# Patient Record
Sex: FEMALE | Race: WHITE | HISPANIC OR LATINO | Employment: FULL TIME | URBAN - METROPOLITAN AREA
[De-identification: names, ages, dates, MRNs, and addresses within clinical notes are randomized per-mention and may not be internally consistent; named-entity substitution may affect disease eponyms.]

---

## 2017-05-26 ENCOUNTER — GENERIC CONVERSION - ENCOUNTER (OUTPATIENT)
Dept: OTHER | Facility: OTHER | Age: 27
End: 2017-05-26

## 2017-06-01 ENCOUNTER — GENERIC CONVERSION - ENCOUNTER (OUTPATIENT)
Dept: OTHER | Facility: OTHER | Age: 27
End: 2017-06-01

## 2017-07-28 ENCOUNTER — ALLSCRIPTS OFFICE VISIT (OUTPATIENT)
Dept: OTHER | Facility: OTHER | Age: 27
End: 2017-07-28

## 2017-07-28 DIAGNOSIS — M54.41 LOW BACK PAIN WITH RIGHT-SIDED SCIATICA: ICD-10-CM

## 2017-07-28 DIAGNOSIS — E89.0 POSTPROCEDURAL HYPOTHYROIDISM: ICD-10-CM

## 2017-07-28 LAB
BILIRUB UR QL STRIP: NORMAL
CLARITY UR: NORMAL
COLOR UR: YELLOW
GLUCOSE (HISTORICAL): NORMAL
HGB UR QL STRIP.AUTO: NORMAL
KETONES UR STRIP-MCNC: NORMAL MG/DL
LEUKOCYTE ESTERASE UR QL STRIP: NORMAL
NITRITE UR QL STRIP: NORMAL
PH UR STRIP.AUTO: 6 [PH]
PROT UR STRIP-MCNC: NORMAL MG/DL
SP GR UR STRIP.AUTO: 1.01
UROBILINOGEN UR QL STRIP.AUTO: NORMAL

## 2017-07-31 ENCOUNTER — TRANSCRIBE ORDERS (OUTPATIENT)
Dept: ADMINISTRATIVE | Facility: HOSPITAL | Age: 27
End: 2017-07-31

## 2017-07-31 DIAGNOSIS — M54.41 ACUTE BACK PAIN WITH SCIATICA, RIGHT: Primary | ICD-10-CM

## 2017-08-10 ENCOUNTER — TRANSCRIBE ORDERS (OUTPATIENT)
Dept: ADMINISTRATIVE | Facility: HOSPITAL | Age: 27
End: 2017-08-10

## 2017-08-10 ENCOUNTER — APPOINTMENT (OUTPATIENT)
Dept: LAB | Facility: HOSPITAL | Age: 27
End: 2017-08-10
Payer: COMMERCIAL

## 2017-08-10 ENCOUNTER — HOSPITAL ENCOUNTER (OUTPATIENT)
Dept: RADIOLOGY | Facility: HOSPITAL | Age: 27
Discharge: HOME | End: 2017-08-10
Payer: COMMERCIAL

## 2017-08-10 DIAGNOSIS — M54.41 LOW BACK PAIN WITH RIGHT-SIDED SCIATICA: ICD-10-CM

## 2017-08-10 DIAGNOSIS — E89.0 POSTSURGICAL HYPOTHYROIDISM: ICD-10-CM

## 2017-08-10 DIAGNOSIS — E89.0 POSTPROCEDURAL HYPOTHYROIDISM: ICD-10-CM

## 2017-08-10 DIAGNOSIS — Z79.899 ENCOUNTER FOR LONG-TERM (CURRENT) USE OF OTHER MEDICATIONS: ICD-10-CM

## 2017-08-10 DIAGNOSIS — Z83.3 FAMILY HISTORY OF DIABETES MELLITUS: ICD-10-CM

## 2017-08-10 DIAGNOSIS — C73 MALIGNANT NEOPLASM OF THYROID GLAND (HCC): ICD-10-CM

## 2017-08-10 DIAGNOSIS — C73 MALIGNANT NEOPLASM OF THYROID GLAND (HCC): Primary | ICD-10-CM

## 2017-08-10 LAB
25(OH)D3 SERPL-MCNC: 7.1 NG/ML (ref 30–100)
ALBUMIN SERPL BCP-MCNC: 3.8 G/DL (ref 3.5–5)
ALP SERPL-CCNC: 45 U/L (ref 46–116)
ALT SERPL W P-5'-P-CCNC: 36 U/L (ref 12–78)
ANION GAP SERPL CALCULATED.3IONS-SCNC: 11 MMOL/L (ref 4–13)
AST SERPL W P-5'-P-CCNC: 35 U/L (ref 5–45)
BASOPHILS # BLD AUTO: 0.02 THOUSANDS/ΜL (ref 0–0.1)
BASOPHILS NFR BLD AUTO: 0 % (ref 0–1)
BILIRUB SERPL-MCNC: 0.74 MG/DL (ref 0.2–1)
BILIRUB UR QL STRIP: NEGATIVE
BUN SERPL-MCNC: 9 MG/DL (ref 5–25)
CALCIUM SERPL-MCNC: 7.9 MG/DL (ref 8.3–10.1)
CHLORIDE SERPL-SCNC: 106 MMOL/L (ref 100–108)
CHOLEST SERPL-MCNC: 283 MG/DL (ref 50–200)
CLARITY UR: NORMAL
CO2 SERPL-SCNC: 24 MMOL/L (ref 21–32)
COLOR UR: YELLOW
CREAT SERPL-MCNC: 1.1 MG/DL (ref 0.6–1.3)
EOSINOPHIL # BLD AUTO: 0.22 THOUSAND/ΜL (ref 0–0.61)
EOSINOPHIL NFR BLD AUTO: 2 % (ref 0–6)
ERYTHROCYTE [DISTWIDTH] IN BLOOD BY AUTOMATED COUNT: 15.9 % (ref 11.6–15.1)
GFR SERPL CREATININE-BSD FRML MDRD: 68 ML/MIN/1.73SQ M
GLUCOSE P FAST SERPL-MCNC: 87 MG/DL (ref 65–99)
GLUCOSE UR STRIP-MCNC: NEGATIVE MG/DL
HCT VFR BLD AUTO: 40.9 % (ref 34.8–46.1)
HDLC SERPL-MCNC: 48 MG/DL (ref 40–60)
HGB BLD-MCNC: 13.3 G/DL (ref 11.5–15.4)
HGB UR QL STRIP.AUTO: NEGATIVE
KETONES UR STRIP-MCNC: NEGATIVE MG/DL
LDLC SERPL CALC-MCNC: 214 MG/DL (ref 0–100)
LEUKOCYTE ESTERASE UR QL STRIP: NEGATIVE
LYMPHOCYTES # BLD AUTO: 2.78 THOUSANDS/ΜL (ref 0.6–4.47)
LYMPHOCYTES NFR BLD AUTO: 26 % (ref 14–44)
MCH RBC QN AUTO: 28.2 PG (ref 26.8–34.3)
MCHC RBC AUTO-ENTMCNC: 32.5 G/DL (ref 31.4–37.4)
MCV RBC AUTO: 87 FL (ref 82–98)
MONOCYTES # BLD AUTO: 0.53 THOUSAND/ΜL (ref 0.17–1.22)
MONOCYTES NFR BLD AUTO: 5 % (ref 4–12)
NEUTROPHILS # BLD AUTO: 7.03 THOUSANDS/ΜL (ref 1.85–7.62)
NEUTS SEG NFR BLD AUTO: 67 % (ref 43–75)
NITRITE UR QL STRIP: NEGATIVE
NRBC BLD AUTO-RTO: 0 /100 WBCS
PH UR STRIP.AUTO: 6 [PH] (ref 4.5–8)
PLATELET # BLD AUTO: 367 THOUSANDS/UL (ref 149–390)
PMV BLD AUTO: 9.7 FL (ref 8.9–12.7)
POTASSIUM SERPL-SCNC: 3.9 MMOL/L (ref 3.5–5.3)
PROT SERPL-MCNC: 8 G/DL (ref 6.4–8.2)
PROT UR STRIP-MCNC: NEGATIVE MG/DL
RBC # BLD AUTO: 4.71 MILLION/UL (ref 3.81–5.12)
SODIUM SERPL-SCNC: 141 MMOL/L (ref 136–145)
SP GR UR STRIP.AUTO: 1.02 (ref 1–1.03)
T4 FREE SERPL-MCNC: 0.3 NG/DL (ref 0.76–1.46)
T4 FREE SERPL-MCNC: 0.3 NG/DL (ref 0.76–1.46)
TRIGL SERPL-MCNC: 104 MG/DL
TSH SERPL DL<=0.05 MIU/L-ACNC: 73.43 UIU/ML (ref 0.36–3.74)
UROBILINOGEN UR QL STRIP.AUTO: 0.2 E.U./DL
WBC # BLD AUTO: 10.58 THOUSAND/UL (ref 4.31–10.16)

## 2017-08-10 PROCEDURE — 84443 ASSAY THYROID STIM HORMONE: CPT

## 2017-08-10 PROCEDURE — 80061 LIPID PANEL: CPT

## 2017-08-10 PROCEDURE — 84432 ASSAY OF THYROGLOBULIN: CPT

## 2017-08-10 PROCEDURE — 81003 URINALYSIS AUTO W/O SCOPE: CPT

## 2017-08-10 PROCEDURE — 85025 COMPLETE CBC W/AUTO DIFF WBC: CPT

## 2017-08-10 PROCEDURE — 80053 COMPREHEN METABOLIC PANEL: CPT

## 2017-08-10 PROCEDURE — 82306 VITAMIN D 25 HYDROXY: CPT

## 2017-08-10 PROCEDURE — 86800 THYROGLOBULIN ANTIBODY: CPT

## 2017-08-10 PROCEDURE — 36415 COLL VENOUS BLD VENIPUNCTURE: CPT

## 2017-08-10 PROCEDURE — 84439 ASSAY OF FREE THYROXINE: CPT

## 2017-08-10 PROCEDURE — 72110 X-RAY EXAM L-2 SPINE 4/>VWS: CPT

## 2017-08-11 ENCOUNTER — GENERIC CONVERSION - ENCOUNTER (OUTPATIENT)
Dept: OTHER | Facility: OTHER | Age: 27
End: 2017-08-11

## 2017-08-11 LAB
THYROGLOB AB SERPL-ACNC: <1 IU/ML (ref 0–0.9)
THYROGLOB AB SERPL-ACNC: <1 IU/ML (ref 0–0.9)
THYROGLOB SERPL-MCNC: 157.4 NG/ML (ref 1.5–38.5)

## 2017-08-15 ENCOUNTER — GENERIC CONVERSION - ENCOUNTER (OUTPATIENT)
Dept: OTHER | Facility: OTHER | Age: 27
End: 2017-08-15

## 2017-08-21 ENCOUNTER — HOSPITAL ENCOUNTER (OUTPATIENT)
Dept: NEUROLOGY | Facility: CLINIC | Age: 27
Discharge: HOME | End: 2017-08-21
Payer: COMMERCIAL

## 2017-08-21 DIAGNOSIS — M54.41 ACUTE BACK PAIN WITH SCIATICA, RIGHT: ICD-10-CM

## 2017-08-21 PROCEDURE — 95886 MUSC TEST DONE W/N TEST COMP: CPT

## 2017-08-21 PROCEDURE — 95910 NRV CNDJ TEST 7-8 STUDIES: CPT

## 2017-08-23 ENCOUNTER — TRANSCRIBE ORDERS (OUTPATIENT)
Dept: ADMINISTRATIVE | Age: 27
End: 2017-08-23

## 2017-08-23 ENCOUNTER — HOSPITAL ENCOUNTER (OUTPATIENT)
Dept: RADIOLOGY | Age: 27
Discharge: HOME | End: 2017-08-23
Payer: COMMERCIAL

## 2017-08-23 ENCOUNTER — APPOINTMENT (OUTPATIENT)
Dept: LAB | Age: 27
End: 2017-08-23
Payer: COMMERCIAL

## 2017-08-23 DIAGNOSIS — C73 MALIGNANT NEOPLASM OF THYROID GLAND (HCC): Primary | ICD-10-CM

## 2017-08-23 DIAGNOSIS — C73 MALIGNANT NEOPLASM OF THYROID GLAND (HCC): ICD-10-CM

## 2017-08-23 LAB — B-HCG SERPL-ACNC: <2 MIU/ML

## 2017-08-23 PROCEDURE — 84702 CHORIONIC GONADOTROPIN TEST: CPT

## 2017-08-23 PROCEDURE — 36415 COLL VENOUS BLD VENIPUNCTURE: CPT

## 2017-08-24 ENCOUNTER — HOSPITAL ENCOUNTER (OUTPATIENT)
Dept: RADIOLOGY | Age: 27
Discharge: HOME | End: 2017-08-24
Payer: COMMERCIAL

## 2017-08-24 DIAGNOSIS — C73 MALIGNANT NEOPLASM OF THYROID GLAND (HCC): ICD-10-CM

## 2017-08-25 ENCOUNTER — HOSPITAL ENCOUNTER (OUTPATIENT)
Dept: RADIOLOGY | Facility: HOSPITAL | Age: 27
Discharge: HOME | End: 2017-08-25
Payer: COMMERCIAL

## 2017-08-25 ENCOUNTER — HOSPITAL ENCOUNTER (OUTPATIENT)
Dept: RADIOLOGY | Age: 27
Discharge: HOME | End: 2017-08-25
Payer: COMMERCIAL

## 2017-08-25 DIAGNOSIS — C73 MALIGNANT NEOPLASM OF THYROID GLAND (HCC): ICD-10-CM

## 2017-08-25 PROCEDURE — A9517 I131 IODIDE CAP, RX: HCPCS

## 2017-08-25 PROCEDURE — 78018 THYROID MET IMAGING BODY: CPT

## 2017-08-25 PROCEDURE — 79005 NUCLEAR RX ORAL ADMIN: CPT

## 2017-08-25 PROCEDURE — A9509 IODINE I-123 SOD IODIDE MIL: HCPCS

## 2017-08-31 ENCOUNTER — HOSPITAL ENCOUNTER (OUTPATIENT)
Dept: RADIOLOGY | Age: 27
Discharge: HOME | End: 2017-08-31
Payer: COMMERCIAL

## 2017-08-31 DIAGNOSIS — C73 MALIGNANT NEOPLASM OF THYROID GLAND (HCC): ICD-10-CM

## 2017-08-31 PROCEDURE — 78018 THYROID MET IMAGING BODY: CPT

## 2017-09-07 ENCOUNTER — TRANSCRIBE ORDERS (OUTPATIENT)
Dept: ADMINISTRATIVE | Facility: HOSPITAL | Age: 27
End: 2017-09-07

## 2017-09-07 ENCOUNTER — APPOINTMENT (OUTPATIENT)
Dept: LAB | Facility: HOSPITAL | Age: 27
End: 2017-09-07
Payer: COMMERCIAL

## 2017-09-07 DIAGNOSIS — E55.9 UNSPECIFIED VITAMIN D DEFICIENCY: ICD-10-CM

## 2017-09-07 DIAGNOSIS — Z79.899 ENCOUNTER FOR LONG-TERM (CURRENT) USE OF OTHER MEDICATIONS: Primary | ICD-10-CM

## 2017-09-07 DIAGNOSIS — Z79.899 ENCOUNTER FOR LONG-TERM (CURRENT) USE OF OTHER MEDICATIONS: ICD-10-CM

## 2017-09-07 LAB
25(OH)D3 SERPL-MCNC: 9.6 NG/ML (ref 30–100)
ALBUMIN SERPL BCP-MCNC: 3.9 G/DL (ref 3.5–5)
ALP SERPL-CCNC: 61 U/L (ref 46–116)
ALT SERPL W P-5'-P-CCNC: 28 U/L (ref 12–78)
ANION GAP SERPL CALCULATED.3IONS-SCNC: 6 MMOL/L (ref 4–13)
AST SERPL W P-5'-P-CCNC: 39 U/L (ref 5–45)
BASOPHILS # BLD AUTO: 0.02 THOUSANDS/ΜL (ref 0–0.1)
BASOPHILS NFR BLD AUTO: 0 % (ref 0–1)
BILIRUB SERPL-MCNC: 1.18 MG/DL (ref 0.2–1)
BUN SERPL-MCNC: 8 MG/DL (ref 5–25)
CALCIUM SERPL-MCNC: 7.3 MG/DL (ref 8.3–10.1)
CHLORIDE SERPL-SCNC: 105 MMOL/L (ref 100–108)
CHOLEST SERPL-MCNC: 247 MG/DL (ref 50–200)
CO2 SERPL-SCNC: 26 MMOL/L (ref 21–32)
CREAT SERPL-MCNC: 1.21 MG/DL (ref 0.6–1.3)
EOSINOPHIL # BLD AUTO: 0.22 THOUSAND/ΜL (ref 0–0.61)
EOSINOPHIL NFR BLD AUTO: 3 % (ref 0–6)
ERYTHROCYTE [DISTWIDTH] IN BLOOD BY AUTOMATED COUNT: 16.3 % (ref 11.6–15.1)
EST. AVERAGE GLUCOSE BLD GHB EST-MCNC: 120 MG/DL
FOLATE SERPL-MCNC: 3.3 NG/ML (ref 3.1–17.5)
GFR SERPL CREATININE-BSD FRML MDRD: 61 ML/MIN/1.73SQ M
GLUCOSE P FAST SERPL-MCNC: 88 MG/DL (ref 65–99)
HBA1C MFR BLD: 5.8 % (ref 4.2–6.3)
HCT VFR BLD AUTO: 42.2 % (ref 34.8–46.1)
HDLC SERPL-MCNC: 45 MG/DL (ref 40–60)
HGB BLD-MCNC: 13.6 G/DL (ref 11.5–15.4)
LDLC SERPL CALC-MCNC: 185 MG/DL (ref 0–100)
LITHIUM SERPL-SCNC: 0.4 MMOL/L (ref 0.5–1)
LYMPHOCYTES # BLD AUTO: 1.39 THOUSANDS/ΜL (ref 0.6–4.47)
LYMPHOCYTES NFR BLD AUTO: 17 % (ref 14–44)
MCH RBC QN AUTO: 28.6 PG (ref 26.8–34.3)
MCHC RBC AUTO-ENTMCNC: 32.2 G/DL (ref 31.4–37.4)
MCV RBC AUTO: 89 FL (ref 82–98)
MONOCYTES # BLD AUTO: 0.49 THOUSAND/ΜL (ref 0.17–1.22)
MONOCYTES NFR BLD AUTO: 6 % (ref 4–12)
NEUTROPHILS # BLD AUTO: 6.2 THOUSANDS/ΜL (ref 1.85–7.62)
NEUTS SEG NFR BLD AUTO: 74 % (ref 43–75)
NRBC BLD AUTO-RTO: 0 /100 WBCS
PLATELET # BLD AUTO: 306 THOUSANDS/UL (ref 149–390)
PMV BLD AUTO: 10.3 FL (ref 8.9–12.7)
POTASSIUM SERPL-SCNC: 4.2 MMOL/L (ref 3.5–5.3)
PROT SERPL-MCNC: 8.5 G/DL (ref 6.4–8.2)
RBC # BLD AUTO: 4.75 MILLION/UL (ref 3.81–5.12)
SODIUM SERPL-SCNC: 137 MMOL/L (ref 136–145)
T4 FREE SERPL-MCNC: 0.65 NG/DL (ref 0.76–1.46)
TRIGL SERPL-MCNC: 84 MG/DL
TSH SERPL DL<=0.05 MIU/L-ACNC: 98.32 UIU/ML (ref 0.36–3.74)
VIT B12 SERPL-MCNC: 676 PG/ML (ref 100–900)
WBC # BLD AUTO: 8.32 THOUSAND/UL (ref 4.31–10.16)

## 2017-09-07 PROCEDURE — 84439 ASSAY OF FREE THYROXINE: CPT

## 2017-09-07 PROCEDURE — 82746 ASSAY OF FOLIC ACID SERUM: CPT

## 2017-09-07 PROCEDURE — 83036 HEMOGLOBIN GLYCOSYLATED A1C: CPT

## 2017-09-07 PROCEDURE — 36415 COLL VENOUS BLD VENIPUNCTURE: CPT

## 2017-09-07 PROCEDURE — 82306 VITAMIN D 25 HYDROXY: CPT

## 2017-09-07 PROCEDURE — 85025 COMPLETE CBC W/AUTO DIFF WBC: CPT

## 2017-09-07 PROCEDURE — 80178 ASSAY OF LITHIUM: CPT

## 2017-09-07 PROCEDURE — 84443 ASSAY THYROID STIM HORMONE: CPT

## 2017-09-07 PROCEDURE — 80061 LIPID PANEL: CPT

## 2017-09-07 PROCEDURE — 82607 VITAMIN B-12: CPT

## 2017-09-07 PROCEDURE — 80053 COMPREHEN METABOLIC PANEL: CPT

## 2017-09-15 ENCOUNTER — ALLSCRIPTS OFFICE VISIT (OUTPATIENT)
Dept: OTHER | Facility: OTHER | Age: 27
End: 2017-09-15

## 2017-09-15 DIAGNOSIS — C73 MALIGNANT NEOPLASM OF THYROID GLAND (HCC): ICD-10-CM

## 2017-09-15 DIAGNOSIS — E83.51 HYPOCALCEMIA: ICD-10-CM

## 2017-10-10 ENCOUNTER — GENERIC CONVERSION - ENCOUNTER (OUTPATIENT)
Dept: OTHER | Facility: OTHER | Age: 27
End: 2017-10-10

## 2017-10-13 ENCOUNTER — ALLSCRIPTS OFFICE VISIT (OUTPATIENT)
Dept: OTHER | Facility: OTHER | Age: 27
End: 2017-10-13

## 2017-10-14 NOTE — CONSULTS
Assessment  1  Thyroid cancer (193) (C73)    Plan  Thyroid cancer    · Follow Up After Tests Complete Evaluation and Treatment  Follow-up  Status: Hold For -  Scheduling  Requested for: 92MJY2004   Ordered; For: Thyroid cancer; Ordered By: Vernell Rodríguez Performed:  Due: 01PBE0640   · (1) T3 TOTAL; Status:Active; Requested NICOLAS:[id number];    Perform:[facility]; Due:13Oct2018; Ordered; For:Thyroid cancer; Ordered By:Ian Barakat;   · (1) T4, FREE; Status:Active; Requested UZF:00OMF0597;    Perform:[facility]; Due:13Oct2018; Ordered; For:Thyroid cancer; Ordered By:Ian Barakat;   · (1) THYROGLOBULIN/QUANT W/ANTIBODY PANEL; Status:Active; Requested  SIN:66UYF3580;    Perform:[facility]; Due:13Oct2018; Ordered; For:Thyroid cancer; Ordered By:Ian Barakat;   · (1) TSH; Status:Active; Requested ICZ:73BQK4874;    Perform:[facility]; Due:13Oct2018; Ordered; For:Thyroid cancer; Ordered By:Ian Barakat;   · US HEAD NECK [place]; Status:Active; Requested UTX:52VOS1236  02:15PM;    Perform:[facility]; Due:16Oct2018; Last Updated By:Moyer, Denise Buerger; 10/13/2017 12:38:14 PM;Ordered; For:Thyroid cancer; Ordered By:Michelle Barakat; Discussion/Summary  Discussion Summary:   History of thyroid cancer, status post radioactive iodine ablation  We will set up for repeat blood work and ultrasound for surveillance  Follow-up here once results are available for review  Further management to be determined based on results  Goals and Barriers: The patient has the current Goals: Diagnosis/surveillance  The patent has the current Barriers: None  Patient's Capacity to Self-Care: Patient is able to Self-Care  Patient agrees and allows to involve family/caregiver in development of care plan:   Medication SE Review and Pt Understands Tx: The treatment plan was reviewed with the patient/guardian   The patient/guardian understands and agrees with the treatment plan Chief Complaint  Chief Complaint Free Text Note Form: Patient here for surgical consult history of thyroid cancer  Here to review recent scan  History of Present Illness  HPI: Patient is a 28year old woman Status post philip then total thyroidectomy in spring of this year  She underwent a recent iodine scan in August of this year and was noted to have residual area of uptake in the neck  She subsequently underwent an ablative dose  She has been referred for evaluation and treatment  She has been taking 125 mcg of Synthroid daily  Previous Therapy:   Philip and completion/total thyroidectomy 2017  radioactive iodine August 2017      Review of Systems  Complete Female ROS SurgOnc:   Constitutional: feeling poorly,-- feeling tired-- and-- change in appetite, but-- The patient denies new or recent history of general fatigue, no recent weight loss, no change in appetite  Eyes: No complaints of visual problems, no scleral icterus  ENT: no complaints of ear pain, no hoarseness, no difficulty swallowing  Cardiovascular: No complaints of chest pain, no palpitations, no ankle edema  Respiratory: No complaints of shortness of breath, no cough  Gastrointestinal: No complaints of jaundice, no bloody stools, no pale stools  Genitourinary: No complaints of dysuria, no hematuria, no nocturia, no frequent urination, no urethral discharge  Musculoskeletal: No complaints of weakness, paralysis, joint stiffness or arthralgias,  Integumentary: No complaints of rash, no new lesions  Neurological: No complaints of convulsions, no seizures, no dizziness  Hematologic/Lymphatic: No complaints of easy bruising  ROS Reviewed:   ROS reviewed  Active Problems  1  Cervical cancer screening (V76 2) (Z12 4)   2  Chronic bilateral low back pain with right-sided sciatica (724 2,724 3,338 29)   (M54 41,G89 29)   3  Hypocalcemia (275 41) (E83 51)   4  Postoperative hypothyroidism (244 0) (E89 0)   5   Schizoaffective disorder, bipolar type (295 70) (F25 0)   6  Thyroid cancer (193) (C73)   7  Urinary frequency (788 41) (R35 0)   8  Vitamin D deficiency (268 9) (E55 9)    Past Medical History  1  History of bipolar disorder (V11 1) (Z86 59)  Active Problems And Past Medical History Reviewed: The active problems and past medical history were reviewed and updated today  Surgical History  1  History of Thyroid Surgery Total Thyroidectomy  Surgical History Reviewed: The surgical history was reviewed and updated today  Family History  Mother    1  Family history of Anxiety and depression   2  Family history of HTN (hypertension), benign  Father    3  Family history of hyperlipidemia (V18 19) (Z83 49)   4  Family history of HTN (hypertension), benign  Family History Reviewed: The family history was reviewed and updated today  Social History   · Former smoker (V79 51) (Z31 340)   · History of marijuana use (305 23) (Z87 898)  Social History Reviewed: The social history was reviewed and updated today  The social history was reviewed and is unchanged  Current Meds   1  Calcium Carbonate 600 MG Oral Tablet; Take 1 tablet twice daily; Therapy: 68Ysx8872 to (Evaluate:46Hgd2515)  Requested for: 50Gze4121; Last   Rx:80Vdz0087 Ordered   2  Levothyroxine Sodium 125 MCG Oral Tablet; TAKE 1 TABLET DAILY AS DIRECTED; Therapy: 85Qhm8673 to (Evaluate:04Wmb7364)  Requested for: 21Cpr4167; Last   Rx:73Gmh7663 Ordered   3  Methocarbamol 500 MG Oral Tablet; TAKE 1 TABLET 3 TIMES DAILY; Therapy: 74PDI0943 to (Evaluate:42Jnm1225)  Requested for: 67LDJ2718; Last   Rx:44Inl2695 Ordered   4  Naproxen 500 MG Oral Tablet; take 1 tablet every 12 hours with food as needed; Therapy: 85YGT8616 to (Evaluate:68Cgv7611)  Requested for: 35XYA0360; Last   Rx:71Isu2967 Ordered   5  Vitamin D (Ergocalciferol) 49617 UNIT Oral Capsule; TAKE 1 CAPSULE Weekly;    Therapy: 85Vsd2789 to (Last Rx:38Skv4137)  Requested for: 95Lpp1966 Ordered  Medication List Reviewed: The medication list was reviewed and updated today  Allergies  1  No Known Drug Allergies    Vitals  Vital Signs    Recorded: 86LXC1818 11:59AM   Temperature 98 F   Heart Rate 78   Respiration 16   Systolic 092   Diastolic 78   Height 5 ft 7 in   Weight 263 lb 4 oz   BMI Calculated 41 23   BSA Calculated 2 27   O2 Saturation 96     Physical Exam    Constitutional: General appearance: The Patient is well-developed, well-nourished female who appears her stated age in no acute distress  She is pleasant and talkative  HEENT: Head and face: Normal  -- Conjunctiva and lids: No swelling, erythema, or discharge  -- CHRISTIAN, EOMI and the sclerae are anicteric  -- External inspection of ears and nose: Normal  -- Neck: Abnormal  -- left neck cystic hygroma status post prior resection, with recurrence, posterior neck; keloid scar from thyroidectomy  Results/Data  Diagnostic Studies Reviewed Surg Onc:   Lab Review (1) THYROGLOBULIN/QUANT W/ANTIBODY PANEL Final  Documents Attached  ordered by: Pat Decker   Test Result Flag Reference Goal   THYROGLOB AB <1 0 IU/mL 0 0 - 0 9 New   Thyroglobulin Antibody measured by [facility] Methodologyat: [address], [place] [id number]: Dafne Lindsey MD, Phone: [phone]      THYROGLOBULIN-ISADORA 157 4 ng/mL H 1 5 - 38 5 New   According to the [facility] of Clinical Biochemistry, theinterval for Thyroglobulin (TG) should be related topatients and not for patients who underwent thyroidectomy  reference intervals for these patients depend on the residualof the thyroid tissue left after surgery  Establishing abaseline is recommended  assay limit of quantitation is 0 1 ng/mLmeasured by [facility] Immunometric [facility]at: [address] [place], [place] [id number]: Dafne Lindsey MD, Phone: [phone]      by: Shayy Green, Provider Collected/Examined: 19MAR7743 12:54PM Not Required Stage: Final Resulted: 13JMU8494 12:54PM Last Updated: 58Yvw2374 05:04AM Accession: [id number]           Signatures   Electronically signed by : Sully Locke MD; Oct 13 2017 12:39PM EST                       (Author)

## 2017-10-16 ENCOUNTER — HOSPITAL ENCOUNTER (OUTPATIENT)
Dept: ULTRASOUND IMAGING | Facility: HOSPITAL | Age: 27
Discharge: HOME | End: 2017-10-16
Attending: SURGERY
Payer: COMMERCIAL

## 2017-10-16 DIAGNOSIS — C73 MALIGNANT NEOPLASM OF THYROID GLAND (HCC): ICD-10-CM

## 2017-10-16 PROCEDURE — 76536 US EXAM OF HEAD AND NECK: CPT

## 2017-10-20 ENCOUNTER — ALLSCRIPTS OFFICE VISIT (OUTPATIENT)
Dept: OTHER | Facility: OTHER | Age: 27
End: 2017-10-20

## 2017-11-27 ENCOUNTER — APPOINTMENT (OUTPATIENT)
Dept: LAB | Facility: HOSPITAL | Age: 27
End: 2017-11-27
Attending: SURGERY
Payer: COMMERCIAL

## 2017-11-27 DIAGNOSIS — C73 MALIGNANT NEOPLASM OF THYROID GLAND (HCC): ICD-10-CM

## 2017-11-27 LAB
T3 SERPL-MCNC: 0.6 NG/ML (ref 0.6–1.8)
T4 FREE SERPL-MCNC: 0.71 NG/DL (ref 0.76–1.46)
TSH SERPL DL<=0.05 MIU/L-ACNC: 105.24 UIU/ML (ref 0.36–3.74)

## 2017-11-27 PROCEDURE — 84443 ASSAY THYROID STIM HORMONE: CPT

## 2017-11-27 PROCEDURE — 36415 COLL VENOUS BLD VENIPUNCTURE: CPT

## 2017-11-27 PROCEDURE — 84432 ASSAY OF THYROGLOBULIN: CPT

## 2017-11-27 PROCEDURE — 84439 ASSAY OF FREE THYROXINE: CPT

## 2017-11-27 PROCEDURE — 84480 ASSAY TRIIODOTHYRONINE (T3): CPT

## 2017-11-27 PROCEDURE — 86800 THYROGLOBULIN ANTIBODY: CPT

## 2017-11-28 LAB
THYROGLOB AB SERPL-ACNC: <1 IU/ML (ref 0–0.9)
THYROGLOB SERPL-MCNC: 43.4 NG/ML (ref 1.5–38.5)

## 2018-01-11 NOTE — RESULT NOTES
Verified Results  (1) TSH WITH FT4 REFLEX 10Aug2017 12:51PM Fay Kramer Order Number: [id number]     Test Name Result Flag Reference   TSH 73 430 uIU/mL H 0 358-3 740   A FT4 has been ordered      Patients undergoing fluorescein dye angiography may retain small amounts of fluorescein in the body for 48-72 hours post procedure  Samples containing fluorescein can produce falsely depressed TSH values  If the patient had this procedure,a specimen should be resubmitted post fluorescein clearance  The recommended reference ranges for TSH during pregnancy are as follows:  First trimester 0 1 to 2 5 uIU/mL  Second trimester  0 2 to 3 0 uIU/mL  Third trimester 0 3 to 3 0 uIU/m   T4,FREE 0 30 ng/dL L 0 76-1 46   Specimen collection should occur prior to Sulfasalazine administration due to the potential for falsely elevated results

## 2018-01-12 NOTE — RESULT NOTES
Verified Results  * XR SPINE LUMBAR MINIMUM 4 VIEWS NON INJURY 10Aug2017 12:26PM Myriam Grayson Order Number: [id number]     Test Name Result Flag Reference   XR SPINE LUMBAR MINIMUM 4 VIEWS (Report)     LUMBAR SPINE     INDICATION: Lower back pain for 2 years  COMPARISON: None     VIEWS: AP, lateral, bilateral oblique and coned down projections     IMAGES: 5     FINDINGS:     Alignment is unremarkable  There is no radiographic evidence of acute fracture or destructive osseous lesion  No significant lumbar degenerative change noted  The pedicles appear intact  No pars defects  Visualized soft tissues appear unremarkable  IMPRESSION:     Normal examination         Workstation performed: [id number]     Signed by:   Jc Garcia DO   8/15/17

## 2018-01-13 VITALS
HEART RATE: 78 BPM | OXYGEN SATURATION: 96 % | WEIGHT: 263.25 LBS | HEIGHT: 67 IN | SYSTOLIC BLOOD PRESSURE: 120 MMHG | BODY MASS INDEX: 41.32 KG/M2 | RESPIRATION RATE: 16 BRPM | DIASTOLIC BLOOD PRESSURE: 78 MMHG | TEMPERATURE: 98 F

## 2018-01-14 VITALS
SYSTOLIC BLOOD PRESSURE: 112 MMHG | DIASTOLIC BLOOD PRESSURE: 76 MMHG | HEART RATE: 88 BPM | HEIGHT: 67 IN | BODY MASS INDEX: 41.48 KG/M2 | OXYGEN SATURATION: 98 % | TEMPERATURE: 96.9 F | WEIGHT: 264.31 LBS | RESPIRATION RATE: 20 BRPM

## 2018-01-14 VITALS
HEIGHT: 67 IN | WEIGHT: 259 LBS | SYSTOLIC BLOOD PRESSURE: 110 MMHG | HEART RATE: 92 BPM | TEMPERATURE: 97.4 F | DIASTOLIC BLOOD PRESSURE: 78 MMHG | RESPIRATION RATE: 18 BRPM | OXYGEN SATURATION: 99 % | BODY MASS INDEX: 40.65 KG/M2

## 2018-01-18 NOTE — RESULT NOTES
Verified Results  (1) CBC/PLT/DIFF 10Aug2017 12:51PM Carlene Sahu Order Number: [id number]     Test Name Result Flag Reference   WBC COUNT 10 58 Thousand/uL H 4 31-10 16   RBC COUNT 4 71 Million/uL  3 81-5 12   HEMOGLOBIN 13 3 g/dL  11 5-15 4   HEMATOCRIT 40 9 %  34 8-46  1   MCV 87 fL  82-98   MCH 28 2 pg  26 8-34 3   MCHC 32 5 g/dL  31 4-37 4   RDW 15 9 % H 11 6-15 1   MPV 9 7 fL  8 9-12 7   PLATELET COUNT 876 Thousands/uL  149-390   nRBC AUTOMATED 0 /100 WBCs     NEUTROPHILS RELATIVE PERCENT 67 %  43-75   LYMPHOCYTES RELATIVE PERCENT 26 %  14-44   MONOCYTES RELATIVE PERCENT 5 %  4-12   EOSINOPHILS RELATIVE PERCENT 2 %  0-6   BASOPHILS RELATIVE PERCENT 0 %  0-1   NEUTROPHILS ABSOLUTE COUNT 7 03 Thousands/? ??L  1 85-7 62   LYMPHOCYTES ABSOLUTE COUNT 2 78 Thousands/? ??L  0 60-4 47   MONOCYTES ABSOLUTE COUNT 0 53 Thousand/? ??L  0 17-1 22   EOSINOPHILS ABSOLUTE COUNT 0 22 Thousand/? ??L  0 00-0 61   BASOPHILS ABSOLUTE COUNT 0 02 Thousands/? ??L  0 00-0 10     (1) COMPREHENSIVE METABOLIC PANEL 72OCM4793 94:12PY Carlene Sahu Order Number: [id number]     Test Name Result Flag Reference   SODIUM 141 mmol/L  136-145   POTASSIUM 3 9 mmol/L  3 5-5 3   CHLORIDE 106 mmol/L  100-108   CARBON DIOXIDE 24 mmol/L  21-32   ANION GAP (CALC) 11 mmol/L  4-13   BLOOD UREA NITROGEN 9 mg/dL  5-25   CREATININE 1 10 mg/dL  0 60-1 30   Standardized to IDMS reference method   CALCIUM 7 9 mg/dL L 8 3-10 1   BILI, TOTAL 0 74 mg/dL  0 20-1 00   ALK PHOSPHATAS 45 U/L L    ALT (SGPT) 36 U/L  12-78   Specimen collection should occur prior to Sulfasalazine administration due to the potential for falsely depressed results  AST(SGOT) 35 U/L  5-45   Specimen collection should occur prior to Sulfasalazine administration due to the potential for falsely depressed results     ALBUMIN 3 8 g/dL  3 5-5 0   TOTAL PROTEIN 8 0 g/dL  6 4-8 2   eGFR 68 ml/min/1 73sq vanessa eDl [employer] Education Program recommendations are as follows:  GFR calculation is accurate only with a steady state creatinine  Chronic Kidney disease less than 60 ml/min/1 73 sq  meters  Kidney failure less than 15 ml/min/1 73 sq  meters  GLUCOSE FASTING 87 mg/dL  65-99   Specimen collection should occur prior to Sulfasalazine administration due to the potential for falsely depressed results  Specimen collection should occur prior to Sulfapyridine administration due to the potential for falsely elevated results  (1) LIPID PANEL, FASTING 10Aug2017 12:51PM Valeria Tan Order Number: [id number]     Test Name Result Flag Reference   CHOLESTEROL 283 mg/dL H    HDL,DIRECT 48 mg/dL  40-60   Specimen collection should occur prior to Metamizole administration due to the potential for falsley depressed results  LDL CHOLESTEROL CALCULATED 214 mg/dL H 0-100   Triglyceride:        Normal ??? ??? ??? ??? ??? ??? ??? <150 mg/dl   ??? ??? ???Borderline High ??? ??? 150-199 mg/dl   ??? ??? ? ?? High ??? ??? ??? ??? ??? ??? ??? 200-499 mg/dl   ??? ??? ? ??Very High ??? ??? ??? ??? ??? >499 mg/dl      Cholesterol:       Desirable ??? ??? ??? ??? <200 mg/dl   ??? ??? Borderline High ??? 200-239 mg/dl   ??? ??? High ??? ??? ??? ??? ??? ??? >239 mg/dl      HDL Cholesterol:       High ??? ???>59 mg/dL   ??? ??? Low ??? ??? <41 mg/dL      This screening LDL is a calculated result  It does not have the accuracy of the Direct Measured LDL in the monitoring of patients with hyperlipidemia and/or statin therapy  Direct Measure LDL (HCB596) must be ordered separately in these patients  TRIGLYCERIDES 104 mg/dL  <=150   Specimen collection should occur prior to N-Acetylcysteine or Metamizole administration due to the potential for falsely depressed results       (1) TSH WITH FT4 REFLEX 10Aug2017 12:51PM Valeria Tan Order Number: [id number]     Test Name Result Flag Reference   TSH 73 430 uIU/mL H 0 358-3 740   A FT4 has been ordered      Patients undergoing fluorescein dye angiography may retain small amounts of fluorescein in the body for 48-72 hours post procedure  Samples containing fluorescein can produce falsely depressed TSH values  If the patient had this procedure,a specimen should be resubmitted post fluorescein clearance            The recommended reference ranges for TSH during pregnancy are as follows:  First trimester 0 1 to 2 5 uIU/mL  Second trimester  0 2 to 3 0 uIU/mL  Third trimester 0 3 to 3 0 uIU/m     (1) URINALYSIS w URINE C/S REFLEX (will reflex a microscopy if leukocytes, occult blood, or nitrites are not within normal limits) 70PYK7148 12:51PM [place] Order Number: [id number]     Test Name Result Flag Reference   COLOR Yellow     CLARITY Slightly Cloudy     PH UA 6 0  4 5-8 0   LEUKOCYTE ESTERASE UA Negative  Negative   NITRITE UA Negative  Negative   PROTEIN UA Negative mg/dl  Negative   GLUCOSE UA Negative mg/dl  Negative   KETONES UA Negative mg/dl  Negative   UROBILINOGEN UA 0 2 E U /dl  0 2, 1 0 E U /dl   BILIRUBIN UA Negative  Negative   BLOOD UA Negative  Negative, Trace-Intact   SPECIFIC GRAVITY UA 1 025  1 003-1 030

## 2019-01-31 ENCOUNTER — TELEPHONE (OUTPATIENT)
Dept: FAMILY MEDICINE CLINIC | Facility: CLINIC | Age: 29
End: 2019-01-31

## 2019-02-01 NOTE — TELEPHONE ENCOUNTER
Please call phone number on file, and trying get a hold of patient  If she is still living in this area, can you get an updated address

## 2019-02-04 NOTE — TELEPHONE ENCOUNTER
PT CALLED BACK WITH ADDRESS:    [address] [facility], [address] [place] [address]

## 2019-04-25 ENCOUNTER — APPOINTMENT (OUTPATIENT)
Dept: LAB | Facility: HOSPITAL | Age: 29
End: 2019-04-25
Attending: FAMILY MEDICINE
Payer: COMMERCIAL

## 2019-04-25 ENCOUNTER — TRANSCRIBE ORDERS (OUTPATIENT)
Dept: ADMINISTRATIVE | Facility: HOSPITAL | Age: 29
End: 2019-04-25

## 2019-04-25 DIAGNOSIS — E03.9 MYXEDEMA HEART DISEASE: Primary | ICD-10-CM

## 2019-04-25 DIAGNOSIS — E66.9 OBESITY, UNSPECIFIED CLASSIFICATION, UNSPECIFIED OBESITY TYPE, UNSPECIFIED WHETHER SERIOUS COMORBIDITY PRESENT: ICD-10-CM

## 2019-04-25 DIAGNOSIS — E83.51 HYPOCALCEMIA: ICD-10-CM

## 2019-04-25 DIAGNOSIS — E03.9 MYXEDEMA HEART DISEASE: ICD-10-CM

## 2019-04-25 DIAGNOSIS — I51.9 MYXEDEMA HEART DISEASE: Primary | ICD-10-CM

## 2019-04-25 DIAGNOSIS — I51.9 MYXEDEMA HEART DISEASE: ICD-10-CM

## 2019-04-25 LAB
T4 FREE SERPL-MCNC: 1.25 NG/DL (ref 0.76–1.46)
TSH SERPL DL<=0.05 MIU/L-ACNC: 2.73 UIU/ML (ref 0.36–3.74)

## 2019-04-25 PROCEDURE — 84439 ASSAY OF FREE THYROXINE: CPT

## 2019-04-25 PROCEDURE — 36415 COLL VENOUS BLD VENIPUNCTURE: CPT

## 2019-04-25 PROCEDURE — 84443 ASSAY THYROID STIM HORMONE: CPT

## 2020-02-05 ENCOUNTER — TELEPHONE (OUTPATIENT)
Dept: PSYCHIATRY | Facility: CLINIC | Age: 30
End: 2020-02-05

## 2020-02-05 NOTE — TELEPHONE ENCOUNTER
Patient called and is looking for a psychiatrist and a therapist  She has [facility] [employer] insurance    Pleas call her at [phone]  Anytime after 3pm or from 11:50  to 12:20    She is experiencing severe anxiety, depression and she has been diagnosed with bipolar  She wanted to see Dr London

## 2020-02-07 NOTE — TELEPHONE ENCOUNTER
Called pt 2xs  First time, pt hung up on me  2nd time, pt stated I had the wrong # and hung up   [phone] pt/left msg to contact intake to schedule appt  [phone]

## 2020-02-12 ENCOUNTER — TELEPHONE (OUTPATIENT)
Dept: PSYCHIATRY | Facility: CLINIC | Age: 30
End: 2020-02-12

## 2020-02-12 NOTE — TELEPHONE ENCOUNTER
Pt is now scheduled w/ Geronimo Bowers and Fartun Peres - deleted the cell phone number as it was incorrect

## 2020-02-12 NOTE — TELEPHONE ENCOUNTER
Behavorial Health Outpatient Intake Questions    Referred by: PCP    Please advised interviewee that they need to answer all questions truthfully to allow for best care and any misrepresentations of information may affect their ability to be seen at this clinic   => Was this discussed? Yes     Behavorial Health Outpatient Intake History -     Presenting Problem (in patient's words): Looking for both therapy and psychiatry for anxiety and depression  Patient describes feeling depressed and anxious for quiet a long time  She has had suicidal thoughts in the past but not recently, instead, the patient describes recent self destcructive thoughts (quitting job, "go somewhere and not deal with life") patient reports these thoughts hinder her in many aspects of life  Patient reports mood swings, "feeling really really happy" and then crashing down  Patient was put on Lexapro about a month ago and reports not really seeing much of a change  Has the patient ever seen or is currently seeing a psychiatrist? Yes   If yes who/when? So long she doesn't remember    If seen as outpatient, have they been seen here (and by whom)? If not seen here, which provider(s) did the patient see and for how long? Has the patient ever seen or currently see a therapist? No If yes who/when? Has a member of the patient's family been in therapy here? No  If yes, with whom? Has the patient been hospitalized for mental health? Yes   If yes, how long ago was last hospitalization and where was it? Last year [facility] for about 3 weeks - patient describes it wasn't super helpful and she did not want to seek help afterwards  Substance Abuse:No concerns of substance abuse are reported  Patient does not want Benzos!!!      Does the patient have ICM or CTT? No    Is the patient taking injectable psychiatric medications? No    => If yes, patient cannot be seen here  Communications  Are there any developmental disabilities?  No    Does the patient have hearing impairment? No       History-    Has the patient served in the [place] [address]? No    If yes, have you had combat services? No    Was the patient activated into federal active duty as a member of the [employer] or reserve? No    Legal History-     Does the patient have any history of arrests, retirement/snf time, or DUIs? No  If Yes-  1) What types of charges? 2) When were they last incarcerated? 3) Are they currently on parole or probation? Minor Child-    Who has custody of the child? Is there a custody agreement? If there is a custody agreement remind parent that they must bring a copy to the first appt or they will not be seen  Intake Team, please check with provider before scheduling if flags come up such as:  - complex case  - legal history (other than DUI)  - communication barrier concerns are present  - if, in your judgment, this needs further review    ACCEPTED as a patient Yes  => Appointment Date: Tuesday 2/25 @ 10am with Tiara Koehler, Wednesday 3/18 @ 5pm w/Tee Ramos    Referred Elsewhere? No    Name of Insurance Co: [address] [facility] ID# [id number]  Insurance Phone # [phone]  If ins is primary or secondary  If patient is a minor, parents information such as Name, D  O B of guarantor

## 2020-03-17 ENCOUNTER — TELEPHONE (OUTPATIENT)
Dept: PSYCHIATRY | Facility: CLINIC | Age: 30
End: 2020-03-17

## 2020-03-17 NOTE — TELEPHONE ENCOUNTER
Called Iveliss,   Hello, we are calling to ask a few questions as we prepare for your appointment tomorrow  Are you able to attend your appointment in person tomorrow? Yes  (if yes, proceed with the next questions) If NO, reschedule or offer the appointment to be a Brief telephone visit with their provider (15 minute check in that is a billable service)  1  Have you traveled to a community with widespread COVID-19 in the past 14 days? No (IF YES, provide quarantine recommendations)  2  Have you had close contact with someone that has traveled  to an affected area or tested positive to COVID-19? No (IF YES, go to next question)  (IF THEY ANSWER YESYOU ASK   Do you currently have a fever, trouble breathing or a cough?   No (If YES, refer them to PCP AND reschedule this patient or offer the appointment to be a Brief telephone visit with their provider (15 minute check in and that is a billable service)

## 2020-03-18 ENCOUNTER — SOCIAL WORK (OUTPATIENT)
Dept: BEHAVIORAL/MENTAL HEALTH CLINIC | Facility: CLINIC | Age: 30
End: 2020-03-18
Payer: COMMERCIAL

## 2020-03-18 DIAGNOSIS — F43.10 POST TRAUMATIC STRESS DISORDER (PTSD): Primary | ICD-10-CM

## 2020-03-18 PROCEDURE — 90791 PSYCH DIAGNOSTIC EVALUATION: CPT | Performed by: SOCIAL WORKER
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
## 2020-03-19 PROBLEM — F43.10 POST TRAUMATIC STRESS DISORDER (PTSD): Status: ACTIVE | Noted: 2020-03-19

## 2020-03-24 ENCOUNTER — TELEPHONE (OUTPATIENT)
Dept: PSYCHIATRY | Facility: CLINIC | Age: 30
End: 2020-03-24

## 2020-03-30 ENCOUNTER — TELEMEDICINE (OUTPATIENT)
Dept: PSYCHIATRY | Facility: CLINIC | Age: 30
End: 2020-03-30
Payer: COMMERCIAL

## 2020-03-30 DIAGNOSIS — F43.10 PTSD (POST-TRAUMATIC STRESS DISORDER): ICD-10-CM

## 2020-03-30 DIAGNOSIS — F41.1 GAD (GENERALIZED ANXIETY DISORDER): Primary | ICD-10-CM

## 2020-03-30 DIAGNOSIS — F25.0 SCHIZOAFFECTIVE DISORDER, BIPOLAR TYPE (HCC): ICD-10-CM

## 2020-03-30 PROCEDURE — 99214 OFFICE O/P EST MOD 30 MIN: CPT | Performed by: NURSE PRACTITIONER

## 2020-03-30 RX ORDER — PRAZOSIN HYDROCHLORIDE 1 MG/1
1 CAPSULE ORAL
Qty: 30 CAPSULE | Refills: 0 | Status: SHIPPED | OUTPATIENT
Start: 2020-03-30 | End: 2020-04-21

## 2020-03-30 RX ORDER — ESCITALOPRAM OXALATE 10 MG/1
10 TABLET ORAL DAILY
Qty: 30 TABLET | Refills: 2 | Status: SHIPPED | OUTPATIENT
Start: 2020-03-30 | End: 2020-04-30

## 2020-03-30 RX ORDER — LEVOTHYROXINE SODIUM 175 UG/1
200 TABLET ORAL DAILY
COMMUNITY

## 2020-03-30 RX ORDER — OLANZAPINE 10 MG/1
10 TABLET ORAL
COMMUNITY
End: 2020-06-17 | Stop reason: SDUPTHER

## 2020-03-30 RX ORDER — TOPIRAMATE 25 MG/1
25 TABLET ORAL DAILY
COMMUNITY
End: 2020-10-15

## 2020-03-30 RX ORDER — ESCITALOPRAM OXALATE 10 MG/1
10 TABLET ORAL DAILY
COMMUNITY
End: 2020-03-30 | Stop reason: SDUPTHER
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
## 2020-03-30 NOTE — BH TREATMENT PLAN
TREATMENT PLAN (Medication Management Only)        [facility]    Name and Date of Birth:  Mason Jeffries 26 y o  1990  Date of Treatment Plan: March 30, 2020  Diagnosis/Diagnoses:    1  GOOD (generalized anxiety disorder)    2  PTSD (post-traumatic stress disorder)    3  Schizoaffective disorder, bipolar type [facility])      Strengths/Personal Resources for Self-Care: supportive family, supportive friends, taking medications as prescribed, motivation for treatment, willingness to work on problems  Area/Areas of need (in own words): anxiety symptoms  1  Long Term Goal: improve control of anxiety  Target Date: 2 months - 5/30/2020  Person/Persons responsible for completion of goal: Iveliss  2  Short Term Objective (s) - How will we reach this goal?:   A  Provider new recommended medication/dosage changes and/or continue medication(s): continue current medications as prescribed Lexapro, Zyprexa, Prazosin  Terrye Porsha all scheduled appointments  C  Attend psychotherapy regularly  Target Date: 1 month - 4/30/2020  Person/Persons Responsible for Completion of Goal: Iveliss,   Progress Towards Goals: continuing treatment  Treatment Modality: medication management every 1 month  Review due 90 to 120 days from date of this plan: 4 months - 7/30/2020  Expected length of service: ongoing treatment  My Physician/PA/NP and I have developed this plan together and I agree to work on the goals and objectives  I understand the treatment goals that were developed for my treatment

## 2020-04-21 DIAGNOSIS — F43.10 PTSD (POST-TRAUMATIC STRESS DISORDER): ICD-10-CM

## 2020-04-21 RX ORDER — PRAZOSIN HYDROCHLORIDE 1 MG/1
1 CAPSULE ORAL
Qty: 30 CAPSULE | Refills: 0 | Status: SHIPPED | OUTPATIENT
Start: 2020-04-21 | End: 2020-06-17

## 2020-04-22 ENCOUNTER — TELEMEDICINE (OUTPATIENT)
Dept: BEHAVIORAL/MENTAL HEALTH CLINIC | Facility: CLINIC | Age: 30
End: 2020-04-22
Payer: COMMERCIAL

## 2020-04-22 DIAGNOSIS — F43.10 POST TRAUMATIC STRESS DISORDER (PTSD): ICD-10-CM

## 2020-04-22 DIAGNOSIS — F41.1 GAD (GENERALIZED ANXIETY DISORDER): Primary | ICD-10-CM

## 2020-04-22 PROCEDURE — 90834 PSYTX W PT 45 MINUTES: CPT | Performed by: SOCIAL WORKER

## 2020-04-29 ENCOUNTER — TELEPHONE (OUTPATIENT)
Dept: BEHAVIORAL/MENTAL HEALTH CLINIC | Facility: CLINIC | Age: 30
End: 2020-04-29

## 2020-04-29 ENCOUNTER — TELEMEDICINE (OUTPATIENT)
Dept: BEHAVIORAL/MENTAL HEALTH CLINIC | Facility: CLINIC | Age: 30
End: 2020-04-29
Payer: COMMERCIAL

## 2020-04-29 DIAGNOSIS — F41.1 GAD (GENERALIZED ANXIETY DISORDER): ICD-10-CM

## 2020-04-29 DIAGNOSIS — F25.0 SCHIZOAFFECTIVE DISORDER, BIPOLAR TYPE (HCC): ICD-10-CM

## 2020-04-29 DIAGNOSIS — F43.10 POST TRAUMATIC STRESS DISORDER (PTSD): Primary | ICD-10-CM

## 2020-04-29 PROCEDURE — 90834 PSYTX W PT 45 MINUTES: CPT | Performed by: SOCIAL WORKER

## 2020-04-30 ENCOUNTER — TELEMEDICINE (OUTPATIENT)
Dept: PSYCHIATRY | Facility: CLINIC | Age: 30
End: 2020-04-30
Payer: COMMERCIAL

## 2020-04-30 DIAGNOSIS — F25.0 SCHIZOAFFECTIVE DISORDER, BIPOLAR TYPE (HCC): Primary | ICD-10-CM

## 2020-04-30 DIAGNOSIS — F41.1 GAD (GENERALIZED ANXIETY DISORDER): ICD-10-CM

## 2020-04-30 DIAGNOSIS — F43.10 POST TRAUMATIC STRESS DISORDER (PTSD): ICD-10-CM

## 2020-04-30 PROCEDURE — 90833 PSYTX W PT W E/M 30 MIN: CPT | Performed by: NURSE PRACTITIONER

## 2020-04-30 PROCEDURE — NC001 PR NO CHARGE: Performed by: NURSE PRACTITIONER

## 2020-04-30 PROCEDURE — 99214 OFFICE O/P EST MOD 30 MIN: CPT | Performed by: NURSE PRACTITIONER

## 2020-04-30 RX ORDER — ESCITALOPRAM OXALATE 20 MG/1
20 TABLET ORAL DAILY
Qty: 30 TABLET | Refills: 1 | Status: SHIPPED | OUTPATIENT
Start: 2020-04-30 | End: 2020-08-19

## 2020-05-06 ENCOUNTER — TELEMEDICINE (OUTPATIENT)
Dept: BEHAVIORAL/MENTAL HEALTH CLINIC | Facility: CLINIC | Age: 30
End: 2020-05-06
Payer: COMMERCIAL

## 2020-05-06 ENCOUNTER — TELEPHONE (OUTPATIENT)
Dept: BEHAVIORAL/MENTAL HEALTH CLINIC | Facility: CLINIC | Age: 30
End: 2020-05-06

## 2020-05-06 DIAGNOSIS — F43.10 POST TRAUMATIC STRESS DISORDER (PTSD): Primary | ICD-10-CM

## 2020-05-06 DIAGNOSIS — F25.0 SCHIZOAFFECTIVE DISORDER, BIPOLAR TYPE (HCC): ICD-10-CM

## 2020-05-06 DIAGNOSIS — F41.1 GAD (GENERALIZED ANXIETY DISORDER): ICD-10-CM

## 2020-05-06 PROCEDURE — 90834 PSYTX W PT 45 MINUTES: CPT | Performed by: SOCIAL WORKER

## 2020-05-13 ENCOUNTER — TELEMEDICINE (OUTPATIENT)
Dept: BEHAVIORAL/MENTAL HEALTH CLINIC | Facility: CLINIC | Age: 30
End: 2020-05-13
Payer: COMMERCIAL

## 2020-05-13 DIAGNOSIS — F43.10 POST TRAUMATIC STRESS DISORDER (PTSD): ICD-10-CM

## 2020-05-13 DIAGNOSIS — F41.1 GAD (GENERALIZED ANXIETY DISORDER): ICD-10-CM

## 2020-05-13 DIAGNOSIS — F25.0 SCHIZOAFFECTIVE DISORDER, BIPOLAR TYPE (HCC): Primary | ICD-10-CM

## 2020-05-13 PROCEDURE — 90834 PSYTX W PT 45 MINUTES: CPT | Performed by: SOCIAL WORKER

## 2020-05-20 ENCOUNTER — TELEPHONE (OUTPATIENT)
Dept: BEHAVIORAL/MENTAL HEALTH CLINIC | Facility: CLINIC | Age: 30
End: 2020-05-20

## 2020-05-20 ENCOUNTER — TELEMEDICINE (OUTPATIENT)
Dept: BEHAVIORAL/MENTAL HEALTH CLINIC | Facility: CLINIC | Age: 30
End: 2020-05-20
Payer: COMMERCIAL

## 2020-05-20 DIAGNOSIS — F41.1 GAD (GENERALIZED ANXIETY DISORDER): ICD-10-CM

## 2020-05-20 DIAGNOSIS — F43.10 POST TRAUMATIC STRESS DISORDER (PTSD): Primary | ICD-10-CM

## 2020-05-20 PROCEDURE — 90834 PSYTX W PT 45 MINUTES: CPT | Performed by: SOCIAL WORKER

## 2020-05-27 ENCOUNTER — TELEMEDICINE (OUTPATIENT)
Dept: BEHAVIORAL/MENTAL HEALTH CLINIC | Facility: CLINIC | Age: 30
End: 2020-05-27
Payer: COMMERCIAL

## 2020-05-27 DIAGNOSIS — F43.10 POST TRAUMATIC STRESS DISORDER (PTSD): Primary | ICD-10-CM

## 2020-05-27 DIAGNOSIS — F41.1 GAD (GENERALIZED ANXIETY DISORDER): ICD-10-CM

## 2020-05-27 PROCEDURE — 90834 PSYTX W PT 45 MINUTES: CPT | Performed by: SOCIAL WORKER

## 2020-06-03 ENCOUNTER — TELEMEDICINE (OUTPATIENT)
Dept: BEHAVIORAL/MENTAL HEALTH CLINIC | Facility: CLINIC | Age: 30
End: 2020-06-03
Payer: COMMERCIAL

## 2020-06-03 ENCOUNTER — TELEPHONE (OUTPATIENT)
Dept: BEHAVIORAL/MENTAL HEALTH CLINIC | Facility: CLINIC | Age: 30
End: 2020-06-03

## 2020-06-03 DIAGNOSIS — F41.1 GAD (GENERALIZED ANXIETY DISORDER): ICD-10-CM

## 2020-06-03 DIAGNOSIS — F43.10 POST TRAUMATIC STRESS DISORDER (PTSD): Primary | ICD-10-CM

## 2020-06-03 PROCEDURE — 90834 PSYTX W PT 45 MINUTES: CPT | Performed by: SOCIAL WORKER

## 2020-06-04 ENCOUNTER — TELEPHONE (OUTPATIENT)
Dept: FAMILY MEDICINE CLINIC | Facility: CLINIC | Age: 30
End: 2020-06-04

## 2020-06-17 ENCOUNTER — TELEMEDICINE (OUTPATIENT)
Dept: PSYCHIATRY | Facility: CLINIC | Age: 30
End: 2020-06-17
Payer: COMMERCIAL

## 2020-06-17 DIAGNOSIS — F25.0 SCHIZOAFFECTIVE DISORDER, BIPOLAR TYPE (HCC): Primary | ICD-10-CM

## 2020-06-17 DIAGNOSIS — F43.10 PTSD (POST-TRAUMATIC STRESS DISORDER): ICD-10-CM

## 2020-06-17 DIAGNOSIS — F41.1 GAD (GENERALIZED ANXIETY DISORDER): ICD-10-CM

## 2020-06-17 DIAGNOSIS — F43.10 POST TRAUMATIC STRESS DISORDER (PTSD): ICD-10-CM

## 2020-06-17 PROCEDURE — 99214 OFFICE O/P EST MOD 30 MIN: CPT | Performed by: NURSE PRACTITIONER

## 2020-06-17 RX ORDER — PRAZOSIN HYDROCHLORIDE 1 MG/1
1 CAPSULE ORAL
Qty: 30 CAPSULE | Refills: 2 | Status: SHIPPED | OUTPATIENT
Start: 2020-06-17 | End: 2020-10-15

## 2020-06-17 RX ORDER — OLANZAPINE 5 MG/1
5 TABLET ORAL
Qty: 30 TABLET | Refills: 2 | Status: SHIPPED | OUTPATIENT
Start: 2020-06-17 | End: 2020-10-15

## 2020-06-23 ENCOUNTER — TELEMEDICINE (OUTPATIENT)
Dept: BEHAVIORAL/MENTAL HEALTH CLINIC | Facility: CLINIC | Age: 30
End: 2020-06-23
Payer: COMMERCIAL

## 2020-06-23 ENCOUNTER — TELEPHONE (OUTPATIENT)
Dept: BEHAVIORAL/MENTAL HEALTH CLINIC | Facility: CLINIC | Age: 30
End: 2020-06-23

## 2020-06-23 DIAGNOSIS — F41.1 GAD (GENERALIZED ANXIETY DISORDER): ICD-10-CM

## 2020-06-23 DIAGNOSIS — F43.10 POST TRAUMATIC STRESS DISORDER (PTSD): Primary | ICD-10-CM

## 2020-06-23 PROCEDURE — 90834 PSYTX W PT 45 MINUTES: CPT | Performed by: SOCIAL WORKER

## 2020-06-29 ENCOUNTER — TELEPHONE (OUTPATIENT)
Dept: BEHAVIORAL/MENTAL HEALTH CLINIC | Facility: CLINIC | Age: 30
End: 2020-06-29

## 2020-06-29 NOTE — TELEPHONE ENCOUNTER
Treatment Plan not completed within required time limits due to: Olga Kidd  no show appointment on 6/29/2020

## 2020-07-13 ENCOUNTER — TELEPHONE (OUTPATIENT)
Dept: BEHAVIORAL/MENTAL HEALTH CLINIC | Facility: CLINIC | Age: 30
End: 2020-07-13

## 2020-07-13 NOTE — TELEPHONE ENCOUNTER
Treatment Plan not completed within required time limits due to: Angeles Warner  no show appointment  on 7/13/2020

## 2020-07-21 ENCOUNTER — TELEPHONE (OUTPATIENT)
Dept: BEHAVIORAL/MENTAL HEALTH CLINIC | Facility: CLINIC | Age: 30
End: 2020-07-21

## 2020-07-21 NOTE — TELEPHONE ENCOUNTER
Pt not signed into [address] [place] [address] not completed within required time limits due to: Adamaris Jones  no show appointment  on 7/21/2020

## 2020-08-12 ENCOUNTER — DOCUMENTATION (OUTPATIENT)
Dept: BEHAVIORAL/MENTAL HEALTH CLINIC | Facility: CLINIC | Age: 30
End: 2020-08-12

## 2020-08-12 DIAGNOSIS — F41.1 GAD (GENERALIZED ANXIETY DISORDER): ICD-10-CM

## 2020-08-12 DIAGNOSIS — F43.10 POST TRAUMATIC STRESS DISORDER (PTSD): Primary | ICD-10-CM

## 2020-08-12 NOTE — PROGRESS NOTES
Assessment/Plan:      Diagnoses and all orders for this visit:    Post traumatic stress disorder (PTSD)    GOOD (generalized anxiety disorder)          Subjective:     Patient ID: Olga Kidd is a 31 y o  female  Outpatient Discharge Summary:   Admission Date: 03/18/2020  Quentin Kimbrough was referred by herself  Discharge Date: 07/20/2020    Discharge Diagnosis:    1  Post traumatic stress disorder (PTSD)     2   GOOD (generalized anxiety disorder)         Treating Physician: Dr Alfa Nance  Treatment Complications: none noted  Presenting Problem: anxiety, unresolved trauma  Course of treatment includes:    psychoeducation, psychiatric evaluation and individual therapy   Treatment Progress: good  Criteria for Discharge: two or more unexcused absences for services  Aftercare recommendations include outpatient counseling in the future if needed  Discharge Medications include:  Current Outpatient Medications:     escitalopram (LEXAPRO) 20 mg tablet, TAKE 1 TABLET BY MOUTH EVERY DAY, Disp: 30 tablet, Rfl: 5    levothyroxine 175 mcg tablet, Take 175 mcg by mouth daily, Disp: , Rfl:     OLANZapine (ZyPREXA) 5 mg tablet, Take 1 tablet (5 mg total) by mouth daily at bedtime, Disp: 30 tablet, Rfl: 2    prazosin (MINIPRESS) 1 mg capsule, Take 1 capsule (1 mg total) by mouth daily at bedtime, Disp: 30 capsule, Rfl: 2    topiramate (TOPAMAX) 25 mg tablet, Take 25 mg by mouth daily, Disp: , Rfl:     Prognosis: good

## 2020-08-19 DIAGNOSIS — F41.1 GAD (GENERALIZED ANXIETY DISORDER): ICD-10-CM

## 2020-08-19 RX ORDER — ESCITALOPRAM OXALATE 20 MG/1
TABLET ORAL
Qty: 30 TABLET | Refills: 5 | Status: SHIPPED | OUTPATIENT
Start: 2020-08-19 | End: 2020-10-15

## 2020-09-16 ENCOUNTER — TELEPHONE (OUTPATIENT)
Dept: BEHAVIORAL/MENTAL HEALTH CLINIC | Facility: CLINIC | Age: 30
End: 2020-09-16

## 2020-09-28 ENCOUNTER — TELEPHONE (OUTPATIENT)
Dept: PSYCHIATRY | Facility: CLINIC | Age: 30
End: 2020-09-28

## 2020-10-12 ENCOUNTER — TELEPHONE (OUTPATIENT)
Dept: PSYCHIATRY | Facility: CLINIC | Age: 30
End: 2020-10-12

## 2020-10-13 DIAGNOSIS — F43.10 PTSD (POST-TRAUMATIC STRESS DISORDER): ICD-10-CM

## 2020-10-13 RX ORDER — PRAZOSIN HYDROCHLORIDE 1 MG/1
1 CAPSULE ORAL
Qty: 30 CAPSULE | Refills: 2 | OUTPATIENT
Start: 2020-10-13

## 2020-10-14 ENCOUNTER — SOCIAL WORK (OUTPATIENT)
Dept: BEHAVIORAL/MENTAL HEALTH CLINIC | Facility: CLINIC | Age: 30
End: 2020-10-14
Payer: COMMERCIAL

## 2020-10-14 DIAGNOSIS — F43.10 POST TRAUMATIC STRESS DISORDER (PTSD): Primary | ICD-10-CM

## 2020-10-14 DIAGNOSIS — F41.1 GAD (GENERALIZED ANXIETY DISORDER): ICD-10-CM

## 2020-10-14 PROCEDURE — 90791 PSYCH DIAGNOSTIC EVALUATION: CPT | Performed by: SOCIAL WORKER

## 2020-10-15 ENCOUNTER — OFFICE VISIT (OUTPATIENT)
Dept: PSYCHIATRY | Facility: CLINIC | Age: 30
End: 2020-10-15
Payer: COMMERCIAL

## 2020-10-15 DIAGNOSIS — F25.0 SCHIZOAFFECTIVE DISORDER, BIPOLAR TYPE (HCC): Primary | ICD-10-CM

## 2020-10-15 DIAGNOSIS — F43.10 PTSD (POST-TRAUMATIC STRESS DISORDER): ICD-10-CM

## 2020-10-15 DIAGNOSIS — F99 INSOMNIA DUE TO OTHER MENTAL DISORDER: ICD-10-CM

## 2020-10-15 DIAGNOSIS — F51.05 INSOMNIA DUE TO OTHER MENTAL DISORDER: ICD-10-CM

## 2020-10-15 DIAGNOSIS — F41.1 GAD (GENERALIZED ANXIETY DISORDER): ICD-10-CM

## 2020-10-15 PROCEDURE — 99214 OFFICE O/P EST MOD 30 MIN: CPT | Performed by: NURSE PRACTITIONER

## 2020-10-15 RX ORDER — LAMOTRIGINE 25 MG/1
25 TABLET ORAL DAILY
Start: 2020-10-15 | End: 2020-10-23

## 2020-10-15 RX ORDER — PHENOL 1.4 %
10 AEROSOL, SPRAY (ML) MUCOUS MEMBRANE
Start: 2020-10-15

## 2020-10-15 RX ORDER — PRAZOSIN HYDROCHLORIDE 2 MG/1
2 CAPSULE ORAL
Qty: 30 CAPSULE | Refills: 1
Start: 2020-10-15 | End: 2020-10-23 | Stop reason: SDUPTHER

## 2020-10-15 RX ORDER — GABAPENTIN 300 MG/1
300 CAPSULE ORAL 3 TIMES DAILY
Qty: 90 CAPSULE | Refills: 1
Start: 2020-10-15 | End: 2020-10-23

## 2020-10-15 RX ORDER — DIPHENHYDRAMINE HCL 50 MG
50 CAPSULE ORAL
Qty: 30 CAPSULE | Refills: 0
Start: 2020-10-15 | End: 2020-10-23 | Stop reason: SDUPTHER

## 2020-10-15 RX ORDER — OLANZAPINE 20 MG/1
20 TABLET ORAL
Qty: 30 TABLET | Refills: 1
Start: 2020-10-15 | End: 2020-12-02

## 2020-10-23 ENCOUNTER — OFFICE VISIT (OUTPATIENT)
Dept: PSYCHIATRY | Facility: CLINIC | Age: 30
End: 2020-10-23
Payer: COMMERCIAL

## 2020-10-23 DIAGNOSIS — F43.10 PTSD (POST-TRAUMATIC STRESS DISORDER): ICD-10-CM

## 2020-10-23 DIAGNOSIS — F25.0 SCHIZOAFFECTIVE DISORDER, BIPOLAR TYPE (HCC): ICD-10-CM

## 2020-10-23 PROCEDURE — 99214 OFFICE O/P EST MOD 30 MIN: CPT | Performed by: NURSE PRACTITIONER

## 2020-10-23 RX ORDER — GABAPENTIN 300 MG/1
300 CAPSULE ORAL 3 TIMES DAILY
Qty: 90 CAPSULE | Refills: 1 | Status: SHIPPED | OUTPATIENT
Start: 2020-10-23 | End: 2020-12-02

## 2020-10-23 RX ORDER — LAMOTRIGINE 25 MG/1
50 TABLET ORAL DAILY
Qty: 60 TABLET | Refills: 1 | Status: SHIPPED | OUTPATIENT
Start: 2020-10-23 | End: 2020-12-02

## 2020-10-23 RX ORDER — DIPHENHYDRAMINE HCL 50 MG
50 CAPSULE ORAL
Qty: 30 CAPSULE | Refills: 0 | Status: SHIPPED | OUTPATIENT
Start: 2020-10-23 | End: 2020-12-02 | Stop reason: SDUPTHER

## 2020-10-23 RX ORDER — PRAZOSIN HYDROCHLORIDE 2 MG/1
2 CAPSULE ORAL
Qty: 30 CAPSULE | Refills: 1 | Status: SHIPPED | OUTPATIENT
Start: 2020-10-23 | End: 2020-12-02

## 2020-11-09 ENCOUNTER — SOCIAL WORK (OUTPATIENT)
Dept: BEHAVIORAL/MENTAL HEALTH CLINIC | Facility: CLINIC | Age: 30
End: 2020-11-09
Payer: COMMERCIAL

## 2020-11-09 DIAGNOSIS — F25.0 SCHIZOAFFECTIVE DISORDER, BIPOLAR TYPE (HCC): ICD-10-CM

## 2020-11-09 DIAGNOSIS — F43.10 POST TRAUMATIC STRESS DISORDER (PTSD): Primary | ICD-10-CM

## 2020-11-09 DIAGNOSIS — F41.1 GAD (GENERALIZED ANXIETY DISORDER): ICD-10-CM

## 2020-11-09 PROCEDURE — 90834 PSYTX W PT 45 MINUTES: CPT | Performed by: SOCIAL WORKER

## 2020-12-02 ENCOUNTER — OFFICE VISIT (OUTPATIENT)
Dept: PSYCHIATRY | Facility: CLINIC | Age: 30
End: 2020-12-02
Payer: COMMERCIAL

## 2020-12-02 DIAGNOSIS — F25.0 SCHIZOAFFECTIVE DISORDER, BIPOLAR TYPE (HCC): ICD-10-CM

## 2020-12-02 DIAGNOSIS — F43.10 PTSD (POST-TRAUMATIC STRESS DISORDER): ICD-10-CM

## 2020-12-02 PROCEDURE — 99213 OFFICE O/P EST LOW 20 MIN: CPT | Performed by: NURSE PRACTITIONER

## 2020-12-02 RX ORDER — OLANZAPINE 20 MG/1
20 TABLET ORAL
Qty: 30 TABLET | Refills: 2 | Status: SHIPPED | OUTPATIENT
Start: 2020-12-02 | End: 2021-03-11 | Stop reason: SDUPTHER

## 2020-12-02 RX ORDER — GABAPENTIN 300 MG/1
300 CAPSULE ORAL 3 TIMES DAILY
Qty: 90 CAPSULE | Refills: 2 | Status: SHIPPED | OUTPATIENT
Start: 2020-12-02 | End: 2021-03-11 | Stop reason: SDUPTHER

## 2020-12-02 RX ORDER — PRAZOSIN HYDROCHLORIDE 2 MG/1
2 CAPSULE ORAL
Qty: 30 CAPSULE | Refills: 2 | Status: SHIPPED | OUTPATIENT
Start: 2020-12-02 | End: 2021-03-11 | Stop reason: SDUPTHER

## 2020-12-02 RX ORDER — LAMOTRIGINE 25 MG/1
50 TABLET ORAL DAILY
Qty: 60 TABLET | Refills: 2 | Status: SHIPPED | OUTPATIENT
Start: 2020-12-02 | End: 2021-01-05

## 2020-12-02 RX ORDER — DIPHENHYDRAMINE HCL 50 MG
50 CAPSULE ORAL
Qty: 30 CAPSULE | Refills: 2 | Status: SHIPPED | OUTPATIENT
Start: 2020-12-02 | End: 2021-03-11 | Stop reason: SDUPTHER

## 2020-12-18 ENCOUNTER — TELEPHONE (OUTPATIENT)
Dept: BEHAVIORAL/MENTAL HEALTH CLINIC | Facility: CLINIC | Age: 30
End: 2020-12-18

## 2020-12-18 ENCOUNTER — TELEMEDICINE (OUTPATIENT)
Dept: BEHAVIORAL/MENTAL HEALTH CLINIC | Facility: CLINIC | Age: 30
End: 2020-12-18
Payer: COMMERCIAL

## 2020-12-18 DIAGNOSIS — F41.1 GAD (GENERALIZED ANXIETY DISORDER): ICD-10-CM

## 2020-12-18 DIAGNOSIS — F25.0 SCHIZOAFFECTIVE DISORDER, BIPOLAR TYPE (HCC): Primary | ICD-10-CM

## 2020-12-18 DIAGNOSIS — F43.10 POST TRAUMATIC STRESS DISORDER (PTSD): ICD-10-CM

## 2020-12-18 PROCEDURE — 90834 PSYTX W PT 45 MINUTES: CPT | Performed by: SOCIAL WORKER

## 2021-01-05 ENCOUNTER — OFFICE VISIT (OUTPATIENT)
Dept: PSYCHIATRY | Facility: CLINIC | Age: 31
End: 2021-01-05
Payer: COMMERCIAL

## 2021-01-05 DIAGNOSIS — F25.0 SCHIZOAFFECTIVE DISORDER, BIPOLAR TYPE (HCC): ICD-10-CM

## 2021-01-05 PROCEDURE — 90833 PSYTX W PT W E/M 30 MIN: CPT | Performed by: NURSE PRACTITIONER

## 2021-01-05 PROCEDURE — 99214 OFFICE O/P EST MOD 30 MIN: CPT | Performed by: NURSE PRACTITIONER

## 2021-01-05 RX ORDER — LAMOTRIGINE 100 MG/1
100 TABLET ORAL DAILY
Qty: 30 TABLET | Refills: 1 | Status: SHIPPED | OUTPATIENT
Start: 2021-01-05 | End: 2021-03-11

## 2021-01-05 NOTE — PSYCH
Virtual Regular Visit    Problem List Items Addressed This Visit        Other    Schizoaffective disorder, bipolar type ([facility] [address] )    Relevant Medications    lamoTRIgine (LaMICtal) 100 mg tablet             Encounter provider GENESIS Talamantes    Provider located at   [address]  [facility]    [address] [facility] [address]  [facility] [address] [phone]    Recent Visits  No visits were found meeting these conditions  Showing recent visits within past 7 days and meeting all other requirements     Future Appointments  No visits were found meeting these conditions  Showing future appointments within next 150 days and meeting all other requirements      The patient was identified by name and date of birth  Jerene Meigs was informed that this is a telemedicine visit and that the visit is being conducted through [employer]  My office door was closed  No one else was in the room  She acknowledged consent and understanding of privacy and security of the video platform  The patient has agreed to participate and understands they can discontinue the visit at any time  Patient is aware this is a billable service       HPI     Current Outpatient Medications   Medication Sig Dispense Refill    diphenhydrAMINE (BENADRYL) 50 mg capsule Take 1 capsule (50 mg total) by mouth daily at bedtime 30 capsule 2    gabapentin (NEURONTIN) 300 mg capsule Take 1 capsule (300 mg total) by mouth 3 (three) times a day 90 capsule 2    lamoTRIgine (LaMICtal) 100 mg tablet Take 1 tablet (100 mg total) by mouth daily 30 tablet 1    levothyroxine 175 mcg tablet Take 200 mcg by mouth daily      Melatonin 10 MG TABS Take 1 tablet (10 mg total) by mouth daily at bedtime      OLANZapine (ZyPREXA) 20 MG tablet Take 1 tablet (20 mg total) by mouth daily at bedtime 30 tablet 2    prazosin (MINIPRESS) 2 mg capsule Take 1 capsule (2 mg total) by mouth daily at bedtime 30 capsule 2     No current facility-administered medications for this visit  Review of Systems  Video Exam    There were no vitals filed for this visit  Physical Exam   As a result of this visit, I have referred the patient for further respiratory evaluation  No    I spent 25 minutes directly with the patient during this visit  Sylvain acknowledges that she has consented to an online visit or consultation  She understands that the online visit is based solely on information provided by her, and that, in the absence of a face-to-face physical evaluation by the physician, the diagnosis she receives is both limited and provisional in terms of accuracy and completeness  This is not intended to replace a full medical face-to-face evaluation by the physician  Sandy Castle understands and accepts these terms  MEDICATION MANAGEMENT NOTE        [address]    Name and Date of Birth:  Sandy Castle 31 y o  1990 MRN: [id number]    Date of Visit: January 5, 2021    No Known Allergies  SUBJECTIVE:    Vidal Cruz is seen today for a follow up for Schizoaffective Disorder  She reports that she has improved slightly since the last visit  Patient states he has experienced some increase fluctuations in mood  States she will have a day where she feels angry then a day when she feels sad followed by return to normal mood for several days  States she often feels bored and then has low motivation to engage in coping activities she enjoys  Patient engaged with provider and identifying healthy coping activities  Patient is also worried about weight gain with Zyprexa  Overall patient remains stable  She denies any side effects from medications  PLAN:  Increase Lamictal to 100 mg p o   Daily  Continue all other psychiatric medications as ordered  Consider additional medication for depression  Continue with individual therapist  Aware of 24 hour and weekend coverage for urgent situations accessed by calling [facility] main practice number    Diagnoses and all orders for this visit:    Schizoaffective disorder, bipolar type ([address] )  -     lamoTRIgine (LaMICtal) 100 mg tablet; Take 1 tablet (100 mg total) by mouth daily        Current Outpatient Medications on File Prior to Visit   Medication Sig Dispense Refill    diphenhydrAMINE (BENADRYL) 50 mg capsule Take 1 capsule (50 mg total) by mouth daily at bedtime 30 capsule 2    gabapentin (NEURONTIN) 300 mg capsule Take 1 capsule (300 mg total) by mouth 3 (three) times a day 90 capsule 2    levothyroxine 175 mcg tablet Take 200 mcg by mouth daily      Melatonin 10 MG TABS Take 1 tablet (10 mg total) by mouth daily at bedtime      OLANZapine (ZyPREXA) 20 MG tablet Take 1 tablet (20 mg total) by mouth daily at bedtime 30 tablet 2    prazosin (MINIPRESS) 2 mg capsule Take 1 capsule (2 mg total) by mouth daily at bedtime 30 capsule 2    [DISCONTINUED] lamoTRIgine (LaMICtal) 25 mg tablet Take 2 tablets (50 mg total) by mouth daily 60 tablet 2     No current facility-administered medications on file prior to visit  Psychotherapy Provided:     Individual psychotherapy provided: Yes  Counseling was provided during the session today for 16 minutes  Supportive counseling provided  Identify 3 healthy coping mechanisms when patient feels bored  Patient can draw, knit or do light exercise  Roommate can help with accountability  HPI ROS Appetite Changes and Sleep:     She reports normal sleep, normal appetite, normal energy level   Patient denies suicidal or homicidal ideation    Review Of Systems:      General decreased functioning   Personality no change in personality   Constitutional negative   ENT negative   Cardiovascular negative   Respiratory negative   Gastrointestinal negative   Genitourinary negative   Musculoskeletal negative   Integumentary negative   Neurological negative   Endocrine negative   Other Symptoms none, all other systems are negative     Mental Status Evaluation:    Appearance Adequate hygiene and grooming   Behavior calm and cooperative   Mood depressed  Depression Scale -  of 10 (0 = No depression)  Anxiety Scale -  of 10 (0 = No anxiety)   Speech Normal rate and volume   Affect mood-congruent and constricted   Thought Processes Goal directed and coherent   Thought Content Does not verbalize delusional material   Associations Tightly connected   Perceptual Disturbances Denies hallucinations and does not appear to be responding to internal stimuli   Risk Potential Suicidal/Homicidal Ideation - No evidence of suicidal or homicidal ideation and Patient does not verbalize suicidal or homicidal ideation  Risk of Violence - No evidence of risk for violence found on assessment  Risk of Self Mutilation - No evidence of risk for self mutilation found on assessment   Orientation oriented to person, place, time/date and situation   Memory recent and remote memory grossly intact   Consciousness alert and awake   Attention/Concentration attention span and concentration are age appropriate   Insight fair   Judgement fair   Muscle Strength and Gait normal muscle strength and normal muscle tone, normal gait/station and normal balance   Motor Activity no abnormal movements   Language no difficulty naming common objects, no difficulty repeating a phrase, no difficulty writing a sentence   Fund of Knowledge adequate knowledge of current events  adequate fund of knowledge regarding past history  adequate fund of knowledge regarding vocabulary      Past Psychiatric History Update:     Inpatient Psychiatric Admission Since Last Encounter:   no  Changes to Outpatient Psychiatric Treatment Team:    no  Suicide Attempt Or Self Mutilation Since Last Encounter:   no  Incidence of Violent Behavior Since Last Encounter:   no    Traumatic History Update:     New Onset of Abuse Since Last Encounter:   no  Traumatic Events Since Last Encounter:   no    Past Medical History:    Past Medical History:   Diagnosis Date    Bipolar 1 disorder ([facility] )     Depression     Schizoaffective disorder, bipolar type ([facility] )     Suicidal ideations     Thyroid disease      No past medical history pertinent negatives    Past Surgical History:   Procedure Laterality Date    NECK SURGERY      US GUIDED THYROID BIOPSY      normal  Onset: 2012     No Known Allergies  Substance Abuse History:    Social History     Substance and Sexual Activity   Alcohol Use Yes    Comment: occ     Social History     Substance and Sexual Activity   Drug Use No     Social History:    Social History     Socioeconomic History    Marital status: Single     Spouse name: Not on file    Number of children: Not on file    Years of education: Not on file    Highest education level: Not on file   Occupational History    Not on file   Social Needs    Financial resource strain: Not on file    Food insecurity     Worry: Not on file     Inability: Not on file    Transportation needs     Medical: Not on file     Non-medical: Not on file   Tobacco Use    Smoking status: Former Smoker     Packs/day: 1 00     Types: Cigarettes    Smokeless tobacco: Never Used   Substance and Sexual Activity    Alcohol use: Yes     Comment: occ    Drug use: No    Sexual activity: Not on file   Lifestyle    Physical activity     Days per week: Not on file     Minutes per session: Not on file    Stress: Not on file   Relationships    Social connections     Talks on phone: Not on file     Gets together: Not on file     Attends Jainism service: Not on file     Active member of club or organization: Not on file     Attends meetings of clubs or organizations: Not on file     Relationship status: Not on file    Intimate partner violence     Fear of current or ex partner: Not on file     Emotionally abused: Not on file     Physically abused: Not on file     Forced sexual activity: Not on file   Other Topics Concern    Not on file   Social History Narrative    Not on file     Family Psychiatric History:     No family history on file  History Review: The following portions of the patient's history were reviewed and updated as appropriate: allergies, current medications, past family history, past medical history, past social history, past surgical history and problem list     OBJECTIVE:     Vital signs in last 24 hours: There were no vitals filed for this visit  Laboratory Results: I have personally reviewed all pertinent laboratory/tests results  Suicide/Homicide Risk Assessment:    Risk of Harm to Self:   The following ratings are based on assessment at the time of the interview   Recent Specific Risk Factors include: current depressive symptoms    Risk of Harm to Others:   The following ratings are based on assessment at the time of the interview   Recent Specific Risk Factors include: none  The following interventions are recommended: no intervention changes needed    Medications Risks/Benefits:      Risks, Benefits And Possible Side Effects Of Medications:    Discussed risks and benefits of treatment with patient including risk of parkinsonian symptoms, metabolic syndrome, tardive dyskinesia and neuroleptic malignant syndrome related to treatment with antipsychotic medications and risk of rash related to treatment with Lamictal     Controlled Medication Discussion:     Not applicable    Treatment Plan:    Due for update/Updated:   no    GENESIS Blunt 01/05/21    This note was shared with patient

## 2021-01-18 ENCOUNTER — TRANSCRIBE ORDERS (OUTPATIENT)
Dept: ADMINISTRATIVE | Facility: HOSPITAL | Age: 31
End: 2021-01-18

## 2021-01-18 ENCOUNTER — LAB (OUTPATIENT)
Dept: LAB | Facility: HOSPITAL | Age: 31
End: 2021-01-18
Payer: COMMERCIAL

## 2021-01-18 DIAGNOSIS — R53.83 FATIGUE, UNSPECIFIED TYPE: ICD-10-CM

## 2021-01-18 DIAGNOSIS — E89.0 POSTSURGICAL HYPOTHYROIDISM: ICD-10-CM

## 2021-01-18 DIAGNOSIS — E03.9 ACQUIRED HYPOTHYROIDISM: Primary | ICD-10-CM

## 2021-01-18 DIAGNOSIS — Z85.850 PERSONAL HISTORY OF MALIGNANT NEOPLASM OF THYROID: ICD-10-CM

## 2021-01-18 DIAGNOSIS — Z00.01 ENCOUNTER FOR GENERAL ADULT MEDICAL EXAMINATION WITH ABNORMAL FINDINGS: ICD-10-CM

## 2021-01-18 DIAGNOSIS — E89.0 POSTSURGICAL HYPOTHYROIDISM: Primary | ICD-10-CM

## 2021-01-18 DIAGNOSIS — E03.9 ACQUIRED HYPOTHYROIDISM: ICD-10-CM

## 2021-01-18 LAB
25(OH)D3 SERPL-MCNC: 25.4 NG/ML (ref 30–100)
ALBUMIN SERPL BCP-MCNC: 3.6 G/DL (ref 3.5–5)
ALP SERPL-CCNC: 52 U/L (ref 46–116)
ALT SERPL W P-5'-P-CCNC: 14 U/L (ref 12–78)
ANION GAP SERPL CALCULATED.3IONS-SCNC: 12 MMOL/L (ref 4–13)
AST SERPL W P-5'-P-CCNC: 28 U/L (ref 5–45)
BILIRUB SERPL-MCNC: 0.63 MG/DL (ref 0.2–1)
BUN SERPL-MCNC: 11 MG/DL (ref 5–25)
CALCIUM SERPL-MCNC: 7.9 MG/DL (ref 8.3–10.1)
CHLORIDE SERPL-SCNC: 106 MMOL/L (ref 100–108)
CHOLEST SERPL-MCNC: 235 MG/DL (ref 50–200)
CO2 SERPL-SCNC: 21 MMOL/L (ref 21–32)
CREAT SERPL-MCNC: 1.1 MG/DL (ref 0.6–1.3)
ERYTHROCYTE [DISTWIDTH] IN BLOOD BY AUTOMATED COUNT: 14.3 % (ref 11.6–15.1)
GFR SERPL CREATININE-BSD FRML MDRD: 67 ML/MIN/1.73SQ M
GLUCOSE P FAST SERPL-MCNC: 93 MG/DL (ref 65–99)
HCT VFR BLD AUTO: 43.6 % (ref 34.8–46.1)
HDLC SERPL-MCNC: 59 MG/DL
HGB BLD-MCNC: 13.3 G/DL (ref 11.5–15.4)
LDLC SERPL CALC-MCNC: 162 MG/DL (ref 0–100)
MCH RBC QN AUTO: 27.5 PG (ref 26.8–34.3)
MCHC RBC AUTO-ENTMCNC: 30.5 G/DL (ref 31.4–37.4)
MCV RBC AUTO: 90 FL (ref 82–98)
NONHDLC SERPL-MCNC: 176 MG/DL
PLATELET # BLD AUTO: 329 THOUSANDS/UL (ref 149–390)
PMV BLD AUTO: 9.3 FL (ref 8.9–12.7)
POTASSIUM SERPL-SCNC: 3.9 MMOL/L (ref 3.5–5.3)
PROT SERPL-MCNC: 8.6 G/DL (ref 6.4–8.2)
RBC # BLD AUTO: 4.84 MILLION/UL (ref 3.81–5.12)
SODIUM SERPL-SCNC: 139 MMOL/L (ref 136–145)
T4 FREE SERPL-MCNC: 1.15 NG/DL (ref 0.76–1.46)
T4 SERPL-MCNC: 14.3 UG/DL (ref 4.7–13.3)
TRIGL SERPL-MCNC: 70 MG/DL
TSH SERPL DL<=0.05 MIU/L-ACNC: 13.17 UIU/ML (ref 0.36–3.74)
WBC # BLD AUTO: 7.17 THOUSAND/UL (ref 4.31–10.16)

## 2021-01-18 PROCEDURE — 84432 ASSAY OF THYROGLOBULIN: CPT

## 2021-01-18 PROCEDURE — 80061 LIPID PANEL: CPT

## 2021-01-18 PROCEDURE — 84439 ASSAY OF FREE THYROXINE: CPT

## 2021-01-18 PROCEDURE — 85027 COMPLETE CBC AUTOMATED: CPT

## 2021-01-18 PROCEDURE — 84443 ASSAY THYROID STIM HORMONE: CPT

## 2021-01-18 PROCEDURE — 36415 COLL VENOUS BLD VENIPUNCTURE: CPT

## 2021-01-18 PROCEDURE — 82306 VITAMIN D 25 HYDROXY: CPT

## 2021-01-18 PROCEDURE — 80053 COMPREHEN METABOLIC PANEL: CPT

## 2021-01-18 PROCEDURE — 86800 THYROGLOBULIN ANTIBODY: CPT

## 2021-01-19 LAB
THYROGLOB AB SERPL-ACNC: <1 IU/ML (ref 0–0.9)
THYROGLOB SERPL-MCNC: 2.8 NG/ML (ref 1.5–38.5)

## 2021-02-04 DIAGNOSIS — F43.10 PTSD (POST-TRAUMATIC STRESS DISORDER): ICD-10-CM

## 2021-02-05 RX ORDER — PRAZOSIN HYDROCHLORIDE 1 MG/1
1 CAPSULE ORAL
Qty: 30 CAPSULE | Refills: 2 | OUTPATIENT
Start: 2021-02-05

## 2021-02-18 ENCOUNTER — TELEMEDICINE (OUTPATIENT)
Dept: BEHAVIORAL/MENTAL HEALTH CLINIC | Facility: CLINIC | Age: 31
End: 2021-02-18
Payer: COMMERCIAL

## 2021-02-18 DIAGNOSIS — F41.1 GAD (GENERALIZED ANXIETY DISORDER): ICD-10-CM

## 2021-02-18 DIAGNOSIS — F25.0 SCHIZOAFFECTIVE DISORDER, BIPOLAR TYPE (HCC): Primary | ICD-10-CM

## 2021-02-18 DIAGNOSIS — F43.10 POST TRAUMATIC STRESS DISORDER (PTSD): ICD-10-CM

## 2021-02-18 PROCEDURE — 90834 PSYTX W PT 45 MINUTES: CPT | Performed by: SOCIAL WORKER

## 2021-02-18 NOTE — PSYCH
Psychotherapy Provided: Individual Psychotherapy 45 minutes     Length of time in session: 45 minutes, follow up in 2 week    Goals addressed in session: 2    Pain:      none    0    Current suicide risk : Low     D:  Met with Janes for session  "She is stable  She is bord  She used to have her thoughts to entertain her but, she doesn't anymore  She has tried reading but, that only lasted for two weeks, the stock market but, that only lasted for 1 week "  She has been "unstable since 24 yrs old "  Described an analogy of a person who uses drugs  They did not have a life due to their drug use  Her's is the same thing except hers was she was mentally unstable and is now stable  Discussed coping skills of her not choosing one thing and burning herself out on it then trying something else  A:  Mod progress on goal 2  P:  To continue addressing  [place] plan goals  Behavioral Health Treatment Plan [facility]: Diagnosis and Treatment Plan explained to Kirasilvianoearl Mariejorge relates understanding diagnosis and is agreeable to Treatment Plan  Yes   Virtual Regular Visit      Assessment/Plan:    Problem List Items Addressed This Visit     None               Reason for visit is No chief complaint on file  Encounter provider Ward Desi    Provider located at [address] [place] [id number]      Recent Visits  No visits were found meeting these conditions  Showing recent visits within past 7 days and meeting all other requirements     Future Appointments  No visits were found meeting these conditions  Showing future appointments within next 150 days and meeting all other requirements        The patient was identified by name and date of birth  Leonides Yates was informed that this is a telemedicine visit and that the visit is being conducted through [employer] and patient was informed that this is a secure, HIPAA-compliant platform  She agrees to proceed     My office door was closed  No one else was in the room  She acknowledged consent and understanding of privacy and security of the video platform  The patient has agreed to participate and understands they can discontinue the visit at any time  Patient is aware this is a billable service  Subjective  Fransisca Segundo is a 31 y o  female    HPI     Past Medical History:   Diagnosis Date    Bipolar 1 disorder ([facility] [address] )     Depression     [facility]     Suicidal ideations     Thyroid disease        Past Surgical History:   Procedure Laterality Date    NECK SURGERY      US GUIDED THYROID BIOPSY      normal  Onset: 2012       Current Outpatient Medications   Medication Sig Dispense Refill    diphenhydrAMINE (BENADRYL) 50 mg capsule Take 1 capsule (50 mg total) by mouth daily at bedtime 30 capsule 2    gabapentin (NEURONTIN) 300 mg capsule Take 1 capsule (300 mg total) by mouth 3 (three) times a day 90 capsule 2    lamoTRIgine (LaMICtal) 100 mg tablet Take 1 tablet (100 mg total) by mouth daily 30 tablet 1    levothyroxine 175 mcg tablet Take 200 mcg by mouth daily      Melatonin 10 MG TABS Take 1 tablet (10 mg total) by mouth daily at bedtime      OLANZapine (ZyPREXA) 20 MG tablet Take 1 tablet (20 mg total) by mouth daily at bedtime 30 tablet 2    prazosin (MINIPRESS) 2 mg capsule Take 1 capsule (2 mg total) by mouth daily at bedtime 30 capsule 2     No current facility-administered medications for this visit  No Known Allergies    Review of Systems    Video Exam    There were no vitals filed for this visit  Physical Exam     I spent 45 minutes directly with the patient during this visit      VIRTUAL VISIT Cecilywilfred acknowledges that she has consented to an online visit or consultation   She understands that the online visit is based solely on information provided by her, and that, in the absence of a face-to-face physical evaluation by the physician, the diagnosis she receives is both limited and provisional in terms of accuracy and completeness  This is not intended to replace a full medical face-to-face evaluation by the physician  Tej Glover understands and accepts these terms

## 2021-03-11 ENCOUNTER — TELEPHONE (OUTPATIENT)
Dept: PSYCHIATRY | Facility: CLINIC | Age: 31
End: 2021-03-11

## 2021-03-11 ENCOUNTER — TELEMEDICINE (OUTPATIENT)
Dept: PSYCHIATRY | Facility: CLINIC | Age: 31
End: 2021-03-11
Payer: COMMERCIAL

## 2021-03-11 DIAGNOSIS — F25.0 SCHIZOAFFECTIVE DISORDER, BIPOLAR TYPE (HCC): ICD-10-CM

## 2021-03-11 DIAGNOSIS — F43.10 PTSD (POST-TRAUMATIC STRESS DISORDER): ICD-10-CM

## 2021-03-11 PROCEDURE — 99214 OFFICE O/P EST MOD 30 MIN: CPT | Performed by: NURSE PRACTITIONER

## 2021-03-11 RX ORDER — OLANZAPINE 20 MG/1
20 TABLET ORAL
Qty: 30 TABLET | Refills: 2 | Status: SHIPPED | OUTPATIENT
Start: 2021-03-11 | End: 2021-04-23

## 2021-03-11 RX ORDER — DIPHENHYDRAMINE HCL 50 MG
50 CAPSULE ORAL
Qty: 30 CAPSULE | Refills: 2 | Status: SHIPPED | OUTPATIENT
Start: 2021-03-11 | End: 2021-04-23

## 2021-03-11 RX ORDER — PRAZOSIN HYDROCHLORIDE 2 MG/1
2 CAPSULE ORAL
Qty: 30 CAPSULE | Refills: 2 | Status: SHIPPED | OUTPATIENT
Start: 2021-03-11 | End: 2021-04-23

## 2021-03-11 RX ORDER — LAMOTRIGINE 100 MG/1
TABLET ORAL
Qty: 113 TABLET | Refills: 0 | Status: SHIPPED | OUTPATIENT
Start: 2021-03-11 | End: 2021-04-23

## 2021-03-11 RX ORDER — GABAPENTIN 300 MG/1
300 CAPSULE ORAL 3 TIMES DAILY
Qty: 90 CAPSULE | Refills: 2 | Status: SHIPPED | OUTPATIENT
Start: 2021-03-11 | End: 2021-04-23

## 2021-03-11 NOTE — PSYCH
Virtual Regular Visit    Problem List Items Addressed This Visit        Other    Schizoaffective disorder, bipolar type ([facility] [address] )    Relevant Medications    lamoTRIgine (LaMICtal) 100 mg tablet    OLANZapine (ZyPREXA) 20 MG tablet    diphenhydrAMINE (BENADRYL) 50 mg capsule      Other Visit Diagnoses     PTSD (post-traumatic stress disorder)        Relevant Medications    prazosin (MINIPRESS) 2 mg capsule    OLANZapine (ZyPREXA) 20 MG tablet    gabapentin (NEURONTIN) 300 mg capsule             Encounter provider GENESIS Mejía    Provider located at   [facility]  [facility]   [address] [address] [address]  [facility] [address] [phone]    Recent Visits  No visits were found meeting these conditions  Showing recent visits within past 7 days and meeting all other requirements     Today's Visits  Date Type Provider Dept   03/11/21 Telephone Maame Perales, [address] [place] [address]   03/11/21 Telemedicine GENESIS [facility] [address]   Showing today's visits and meeting all other requirements     Future Appointments  No visits were found meeting these conditions  Showing future appointments within next 150 days and meeting all other requirements      The patient was identified by name and date of birth  Benjamin Jazmin was informed that this is a telemedicine visit and that the visit is being conducted through [employer]  My office door was closed  No one else was in the room  She acknowledged consent and understanding of privacy and security of the video platform  The patient has agreed to participate and understands they can discontinue the visit at any time  Patient is aware this is a billable service       HPI     Current Outpatient Medications   Medication Sig Dispense Refill    diphenhydrAMINE (BENADRYL) 50 mg capsule Take 1 capsule (50 mg total) by mouth daily at bedtime 30 capsule 2    gabapentin (NEURONTIN) 300 mg capsule Take 1 capsule (300 mg total) by mouth 3 (three) times a day 90 capsule 2    lamoTRIgine (LaMICtal) 100 mg tablet Take 1 5 tablets (150 mg total) by mouth daily for 14 days, THEN 2 tablets (200 mg total) daily  113 tablet 0    levothyroxine 175 mcg tablet Take 200 mcg by mouth daily      Melatonin 10 MG TABS Take 1 tablet (10 mg total) by mouth daily at bedtime      OLANZapine (ZyPREXA) 20 MG tablet Take 1 tablet (20 mg total) by mouth daily at bedtime 30 tablet 2    prazosin (MINIPRESS) 2 mg capsule Take 1 capsule (2 mg total) by mouth daily at bedtime 30 capsule 2     No current facility-administered medications for this visit  Review of Systems  Video Exam    There were no vitals filed for this visit  Physical Exam   As a result of this visit, I have referred the patient for further respiratory evaluation  No    I spent 25 minutes directly with the patient during this visit  Emmieide acknowledges that she has consented to an online visit or consultation  She understands that the online visit is based solely on information provided by her, and that, in the absence of a face-to-face physical evaluation by the physician, the diagnosis she receives is both limited and provisional in terms of accuracy and completeness  This is not intended to replace a full medical face-to-face evaluation by the physician  Forgan Trudy understands and accepts these terms  MEDICATION MANAGEMENT NOTE        [facility]    Name and Date of Birth:  Leonides Yates 31 y o  1990 MRN: [id number]    Date of Visit: March 11, 2021    No Known Allergies  SUBJECTIVE:    Mana Sheryl is seen today for a follow up for PTSD and Schizoaffective Disorder  She reports that she has decompensated slightly since the last visit  Patient states that she has been feeling very bored lately  Does not know how to spend her time  Typically will engage in activity to an extreme and then become poor within stop doing it  Recommended patient pick 2 or 3 activities and schedule times to pick from the list avoiding doing the same activity every day  In addition patient has been distressed about roommate switching from day shift night shift  Patient feels that for the past week since roommate change shifts she has been "paranoid "and fearful at night  Patient reports she has continues to have difficulty with sleep  Falls asleep after taking Zyprexa but then wakes throughout the night  Encouraged patient to improve sleep hygiene practices including not taking naps and not laying in bed or looking at her phone while in bed  Patient appears to be overly dependent on key relationships and become stressed and overwhelmed when changes occur  Patient dislikes interacting with others and developing new relationships  She denies any side effects from medications  PLAN:    -Continue lamictal titration to 200 mg as ordered  - Continue all other current psychiatric medications as ordered  -recommended increasing frequency of individual therapy sessions to discuss techniques for overcoming dependence on roommate and reducing fear of interacting with others  Also to work on developing ways to spend her time when she is bored  -followup in four weeks  Aware of 24 hour and weekend coverage for urgent situations accessed by calling [facility] main practice number    Diagnoses and all orders for this visit:    Schizoaffective disorder, bipolar type ([facility] [address] )  -     lamoTRIgine (LaMICtal) 100 mg tablet; Take 1 5 tablets (150 mg total) by mouth daily for 14 days, THEN 2 tablets (200 mg total) daily   -     OLANZapine (ZyPREXA) 20 MG tablet; Take 1 tablet (20 mg total) by mouth daily at bedtime  -     diphenhydrAMINE (BENADRYL) 50 mg capsule;  Take 1 capsule (50 mg total) by mouth daily at bedtime    PTSD (post-traumatic stress disorder)  -     prazosin (MINIPRESS) 2 mg capsule; Take 1 capsule (2 mg total) by mouth daily at bedtime  -     gabapentin (NEURONTIN) 300 mg capsule; Take 1 capsule (300 mg total) by mouth 3 (three) times a day        Current Outpatient Medications on File Prior to Visit   Medication Sig Dispense Refill    levothyroxine 175 mcg tablet Take 200 mcg by mouth daily      Melatonin 10 MG TABS Take 1 tablet (10 mg total) by mouth daily at bedtime      [DISCONTINUED] diphenhydrAMINE (BENADRYL) 50 mg capsule Take 1 capsule (50 mg total) by mouth daily at bedtime 30 capsule 2    [DISCONTINUED] gabapentin (NEURONTIN) 300 mg capsule Take 1 capsule (300 mg total) by mouth 3 (three) times a day 90 capsule 2    [DISCONTINUED] lamoTRIgine (LaMICtal) 100 mg tablet Take 1 tablet (100 mg total) by mouth daily 30 tablet 1    [DISCONTINUED] OLANZapine (ZyPREXA) 20 MG tablet Take 1 tablet (20 mg total) by mouth daily at bedtime 30 tablet 2    [DISCONTINUED] prazosin (MINIPRESS) 2 mg capsule Take 1 capsule (2 mg total) by mouth daily at bedtime 30 capsule 2     No current facility-administered medications on file prior to visit  Psychotherapy Provided:     Individual psychotherapy provided: Yes  Counseling was provided during the session today for 16 minutes  Supportive counseling provided  HPI ROS Appetite Changes and Sleep:     She reports frequent awakenings, adequate appetite, low energy   Patient denies suicidal or homicidal ideation    Review Of Systems:      General relationship problems, emotional problems, sleep disturbances and decreased functioning   Personality no change in personality   Constitutional negative   ENT negative   Cardiovascular negative   Respiratory negative   Gastrointestinal negative   Genitourinary negative   Musculoskeletal negative   Integumentary negative   Neurological negative   Endocrine negative   Other Symptoms none, all other systems are negative     Mental Status Evaluation:    Appearance Adequate hygiene and grooming   Behavior calm and cooperative   Mood anxious and depressed  Depression Scale -  of 10 (0 = No depression)  Anxiety Scale -  of 10 (0 = No anxiety)   Speech Normal rate and volume   Affect appropriate and mood-congruent   Thought Processes Goal directed and coherent   Thought Content Does not verbalize delusional material   Associations Tightly connected   Perceptual Disturbances Denies hallucinations and does not appear to be responding to internal stimuli   Risk Potential Suicidal/Homicidal Ideation - No evidence of suicidal or homicidal ideation and Patient does not verbalize suicidal or homicidal ideation  Risk of Violence - No evidence of risk for violence found on assessment  Risk of Self Mutilation - No evidence of risk for self mutilation found on assessment   Orientation oriented to person, place, time/date and situation   Memory recent and remote memory grossly intact   Consciousness alert and awake   Attention/Concentration attention span and concentration are age appropriate   Insight limited   Judgement limited   Muscle Strength and Gait normal muscle strength and normal muscle tone, normal gait/station and normal balance   Motor Activity no abnormal movements   Language no difficulty naming common objects, no difficulty repeating a phrase, no difficulty writing a sentence   Fund of Knowledge adequate knowledge of current events  adequate fund of knowledge regarding past history  adequate fund of knowledge regarding vocabulary      Past Psychiatric History Update:     Inpatient Psychiatric Admission Since Last Encounter:   no  Changes to Outpatient Psychiatric Treatment Team:    no  Suicide Attempt Or Self Mutilation Since Last Encounter:   no  Incidence of Violent Behavior Since Last Encounter:   no    Traumatic History Update:     New Onset of Abuse Since Last Encounter:   no  Traumatic Events Since Last Encounter:   no    Past Medical History:    Past Medical History:   Diagnosis Date    Bipolar [facility] disorder ([facility] )     Depression     Schizoaffective disorder, bipolar type ([facility] )     Suicidal ideations     Thyroid disease      No past medical history pertinent negatives    Past Surgical History:   Procedure Laterality Date    NECK SURGERY      US GUIDED THYROID BIOPSY      normal  Onset: 2012     No Known Allergies  Substance Abuse History:    Social History     Substance and Sexual Activity   Alcohol Use Yes    Comment: occ     Social History     Substance and Sexual Activity   Drug Use No     Social History:    Social History     Socioeconomic History    Marital status: Single     Spouse name: Not on file    Number of children: Not on file    Years of education: Not on file    Highest education level: Not on file   Occupational History    Not on file   Social Needs    Financial resource strain: Not on file    Food insecurity     Worry: Not on file     Inability: Not on file    Transportation needs     Medical: Not on file     Non-medical: Not on file   Tobacco Use    Smoking status: Former Smoker     Packs/day: 1 00     Types: Cigarettes    Smokeless tobacco: Never Used   Substance and Sexual Activity    Alcohol use: Yes     Comment: occ    Drug use: No    Sexual activity: Not on file   Lifestyle    Physical activity     Days per week: Not on file     Minutes per session: Not on file    Stress: Not on file   Relationships    Social connections     Talks on phone: Not on file     Gets together: Not on file     Attends [employer] service: Not on file     Active member of club or organization: Not on file     Attends meetings of clubs or organizations: Not on file     Relationship status: Not on file    Intimate partner violence     Fear of current or ex partner: Not on file     Emotionally abused: Not on file     Physically abused: Not on file     Forced sexual activity: Not on file   Other Topics Concern    Not on file   Social History Narrative    Not on file     Family Psychiatric History:     No family history on file  History Review: The following portions of the patient's history were reviewed and updated as appropriate: allergies, current medications, past family history, past medical history, past social history, past surgical history and problem list     OBJECTIVE:     Vital signs in last 24 hours: There were no vitals filed for this visit  Laboratory Results: I have personally reviewed all pertinent laboratory/tests results  Suicide/Homicide Risk Assessment:    Risk of Harm to Self:   The following ratings are based on assessment at the time of the interview   Recent Specific Risk Factors include: diagnosis of mood disorder    Risk of Harm to Others:   The following ratings are based on assessment at the time of the interview   Recent Specific Risk Factors include: none  The following interventions are recommended: no intervention changes needed    Medications Risks/Benefits:      Risks, Benefits And Possible Side Effects Of Medications:    Discussed risks and benefits of treatment with patient including risk of parkinsonian symptoms, metabolic syndrome, tardive dyskinesia and neuroleptic malignant syndrome related to treatment with antipsychotic medications and Risk of orthostatic hypotension with prazosin     Controlled Medication Discussion:     Not applicable    Treatment Plan:    Due for update/Updated:   GENESIS Yeh 03/11/21    This note was shared with patient

## 2021-03-11 NOTE — TELEPHONE ENCOUNTER
Pt called wanted to see if you refilled her medication  I gave her the names of the refills     she also wanted to know if she could get a refill of the Benadryl

## 2021-04-02 ENCOUNTER — TELEMEDICINE (OUTPATIENT)
Dept: BEHAVIORAL/MENTAL HEALTH CLINIC | Facility: CLINIC | Age: 31
End: 2021-04-02
Payer: COMMERCIAL

## 2021-04-02 DIAGNOSIS — F25.0 SCHIZOAFFECTIVE DISORDER, BIPOLAR TYPE (HCC): Primary | ICD-10-CM

## 2021-04-02 DIAGNOSIS — F41.1 GAD (GENERALIZED ANXIETY DISORDER): ICD-10-CM

## 2021-04-02 DIAGNOSIS — F43.10 POST TRAUMATIC STRESS DISORDER (PTSD): ICD-10-CM

## 2021-04-02 PROCEDURE — 90834 PSYTX W PT 45 MINUTES: CPT | Performed by: SOCIAL WORKER

## 2021-04-02 NOTE — PSYCH
Psychotherapy Provided: Individual Psychotherapy 45 minutes     Length of time in session: 45 minutes, follow up in 2 week    Goals addressed in session: 2    Pain:      none    0    Current suicide risk : Low     D:  Met with Janes for session  The Zyprexa is causing her weight gain and tiredness but, she knows she needs to take it and that it works  She went to her PCP who prescribed her diet pills that help will help with the weight and with the tiredness  MSW with Janes's permission sent her PA who is prescribing her psych meds a message regarding the above  Discussed work  A co-worker asked her if she was pregnant and her boss told her she has been late on two occassions which she denied  Her brother who she felt was her protector at work transferred to another building  Her roommate used to work day shift is not working night shift  Her brother is to transfer back to her building and her roommate is to go back to day shift  Her parents are visiting from the [facility] for her mom to have surgery  Discussed coping skills for dealing with co-workers  She wants to go to the gym 3 times per week but, her friend and her mother won't go  A:  Mod progress on goal 1,2   P:  To continue addressing  [place] plan goals  Apply coping skills for all the above that was discussed in session  Behavioral Health Treatment Plan [facility]: Diagnosis and Treatment Plan explained to Ev Miranda relates understanding diagnosis and is agreeable to Treatment Plan  Yes   Virtual Regular Visit      Assessment/Plan:    Problem List Items Addressed This Visit     None               Reason for visit is No chief complaint on file  Encounter provider Tera See    Provider located at [address] [place] [address] [address]  [facility] [id number]      Recent Visits  No visits were found meeting these conditions     Showing recent visits within past 7 days and meeting all other requirements     Future Appointments  No visits were found meeting these conditions  Showing future appointments within next 150 days and meeting all other requirements        The patient was identified by name and date of birth  Kandice Jhaveri was informed that this is a telemedicine visit and that the visit is being conducted through [employer] and patient was informed that this is a secure, HIPAA-compliant platform  She agrees to proceed     My office door was closed  No one else was in the room  She acknowledged consent and understanding of privacy and security of the video platform  The patient has agreed to participate and understands they can discontinue the visit at any time  Patient is aware this is a billable service  Subjective  Kandice Jhaveri is a 31 y o  female    HPI     Past Medical History:   Diagnosis Date    Bipolar 1 disorder ([facility] [address] )     Depression     Schizoaffective disorder, bipolar type ([facility] [address] )     Suicidal ideations     Thyroid disease        Past Surgical History:   Procedure Laterality Date    NECK SURGERY      US GUIDED THYROID BIOPSY      normal  Onset: 2012       Current Outpatient Medications   Medication Sig Dispense Refill    diphenhydrAMINE (BENADRYL) 50 mg capsule Take 1 capsule (50 mg total) by mouth daily at bedtime 30 capsule 2    gabapentin (NEURONTIN) 300 mg capsule Take 1 capsule (300 mg total) by mouth 3 (three) times a day 90 capsule 2    lamoTRIgine (LaMICtal) 100 mg tablet Take 1 5 tablets (150 mg total) by mouth daily for 14 days, THEN 2 tablets (200 mg total) daily   113 tablet 0    levothyroxine 175 mcg tablet Take 200 mcg by mouth daily      Melatonin 10 MG TABS Take 1 tablet (10 mg total) by mouth daily at bedtime      OLANZapine (ZyPREXA) 20 MG tablet Take 1 tablet (20 mg total) by mouth daily at bedtime 30 tablet 2    prazosin (MINIPRESS) 2 mg capsule Take 1 capsule (2 mg total) by mouth daily at bedtime 30 capsule 2 No current facility-administered medications for this visit  No Known Allergies    Review of Systems    Video Exam    There were no vitals filed for this visit  Physical Exam     I spent 45 minutes directly with the patient during this visit      VIRTUAL VISIT Miguel acknowledges that she has consented to an online visit or consultation  She understands that the online visit is based solely on information provided by her, and that, in the absence of a face-to-face physical evaluation by the physician, the diagnosis she receives is both limited and provisional in terms of accuracy and completeness  This is not intended to replace a full medical face-to-face evaluation by the physician  Sandi Blair understands and accepts these terms

## 2021-04-06 ENCOUNTER — TELEPHONE (OUTPATIENT)
Dept: PSYCHIATRY | Facility: CLINIC | Age: 31
End: 2021-04-06

## 2021-04-14 ENCOUNTER — TELEMEDICINE (OUTPATIENT)
Dept: BEHAVIORAL/MENTAL HEALTH CLINIC | Facility: CLINIC | Age: 31
End: 2021-04-14
Payer: COMMERCIAL

## 2021-04-14 DIAGNOSIS — F25.0 SCHIZOAFFECTIVE DISORDER, BIPOLAR TYPE (HCC): ICD-10-CM

## 2021-04-14 DIAGNOSIS — F43.10 POST TRAUMATIC STRESS DISORDER (PTSD): Primary | ICD-10-CM

## 2021-04-14 DIAGNOSIS — F41.1 GAD (GENERALIZED ANXIETY DISORDER): ICD-10-CM

## 2021-04-14 PROCEDURE — 90834 PSYTX W PT 45 MINUTES: CPT | Performed by: SOCIAL WORKER

## 2021-04-14 NOTE — PSYCH
Psychotherapy Provided: Individual Psychotherapy 45 minutes     Length of time in session: 45 minutes, follow up in 2 week    Goals addressed in session: 2    Pain:      none    0    Current suicide risk : Low     D:  Met with Janes for session  She went to the [facility] and is still gaining weight  He wanted to increase her diet pills but, she does not want that  "They are a narcotic and scare her "  She is "afraid to see Yuan Hernandez" regarding her psych meds  She is afraid he is going to change her Zyprexia and she doesn't want him to  "She has been doing good" as evidenced by no psychosis and decreased mood swings  Her brother might  Be moving in with her  She lives in her parents house with her roommate and when her parents come to the  from [address] [place] [address] they stay there  At work "she is learning to get a thicker skin " She gave examples of this to MSW  A:  Mod progress on goal 1,2   P:  To continue addressing  [place] plan goals  To see PA on 4/23  Behavioral Health Treatment Plan [facility]: Diagnosis and Treatment Plan explained to Linda Renner relates understanding diagnosis and is agreeable to Treatment Plan  Yes   Virtual Regular Visit      Assessment/Plan:    Problem List Items Addressed This Visit     None               Reason for visit is No chief complaint on file  Encounter provider Sandor Smith    Provider located at [address]  [facility] [id number]      Recent Visits  No visits were found meeting these conditions  Showing recent visits within past 7 days and meeting all other requirements     Future Appointments  No visits were found meeting these conditions  Showing future appointments within next 150 days and meeting all other requirements        The patient was identified by name and date of birth   Kandcie Jhaveri was informed that this is a telemedicine visit and that the visit is being conducted through [email or web address] Teams and patient was informed that this is a secure, HIPAA-compliant platform  She agrees to proceed     My office door was closed  No one else was in the room  She acknowledged consent and understanding of privacy and security of the video platform  The patient has agreed to participate and understands they can discontinue the visit at any time  Patient is aware this is a billable service  Subjective  Sheela David is a 31 y o  female    HPI     Past Medical History:   Diagnosis Date    [facility] [address] )     Depression     [facility]     Suicidal ideations     Thyroid disease        Past Surgical History:   Procedure Laterality Date    NECK SURGERY      US GUIDED THYROID BIOPSY      normal  Onset: 2012       Current Outpatient Medications   Medication Sig Dispense Refill    diphenhydrAMINE (BENADRYL) 50 mg capsule Take 1 capsule (50 mg total) by mouth daily at bedtime 30 capsule 2    gabapentin (NEURONTIN) 300 mg capsule Take 1 capsule (300 mg total) by mouth 3 (three) times a day 90 capsule 2    lamoTRIgine (LaMICtal) 100 mg tablet Take 1 5 tablets (150 mg total) by mouth daily for 14 days, THEN 2 tablets (200 mg total) daily  113 tablet 0    levothyroxine 175 mcg tablet Take 200 mcg by mouth daily      Melatonin 10 MG TABS Take 1 tablet (10 mg total) by mouth daily at bedtime      OLANZapine (ZyPREXA) 20 MG tablet Take 1 tablet (20 mg total) by mouth daily at bedtime 30 tablet 2    prazosin (MINIPRESS) 2 mg capsule Take 1 capsule (2 mg total) by mouth daily at bedtime 30 capsule 2     No current facility-administered medications for this visit  No Known Allergies    Review of Systems    Video Exam    There were no vitals filed for this visit      Physical Exam     I spent 45 minutes directly with the patient during this visit      VIRTUAL VISIT Miguel acknowledges that she has consented to an online visit or consultation  She understands that the online visit is based solely on information provided by her, and that, in the absence of a face-to-face physical evaluation by the physician, the diagnosis she receives is both limited and provisional in terms of accuracy and completeness  This is not intended to replace a full medical face-to-face evaluation by the physician  Bryan Gao understands and accepts these terms

## 2021-04-15 ENCOUNTER — TELEPHONE (OUTPATIENT)
Dept: PSYCHIATRY | Facility: CLINIC | Age: 31
End: 2021-04-15

## 2021-04-15 NOTE — TELEPHONE ENCOUNTER
R/s 4/29  Next f/u is 5/13 
no
Otolaryngologist Procedure Text (D): After obtaining clear surgical margins the patient was sent to otolaryngology for surgical repair.  The patient understands they will receive post-surgical care and follow-up from the referring physician's office.

## 2021-04-23 ENCOUNTER — TELEMEDICINE (OUTPATIENT)
Dept: PSYCHIATRY | Facility: CLINIC | Age: 31
End: 2021-04-23
Payer: COMMERCIAL

## 2021-04-23 VITALS — WEIGHT: 280 LBS | BODY MASS INDEX: 42.44 KG/M2 | HEIGHT: 68 IN

## 2021-04-23 DIAGNOSIS — F43.10 PTSD (POST-TRAUMATIC STRESS DISORDER): ICD-10-CM

## 2021-04-23 DIAGNOSIS — F41.1 GAD (GENERALIZED ANXIETY DISORDER): ICD-10-CM

## 2021-04-23 DIAGNOSIS — F25.0 SCHIZOAFFECTIVE DISORDER, BIPOLAR TYPE (HCC): Primary | ICD-10-CM

## 2021-04-23 PROCEDURE — 99214 OFFICE O/P EST MOD 30 MIN: CPT | Performed by: NURSE PRACTITIONER

## 2021-04-23 RX ORDER — DIPHENHYDRAMINE HCL 50 MG
50 CAPSULE ORAL
Qty: 30 CAPSULE | Refills: 5 | Status: SHIPPED | OUTPATIENT
Start: 2021-04-23 | End: 2022-01-24

## 2021-04-23 RX ORDER — GABAPENTIN 300 MG/1
300 CAPSULE ORAL 3 TIMES DAILY
Qty: 90 CAPSULE | Refills: 5 | Status: SHIPPED | OUTPATIENT
Start: 2021-04-23 | End: 2021-05-27

## 2021-04-23 RX ORDER — PRAZOSIN HYDROCHLORIDE 2 MG/1
2 CAPSULE ORAL
Qty: 30 CAPSULE | Refills: 5 | Status: SHIPPED | OUTPATIENT
Start: 2021-04-23 | End: 2021-09-09 | Stop reason: SDUPTHER

## 2021-04-23 RX ORDER — OLANZAPINE 20 MG/1
20 TABLET ORAL
Qty: 30 TABLET | Refills: 5 | Status: SHIPPED | OUTPATIENT
Start: 2021-04-23 | End: 2021-09-09 | Stop reason: SDUPTHER

## 2021-04-23 RX ORDER — LAMOTRIGINE 200 MG/1
200 TABLET ORAL DAILY
Qty: 30 TABLET | Refills: 5 | Status: SHIPPED | OUTPATIENT
Start: 2021-04-23 | End: 2021-07-02 | Stop reason: SDUPTHER

## 2021-04-23 NOTE — PSYCH
Virtual Regular Visit    Problem List Items Addressed This Visit        Other    Schizoaffective disorder, bipolar type ([address] ) - Primary    GOOD (generalized anxiety disorder)             Encounter provider GENESIS Pappas    Provider located at   [address]  [facility]    [address] [facility]  [address]  [facility]  [address] [id number] [phone]    Recent Visits  No visits were found meeting these conditions  Showing recent visits within past 7 days and meeting all other requirements     Today's Visits  Date Type Provider Dept   04/23/21 Telemedicine GENESIS [facility]   Showing today's visits and meeting all other requirements     Future Appointments  No visits were found meeting these conditions  Showing future appointments within next 150 days and meeting all other requirements      The patient was identified by name and date of birth  Manny Beard was informed that this is a telemedicine visit and that the visit is being conducted through [employer]  My office door was closed  No one else was in the room  She acknowledged consent and understanding of privacy and security of the video platform  The patient has agreed to participate and understands they can discontinue the visit at any time  Patient is aware this is a billable service  HPI     Current Outpatient Medications   Medication Sig Dispense Refill    diphenhydrAMINE (BENADRYL) 50 mg capsule Take 1 capsule (50 mg total) by mouth daily at bedtime 30 capsule 2    gabapentin (NEURONTIN) 300 mg capsule Take 1 capsule (300 mg total) by mouth 3 (three) times a day 90 capsule 2    lamoTRIgine (LaMICtal) 100 mg tablet Take 1 5 tablets (150 mg total) by mouth daily for 14 days, THEN 2 tablets (200 mg total) daily   113 tablet 0    levothyroxine 175 mcg tablet Take 200 mcg by mouth daily      Melatonin 10 MG TABS Take 1 tablet (10 mg total) by mouth daily at bedtime      OLANZapine (ZyPREXA) 20 MG tablet Take 1 tablet (20 mg total) by mouth daily at bedtime 30 tablet 2    prazosin (MINIPRESS) 2 mg capsule Take 1 capsule (2 mg total) by mouth daily at bedtime 30 capsule 2     No current facility-administered medications for this visit  Review of Systems  Video Exam    Vitals:    04/23/21 1546   Weight: 127 kg (280 lb)   Height: 5' 8" (1 727 m)       Physical Exam   As a result of this visit, I have referred the patient for further respiratory evaluation  No    I spent 21 minutes directly with the patient during this visit  Sylvain acknowledges that she has consented to an online visit or consultation  She understands that the online visit is based solely on information provided by her, and that, in the absence of a face-to-face physical evaluation by the physician, the diagnosis she receives is both limited and provisional in terms of accuracy and completeness  This is not intended to replace a full medical face-to-face evaluation by the physician  Jason Abdirahman understands and accepts these terms  MEDICATION MANAGEMENT NOTE        [facility]    Name and Date of Birth:  Jason Gary 31 y o  1990 MRN: [id number]    Date of Visit: April 23, 2021    No Known Allergies  SUBJECTIVE:    Vee Amaya is seen today for a follow up for PTSD, Generalized Anxiety Disorder and Schizoaffective Disorder  She reports that she has done well since the last visit  Patient states that overall "I have been on happy spectrum "  States she has been relatively productive and feels work is going well  She has been somewhat more social and is feeling that she is build friendships at work  Previously patient was very suspicious of others  Patient continues to sleep well and appears to be overall experiencing a balanced mood    Is considering having her brother move in with her and her roommate  Is considering buying a treadmill as she does feel very self-conscious when going to the gym to work out  Patient is worried about antipsychotic weight gain  Was prescribed phentermine by PCP for weight loss  Discussed risks of stimulant medications with the patient  Recommend patient discontinue this medicine  Will initiate metformin 500 mg p o  b i d  for antipsychotic induced weight gain  Patient is now taking lamotrigine 200 mg daily denies any medication issues other then struggling with weight  PLAN:    Initiate metformin 500 mg p o  b i d  with meals   continue olanzapine 20 mg daily   continue prazosin 2 mg at bedtime   continue lamotrigine 200 mg p o  daily   continue Benadryl 25mg po hs   continue gabapentin 300 mg p o  t i d  Aware of 24 hour and weekend coverage for urgent situations accessed by calling [facility] main practice number    Diagnoses and all orders for this visit:    Schizoaffective disorder, bipolar type ([facility] [address] )    GOOD (generalized anxiety disorder)        Current Outpatient Medications on File Prior to Visit   Medication Sig Dispense Refill    diphenhydrAMINE (BENADRYL) 50 mg capsule Take 1 capsule (50 mg total) by mouth daily at bedtime 30 capsule 2    gabapentin (NEURONTIN) 300 mg capsule Take 1 capsule (300 mg total) by mouth 3 (three) times a day 90 capsule 2    lamoTRIgine (LaMICtal) 100 mg tablet Take 1 5 tablets (150 mg total) by mouth daily for 14 days, THEN 2 tablets (200 mg total) daily  113 tablet 0    levothyroxine 175 mcg tablet Take 200 mcg by mouth daily      Melatonin 10 MG TABS Take 1 tablet (10 mg total) by mouth daily at bedtime      OLANZapine (ZyPREXA) 20 MG tablet Take 1 tablet (20 mg total) by mouth daily at bedtime 30 tablet 2    prazosin (MINIPRESS) 2 mg capsule Take 1 capsule (2 mg total) by mouth daily at bedtime 30 capsule 2     No current facility-administered medications on file prior to visit  Psychotherapy Provided:     Individual psychotherapy provided: Yes  Counseling was provided during the session today for 16 minutes  Supportive counseling provided  HPI ROS Appetite Changes and Sleep:     She reports normal sleep, normal appetite, normal energy level   Patient denies suicidal or homicidal ideation    Review Of Systems:      General emotional problems and appetite disturbances   Personality no change in personality   Constitutional negative   ENT negative   Cardiovascular negative   Respiratory negative   Gastrointestinal negative   Genitourinary negative   Musculoskeletal negative   Integumentary negative   Neurological negative   Endocrine negative   Other Symptoms none, all other systems are negative     Mental Status Evaluation:    Appearance Adequate hygiene and grooming   Behavior calm and cooperative   Mood euthymic  Depression Scale -  of 10 (0 = No depression)  Anxiety Scale -  of 10 (0 = No anxiety)   Speech Normal rate and volume   Affect appropriate and mood-congruent   Thought Processes Goal directed and coherent   Thought Content Does not verbalize delusional material   Associations Tightly connected   Perceptual Disturbances Denies hallucinations and does not appear to be responding to internal stimuli   Risk Potential Suicidal/Homicidal Ideation - No evidence of suicidal or homicidal ideation and Patient does not verbalize suicidal or homicidal ideation  Risk of Violence - No evidence of risk for violence found on assessment  Risk of Self Mutilation - No evidence of risk for self mutilation found on assessment   Orientation oriented to person, place, time/date and situation   Memory recent and remote memory grossly intact   Consciousness alert and awake   Attention/Concentration attention span and concentration are age appropriate   Insight fair   Judgement fair   Muscle Strength and Gait normal muscle strength and normal muscle tone, normal gait/station and normal balance Motor Activity no abnormal movements   Language no difficulty naming common objects, no difficulty repeating a phrase, no difficulty writing a sentence   Fund of Knowledge adequate knowledge of current events  adequate fund of knowledge regarding past history  adequate fund of knowledge regarding vocabulary      Past Psychiatric History Update:     Inpatient Psychiatric Admission Since Last Encounter:   no  Changes to Outpatient Psychiatric Treatment Team:    no  Suicide Attempt Or Self Mutilation Since Last Encounter:   no  Incidence of Violent Behavior Since Last Encounter:   no    Traumatic History Update:     New Onset of Abuse Since Last Encounter:   no  Traumatic Events Since Last Encounter:   no    Past Medical History:    Past Medical History:   Diagnosis Date    Bipolar 1 disorder ([facility] )     Depression     Schizoaffective disorder, bipolar type ([facility] )     Suicidal ideations     Thyroid disease      No past medical history pertinent negatives    Past Surgical History:   Procedure Laterality Date    NECK SURGERY      US GUIDED THYROID BIOPSY      normal  Onset: 2012     No Known Allergies  Substance Abuse History:    Social History     Substance and Sexual Activity   Alcohol Use Yes    Comment: occ     Social History     Substance and Sexual Activity   Drug Use No     Social History:    Social History     Socioeconomic History    Marital status: Single     Spouse name: Not on file    Number of children: Not on file    Years of education: Not on file    Highest education level: Not on file   Occupational History    Not on file   Social Needs    Financial resource strain: Not on file    Food insecurity     Worry: Not on file     Inability: Not on file   [employer] needs     Medical: Not on file     Non-medical: Not on file   Tobacco Use    Smoking status: Former Smoker     Packs/day: 1 00     Types: Cigarettes    Smokeless tobacco: Never Used   Substance and Sexual Activity    Alcohol use: Yes     Comment: occ    Drug use: No    Sexual activity: Not on file   Lifestyle    Physical activity     Days per week: Not on file     Minutes per session: Not on file    Stress: Not on file   Relationships    Social connections     Talks on phone: Not on file     Gets together: Not on file     Attends Rastafarian service: Not on file     Active member of club or organization: Not on file     Attends meetings of clubs or organizations: Not on file     Relationship status: Not on file    Intimate partner violence     Fear of current or ex partner: Not on file     Emotionally abused: Not on file     Physically abused: Not on file     Forced sexual activity: Not on file   Other Topics Concern    Not on file   Social History Narrative    Not on file     Family Psychiatric History:     No family history on file  History Review: The following portions of the patient's history were reviewed and updated as appropriate: allergies, current medications, past family history, past medical history, past social history, past surgical history and problem list     OBJECTIVE:     Vital signs in last 24 hours:    Vitals:    04/23/21 1546   Weight: 127 kg (280 lb)   Height: 5' 8" (1 727 m)     Laboratory Results: I have personally reviewed all pertinent laboratory/tests results  Suicide/Homicide Risk Assessment:    Risk of Harm to Self:   The following ratings are based on assessment at the time of the interview   Recent Specific Risk Factors include: diagnosis of mood disorder    Risk of Harm to Others:   The following ratings are based on assessment at the time of the interview   Recent Specific Risk Factors include: none      The following interventions are recommended: no intervention changes needed    Medications Risks/Benefits:      Risks, Benefits And Possible Side Effects Of Medications:    Discussed risks and benefits of treatment with patient including risk of parkinsonian symptoms, metabolic syndrome, tardive dyskinesia and neuroleptic malignant syndrome related to treatment with antipsychotic medications, risk of rash related to treatment with Lamictal and Risk of orthostatic hypotension with prazosin     Controlled Medication Discussion:     Not applicable    Treatment Plan:    Due for update/Updated:   GENESIS Adkins 04/23/21    This note was shared with patient

## 2021-05-06 ENCOUNTER — TELEPHONE (OUTPATIENT)
Dept: PSYCHIATRY | Facility: CLINIC | Age: 31
End: 2021-05-06

## 2021-05-06 NOTE — TELEPHONE ENCOUNTER
Janes called and left a message  She is wondering when her next appointment is  She made the f/u with Ruben Washburn but it looks like it was never scheduled  Do you remember the date or the time frame?

## 2021-05-07 ENCOUNTER — TELEPHONE (OUTPATIENT)
Dept: PSYCHIATRY | Facility: CLINIC | Age: 31
End: 2021-05-07

## 2021-05-07 NOTE — TELEPHONE ENCOUNTER
Called Janes to discuss side effects  Janes states she feels like "trash"  I asked her to describe the symptoms that are making her feel that way and she said her body is retaining water, gaining weight and she is "peeing a lot"  Asked what is making her feel as though she is retaining water and she said she couldn't get her shoes on  States that her legs dont look swollen but they feel swollen  When she lays on her adjustable bed wit her feel elevated, she feels as though the water moves down to her knees and then she can't bend her legs  Asked if she has pain to assess for blood clots  Instructed her to go to the ER for evaluation if swelling gets worse and she has pain  States she is still taking the Metformin and took her morning dose already  No other complaints other than the swelling  Will refer to French Guzman for review

## 2021-05-07 NOTE — TELEPHONE ENCOUNTER
Tracey Agent can you follow up for me details  Is she nauseous, taking with food? Any leg swelling, why does she feel she is "retaining water"?

## 2021-05-07 NOTE — TELEPHONE ENCOUNTER
Lets stop metformin and see if symptoms improve  If they do not patient should schedule an appointment with PCP for follow-up

## 2021-05-07 NOTE — TELEPHONE ENCOUNTER
Called Janes and instructed her to stop the Metformin per Khris  Requested that she monitor water retention and swelling  If it does not improve after stopping the Metformin, instructed her that she will need to call her PCP  Will refer to Jose Ureña for is information

## 2021-05-07 NOTE — TELEPHONE ENCOUNTER
Patient called into the office and would like to speak with Khris regarding some side effects she is having with the  metFORMIN - she has been retaining water and not feeling well

## 2021-05-11 ENCOUNTER — TELEPHONE (OUTPATIENT)
Dept: PSYCHIATRY | Facility: CLINIC | Age: 31
End: 2021-05-11

## 2021-05-12 NOTE — TELEPHONE ENCOUNTER
Spoke with patient over the phone  Patient was referred to nutritionist by PCP for weight management  Patient had gained an additional 5 lb since last visit  Will continue to monitor weight  Patient was retaining fluid possibly related to metformin  Patient reports swelling in legs and feet has subsided  Recommend patient work on lifestyle modification and look for opportunities for improvements in diet and exercise during individual therapy sessions

## 2021-05-13 ENCOUNTER — TELEMEDICINE (OUTPATIENT)
Dept: BEHAVIORAL/MENTAL HEALTH CLINIC | Facility: CLINIC | Age: 31
End: 2021-05-13
Payer: COMMERCIAL

## 2021-05-13 DIAGNOSIS — F43.10 POST TRAUMATIC STRESS DISORDER (PTSD): Primary | ICD-10-CM

## 2021-05-13 DIAGNOSIS — F41.1 GAD (GENERALIZED ANXIETY DISORDER): ICD-10-CM

## 2021-05-13 DIAGNOSIS — F25.0 SCHIZOAFFECTIVE DISORDER, BIPOLAR TYPE (HCC): ICD-10-CM

## 2021-05-13 PROCEDURE — 90834 PSYTX W PT 45 MINUTES: CPT | Performed by: SOCIAL WORKER

## 2021-05-13 NOTE — BH TREATMENT PLAN
Fanny Cannon  1990       Date of Initial Treatment Plan: 11/9/20   Date of Current Treatment Plan: 05/13/21    Treatment Plan Number 1     Strengths/Personal Resources for Self Care: hard working and caring    Diagnosis:   1  Post traumatic stress disorder (PTSD)     2  Schizoaffective disorder, bipolar type ([facility] [address] )     3  GOOD (generalized anxiety disorder)         Area of Needs: I am 285 lbs and am gaining weight  CONNIE Piendo told me not to take the diet pills and to talk to MSW  Long Term Goal 1:  I want to loose weight  Target Date:  11/21  Completion Date: N/A         Short Term Objectives for Goal 1:   I will follow through with the weight management program at   I will call insurance company to see it covers nutitional counseling  I will not drink energy drinks or soda due to weight management but, also due to hx of grey  I will continue to follow a healthy meal plan  I will decrease my carb intake  I will watch the "healthy" foods that have a lot of sugar in them  I will eat the plain oatmeal, yogart, etc and put my own berries, etc in it  I will not eat foods that have high frutose corn syrup in it  I will go to the gym Mon, Wed, Fri         I will try all the above first for 3-6 months and if still no weight loss then, go back and talk to Prince Lindquist  Long Term Goal 2: NA    Target Date:  Completion Date: N/A    Short Term Objectives for Goal 2:                   Long Term Goal # 3: N/A        Target Date: N/A  Completion Date: N/A    Short Term Objectives for Goal 3: N/A    GOAL 1: Modality: Individual 2x per month   Completion Date Iveliss  and The person(s) responsible for carrying out the plan is  Iveliss  GOAL 2: Modality:  Individual 2x per month   Completion Date Iveliss  and The person(s) responsible for carrying out the plan is  Iveliss      GOAL 3: Modality: NA      [address]: Diagnosis and Treatment Plan explained to Hugo Clear relates understanding diagnosis and is agreeable to Treatment Plan         Client Comments : Please share your thoughts, feelings, need and/or experiences regarding your treatment plan:

## 2021-05-13 NOTE — PSYCH
Psychotherapy Provided: Individual Psychotherapy 50 minutes     Length of time in session: 50 minutes, follow up in 2 week    No diagnosis found  Goals addressed in session: Goal 1     Pain:      none    0    Current suicide risk : Low     D:  Met with Janes for session  She talked to her PA who recommended she not take the diet pills  She has stopped them  Her PCP had put her on another med that made her retain water so she is not on any meds currently to help with weight loss  Her Dr referred her to weight management  Her PA discussed her Zyprexia as a weight dario but, she is afraid to come off of it due to "how her brain feels when not on it "    She got the COVID shot yesterday and does not feel well today  She left work by ambulance due to her heart racing and now is at home and feels tired and like her body was hit by a truck  Reviewed previous tx plan and developed new one  A:  Mod progress on goal 1,2   P:  To  Address new  [place] plan goals  Behavioral Health Treatment Plan [facility]: Diagnosis and Treatment Plan explained to Samantha Sy relates understanding diagnosis and is agreeable to Treatment Plan  Yes     Virtual Regular Visit      Assessment/Plan:    Problem List Items Addressed This Visit     None          Goals addressed in session: Goal 1 and Goal 2          Reason for visit is No chief complaint on file  Encounter provider Indra Wesley    Provider located at [address] [place] [id number] [phone]      Recent Visits  No visits were found meeting these conditions  Showing recent visits within past 7 days and meeting all other requirements     Future Appointments  No visits were found meeting these conditions  Showing future appointments within next 150 days and meeting all other requirements        The patient was identified by name and date of birth   Karen Aparicio Amaury Suarez was informed that this is a telemedicine visit and that the visit is being conducted through [employer] and patient was informed that this is a secure, HIPAA-compliant platform  She agrees to proceed     My office door was closed  No one else was in the room  She acknowledged consent and understanding of privacy and security of the video platform  The patient has agreed to participate and understands they can discontinue the visit at any time  Patient is aware this is a billable service  Subjective  Kristina Miner is a 31 y o  female    HPI     Past Medical History:   Diagnosis Date    Bipolar 1 disorder ([facility] [address] )     Depression     Schizoaffective disorder, bipolar type ([facility] [address] )     Suicidal ideations     Thyroid disease        Past Surgical History:   Procedure Laterality Date    NECK SURGERY      US GUIDED THYROID BIOPSY      normal  Onset: 2012       Current Outpatient Medications   Medication Sig Dispense Refill    diphenhydrAMINE (BENADRYL) 50 mg capsule Take 1 capsule (50 mg total) by mouth daily at bedtime 30 capsule 5    gabapentin (NEURONTIN) 300 mg capsule Take 1 capsule (300 mg total) by mouth 3 (three) times a day 90 capsule 5    lamoTRIgine (LaMICtal) 200 MG tablet Take 1 tablet (200 mg total) by mouth daily for 14 days 30 tablet 5    levothyroxine 175 mcg tablet Take 200 mcg by mouth daily      Melatonin 10 MG TABS Take 1 tablet (10 mg total) by mouth daily at bedtime      OLANZapine (ZyPREXA) 20 MG tablet Take 1 tablet (20 mg total) by mouth daily at bedtime 30 tablet 5    prazosin (MINIPRESS) 2 mg capsule Take 1 capsule (2 mg total) by mouth daily at bedtime 30 capsule 5     No current facility-administered medications for this visit  No Known Allergies    Review of Systems    Video Exam    There were no vitals filed for this visit      Physical Exam     I spent 50 minutes directly with the patient during this visit      VIRTUAL VISIT DISCLAIMER    Monty Ireland acknowledges that she has consented to an online visit or consultation  She understands that the online visit is based solely on information provided by her, and that, in the absence of a face-to-face physical evaluation by the physician, the diagnosis she receives is both limited and provisional in terms of accuracy and completeness  This is not intended to replace a full medical face-to-face evaluation by the physician  Monty Ireland understands and accepts these terms

## 2021-05-18 ENCOUNTER — TELEPHONE (OUTPATIENT)
Dept: PSYCHIATRY | Facility: CLINIC | Age: 31
End: 2021-05-18

## 2021-05-18 NOTE — TELEPHONE ENCOUNTER
Val called and would like Khris to call her back - she would like to know if she is able to switch to abilify

## 2021-05-20 ENCOUNTER — TELEPHONE (OUTPATIENT)
Dept: PSYCHIATRY | Facility: CLINIC | Age: 31
End: 2021-05-20

## 2021-05-25 ENCOUNTER — TELEPHONE (OUTPATIENT)
Dept: PSYCHIATRY | Facility: CLINIC | Age: 31
End: 2021-05-25

## 2021-05-25 NOTE — TELEPHONE ENCOUNTER
I called Janes and scheduled her off of the Wait List for an appointment with Aminta Limon  Patient stated that she had received a call from billing, they did not say their name, and had asked for her  and address  Is there a way to confirm this was someone from billing contacting patient or if it was a scam call?

## 2021-05-25 NOTE — TELEPHONE ENCOUNTER
I checked on what I have access to, I don't see anything documented  I would suggest she contact [employer] to see if someone called   [phone]

## 2021-05-26 ENCOUNTER — TELEMEDICINE (OUTPATIENT)
Dept: BEHAVIORAL/MENTAL HEALTH CLINIC | Facility: CLINIC | Age: 31
End: 2021-05-26
Payer: COMMERCIAL

## 2021-05-26 DIAGNOSIS — F43.10 POST TRAUMATIC STRESS DISORDER (PTSD): Primary | ICD-10-CM

## 2021-05-26 DIAGNOSIS — F25.0 SCHIZOAFFECTIVE DISORDER, BIPOLAR TYPE (HCC): ICD-10-CM

## 2021-05-26 DIAGNOSIS — F41.1 GAD (GENERALIZED ANXIETY DISORDER): ICD-10-CM

## 2021-05-26 PROCEDURE — 90834 PSYTX W PT 45 MINUTES: CPT | Performed by: SOCIAL WORKER

## 2021-05-26 NOTE — PSYCH
Psychotherapy Provided: Individual Psychotherapy 50 minutes     Length of time in session: 50 minutes, follow up in 2 week    Encounter Diagnosis     ICD-10-CM    1  Post traumatic stress disorder (PTSD)  F43 10    2  Schizoaffective disorder, bipolar type ([facility] )  F25 0    3  GOOD (generalized anxiety disorder)  F41 1        Goals addressed in session: Goal 1     Pain:      none    0    Current suicide risk : Low     D:  Met with Janes for session  She has an appt next month at the weight loss clinic  Work is slow at her job due to no parts  Discussed coworkers/incidents at work and "wasn't sure if it is real or if she is paranoid " Throughout session she listed off coping skills she has been applying  She is afraid of returning to the hospital   Aug will be a year post hospital   She is afraid to come off of the Zyprexia but, recognizes her weight is an issue and is unsure what to do  She has started eating better  A:  Mod progress on goal 1,2  Appears to have some paranoia but, has some insight into it  P:  To continue addressing  [place] plan goals  To see if the weight management clinic can help her loose weight  If it cannot then, talk to  about alternative meds  Behavioral Health Treatment Plan [facility]: Diagnosis and Treatment Plan explained to Love See relates understanding diagnosis and is agreeable to Treatment Plan  Yes     Virtual Regular Visit      Assessment/Plan:    Problem List Items Addressed This Visit     None          Goals addressed in session: Goal 1 and Goal 2          Reason for visit is No chief complaint on file  Encounter provider Yen Herrera    Provider located at [address] [place] [id number] [phone]      Recent Visits  No visits were found meeting these conditions     Showing recent visits within past 7 days and meeting all other requirements     Future Appointments  No visits were found meeting these conditions  Showing future appointments within next 150 days and meeting all other requirements        The patient was identified by name and date of birth  Monty Ireland was informed that this is a telemedicine visit and that the visit is being conducted through [employer] and patient was informed that this is a secure, HIPAA-compliant platform  She agrees to proceed     My office door was closed  No one else was in the room  She acknowledged consent and understanding of privacy and security of the video platform  The patient has agreed to participate and understands they can discontinue the visit at any time  Patient is aware this is a billable service  Subjective  Monty Ireland is a 31 y o  female    HPI     Past Medical History:   Diagnosis Date    Bipolar 1 disorder ([facility] [address] )     Depression     Schizoaffective disorder, bipolar type ([facility] )     Suicidal ideations     Thyroid disease        Past Surgical History:   Procedure Laterality Date    NECK SURGERY      US GUIDED THYROID BIOPSY      normal  Onset: 2012       Current Outpatient Medications   Medication Sig Dispense Refill    diphenhydrAMINE (BENADRYL) 50 mg capsule Take 1 capsule (50 mg total) by mouth daily at bedtime 30 capsule 5    gabapentin (NEURONTIN) 300 mg capsule Take 1 capsule (300 mg total) by mouth 3 (three) times a day 90 capsule 5    lamoTRIgine (LaMICtal) 200 MG tablet Take 1 tablet (200 mg total) by mouth daily for 14 days 30 tablet 5    levothyroxine 175 mcg tablet Take 200 mcg by mouth daily      Melatonin 10 MG TABS Take 1 tablet (10 mg total) by mouth daily at bedtime      OLANZapine (ZyPREXA) 20 MG tablet Take 1 tablet (20 mg total) by mouth daily at bedtime 30 tablet 5    prazosin (MINIPRESS) 2 mg capsule Take 1 capsule (2 mg total) by mouth daily at bedtime 30 capsule 5     No current facility-administered medications for this visit           No Known Allergies    Review of Systems    Video Exam    There were no vitals filed for this visit  Physical Exam     I spent 50 minutes directly with the patient during this visit      VIRTUAL VISIT Miguel acknowledges that she has consented to an online visit or consultation  She understands that the online visit is based solely on information provided by her, and that, in the absence of a face-to-face physical evaluation by the physician, the diagnosis she receives is both limited and provisional in terms of accuracy and completeness  This is not intended to replace a full medical face-to-face evaluation by the physician  Julia Hester understands and accepts these terms

## 2021-05-27 ENCOUNTER — TELEMEDICINE (OUTPATIENT)
Dept: PSYCHIATRY | Facility: CLINIC | Age: 31
End: 2021-05-27
Payer: COMMERCIAL

## 2021-05-27 DIAGNOSIS — F41.1 GAD (GENERALIZED ANXIETY DISORDER): ICD-10-CM

## 2021-05-27 DIAGNOSIS — F25.0 SCHIZOAFFECTIVE DISORDER, BIPOLAR TYPE (HCC): ICD-10-CM

## 2021-05-27 DIAGNOSIS — F43.10 POST TRAUMATIC STRESS DISORDER (PTSD): ICD-10-CM

## 2021-05-27 DIAGNOSIS — F43.10 PTSD (POST-TRAUMATIC STRESS DISORDER): ICD-10-CM

## 2021-05-27 PROCEDURE — 99213 OFFICE O/P EST LOW 20 MIN: CPT | Performed by: NURSE PRACTITIONER

## 2021-05-27 PROCEDURE — 90833 PSYTX W PT W E/M 30 MIN: CPT | Performed by: NURSE PRACTITIONER

## 2021-05-27 RX ORDER — GABAPENTIN 400 MG/1
400 CAPSULE ORAL 3 TIMES DAILY
Qty: 90 CAPSULE | Refills: 5 | Status: SHIPPED | OUTPATIENT
Start: 2021-05-27 | End: 2021-07-02

## 2021-05-27 NOTE — PSYCH
Virtual Regular Visit    Problem List Items Addressed This Visit        Other    Post traumatic stress disorder (PTSD)    Schizoaffective disorder, bipolar type ([facility] [address] )    GOOD (generalized anxiety disorder) - Primary           Encounter provider GENESIS Osorio    Provider located at   [address]   [address] [address] [address]  [facility] [address] [phone]    Recent Visits  Date Type Provider Dept   05/25/21 Telephone [facility]   05/20/21 Telephone [address] recent visits within past 7 days and meeting all other requirements     Future Appointments  No visits were found meeting these conditions  Showing future appointments within next 150 days and meeting all other requirements      The patient was identified by name and date of birth  [place] Sender was informed that this is a telemedicine visit and that the visit is being conducted through [employer]  My office door was closed  No one else was in the room  She acknowledged consent and understanding of privacy and security of the video platform  The patient has agreed to participate and understands they can discontinue the visit at any time  Patient is aware this is a billable service       HPI     Current Outpatient Medications   Medication Sig Dispense Refill    diphenhydrAMINE (BENADRYL) 50 mg capsule Take 1 capsule (50 mg total) by mouth daily at bedtime 30 capsule 5    gabapentin (NEURONTIN) 300 mg capsule Take 1 capsule (300 mg total) by mouth 3 (three) times a day 90 capsule 5    lamoTRIgine (LaMICtal) 200 MG tablet Take 1 tablet (200 mg total) by mouth daily for 14 days 30 tablet 5    levothyroxine 175 mcg tablet Take 200 mcg by mouth daily      Melatonin 10 MG TABS Take 1 tablet (10 mg total) by mouth daily at bedtime      OLANZapine (ZyPREXA) 20 MG tablet Take 1 tablet (20 mg total) by mouth daily at bedtime 30 tablet 5    prazosin (MINIPRESS) 2 mg capsule Take 1 capsule (2 mg total) by mouth daily at bedtime 30 capsule 5     No current facility-administered medications for this visit  Review of Systems  Video Exam    There were no vitals filed for this visit  Physical Exam   As a result of this visit, I have referred the patient for further respiratory evaluation  No    I spent 25 minutes directly with the patient during this visit  Sylvain acknowledges that she has consented to an online visit or consultation  She understands that the online visit is based solely on information provided by her, and that, in the absence of a face-to-face physical evaluation by the physician, the diagnosis she receives is both limited and provisional in terms of accuracy and completeness  This is not intended to replace a full medical face-to-face evaluation by the physician  Vesna Hancock understands and accepts these terms  MEDICATION MANAGEMENT NOTE        [address] [place] [address]    Name and Date of Birth:  Vesna Hancock 31 y o  1990 MRN: [id number]    Date of Visit: May 27, 2021    No Known Allergies  SUBJECTIVE:    Jl Park is seen today for a follow up for PTSD, Generalized Anxiety Disorder and Schizoaffective Disorder  She reports that she has experienced on and off symptoms since the last visit  Patient states she feels she has been struggling more recently  She has been stressed at work and feels unsure if people do not like her or not  Leading up to prior hospitalization she had a lot of paranoia associated with work environment  Patient retain some insight  Patient has also been stressed about losing weight due to family commenting on weight and telling her she needs to lose weight  Weight has been stable at 280  Patient has done very well on Zyprexa  And weight is currently stable    Patient encouraged to focus on what she can control will increase gabapentin to help with anxiety  Patient encouraged to work on coping techniques associated with stressors in everyday life  Patient sees individual therapist who is given similar advice  Patient will continue to you to see individual therapist    She denies any side effects from medications  PLAN:   increase gabapentin to 400 mg p o  t i d    continue all psychiatric medications as ordered   follow-up in one month   continue with individual therapy  Aware of 24 hour and weekend coverage for urgent situations accessed by calling [facility] main practice number    Diagnoses and all orders for this visit:    GOOD (generalized anxiety disorder)    Schizoaffective disorder, bipolar type ([facility] [address] )    Post traumatic stress disorder (PTSD)        Current Outpatient Medications on File Prior to Visit   Medication Sig Dispense Refill    diphenhydrAMINE (BENADRYL) 50 mg capsule Take 1 capsule (50 mg total) by mouth daily at bedtime 30 capsule 5    gabapentin (NEURONTIN) 300 mg capsule Take 1 capsule (300 mg total) by mouth 3 (three) times a day 90 capsule 5    lamoTRIgine (LaMICtal) 200 MG tablet Take 1 tablet (200 mg total) by mouth daily for 14 days 30 tablet 5    levothyroxine 175 mcg tablet Take 200 mcg by mouth daily      Melatonin 10 MG TABS Take 1 tablet (10 mg total) by mouth daily at bedtime      OLANZapine (ZyPREXA) 20 MG tablet Take 1 tablet (20 mg total) by mouth daily at bedtime 30 tablet 5    prazosin (MINIPRESS) 2 mg capsule Take 1 capsule (2 mg total) by mouth daily at bedtime 30 capsule 5     No current facility-administered medications on file prior to visit  Psychotherapy Provided:     Individual psychotherapy provided: Yes  Counseling was provided during the session today for 16 minutes  Supportive counseling provided       HPI ROS Appetite Changes and Sleep:     She reports frequent awakenings, nightmares, adequate appetite, adequate energy level   Patient denies suicidal or homicidal ideation    Review Of Systems:      General relationship problems, emotional problems, sleep disturbances and decreased functioning   Personality no change in personality   Constitutional negative   ENT negative   Cardiovascular negative   Respiratory negative   Gastrointestinal negative   Genitourinary negative   Musculoskeletal negative   Integumentary negative   Neurological negative   Endocrine negative   Other Symptoms none, all other systems are negative     Mental Status Evaluation:    Appearance Adequate hygiene and grooming   Behavior cooperative   Mood anxious and depressed  Depression Scale -  of 10 (0 = No depression)  Anxiety Scale -  of 10 (0 = No anxiety)   Speech Normal rate and volume   Affect appropriate and mood-congruent   Thought Processes Goal directed and coherent   Thought Content Does not verbalize delusional material   Associations Tightly connected   Perceptual Disturbances Denies hallucinations and does not appear to be responding to internal stimuli   Risk Potential Suicidal/Homicidal Ideation - No evidence of suicidal or homicidal ideation and Patient does not verbalize suicidal or homicidal ideation  Risk of Violence - No evidence of risk for violence found on assessment  Risk of Self Mutilation - No evidence of risk for self mutilation found on assessment   Orientation oriented to person, place, time/date and situation   Memory recent and remote memory grossly intact   Consciousness alert and awake   Attention/Concentration attention span and concentration are age appropriate   Insight improving   Judgement fair   Muscle Strength and Gait normal muscle strength and normal muscle tone, normal gait/station and normal balance   Motor Activity no abnormal movements   Language no difficulty naming common objects, no difficulty repeating a phrase, no difficulty writing a sentence   Fund of Knowledge adequate knowledge of current events  adequate fund of knowledge regarding past history  adequate fund of knowledge regarding vocabulary      Past Psychiatric History Update:     Inpatient Psychiatric Admission Since Last Encounter:   no  Changes to Outpatient Psychiatric Treatment Team:    no  Suicide Attempt Or Self Mutilation Since Last Encounter:   no  Incidence of Violent Behavior Since Last Encounter:   no    Traumatic History Update:     New Onset of Abuse Since Last Encounter:   no  Traumatic Events Since Last Encounter:   no    Past Medical History:    Past Medical History:   Diagnosis Date    Bipolar 1 disorder ([place] [address] )     Depression     [facility]oaffective disorder, [facility] type ([facility] )     Suicidal ideations     Thyroid disease      No past medical history pertinent negatives    Past Surgical History:   Procedure Laterality Date    NECK SURGERY      US GUIDED THYROID BIOPSY      normal  Onset: 2012     No Known Allergies  Substance Abuse History:    Social History     Substance and Sexual Activity   Alcohol Use Yes    Comment: occ     Social History     Substance and Sexual Activity   Drug Use No     Social History:    Social History     Socioeconomic History    Marital status: Single     Spouse name: Not on file    Number of children: Not on file    Years of education: Not on file    Highest education level: Not on file   Occupational History    Not on file   Social Needs    Financial resource strain: Not on file    Food insecurity     Worry: Not on file     Inability: Not on file   [employer] needs     Medical: Not on file     Non-medical: Not on file   Tobacco Use    Smoking status: Former Smoker     Packs/day: 1 00     Types: Cigarettes    Smokeless tobacco: Never Used   Substance and Sexual Activity    Alcohol use: Yes     Comment: occ    Drug use: No    Sexual activity: Not on file   Lifestyle    Physical activity     Days per week: Not on file     Minutes per session: Not on file    Stress: Not on file   Relationships    Social connections     Talks on phone: Not on file     Gets together: Not on file     Attends [employer] service: Not on file     Active member of club or organization: Not on file     Attends meetings of clubs or organizations: Not on file     Relationship status: Not on file    Intimate partner violence     Fear of current or ex partner: Not on file     Emotionally abused: Not on file     Physically abused: Not on file     Forced sexual activity: Not on file   Other Topics Concern    Not on file   Social History Narrative    Not on file     Family Psychiatric History:     No family history on file  History Review: The following portions of the patient's history were reviewed and updated as appropriate: allergies, current medications, past family history, past medical history, past social history, past surgical history and problem list     OBJECTIVE:     Vital signs in last 24 hours: There were no vitals filed for this visit  Laboratory Results: I have personally reviewed all pertinent laboratory/tests results  Suicide/Homicide Risk Assessment:    Risk of Harm to Self:   The following ratings are based on assessment at the time of the interview   Recent Specific Risk Factors include: diagnosis of mood disorder   Demographic risk factors include: none    Risk of Harm to Others:   The following ratings are based on assessment at the time of the interview   Recent Specific Risk Factors include: none      The following interventions are recommended: no intervention changes needed    Medications Risks/Benefits:      Risks, Benefits And Possible Side Effects Of Medications:    Discussed risks and benefits of treatment with patient including risk of parkinsonian symptoms, metabolic syndrome, tardive dyskinesia and neuroleptic malignant syndrome related to treatment with antipsychotic medications, risk of rash related to treatment with Lamictal and Risk of orthostatic hypotension with prazosin     Controlled Medication Discussion:     Not applicable    Treatment Plan:    Due for update/Updated:   GENESIS Berkowitz 05/27/21    This note was shared with patient

## 2021-06-03 ENCOUNTER — TELEMEDICINE (OUTPATIENT)
Dept: BEHAVIORAL/MENTAL HEALTH CLINIC | Facility: CLINIC | Age: 31
End: 2021-06-03
Payer: COMMERCIAL

## 2021-06-03 DIAGNOSIS — F25.0 SCHIZOAFFECTIVE DISORDER, BIPOLAR TYPE (HCC): Primary | ICD-10-CM

## 2021-06-03 DIAGNOSIS — F43.10 POST TRAUMATIC STRESS DISORDER (PTSD): ICD-10-CM

## 2021-06-03 DIAGNOSIS — F41.1 GAD (GENERALIZED ANXIETY DISORDER): ICD-10-CM

## 2021-06-03 PROCEDURE — 90834 PSYTX W PT 45 MINUTES: CPT | Performed by: SOCIAL WORKER

## 2021-06-03 NOTE — PSYCH
Psychotherapy Provided: Individual Psychotherapy 50 minutes     Length of time in session: 50 minutes, follow up in 2 week    Encounter Diagnosis     ICD-10-CM    1  Post traumatic stress disorder (PTSD)  F43 10    2  Schizoaffective disorder, bipolar type ([facility] )  F25 0    3  GOOD (generalized anxiety disorder)  F41 1        Goals addressed in session: Goal 1     Pain:      none    0    Current suicide risk : Low     D:  Met with Janes for session  She meet with the PA who upped her one med  She "has been doing better but, people at work are afraid of her  She has been speaking her mind   she is tired of the bullshit  Her brother told her it is about time "  In society she has only done this with her mother    Her mother complains about everything  She had a broken foot for 5 years   , Her dad was chuckling under his breath "   This is not like her  Usually she would keep everything in and then, explode  She thought saying things, "as not good "   She has a pending appt soon with the weight management DR  She has been going to the gym by herself  A:  Mod progress on goal 1  It does not appear people are "afraid of her" and it does not appear that her "speaking her mind"  Is extreme as she may think just maybe use a little more tact  P:  To continue addressing  [place] plan goals  Behavioral Health [facility]: Diagnosis and Treatment Plan explained to Ana Laura Hester relates understanding diagnosis and is agreeable to Treatment Plan  Yes         Virtual Regular Visit      Assessment/Plan:    Problem List Items Addressed This Visit        Other    Post traumatic stress disorder (PTSD)    Schizoaffective disorder, bipolar type ([place] [address] ) - Primary    GOOD (generalized anxiety disorder)          Goals addressed in session: Goal 1          Reason for visit is No chief complaint on file         Encounter provider Tristian Alba    Provider located at [place] [address] [facility] [address]  [place] [address] [phone]      Recent Visits  No visits were found meeting these conditions  Showing recent visits within past 7 days and meeting all other requirements     Future Appointments  No visits were found meeting these conditions  Showing future appointments within next 150 days and meeting all other requirements        The patient was identified by name and date of birth  Olga Kidd was informed that this is a telemedicine visit and that the visit is being conducted through [address] [facility] and patient was informed that this is a secure, HIPAA-compliant platform  She agrees to proceed     My office door was closed  No one else was in the room  She acknowledged consent and understanding of privacy and security of the video platform  The patient has agreed to participate and understands they can discontinue the visit at any time  Patient is aware this is a billable service  Subjective  Olga Kidd is a 31 y o  female          HPI     Past Medical History:   Diagnosis Date    B[facility] ([facility] )     Depression     [facility]     Suicidal ideations     Thyroid disease        Past Surgical History:   Procedure Laterality Date    NECK SURGERY      US GUIDED THYROID BIOPSY      normal  Onset:        Current Outpatient Medications   Medication Sig Dispense Refill    diphenhydrAMINE (BENADRYL) 50 mg capsule Take 1 capsule (50 mg total) by mouth daily at bedtime 30 capsule 5    gabapentin (NEURONTIN) 400 mg capsule Take 1 capsule (400 mg total) by mouth 3 (three) times a day 90 capsule 5    lamoTRIgine (LaMICtal) 200 MG tablet Take 1 tablet (200 mg total) by mouth daily for 14 days 30 tablet 5    levothyroxine 175 mcg tablet Take 200 mcg by mouth daily      Melatonin 10 MG TABS Take 1 tablet (10 mg total) by mouth daily at bedtime      OLANZapine (ZyPREXA) 20 MG tablet Take 1 tablet (20 mg total) by mouth daily at bedtime 30 tablet 5    prazosin (MINIPRESS) 2 mg capsule Take 1 capsule (2 mg total) by mouth daily at bedtime 30 capsule 5     No current facility-administered medications for this visit  No Known Allergies    Review of Systems    Video Exam    There were no vitals filed for this visit  Physical Exam     I spent 50 minutes directly with the patient during this visit      VIRTUAL VISIT Miguel acknowledges that she has consented to an online visit or consultation  She understands that the online visit is based solely on information provided by her, and that, in the absence of a face-to-face physical evaluation by the physician, the diagnosis she receives is both limited and provisional in terms of accuracy and completeness  This is not intended to replace a full medical face-to-face evaluation by the physician  Bryan Gao understands and accepts these terms  Psychotherapy Provided: Individual Psychotherapy 50 minutes     Length of time in session: 50 minutes, follow up in 2 week    Encounter Diagnosis     ICD-10-CM    1  Post traumatic stress disorder (PTSD)  F43 10    2  [facility]ive disorder, [facility] ([facility] )  F25 0    3  GOOD (generalized anxiety disorder)  F41 1        Goals addressed in session: Goal 1     Pain:      none    0    Current suicide risk : Low     D:  Met with Janes for session  She has an appt next month at the weight loss clinic  Work is slow at her job due to no parts  Discussed coworkers/incidents at work and "wasn't sure if it is real or if she is paranoid " Throughout session she listed off coping skills she has been applying  She is afraid of returning to the hospital   Aug will be a year post hospital   She is afraid to come off of the Zyprexia but, recognizes her weight is an issue and is unsure what to do  She has started eating better  A:  Mod progress on goal 1,2  Appears to have some paranoia but, has some insight into it  P:  To continue addressing  [place] plan goals  To see if the weight management clinic can help her loose weight  If it cannot then, talk to  about alternative meds  Behavioral Health Treatment Plan [facility]: Diagnosis and Treatment Plan explained to Luis Abernathy relates understanding diagnosis and is agreeable to Treatment Plan   Yes

## 2021-06-05 ENCOUNTER — HOSPITAL ENCOUNTER (EMERGENCY)
Facility: HOSPITAL | Age: 31
Discharge: HOME | End: 2021-06-05
Attending: EMERGENCY MEDICINE | Admitting: EMERGENCY MEDICINE
Payer: COMMERCIAL

## 2021-06-05 VITALS
TEMPERATURE: 98.2 F | SYSTOLIC BLOOD PRESSURE: 143 MMHG | HEART RATE: 87 BPM | DIASTOLIC BLOOD PRESSURE: 69 MMHG | BODY MASS INDEX: 43.31 KG/M2 | OXYGEN SATURATION: 97 % | RESPIRATION RATE: 18 BRPM | WEIGHT: 284.83 LBS

## 2021-06-05 DIAGNOSIS — R60.9 PERIPHERAL EDEMA: Primary | ICD-10-CM

## 2021-06-05 LAB
ALBUMIN SERPL BCP-MCNC: 3.3 G/DL (ref 3.5–5)
ALP SERPL-CCNC: 50 U/L (ref 46–116)
ALT SERPL W P-5'-P-CCNC: 23 U/L (ref 12–78)
ANION GAP SERPL CALCULATED.3IONS-SCNC: 12 MMOL/L (ref 4–13)
AST SERPL W P-5'-P-CCNC: 15 U/L (ref 5–45)
BASOPHILS # BLD AUTO: 0.04 THOUSANDS/ΜL (ref 0–0.1)
BASOPHILS NFR BLD AUTO: 0 % (ref 0–1)
BILIRUB SERPL-MCNC: 0.33 MG/DL (ref 0.2–1)
BUN SERPL-MCNC: 11 MG/DL (ref 5–25)
CALCIUM ALBUM COR SERPL-MCNC: 8.6 MG/DL (ref 8.3–10.1)
CALCIUM SERPL-MCNC: 8 MG/DL (ref 8.3–10.1)
CHLORIDE SERPL-SCNC: 104 MMOL/L (ref 100–108)
CO2 SERPL-SCNC: 24 MMOL/L (ref 21–32)
CREAT SERPL-MCNC: 1.07 MG/DL (ref 0.6–1.3)
D DIMER PPP FEU-MCNC: 0.48 UG/ML FEU
EOSINOPHIL # BLD AUTO: 0.28 THOUSAND/ΜL (ref 0–0.61)
EOSINOPHIL NFR BLD AUTO: 3 % (ref 0–6)
ERYTHROCYTE [DISTWIDTH] IN BLOOD BY AUTOMATED COUNT: 15.5 % (ref 11.6–15.1)
GFR SERPL CREATININE-BSD FRML MDRD: 69 ML/MIN/1.73SQ M
GLUCOSE SERPL-MCNC: 105 MG/DL (ref 65–140)
HCT VFR BLD AUTO: 40.7 % (ref 34.8–46.1)
HGB BLD-MCNC: 12.4 G/DL (ref 11.5–15.4)
IMM GRANULOCYTES # BLD AUTO: 0.03 THOUSAND/UL (ref 0–0.2)
IMM GRANULOCYTES NFR BLD AUTO: 0 % (ref 0–2)
LYMPHOCYTES # BLD AUTO: 2.57 THOUSANDS/ΜL (ref 0.6–4.47)
LYMPHOCYTES NFR BLD AUTO: 28 % (ref 14–44)
MCH RBC QN AUTO: 27.3 PG (ref 26.8–34.3)
MCHC RBC AUTO-ENTMCNC: 30.5 G/DL (ref 31.4–37.4)
MCV RBC AUTO: 90 FL (ref 82–98)
MONOCYTES # BLD AUTO: 0.71 THOUSAND/ΜL (ref 0.17–1.22)
MONOCYTES NFR BLD AUTO: 8 % (ref 4–12)
NEUTROPHILS # BLD AUTO: 5.49 THOUSANDS/ΜL (ref 1.85–7.62)
NEUTS SEG NFR BLD AUTO: 61 % (ref 43–75)
NRBC BLD AUTO-RTO: 0 /100 WBCS
NT-PROBNP SERPL-MCNC: 57 PG/ML
PLATELET # BLD AUTO: 316 THOUSANDS/UL (ref 149–390)
PMV BLD AUTO: 9.2 FL (ref 8.9–12.7)
POTASSIUM SERPL-SCNC: 3.3 MMOL/L (ref 3.5–5.3)
PROT SERPL-MCNC: 7.8 G/DL (ref 6.4–8.2)
RBC # BLD AUTO: 4.55 MILLION/UL (ref 3.81–5.12)
SODIUM SERPL-SCNC: 140 MMOL/L (ref 136–145)
WBC # BLD AUTO: 9.12 THOUSAND/UL (ref 4.31–10.16)

## 2021-06-05 PROCEDURE — 83880 ASSAY OF NATRIURETIC PEPTIDE: CPT | Performed by: EMERGENCY MEDICINE

## 2021-06-05 PROCEDURE — 85379 FIBRIN DEGRADATION QUANT: CPT | Performed by: EMERGENCY MEDICINE

## 2021-06-05 PROCEDURE — 36415 COLL VENOUS BLD VENIPUNCTURE: CPT | Performed by: EMERGENCY MEDICINE

## 2021-06-05 PROCEDURE — 99284 EMERGENCY DEPT VISIT MOD MDM: CPT

## 2021-06-05 PROCEDURE — 85025 COMPLETE CBC W/AUTO DIFF WBC: CPT | Performed by: EMERGENCY MEDICINE

## 2021-06-05 PROCEDURE — 80053 COMPREHEN METABOLIC PANEL: CPT | Performed by: EMERGENCY MEDICINE

## 2021-06-05 PROCEDURE — 99282 EMERGENCY DEPT VISIT SF MDM: CPT | Performed by: EMERGENCY MEDICINE

## 2021-06-06 NOTE — ED PROVIDER NOTES
History  Chief Complaint   Patient presents with    Edema     b/l foot swelling and sob x1 week getting worse also with left calf pain     78-year-old female with history of bipolar depression, schizoaffective disorder, thyroid cancer with thyroidectomy presents to the emergency department with bilateral lower extremity swelling for 1 week  Patient also complains of feeling short of breath  She does states she feels like her right leg is larger than the other  No pain in the legs  No prior history of similar episodes  No new medications  History provided by:  Patient   used: No    Leg Pain  Location:  Leg  Time since incident:  1 week  Injury: no    Leg pain location: Bilateral leg swelling  No pain  Pain details:     Severity:  No pain    Timing:  Constant    Progression:  Unchanged  Chronicity:  New  Prior injury to area:  No  Relieved by:  None tried  Worsened by:  Nothing  Ineffective treatments:  None tried  Associated symptoms: swelling    Associated symptoms: no back pain, no decreased ROM, no fatigue, no fever, no itching, no muscle weakness, no neck pain, no numbness, no stiffness and no tingling    Swelling:     Location:  Leg    Onset quality:  Gradual    Duration:  7 days    Timing:  Constant    Progression:  Unchanged    Chronicity:  New  Risk factors: obesity        Prior to Admission Medications   Prescriptions Last Dose Informant Patient Reported? Taking?    Melatonin 10 MG TABS   No No   Sig: Take 1 tablet (10 mg total) by mouth daily at bedtime   OLANZapine (ZyPREXA) 20 MG tablet   No No   Sig: Take 1 tablet (20 mg total) by mouth daily at bedtime   diphenhydrAMINE (BENADRYL) 50 mg capsule   No No   Sig: Take 1 capsule (50 mg total) by mouth daily at bedtime   gabapentin (NEURONTIN) 400 mg capsule   No No   Sig: Take 1 capsule (400 mg total) by mouth 3 (three) times a day   lamoTRIgine (LaMICtal) 200 MG tablet   No No   Sig: Take 1 tablet (200 mg total) by mouth daily for 14 days   levothyroxine 175 mcg tablet   Yes No   Sig: Take 200 mcg by mouth daily   prazosin (MINIPRESS) 2 mg capsule   No No   Sig: Take 1 capsule (2 mg total) by mouth daily at bedtime      Facility-Administered Medications: None       Past Medical History:   Diagnosis Date    Bipolar 1 disorder ([place] [address] )     Cancer ([place] [address] )     thyroid    Depression     Schizoaffective disorder, bipolar type ([place] [address] )     Suicidal ideations     Thyroid disease        Past Surgical History:   Procedure Laterality Date    NECK SURGERY      US GUIDED THYROID BIOPSY      normal  Onset: 2012       History reviewed  No pertinent family history  I have reviewed and agree with the history as documented  E-Cigarette/Vaping     E-Cigarette/Vaping Substances     Social History     Tobacco Use    Smoking status: Former Smoker     Packs/day: 1 00     Types: Cigarettes    Smokeless tobacco: Never Used   Substance Use Topics    Alcohol use: Yes     Comment: occ    Drug use: No       Review of Systems   Constitutional: Negative  Negative for chills, diaphoresis, fatigue and fever  HENT: Negative  Negative for congestion, rhinorrhea and sore throat  Eyes: Negative  Negative for discharge, redness and itching  Respiratory: Negative  Negative for apnea, cough, chest tightness, shortness of breath and wheezing  Cardiovascular: Negative for chest pain, palpitations and leg swelling  Gastrointestinal: Negative  Negative for abdominal pain  Endocrine: Negative  Genitourinary: Negative  Negative for flank pain, frequency and urgency  Musculoskeletal: Negative  Negative for back pain, neck pain and stiffness  Skin: Negative  Negative for itching  Allergic/Immunologic: Negative  Neurological: Negative  Negative for dizziness, syncope, weakness, light-headedness, numbness and headaches  Hematological: Negative  All other systems reviewed and are negative        Physical Exam  Physical Exam  Vitals signs and nursing note reviewed  Constitutional:       General: She is awake  She is not in acute distress  Appearance: Normal appearance  She is well-developed and overweight  She is not ill-appearing, toxic-appearing or diaphoretic  HENT:      Head: Normocephalic and atraumatic  Right Ear: External ear normal       Left Ear: External ear normal       Nose: Nose normal       Mouth/Throat:      Pharynx: No oropharyngeal exudate  Eyes:      General: No scleral icterus  Right eye: No discharge  Left eye: No discharge  Conjunctiva/sclera: Conjunctivae normal    Neck:      Thyroid: No thyromegaly  Vascular: No JVD  Trachea: No tracheal deviation  Cardiovascular:      Rate and Rhythm: Normal rate and regular rhythm  Heart sounds: Normal heart sounds  No murmur  No friction rub  No gallop  Pulmonary:      Effort: Pulmonary effort is normal  No respiratory distress  Breath sounds: Normal breath sounds  No stridor  No wheezing, rhonchi or rales  Chest:      Chest wall: No tenderness  Abdominal:      General: Bowel sounds are normal  There is no distension  Palpations: Abdomen is soft  There is no mass  Tenderness: There is no abdominal tenderness  Hernia: No hernia is present  Musculoskeletal: Normal range of motion  General: Swelling present  No tenderness, deformity or signs of injury  Right lower leg: Edema (Nonpitting edema) present  Left lower leg: Edema (Nonpitting edema) present  Skin:     General: Skin is warm and dry  Coloration: Skin is not jaundiced or pale  Findings: No bruising, erythema, lesion or rash  Neurological:      General: No focal deficit present  Mental Status: She is alert and oriented to person, place, and time  Motor: No weakness or abnormal muscle tone  Deep Tendon Reflexes: Reflexes are normal and symmetric     Psychiatric:         Mood and Affect: Mood normal  Behavior: Behavior is cooperative  Vital Signs  ED Triage Vitals [06/05/21 2116]   Temperature Pulse Respirations Blood Pressure SpO2   98 2 °F (36 8 °C) 87 18 143/69 97 %      Temp Source Heart Rate Source Patient Position - Orthostatic VS BP Location FiO2 (%)   Oral Monitor Sitting Right arm --      Pain Score       No Pain           Vitals:    06/05/21 2116   BP: 143/69   Pulse: 87   Patient Position - Orthostatic VS: Sitting         Visual Acuity      ED Medications  Medications - No data to display    Diagnostic Studies  Results Reviewed     Procedure Component Value Units Date/Time    D-Dimer [[id number]]     Lab Status: No result Specimen: Blood     CBC and differential [[id number]]     Lab Status: No result Specimen: Blood     Comprehensive metabolic panel [[id number]]     Lab Status: No result Specimen: Blood                  No orders to display              Procedures  Procedures         ED Course                                           MDM  Number of Diagnoses or Management Options  Diagnosis management comments: 34-year-old female presents with a one-week history of bilateral lower extremity swelling and feeling short of breath  No chest pain  No new medications  On exam she is alert no acute distress  Her lungs are clear  She does have bilateral non pitting edema of the lower extremities  She states that the right leg is larger than the left, but I do not appreciate a difference  There is no erythema or calf tenderness  She is on several medications that potentially have a side effect of edema  She looks well so I do not really suspect congestive heart failure but will check CBC, CMP to rule out any electrolyte abnormalities    Will check D-dimer and BNP       Amount and/or Complexity of Data Reviewed  Clinical lab tests: ordered and reviewed        Disposition  Final diagnoses:   None     ED Disposition     None      Follow-up Information    None         Patient's Medications Discharge Prescriptions    No medications on file     No discharge procedures on file      PDMP Review     None          ED Provider  Electronically Signed by           Estevan Koehler DO  06/05/21 8768

## 2021-06-11 ENCOUNTER — TRANSCRIBE ORDERS (OUTPATIENT)
Dept: ADMINISTRATIVE | Facility: HOSPITAL | Age: 31
End: 2021-06-11

## 2021-06-11 ENCOUNTER — APPOINTMENT (OUTPATIENT)
Dept: LAB | Facility: HOSPITAL | Age: 31
End: 2021-06-11
Attending: FAMILY MEDICINE
Payer: COMMERCIAL

## 2021-06-11 DIAGNOSIS — I51.9 MYXEDEMA HEART DISEASE: ICD-10-CM

## 2021-06-11 DIAGNOSIS — E55.9 VITAMIN D DEFICIENCY, UNSPECIFIED: Primary | ICD-10-CM

## 2021-06-11 DIAGNOSIS — E83.51 HYPOCALCEMIA: ICD-10-CM

## 2021-06-11 DIAGNOSIS — E03.9 MYXEDEMA HEART DISEASE: ICD-10-CM

## 2021-06-11 DIAGNOSIS — E55.9 VITAMIN D DEFICIENCY, UNSPECIFIED: ICD-10-CM

## 2021-06-11 DIAGNOSIS — E87.6 HYPOPOTASSEMIA: ICD-10-CM

## 2021-06-11 LAB
ALBUMIN SERPL BCP-MCNC: 3.6 G/DL (ref 3.5–5)
ALP SERPL-CCNC: 62 U/L (ref 46–116)
ALT SERPL W P-5'-P-CCNC: 30 U/L (ref 12–78)
ANION GAP SERPL CALCULATED.3IONS-SCNC: 12 MMOL/L (ref 4–13)
AST SERPL W P-5'-P-CCNC: 26 U/L (ref 5–45)
BILIRUB SERPL-MCNC: 0.4 MG/DL (ref 0.2–1)
BUN SERPL-MCNC: 11 MG/DL (ref 5–25)
CALCIUM SERPL-MCNC: 7.4 MG/DL (ref 8.3–10.1)
CHLORIDE SERPL-SCNC: 104 MMOL/L (ref 100–108)
CO2 SERPL-SCNC: 22 MMOL/L (ref 21–32)
CREAT SERPL-MCNC: 1.22 MG/DL (ref 0.6–1.3)
GFR SERPL CREATININE-BSD FRML MDRD: 59 ML/MIN/1.73SQ M
GLUCOSE SERPL-MCNC: 77 MG/DL (ref 65–140)
POTASSIUM SERPL-SCNC: 3.8 MMOL/L (ref 3.5–5.3)
PROT SERPL-MCNC: 8 G/DL (ref 6.4–8.2)
PTH-INTACT SERPL-MCNC: 39.3 PG/ML (ref 18.4–80.1)
SODIUM SERPL-SCNC: 138 MMOL/L (ref 136–145)
T4 FREE SERPL-MCNC: 1.14 NG/DL (ref 0.76–1.46)
TSH SERPL DL<=0.05 MIU/L-ACNC: 10.31 UIU/ML (ref 0.36–3.74)

## 2021-06-11 PROCEDURE — 82088 ASSAY OF ALDOSTERONE: CPT

## 2021-06-11 PROCEDURE — 36415 COLL VENOUS BLD VENIPUNCTURE: CPT

## 2021-06-11 PROCEDURE — 84439 ASSAY OF FREE THYROXINE: CPT

## 2021-06-11 PROCEDURE — 83970 ASSAY OF PARATHORMONE: CPT

## 2021-06-11 PROCEDURE — 80053 COMPREHEN METABOLIC PANEL: CPT

## 2021-06-11 PROCEDURE — 84244 ASSAY OF RENIN: CPT

## 2021-06-11 PROCEDURE — 84443 ASSAY THYROID STIM HORMONE: CPT

## 2021-06-17 LAB
ALDOST SERPL-MCNC: 2.6 NG/DL (ref 0–30)
ALDOST/RENIN PLAS-RTO: 1.3 {RATIO} (ref 0–30)
RENIN PLAS-CCNC: 1.95 NG/ML/HR (ref 0.17–5.38)

## 2021-06-25 ENCOUNTER — TELEMEDICINE (OUTPATIENT)
Dept: BEHAVIORAL/MENTAL HEALTH CLINIC | Facility: CLINIC | Age: 31
End: 2021-06-25
Payer: COMMERCIAL

## 2021-06-25 DIAGNOSIS — F41.1 GAD (GENERALIZED ANXIETY DISORDER): ICD-10-CM

## 2021-06-25 DIAGNOSIS — F25.0 SCHIZOAFFECTIVE DISORDER, BIPOLAR TYPE (HCC): Primary | ICD-10-CM

## 2021-06-25 DIAGNOSIS — F43.10 POST TRAUMATIC STRESS DISORDER (PTSD): ICD-10-CM

## 2021-06-25 PROCEDURE — 90834 PSYTX W PT 45 MINUTES: CPT | Performed by: SOCIAL WORKER

## 2021-06-25 NOTE — PSYCH
Psychotherapy Provided: Individual Psychotherapy 50 minutes     Length of time in session: 50 minutes, follow up in 2 week    Encounter Diagnosis     ICD-10-CM    1  Post traumatic stress disorder (PTSD)  F43 10    2  Schizoaffective disorder, bipolar type ([facility] [address] )  F25 0    3  GOOD (generalized anxiety disorder)  F41 1        Goals addressed in session: Goal 1     Pain:      none    0    Current suicide risk : Low     D:  Met with Janes for session  "Fed up with work "  Asked her Dr to fill out [employer] paperwork so she can take mental health days  He didn't fill the paperwork out correctly so she is going to ask the psych PA  She did list all the things she accomplished in the past few months such as things she bought, trips she has planned, etc   Discussed what did she need to reach all those goals, money  Discussed focusing on the money as a coping skill to get through work along with other coping skills  A:  Mod progress on goal 1  Appears she has burn out at her job  P:  To continue addressing  [place] plan goals  Behavioral Health Treatment Plan [facility]: Diagnosis and Treatment Plan explained to Ev Miranda relates understanding diagnosis and is agreeable to Treatment Plan  Yes         Virtual Regular Visit      Assessment/Plan:    Problem List Items Addressed This Visit     None          Goals addressed in session: Goal 1          Reason for visit is No chief complaint on file  Encounter provider Tera See    Provider located at [address] [place] [id number] [phone]      Recent Visits  No visits were found meeting these conditions  Showing recent visits within past 7 days and meeting all other requirements  Future Appointments  No visits were found meeting these conditions    Showing future appointments within next 150 days and meeting all other requirements       The patient was identified by name and date of birth  Brynn Muir was informed that this is a telemedicine visit and that the visit is being conducted through [address] [place] [address] Now and patient was informed that this is a secure, HIPAA-compliant platform  She agrees to proceed     My office door was closed  No one else was in the room  She acknowledged consent and understanding of privacy and security of the video platform  The patient has agreed to participate and understands they can discontinue the visit at any time  Patient is aware this is a billable service  Subjective  Brynn Muir is a 27 y o  female    HPI     Past Medical History:   Diagnosis Date    Bipolar 1 disorder ([facility] [address] )     Cancer ([facility] )     thyroid    Depression     Schizoaffective disorder, bipolar type ([facility] )     Suicidal ideations     Thyroid disease        Past Surgical History:   Procedure Laterality Date    NECK SURGERY      US GUIDED THYROID BIOPSY      normal  Onset: 2012       Current Outpatient Medications   Medication Sig Dispense Refill    diphenhydrAMINE (BENADRYL) 50 mg capsule Take 1 capsule (50 mg total) by mouth daily at bedtime 30 capsule 5    gabapentin (NEURONTIN) 400 mg capsule Take 1 capsule (400 mg total) by mouth 3 (three) times a day 90 capsule 5    lamoTRIgine (LaMICtal) 200 MG tablet Take 1 tablet (200 mg total) by mouth daily for 14 days 30 tablet 5    levothyroxine 175 mcg tablet Take 200 mcg by mouth daily      Melatonin 10 MG TABS Take 1 tablet (10 mg total) by mouth daily at bedtime      OLANZapine (ZyPREXA) 20 MG tablet Take 1 tablet (20 mg total) by mouth daily at bedtime 30 tablet 5    prazosin (MINIPRESS) 2 mg capsule Take 1 capsule (2 mg total) by mouth daily at bedtime 30 capsule 5     No current facility-administered medications for this visit  No Known Allergies    Review of Systems    Video Exam    There were no vitals filed for this visit      Physical Exam     I spent 50 minutes directly with the patient during this visit      VIRTUAL VISIT Miguel acknowledges that she has consented to an online visit or consultation  She understands that the online visit is based solely on information provided by her, and that, in the absence of a face-to-face physical evaluation by the physician, the diagnosis she receives is both limited and provisional in terms of accuracy and completeness  This is not intended to replace a full medical face-to-face evaluation by the physician  Bernardino Holcomb understands and accepts these terms  Psychotherapy Provided: Individual Psychotherapy 50 minutes     Length of time in session: 50 minutes, follow up in 2 week    Encounter Diagnosis     ICD-10-CM    1  Post traumatic stress disorder (PTSD)  F43 10    2  Schizoaffective disorder, bipolar type ([facility] [address] )  F25 0    3  GOOD (generalized anxiety disorder)  F41 1        Goals addressed in session: Goal 1     Pain:      none    0    Current suicide risk : Low     D:  Met with Janes for session  She has an appt next month at the weight loss clinic  Work is slow at her job due to no parts  Discussed coworkers/incidents at work and "wasn't sure if it is real or if she is paranoid " Throughout session she listed off coping skills she has been applying  She is afraid of returning to the hospital   Aug will be a year post hospital   She is afraid to come off of the Zyprexia but, recognizes her weight is an issue and is unsure what to do  She has started eating better  A:  Mod progress on goal 1,2  Appears to have some paranoia but, has some insight into it  P:  To continue addressing  [place] plan goals  To see if the weight management clinic can help her loose weight  If it cannot then, talk to  about alternative meds  Behavioral Health Treatment Plan [facility]: Diagnosis and Treatment Plan explained to Sushila Chou relates understanding diagnosis and is agreeable to Treatment Plan   Yes

## 2021-07-02 ENCOUNTER — OFFICE VISIT (OUTPATIENT)
Dept: PSYCHIATRY | Facility: CLINIC | Age: 31
End: 2021-07-02
Payer: COMMERCIAL

## 2021-07-02 DIAGNOSIS — F25.0 SCHIZOAFFECTIVE DISORDER, BIPOLAR TYPE (HCC): Primary | ICD-10-CM

## 2021-07-02 DIAGNOSIS — F41.1 GAD (GENERALIZED ANXIETY DISORDER): ICD-10-CM

## 2021-07-02 DIAGNOSIS — F99 INSOMNIA DUE TO OTHER MENTAL DISORDER: ICD-10-CM

## 2021-07-02 DIAGNOSIS — F51.05 INSOMNIA DUE TO OTHER MENTAL DISORDER: ICD-10-CM

## 2021-07-02 DIAGNOSIS — F43.10 PTSD (POST-TRAUMATIC STRESS DISORDER): ICD-10-CM

## 2021-07-02 PROCEDURE — 99214 OFFICE O/P EST MOD 30 MIN: CPT | Performed by: NURSE PRACTITIONER

## 2021-07-02 RX ORDER — LAMOTRIGINE 200 MG/1
200 TABLET ORAL DAILY
Qty: 30 TABLET | Refills: 5 | Status: SHIPPED | OUTPATIENT
Start: 2021-07-02 | End: 2021-11-26 | Stop reason: SDUPTHER

## 2021-07-02 RX ORDER — GABAPENTIN 100 MG/1
200 CAPSULE ORAL 3 TIMES DAILY
Qty: 180 CAPSULE | Refills: 1 | Status: SHIPPED | OUTPATIENT
Start: 2021-07-02 | End: 2021-07-06

## 2021-07-02 RX ORDER — GABAPENTIN 300 MG/1
300 CAPSULE ORAL 3 TIMES DAILY
Qty: 7 CAPSULE | Refills: 0 | Status: SHIPPED | OUTPATIENT
Start: 2021-07-02 | End: 2021-08-18

## 2021-07-02 NOTE — BH TREATMENT PLAN
TREATMENT PLAN (Medication Management Only)        [facility]    Name and Date of Birth:  Martina Maldonado 27 y o  1990  Date of Treatment Plan: July 2, 2021  Diagnosis/Diagnoses:    1  Schizoaffective disorder, bipolar type ([facility] [address] )    2  PTSD (post-traumatic stress disorder)    3  GOOD (generalized anxiety disorder)    4  Insomnia due to other mental disorder      Strengths/Personal Resources for Self-Care: motivation for treatment, taking medications as prescribed, ability to communicate well, ability to listen, ability to reason  Area/Areas of need (in own words): depressive symptoms, anxiety symptoms, mood instability, sleep problems, psychotic symptoms  1  Long Term Goal: maintain improvement in anxiety, maintain improvement in depression, maintain improvement in mood stability, maintain improvement in psychotic symptoms and maintain improvement insleep  Target Date: 6 months - 1/2/2022  Person/Persons responsible for completion of goal: Martina Erica  2  Short Term Objective (s) - How will we reach this goal?:   A  Provider new recommended medication/dosage changes and/or continue medication(s): continue current medications as prescribed  B   Attend medication management appointments regularly  C   Attend psychotherapy regularly  Target Date: 6 months - 1/2/2022  Person/Persons Responsible for Completion of Goal: Kgmariah Maldonado  Progress Towards Goals: continuing treatment  Treatment Modality: medication management with psychotherapy every 1 months  Review due 90 to 120 days from date of this plan: 6 months - 1/2/2022  Expected length of service: maintenance unless revised  My Physician/PA/NP and I have developed this plan together and I agree to work on the goals and objectives  I understand the treatment goals that were developed for my treatment    Treatment Plan Verbal Consent - Due to COVID

## 2021-07-02 NOTE — PSYCH
MEDICATION MANAGEMENT NOTE        [address]    Name and Date of Birth:  Alexandra Vega 27 y o  1990 MRN: [id number]    Date of Visit: July 2, 2021    No Known Allergies  SUBJECTIVE:    Bruce Terrazas is seen today for a follow up for PTSD, Generalized Anxiety Disorder, Schizoaffective Disorder and insomnia  She reports that she has done fairly well since the last visit  Patient has remained stable since last visit  No symptoms of psychosis, mood instability, depression, anxiety PTSD  Overall she feels she is doing well  However, the patient has been experiencing intermittent lower extremity edema  Patient finds that the edema happens more frequently when she sits for extended periods  Finds it is helped by exercise such as walking  Reviewed emergency department note from June 5th  No medical cause found for lower extremity edema  Suspect side effects from 1 of the patient's medications  Review potential side effects olanzapine, gabapentin and prazosin seem to carry some risk of lower extremity edema  Will reduce medications 1 at a time and assess for improvement in symptoms  Denies any other side effects from current medication regimen  PLAN:   -Reduce gabapentin to 200 mg p o  t i d ;  Call office if  edema recurs or worsens   -continue all other psychiatric medications as ordered   -follow-up 1 month   -continue with individual therapy  Aware of 24 hour and weekend coverage for urgent situations accessed by calling [facility] main practice number    Diagnoses and all orders for this visit:    GOOD (generalized anxiety disorder)  -     gabapentin (NEURONTIN) 100 mg capsule; Take 2 capsules (200 mg total) by mouth 3 (three) times a day    PTSD (post-traumatic stress disorder)  -     gabapentin (NEURONTIN) 300 mg capsule;  Take 1 capsule (300 mg total) by mouth 3 (three) times a day for 7 days    Schizoaffective disorder, bipolar type ([facility] [address] )  -     Lipid Panel with Direct LDL reflex; Future  -     lamoTRIgine (LaMICtal) 200 MG tablet; Take 1 tablet (200 mg total) by mouth daily    Insomnia due to other mental disorder        Current Outpatient Medications on File Prior to Visit   Medication Sig Dispense Refill    diphenhydrAMINE (BENADRYL) 50 mg capsule Take 1 capsule (50 mg total) by mouth daily at bedtime 30 capsule 5    levothyroxine 175 mcg tablet Take 200 mcg by mouth daily      Melatonin 10 MG TABS Take 1 tablet (10 mg total) by mouth daily at bedtime      OLANZapine (ZyPREXA) 20 MG tablet Take 1 tablet (20 mg total) by mouth daily at bedtime 30 tablet 5    prazosin (MINIPRESS) 2 mg capsule Take 1 capsule (2 mg total) by mouth daily at bedtime 30 capsule 5    [DISCONTINUED] gabapentin (NEURONTIN) 400 mg capsule Take 1 capsule (400 mg total) by mouth 3 (three) times a day 90 capsule 5    [DISCONTINUED] lamoTRIgine (LaMICtal) 200 MG tablet Take 1 tablet (200 mg total) by mouth daily for 14 days 30 tablet 5     No current facility-administered medications on file prior to visit  Psychotherapy Provided:     Individual psychotherapy provided: Yes  Counseling was provided during the session today for 16 minutes  Supportive counseling provided  HPI ROS Appetite Changes and Sleep:     She reports normal sleep, normal appetite, normal energy level   Patient denies suicidal or homicidal ideation    Review Of Systems:      General normal    Personality no change in personality   Constitutional negative   ENT negative   Cardiovascular negative   Respiratory negative   Gastrointestinal negative   Genitourinary negative   Musculoskeletal negative   Integumentary negative   Neurological negative   Endocrine negative   Other Symptoms none, all other systems are negative     Mental Status Evaluation:    Appearance Adequate hygiene and grooming   Behavior calm and cooperative   Mood euthymic  Depression Scale -  of 10 (0 = No depression)  Anxiety Scale -  of 10 (0 = No anxiety)   Speech Normal rate and volume   Affect appropriate and mood-congruent   Thought Processes Goal directed and coherent   Thought Content Does not verbalize delusional material   Associations Tightly connected   Perceptual Disturbances Denies hallucinations and does not appear to be responding to internal stimuli   Risk Potential Suicidal/Homicidal Ideation - No evidence of suicidal or homicidal ideation and patient does not verbalize suicidal or homicidal ideation  Risk of Violence - No evidence of risk for violence found on assessment  Risk of Self Mutilation - No evidence of risk for self mutilation found on assessment   Orientation oriented to person, place, time/date and situation   Memory recent and remote memory grossly intact   Consciousness alert and awake   Attention/Concentration attention span and concentration are age appropriate   Insight intact   Judgement intact   Muscle Strength and Gait normal muscle strength and normal muscle tone, normal gait/station and normal balance   Motor Activity no abnormal movements   Language no difficulty naming common objects, no difficulty repeating a phrase, no difficulty writing a sentence   Fund of Knowledge adequate knowledge of current events  adequate fund of knowledge regarding past history  adequate fund of knowledge regarding vocabulary      Past Psychiatric History Update:     Inpatient Psychiatric Admission Since Last Encounter:   no  Changes to Outpatient Psychiatric Treatment Team:    no  Suicide Attempt Or Self Mutilation Since Last Encounter:   no  Incidence of Violent Behavior Since Last Encounter:   no    Traumatic History Update:     New Onset of Abuse Since Last Encounter:   no  Traumatic Events Since Last Encounter:   no    Past Medical History:    Past Medical History:   Diagnosis Date    [facility]     Cancer ([facility] )     thyroid    Depression     Schizoaffective disorder, bipolar type ([facility] [facility] )     Suicidal ideations     Thyroid disease      No past medical history pertinent negatives  Past Surgical History:   Procedure Laterality Date    NECK SURGERY      US GUIDED THYROID BIOPSY      normal  Onset: 2012     No Known Allergies  Substance Abuse History:    Social History     Substance and Sexual Activity   Alcohol Use Yes    Comment: occ     Social History     Substance and Sexual Activity   Drug Use No     Social History:    Social History     Socioeconomic History    Marital status: Single     Spouse name: Not on file    Number of children: Not on file    Years of education: Not on file    Highest education level: Not on file   Occupational History    Not on file   Tobacco Use    Smoking status: Former Smoker     Packs/day: 1 00     Types: Cigarettes    Smokeless tobacco: Never Used   Substance and Sexual Activity    Alcohol use: Yes     Comment: occ    Drug use: No    Sexual activity: Not on file   Other Topics Concern    Not on file   Social History Narrative    Not on file     Social Determinants of Health     Financial Resource Strain:     Difficulty of Paying Living Expenses:    Food Insecurity:     Worried About Running Out of Food in the Last Year:     [address] in the Last Year:    Transportation Needs:     Lack of Transportation (Medical):      Lack of Transportation (Non-Medical):    Physical Activity:     Days of Exercise per Week:     Minutes of Exercise per Session:    Stress:     Feeling of Stress :    Social Connections:     Frequency of Communication with Friends and Family:     Frequency of Social Gatherings with Friends and Family:     Attends L[employer] day Services:     Active Member of Clubs or Organizations:     Attends Club or Organization Meetings:     Marital Status:    Intimate Partner Violence:     Fear of Current or Ex-Partner:     Emotionally Abused:     Physically Abused:     Sexually Abused:      Family Psychiatric History:     No family history on file  History Review: The following portions of the patient's history were reviewed and updated as appropriate: allergies, current medications, past family history, past medical history, past social history, past surgical history and problem list     OBJECTIVE:     Vital signs in last 24 hours: There were no vitals filed for this visit  Laboratory Results: I have personally reviewed all pertinent laboratory/tests results  Suicide/Homicide Risk Assessment:    Risk of Harm to Self:   The following ratings are based on assessment at the time of the interview   Recent Specific Risk Factors include: none    Risk of Harm to Others:   The following ratings are based on assessment at the time of the interview   Recent Specific Risk Factors include: none  The following interventions are recommended: no intervention changes needed    Medications Risks/Benefits:      Risks, Benefits And Possible Side Effects Of Medications:    Discussed risks and benefits of treatment with patient including risk of parkinsonian symptoms, metabolic syndrome, tardive dyskinesia and neuroleptic malignant syndrome related to treatment with antipsychotic medications, Risk of orthostatic hypotension with Prazosin and risk of rash related to treatment with Lamictal     Controlled Medication Discussion:     Not applicable    Treatment Plan:    Due for update/Updated:   yes    Romayne Saxon, CRNP 07/02/21    This note was shared with patient

## 2021-07-07 ENCOUNTER — TELEPHONE (OUTPATIENT)
Dept: PSYCHIATRY | Facility: CLINIC | Age: 31
End: 2021-07-07

## 2021-07-07 NOTE — TELEPHONE ENCOUNTER
Patient called about her FMLA Paperwork  Patient stated that her insurance company would not accept it without the Start and End Date in Section 3a  Paperwork was placed in your mail box with her start and end date  Please initial the change, date, and time it  (needs to match the previous dates and times)    Patient was notified this will not be complete until Monday   Patient requests a call back once faxed to the [address] [place] [address]

## 2021-07-15 ENCOUNTER — TELEMEDICINE (OUTPATIENT)
Dept: BEHAVIORAL/MENTAL HEALTH CLINIC | Facility: CLINIC | Age: 31
End: 2021-07-15
Payer: COMMERCIAL

## 2021-07-15 DIAGNOSIS — F41.1 GAD (GENERALIZED ANXIETY DISORDER): ICD-10-CM

## 2021-07-15 DIAGNOSIS — F43.10 POST TRAUMATIC STRESS DISORDER (PTSD): Primary | ICD-10-CM

## 2021-07-15 DIAGNOSIS — F25.0 SCHIZOAFFECTIVE DISORDER, BIPOLAR TYPE (HCC): ICD-10-CM

## 2021-07-15 PROCEDURE — 90832 PSYTX W PT 30 MINUTES: CPT | Performed by: SOCIAL WORKER

## 2021-07-15 NOTE — PSYCH
Psychotherapy Provided: Individual Psychotherapy 25 minutes     Length of time in session: 25 minutes, follow up in 2 week    Encounter Diagnosis     ICD-10-CM    1  Post traumatic stress disorder (PTSD)  F43 10    2  Schizoaffective disorder, bipolar type ([facility] )  F25 0    3  GOOD (generalized anxiety disorder)  F41 1        Goals addressed in session: Goal 1     Pain:      none    0    Current suicide risk : Low     D:  Met with Janes for session  The PA lowered her Gabapentent due to her foot swelling which has helped with her foot  Since then, she has been more anxious  With her permission MSW sent the PA a message while we were in session  She continues to struggle with work in "not liking her job "  Her increased anxiety has not helped with this  Her father went back to  her mom will go back on the 24th of this month and they will return to here in Oct   She "likes it when they are here because the house feels more like a home "  Her parents consistently travel back and forth from  to the [address] [facility] [facility]  A:  Mod progress on goal 1  P:  To continue addressing  [place] plan goals  Behavioral Health Treatment Plan [facility]: Diagnosis and Treatment Plan explained to Devi Schaeffer relates understanding diagnosis and is agreeable to Treatment Plan   Yes             Virtual Regular Visit    Verification of patient location:    Patient is currently located in the state [facility]  Patient is currently located in a state in which I am licensed      Assessment/Plan:    Problem List Items Addressed This Visit        Other    Post traumatic stress disorder (PTSD) - Primary    Schizoaffective disorder, bipolar type ([facility] )    GOOD (generalized anxiety disorder)          Goals addressed in session: Goal 1          Reason for visit is   Chief Complaint   Patient presents with    Virtual Regular Visit        Encounter provider Cayla Robins    Provider located at [facility] [place] [facility] [address]  [phone]      Recent Visits  No visits were found meeting these conditions  Showing recent visits within past 7 days and meeting all other requirements  Today's Visits  Date Type Provider Dept   07/15/21 [address] cloud's visits and meeting all other requirements  Future Appointments  No visits were found meeting these conditions  Showing future appointments within next 150 days and meeting all other requirements       The patient was identified by name and date of birth  Dorita Ulloa was informed that this is a telemedicine visit and that the visit is being conducted through[employer] and patient was informed that this is a secure, HIPAA-compliant platform  She agrees to proceed     My office door was closed  No one else was in the room  She acknowledged consent and understanding of privacy and security of the video platform  The patient has agreed to participate and understands they can discontinue the visit at any time  Patient is aware this is a billable service  Subjective  Dorita Ulloa is a 27 y o  female          HPI     Past Medical History:   Diagnosis Date    B[facility] disorder ([facility]     [facility]     thyroid    Depression     Schizoaffective disorder, bipolar type ([facility] )     Suicidal ideations     Thyroid disease        Past Surgical History:   Procedure Laterality Date    NECK SURGERY      US GUIDED THYROID BIOPSY      normal  Onset: 2012       Current Outpatient Medications   Medication Sig Dispense Refill    diphenhydrAMINE (BENADRYL) 50 mg capsule Take 1 capsule (50 mg total) by mouth daily at bedtime 30 capsule 5    gabapentin (NEURONTIN) 100 mg capsule Take 1 capsule (100 mg total) by mouth 3 (three) times a day 180 capsule 1    gabapentin (NEURONTIN) 300 mg capsule Take 1 capsule (300 mg total) by mouth 3 (three) times a day for 7 days 7 capsule 0    lamoTRIgine (LaMICtal) 200 MG tablet Take 1 tablet (200 mg total) by mouth daily 30 tablet 5    levothyroxine 175 mcg tablet Take 200 mcg by mouth daily      Melatonin 10 MG TABS Take 1 tablet (10 mg total) by mouth daily at bedtime      OLANZapine (ZyPREXA) 20 MG tablet Take 1 tablet (20 mg total) by mouth daily at bedtime 30 tablet 5    prazosin (MINIPRESS) 2 mg capsule Take 1 capsule (2 mg total) by mouth daily at bedtime 30 capsule 5     No current facility-administered medications for this visit  No Known Allergies    Review of Systems    Video Exam    There were no vitals filed for this visit  Physical Exam     I spent 25 minutes directly with the patient during this visit    Sylvain verbally agrees to participate in [employer]  Pt is aware that [employer] could be limited without vital signs or the ability to perform a full hands-on physical [address] understands she or the provider may request at any time to terminate the video visit and request the patient to seek care or treatment in person

## 2021-08-17 ENCOUNTER — TELEMEDICINE (OUTPATIENT)
Dept: BEHAVIORAL/MENTAL HEALTH CLINIC | Facility: CLINIC | Age: 31
End: 2021-08-17
Payer: COMMERCIAL

## 2021-08-17 DIAGNOSIS — F41.1 GAD (GENERALIZED ANXIETY DISORDER): ICD-10-CM

## 2021-08-17 DIAGNOSIS — F25.0 SCHIZOAFFECTIVE DISORDER, BIPOLAR TYPE (HCC): ICD-10-CM

## 2021-08-17 DIAGNOSIS — F43.10 POST TRAUMATIC STRESS DISORDER (PTSD): Primary | ICD-10-CM

## 2021-08-17 PROCEDURE — 90834 PSYTX W PT 45 MINUTES: CPT | Performed by: SOCIAL WORKER

## 2021-08-17 NOTE — PSYCH
Psychotherapy Provided: Individual Psychotherapy 45 minutes     Length of time in session: 45 minutes, follow up in 2 week    Encounter Diagnosis     ICD-10-CM    1  Post traumatic stress disorder (PTSD)  F43 10    2  Schizoaffective disorder, bipolar type ([facility] [address] )  F25 0    3  GOOD (generalized anxiety disorder)  F41 1        Goals addressed in session: Goal 1     Pain:      none    0    Current suicide risk : Low     D:  Met with Janes for session  The PA discontinued her Gabapenten due to side effects  Since then her anxiety increased and she has begun picking again  She has has been smoking marijuana to help with her anxiety so she can sleep at night  MSW recommended she call the PA  "She doesn't like to bother him "  Discussed it is not bothering him  Also discussed other copping skills to help with her picking  A:  Mod progress on goal 1  P:  To continue addressing  [place] plan goals  Behavioral Health Treatment Plan [facility]: Diagnosis and Treatment Plan explained to Kami Colindres relates understanding diagnosis and is agreeable to Treatment Plan  Yes             Virtual Regular Visit    Verification of patient location:    Patient is currently located in the state [facility]  Patient is currently located in a state in which I am licensed      Assessment/Plan:    Problem List Items Addressed This Visit     None          Goals addressed in session: Goal 1          Reason for visit is   No chief complaint on file  Encounter provider Muriel Sarmiento    Provider located at [address] [place] [id number] [phone]      Recent Visits  No visits were found meeting these conditions  Showing recent visits within past 7 days and meeting all other requirements  Future Appointments  No visits were found meeting these conditions    Showing future appointments within next 150 days and meeting all other requirements       The patient was identified by name and date of birth  Martina Maldonado was informed that this is a telemedicine visit and that the [facility] and patient was informed that this is a secure, HIPAA-compliant platform  She agrees to proceed     My office door was closed  No one else was in the room  She acknowledged consent and understanding of privacy and security of the video platform  The patient has agreed to participate and understands they can discontinue the visit at any time  Patient is aware this is a billable service  Subjective  Martina Maldonado is a 27 y o  female    HPI     Past Medical History:   Diagnosis Date    Bipolar 1 disorder ([facility] )     Cancer ([facility] )     thyroid    Depression     Schizoaffective disorder, bipolar type ([facility] )     Suicidal ideations     Thyroid disease        Past Surgical History:   Procedure Laterality Date    NECK SURGERY      US GUIDED THYROID BIOPSY      normal  Onset: 2012       Current Outpatient Medications   Medication Sig Dispense Refill    diphenhydrAMINE (BENADRYL) 50 mg capsule Take 1 capsule (50 mg total) by mouth daily at bedtime 30 capsule 5    gabapentin (NEURONTIN) 100 mg capsule Take 1 capsule (100 mg total) by mouth 3 (three) times a day 180 capsule 1    gabapentin (NEURONTIN) 300 mg capsule Take 1 capsule (300 mg total) by mouth 3 (three) times a day for 7 days 7 capsule 0    lamoTRIgine (LaMICtal) 200 MG tablet Take 1 tablet (200 mg total) by mouth daily 30 tablet 5    levothyroxine 175 mcg tablet Take 200 mcg by mouth daily      Melatonin 10 MG TABS Take 1 tablet (10 mg total) by mouth daily at bedtime      OLANZapine (ZyPREXA) 20 MG tablet Take 1 tablet (20 mg total) by mouth daily at bedtime 30 tablet 5    prazosin (MINIPRESS) 2 mg capsule Take 1 capsule (2 mg total) by mouth daily at bedtime 30 capsule 5     No current facility-administered medications for this visit          No Known Allergies    Review of Systems    Video Exam    There were no vitals filed for this visit  Physical Exam     I spent 45 minutes directly with the patient during this [address] [facility] [address] verbally agrees to participate in [employer]  Pt is aware that [employer] could be limited without vital signs or the ability to perform a full hands-on physical [address] understands she or the provider may request at any time to terminate the video visit and request the patient to seek care or treatment in person

## 2021-08-18 ENCOUNTER — TELEPHONE (OUTPATIENT)
Dept: PSYCHIATRY | Facility: CLINIC | Age: 31
End: 2021-08-18

## 2021-08-18 DIAGNOSIS — F41.1 GAD (GENERALIZED ANXIETY DISORDER): Primary | ICD-10-CM

## 2021-08-18 DIAGNOSIS — F42.4 SKIN PICKING HABIT: ICD-10-CM

## 2021-08-18 RX ORDER — CLONAZEPAM 0.5 MG/1
0.25 TABLET ORAL 2 TIMES DAILY
Qty: 30 TABLET | Refills: 2 | Status: SHIPPED | OUTPATIENT
Start: 2021-08-18 | End: 2021-09-09

## 2021-08-18 NOTE — TELEPHONE ENCOUNTER
Patient called to let Calero Vaughn know that she is struggling with her anxiety, and it Is getting worse - she said she is staring to pick her skin again    She is currently scheduled to see Khris on 9/9, and is also on the wait list

## 2021-08-18 NOTE — TELEPHONE ENCOUNTER
Patient continues to have ongoing anxiety and history soon and skin picking habit when feeling anxious or distressed  Patient's gabapentin was lowered to 100 3 times a day due to suspicion of edema from 1 of her psychiatric medications  Edema has since resolved however she has been unable to manage anxiety on lower dose  Will initiate clonazepam 0 25 mg mg p o  b i d   Patient to call with questions or concerns

## 2021-08-31 ENCOUNTER — TELEMEDICINE (OUTPATIENT)
Dept: BEHAVIORAL/MENTAL HEALTH CLINIC | Facility: CLINIC | Age: 31
End: 2021-08-31
Payer: COMMERCIAL

## 2021-08-31 DIAGNOSIS — F43.10 POST TRAUMATIC STRESS DISORDER (PTSD): ICD-10-CM

## 2021-08-31 DIAGNOSIS — F25.0 SCHIZOAFFECTIVE DISORDER, BIPOLAR TYPE (HCC): ICD-10-CM

## 2021-08-31 DIAGNOSIS — F41.1 GAD (GENERALIZED ANXIETY DISORDER): Primary | ICD-10-CM

## 2021-08-31 PROCEDURE — 90834 PSYTX W PT 45 MINUTES: CPT | Performed by: SOCIAL WORKER

## 2021-08-31 NOTE — PSYCH
Psychotherapy Provided: Individual Psychotherapy 45 minutes     Length of time in session: 45 minutes, follow up in 2 week    Encounter Diagnosis     ICD-10-CM    1  Post traumatic stress disorder (PTSD)  F43 10    2  Schizoaffective disorder, bipolar type ([facility] )  F25 0    3  GOOD (generalized anxiety disorder)  F41 1        Goals addressed in session: Goal 1     Pain:      none    0    Current suicide risk : Low     D:  Met with Janes for session  Her roommate and her are doing the Keto diet 4 days ago  She has had trouble sleeping the past 2 nights with only getting 5 hrs of sleep each night  She has no thyroid and takes meds for it  Her dr is unaware that she is on a diet  Discussed talking to her DR  She met with her psych PA  He prescribed her klonopin but, she is afraid to take them  Discussed the risks and the benefits  She is willing to try them now  She missed work yesterday and felt good that when she went into work today she felt appreciated  Her year anniversary of staying out of the hospital is approaching  A:  Mod progress on goal 1  P:  To continue addressing  [place] plan goals  Behavioral Health Treatment Plan [facility]: Diagnosis and Treatment Plan explained to Mckenzie Garnica relates understanding diagnosis and is agreeable to Treatment Plan  Yes             Virtual Regular Visit    Verification of patient location:    Patient is currently located in the state [facility]  Patient is currently located in a state in which I am licensed      Assessment/Plan:    Problem List Items Addressed This Visit        Other    Post traumatic stress disorder (PTSD)    Schizoaffective disorder, bipolar type ([place] [address] )    GOOD (generalized anxiety disorder) - Primary          Goals addressed in session: Goal 1          Reason for visit is   No chief complaint on file         Encounter provider Bhavani Barton    Provider located at [address] [facility] [address]  [place] [id number] [phone]      Recent Visits  No visits were found meeting these conditions  Showing recent visits within past 7 days and meeting all other requirements  Future Appointments  No visits were found meeting these conditions  Showing future appointments within next 150 days and meeting all other requirements       The patient was identified by name and date of birth  Martina Maldonado was informed that this is a telemedicine visit and that the [facility] and patient was informed that this is a secure, HIPAA-compliant platform  She agrees to proceed     My office door was closed  No one else was in the room  She acknowledged consent and understanding of privacy and security of the video platform  The patient has agreed to participate and understands they can discontinue the visit at any time  Patient is aware this is a billable service  Subjective  Martina Maldonado is a 27 y o  female          HPI     Past Medical History:   Diagnosis Date    Bipolar 1 disorder ([facility] )     Cancer ([facility] )     thyroid    Depression     Schizoaffective disorder, bipolar type ([facility] )     Suicidal ideations     Thyroid disease        Past Surgical History:   Procedure Laterality Date    NECK SURGERY      US GUIDED THYROID BIOPSY      normal  Onset: 2012       Current Outpatient Medications   Medication Sig Dispense Refill    clonazePAM (KlonoPIN) 0 5 mg tablet Take 0 5 tablets (0 25 mg total) by mouth 2 (two) times a day 30 tablet 2    diphenhydrAMINE (BENADRYL) 50 mg capsule Take 1 capsule (50 mg total) by mouth daily at bedtime 30 capsule 5    gabapentin (NEURONTIN) 100 mg capsule Take 1 capsule (100 mg total) by mouth 3 (three) times a day 180 capsule 1    lamoTRIgine (LaMICtal) 200 MG tablet Take 1 tablet (200 mg total) by mouth daily 30 tablet 5    levothyroxine 175 mcg tablet Take 200 mcg by mouth daily      Melatonin 10 MG TABS Take 1 tablet (10 mg total) by mouth daily at bedtime      OLANZapine (ZyPREXA) 20 MG tablet Take 1 tablet (20 mg total) by mouth daily at bedtime 30 tablet 5    prazosin (MINIPRESS) 2 mg capsule Take 1 capsule (2 mg total) by mouth daily at bedtime 30 capsule 5     No current facility-administered medications for this visit  No Known Allergies    Review of Systems    Video Exam    There were no vitals filed for this visit  Physical Exam     I spent 45 minutes directly with the patient during this [address] [facility] verbally agrees to participate in [employer]  Pt is aware that [employer] could be limited without vital signs or the ability to perform a full hands-on physical [address] [facility] understands she or the provider may request at any time to terminate the video visit and request the patient to seek care or treatment in person

## 2021-09-09 ENCOUNTER — TELEMEDICINE (OUTPATIENT)
Dept: PSYCHIATRY | Facility: CLINIC | Age: 31
End: 2021-09-09
Payer: COMMERCIAL

## 2021-09-09 DIAGNOSIS — F41.1 GAD (GENERALIZED ANXIETY DISORDER): ICD-10-CM

## 2021-09-09 DIAGNOSIS — F43.10 PTSD (POST-TRAUMATIC STRESS DISORDER): ICD-10-CM

## 2021-09-09 DIAGNOSIS — F25.0 SCHIZOAFFECTIVE DISORDER, BIPOLAR TYPE (HCC): Primary | ICD-10-CM

## 2021-09-09 DIAGNOSIS — F42.4 SKIN PICKING HABIT: ICD-10-CM

## 2021-09-09 PROCEDURE — 99214 OFFICE O/P EST MOD 30 MIN: CPT | Performed by: NURSE PRACTITIONER

## 2021-09-09 RX ORDER — PRAZOSIN HYDROCHLORIDE 2 MG/1
2 CAPSULE ORAL
Qty: 30 CAPSULE | Refills: 5 | Status: SHIPPED | OUTPATIENT
Start: 2021-09-09 | End: 2021-11-26 | Stop reason: SDUPTHER

## 2021-09-09 RX ORDER — OLANZAPINE 20 MG/1
20 TABLET ORAL
Qty: 30 TABLET | Refills: 5 | Status: SHIPPED | OUTPATIENT
Start: 2021-09-09 | End: 2021-11-26 | Stop reason: SDUPTHER

## 2021-09-09 RX ORDER — CLONAZEPAM 0.5 MG/1
0.5 TABLET ORAL 2 TIMES DAILY
Qty: 60 TABLET | Refills: 2 | Status: SHIPPED | OUTPATIENT
Start: 2021-09-09 | End: 2021-11-26 | Stop reason: SDUPTHER

## 2021-09-09 NOTE — PSYCH
Virtual Regular Visit    Problem List Items Addressed This Visit        Other    Schizoaffective disorder, bipolar type ([facility] ) - Primary    GOOD (generalized anxiety disorder)    Skin picking habit             Encounter provider GENESIS Apple    Provider located at   [address]  [facility]    [address] [facility] [address]  [facility] [address] [phone]    Patient is located in the following state in which I hold an active license [place]    Recent Visits  No visits were found meeting these conditions  Showing recent visits within past 7 days and meeting all other requirements  Future Appointments  No visits were found meeting these conditions  Showing future appointments within next 150 days and meeting all other requirements     The patient was identified by name and date of birth  Denis Alvarado was informed that this is a telemedicine visit and that the visit is being conducted through [address] [place] [address] Now and patient was informed that this is a secure, HIPAA-compliant platform  Denis Alvarado agrees to proceed  My office door was closed  No one else was in the room  She acknowledged consent and understanding of privacy and security of the video platform  The patient has agreed to participate and understands they can discontinue the visit at any time  Patient is aware this is a billable service       HPI     Current Outpatient Medications   Medication Sig Dispense Refill    clonazePAM (KlonoPIN) 0 5 mg tablet Take 0 5 tablets (0 25 mg total) by mouth 2 (two) times a day 30 tablet 2    diphenhydrAMINE (BENADRYL) 50 mg capsule Take 1 capsule (50 mg total) by mouth daily at bedtime 30 capsule 5    lamoTRIgine (LaMICtal) 200 MG tablet Take 1 tablet (200 mg total) by mouth daily 30 tablet 5    levothyroxine 175 mcg tablet Take 200 mcg by mouth daily      Melatonin 10 MG TABS Take 1 tablet (10 mg total) by mouth daily at bedtime      OLANZapine (ZyPREXA) 20 MG tablet Take 1 tablet (20 mg total) by mouth daily at bedtime 30 tablet 5    prazosin (MINIPRESS) 2 mg capsule Take 1 capsule (2 mg total) by mouth daily at bedtime 30 capsule 5     No current facility-administered medications for this visit  Review of Systems  Video Exam    There were no vitals filed for this visit  Physical Exam   As a result of this visit, I have referred the patient for further respiratory evaluation  No    I spent 20 minutes directly with the patient during this visit  Sylvain acknowledges that she has consented to an online visit or consultation  She understands that the online visit is based solely on information provided by her, and that, in the absence of a face-to-face physical evaluation by the physician, the diagnosis she receives is both limited and provisional in terms of accuracy and completeness  This is not intended to replace a full medical face-to-face evaluation by the physician  Reny Wang understands and accepts these terms  MEDICATION MANAGEMENT NOTE        [facility]    Name and Date of Birth:  Reny Wang 27 y o  1990 MRN: [id number]    Date of Visit: September 9, 2021    No Known Allergies  SUBJECTIVE:    Joe Rowell is seen today for a follow up for PTSD, Generalized Anxiety Disorder, Schizoaffective Disorder and insomnia  She reports that she has decompensated since the last visit  Patient experienced a 3 day span where she had decreased need for sleep approximately 5 hours per night, began to feel paranoid, had increased anxiety ,felt  "Manic ", restless and irritable  symptoms resolved after starting clonazepam 0 25 mg at bedtime and patient began to get 8 hours of sleep at night  Edema has not returned    States that she began a ketogenic diet 1 month ago and feels that has been giving her some additional energy and may be contributing to sleeplessness  States she has lost 20 lb in the last several weeks, patient believes some of his weight loss is reduction in "water weight"  Continues to experience difficulty with anxiety control, visible small 0 5-1 cm lesions on face from skin picking in various stages of healing  Will increase clonazepam to 0 5 mg p o  b i d  to help treat insomnia, anxiety and agitation  She denies any side effects from medications      PLAN:    Discontinue gabapentin   increase clonazepam to 0 5 mg p o  b i d    continue diphenhydramine 50 mg at bedtime   continue lamotrigine 200 mg p o  daily   continue olanzapine 20 mg at bedtime   continue prazosin 2 mg at bedtime   continue with individual therapy   call with questions or concerns or if symptoms of  Ai or psychosis return   follow-up in 6-8 weeks  Aware of 24 hour and weekend coverage for urgent situations accessed by calling [facility] main practice number    Diagnoses and all orders for this visit:    Schizoaffective disorder, bipolar type ([facility] )    GOOD (generalized anxiety disorder)    Skin picking habit        Current Outpatient Medications on File Prior to Visit   Medication Sig Dispense Refill    clonazePAM (KlonoPIN) 0 5 mg tablet Take 0 5 tablets (0 25 mg total) by mouth 2 (two) times a day 30 tablet 2    diphenhydrAMINE (BENADRYL) 50 mg capsule Take 1 capsule (50 mg total) by mouth daily at bedtime 30 capsule 5    lamoTRIgine (LaMICtal) 200 MG tablet Take 1 tablet (200 mg total) by mouth daily 30 tablet 5    levothyroxine 175 mcg tablet Take 200 mcg by mouth daily      Melatonin 10 MG TABS Take 1 tablet (10 mg total) by mouth daily at bedtime      OLANZapine (ZyPREXA) 20 MG tablet Take 1 tablet (20 mg total) by mouth daily at bedtime 30 tablet 5    prazosin (MINIPRESS) 2 mg capsule Take 1 capsule (2 mg total) by mouth daily at bedtime 30 capsule 5    [DISCONTINUED] gabapentin (NEURONTIN) 100 mg capsule Take 1 capsule (100 mg total) by mouth 3 (three) times a day 180 capsule 1     No current facility-administered medications on file prior to visit  Psychotherapy Provided:     Individual psychotherapy provided: Yes  Counseling was provided during the session today for 16 minutes  Supportive counseling provided  HPI ROS Appetite Changes and Sleep:     She reports decrease in number of sleep hours (5 hours), adequate appetite, adequate energy level   Patient denies suicidal or homicidal ideation    Review Of Systems:      General emotional problems, decreased functioning and sleep disturbances   Personality no change in personality   Constitutional negative   ENT negative   Cardiovascular negative   Respiratory negative   Gastrointestinal negative   Genitourinary negative   Musculoskeletal negative   Integumentary negative   Neurological negative   Endocrine negative   Other Symptoms none, all other systems are negative     Mental Status Evaluation:    Appearance Adequate hygiene and grooming   Behavior calm and cooperative   Mood anxious  Depression Scale -  of 10 (0 = No depression)  Anxiety Scale -  of 10 (0 = No anxiety)   Speech Normal rate and volume   Affect appropriate and mood-congruent   Thought Processes Goal directed and coherent   Thought Content Does not verbalize delusional material   Associations Tightly connected   Perceptual Disturbances Denies hallucinations and does not appear to be responding to internal stimuli   Risk Potential Suicidal/Homicidal Ideation - No evidence of suicidal or homicidal ideation and patient does not verbalize suicidal or homicidal ideation  Risk of Violence - No evidence of risk for violence found on assessment  Risk of Self Mutilation - No evidence of risk for self mutilation found on assessment   Orientation oriented to person, place, time/date and situation   Memory recent and remote memory grossly intact   Consciousness alert and awake   Attention/Concentration attention span and concentration are age appropriate   Insight intact   Judgement intact   Muscle Strength and Gait normal muscle strength and normal muscle tone, normal gait/station and normal balance   Motor Activity no abnormal movements   Language no difficulty naming common objects, no difficulty repeating a phrase, no difficulty writing a sentence   Fund of Knowledge adequate knowledge of current events  adequate fund of knowledge regarding past history  adequate fund of knowledge regarding vocabulary      Past Psychiatric History Update:     Inpatient Psychiatric Admission Since Last Encounter:   no  Suicide Attempt Or Self Mutilation Since Last Encounter:   no  Incidence of Violent Behavior Since Last Encounter:   no    Traumatic History Update:     New Onset of Abuse Since Last Encounter:   no  Traumatic Events Since Last Encounter:   no    Past Medical History:    Past Medical History:   Diagnosis Date    Bipolar 1 disorder ([facility] )     Cancer ([facility] )     thyroid    Depression     Schizoaffective disorder, bipolar type ([facility] )     Suicidal ideations     Thyroid disease      No past medical history pertinent negatives    Past Surgical History:   Procedure Laterality Date    NECK SURGERY      US GUIDED THYROID BIOPSY      normal  Onset: 2012     No Known Allergies  Substance Abuse History:    Social History     Substance and Sexual Activity   Alcohol Use Yes    Comment: occ     Social History     Substance and Sexual Activity   Drug Use No     Social History:    Social History     Socioeconomic History    Marital status: Single     Spouse name: Not on file    Number of children: Not on file    Years of education: Not on file    Highest education level: Not on file   Occupational History    Not on file   Tobacco Use    Smoking status: Former Smoker     Packs/day: 1 00     Types: Cigarettes    Smokeless tobacco: Never Used   Substance and Sexual Activity    Alcohol use: Yes     Comment: occ    Drug use: No    Sexual activity: Not on file   Other Topics Concern    Not on file   Social History Narrative    Not on file     Social Determinants of Health     Financial Resource Strain:     Difficulty of Paying Living Expenses:    Food Insecurity:     Worried About Running Out of Food in the Last Year:     [address] in the Last Year:    Transportation Needs:     Lack of Transportation (Medical):  Lack of Transportation (Non-Medical):    Physical Activity:     Days of Exercise per Week:     Minutes of Exercise per Session:    Stress:     Feeling of Stress :    Social Connections:     Frequency of Communication with Friends and Family:     Frequency of Social Gatherings with Friends and Family:     Attends Mu-ism Services:     Active Member of Clubs or Organizations:     Attends Club or Organization Meetings:     Marital Status:    Intimate Partner Violence:     Fear of Current or Ex-Partner:     Emotionally Abused:     Physically Abused:     Sexually Abused:      Family Psychiatric History:     No family history on file  History Review: The following portions of the patient's history were reviewed and updated as appropriate: allergies, current medications, past family history, past medical history, past social history, past surgical history and problem list     OBJECTIVE:     Vital signs in last 24 hours: There were no vitals filed for this visit  Laboratory Results: I have personally reviewed all pertinent laboratory/tests results  Suicide/Homicide Risk Assessment:    Risk of Harm to Self:   The following ratings are based on assessment at the time of the interview   Recent Specific Risk Factors include: current depressive symptoms, current anxiety symptoms    Risk of Harm to Others:   The following ratings are based on assessment at the time of the interview   Recent Specific Risk Factors include: none      The following interventions are recommended: no intervention changes needed    Medications Risks/Benefits:      Risks, Benefits And Possible Side Effects Of Medications:    Discussed risks and benefits of treatment with patient including risk of parkinsonian symptoms, metabolic syndrome, tardive dyskinesia and neuroleptic malignant syndrome related to treatment with antipsychotic medications, risks of misuse, abuse or dependence, sedation and respiratory depression related to treatment with benzodiazepine medications, Risk of orthostatic hypotension with Prazosin and risk of rash related to treatment with Lamictal     Controlled Medication Discussion:     Nainamargarettedrew Newsome has been filling controlled prescriptions on time as prescribed according to [facility]    Treatment Plan:    Due for update/Updated:   GENESIS Madrigal 09/09/21    This note was shared with patient

## 2021-09-09 NOTE — BH TREATMENT PLAN
TREATMENT PLAN (Medication Management Only)        [facility]    Name and Date of Birth:  Isaac Thompson 27 y o  1990  Date of Treatment Plan: September 9, 2021  Diagnosis/Diagnoses:  No diagnosis found  Strengths/Personal Resources for Self-Care: motivation for treatment, taking medications as prescribed, ability to communicate well, ability to listen, ability to reason  Area/Areas of need (in own words): depressive symptoms, anxiety symptoms, mood instability  1  Long Term Goal: maintain improvement in anxiety, maintain improvement in depression and maintain improvement in mood stability  Target Date: 6 months - 3/9/2022  Person/Persons responsible for completion of goal: Isaac Thompson  2  Short Term Objective (s) - How will we reach this goal?:   A  Provider new recommended medication/dosage changes and/or continue medication(s): continue current medications as prescribed  B   Attend medication management appointments regularly  C   Attend psychotherapy regularly  Target Date: 6 months - 3/9/2022  Person/Persons Responsible for Completion of Goal: Isaac Thompson  Progress Towards Goals: continuing treatment  Treatment Modality: medication management with psychotherapy every 3 months  Review due 90 to 120 days from date of this plan: 6 months - 3/9/2022  Expected length of service: maintenance unless revised  My Physician/PA/NP and I have developed this plan together and I agree to work on the goals and objectives  I understand the treatment goals that were developed for my treatment    Treatment Plan Verbal Consent - [facility]

## 2021-09-21 ENCOUNTER — TELEMEDICINE (OUTPATIENT)
Dept: BEHAVIORAL/MENTAL HEALTH CLINIC | Facility: CLINIC | Age: 31
End: 2021-09-21
Payer: COMMERCIAL

## 2021-09-21 DIAGNOSIS — F41.1 GAD (GENERALIZED ANXIETY DISORDER): ICD-10-CM

## 2021-09-21 DIAGNOSIS — F43.10 POST TRAUMATIC STRESS DISORDER (PTSD): Primary | ICD-10-CM

## 2021-09-21 DIAGNOSIS — F25.0 SCHIZOAFFECTIVE DISORDER, BIPOLAR TYPE (HCC): ICD-10-CM

## 2021-09-21 PROCEDURE — 90834 PSYTX W PT 45 MINUTES: CPT | Performed by: SOCIAL WORKER

## 2021-09-21 NOTE — PSYCH
Psychotherapy Provided: Individual Psychotherapy 45 minutes     Length of time in session: 45 minutes, follow up in 2 week    Encounter Diagnosis     ICD-10-CM    1  Post traumatic stress disorder (PTSD)  F43 10    2  Schizoaffective disorder, bipolar type ([facility] )  F25 0    3  GOOD (generalized anxiety disorder)  F41 1        Goals addressed in session: Goal 1     Pain:      none    0    Current suicide risk : Low     D:  Met with Janes for session  She does not want to keep working   " 72 yrs old is far away, she doesn't like her co workers, she is board, etc   She wants to find a sugar daddy so she doesn't have to work but, has money "  Discussed what does she do outside of work  During the week it is "eat, shower, take her meds and go to bed by 6:00 pm   She is up at 4:00 am   Her Zyprexa makes her tired so she goes to bed early  She does not do much on the weekends either except play video games on line with friends  Discussed her "getting a life"  The PA increased her Klonopin which has helped with her anxiety and decreased her picking to not picking at all  A:  Mod progress on goal 1  P:  To continue addressing  [place] plan goals  Behavioral Health Treatment Plan [facility]: Diagnosis and Treatment Plan explained to Mckenzie Garnica relates understanding diagnosis and is agreeable to Treatment Plan  Yes             Virtual Regular Visit    Verification of patient location:    Patient is currently located in the [facility]  Patient is currently located in a state in which I am licensed      Assessment/Plan:    Problem List Items Addressed This Visit     None          Goals addressed in session: Goal 1          Reason for visit is   No chief complaint on file         Encounter provider Bhavani Barton    Provider located at [address] [place] [address] [phone]      Recent Visits  No visits were found meeting these conditions  Showing recent visits within past 7 days and meeting all other requirements  Future Appointments  No visits were found meeting these conditions  Showing future appointments within next 150 days and meeting all other requirements       The patient was identified by name and date of birth  Dorita Ulloa was informed that this is a telemedicine visit and that the [facility] and patient was informed that this is a secure, HIPAA-compliant platform  She agrees to proceed     My office door was closed  No one else was in the room  She acknowledged consent and understanding of privacy and security of the video platform  The patient has agreed to participate and understands they can discontinue the visit at any time  Patient is aware this is a billable service  Subjective  Dorita Ulloa is a 27 y o  female          HPI     Past Medical History:   Diagnosis Date    Bipolar 1 disorder ([facility] )     Cancer ([facility] )     thyroid    Depression     Schizoaffective disorder, bipolar type ([facility] )     Suicidal ideations     Thyroid disease        Past Surgical History:   Procedure Laterality Date    NECK SURGERY      US GUIDED THYROID BIOPSY      normal  Onset: 2012       Current Outpatient Medications   Medication Sig Dispense Refill    clonazePAM (KlonoPIN) 0 5 mg tablet Take 1 tablet (0 5 mg total) by mouth 2 (two) times a day 60 tablet 2    diphenhydrAMINE (BENADRYL) 50 mg capsule Take 1 capsule (50 mg total) by mouth daily at bedtime 30 capsule 5    lamoTRIgine (LaMICtal) 200 MG tablet Take 1 tablet (200 mg total) by mouth daily 30 tablet 5    levothyroxine 175 mcg tablet Take 200 mcg by mouth daily      Melatonin 10 MG TABS Take 1 tablet (10 mg total) by mouth daily at bedtime      OLANZapine (ZyPREXA) 20 MG tablet Take 1 tablet (20 mg total) by mouth daily at bedtime 30 tablet 5    prazosin (MINIPRESS) 2 mg capsule Take 1 capsule (2 mg total) by mouth daily at bedtime 30 capsule 5     No current facility-administered medications for this visit  No Known Allergies    Review of Systems    Video Exam    There were no vitals filed for this visit  Physical Exam     I spent 45 minutes directly with the patient during this [address] [facility] [address] verbally agrees to participate in [employer]  Pt is aware that [employer] could be limited without vital signs or the ability to perform a full hands-on physical [address] understands she or the provider may request at any time to terminate the video visit and request the patient to seek care or treatment in person

## 2021-10-29 ENCOUNTER — TELEMEDICINE (OUTPATIENT)
Dept: BEHAVIORAL/MENTAL HEALTH CLINIC | Facility: CLINIC | Age: 31
End: 2021-10-29
Payer: COMMERCIAL

## 2021-10-29 DIAGNOSIS — F43.10 POST TRAUMATIC STRESS DISORDER (PTSD): Primary | ICD-10-CM

## 2021-10-29 DIAGNOSIS — F41.1 GAD (GENERALIZED ANXIETY DISORDER): ICD-10-CM

## 2021-10-29 DIAGNOSIS — F25.0 SCHIZOAFFECTIVE DISORDER, BIPOLAR TYPE (HCC): ICD-10-CM

## 2021-10-29 PROCEDURE — 90834 PSYTX W PT 45 MINUTES: CPT | Performed by: SOCIAL WORKER

## 2021-11-10 ENCOUNTER — TELEPHONE (OUTPATIENT)
Dept: BEHAVIORAL/MENTAL HEALTH CLINIC | Facility: CLINIC | Age: 31
End: 2021-11-10

## 2021-11-11 ENCOUNTER — TELEPHONE (OUTPATIENT)
Dept: BEHAVIORAL/MENTAL HEALTH CLINIC | Facility: CLINIC | Age: 31
End: 2021-11-11

## 2021-11-23 ENCOUNTER — TELEMEDICINE (OUTPATIENT)
Dept: BEHAVIORAL/MENTAL HEALTH CLINIC | Facility: CLINIC | Age: 31
End: 2021-11-23
Payer: COMMERCIAL

## 2021-11-23 DIAGNOSIS — F41.1 GAD (GENERALIZED ANXIETY DISORDER): ICD-10-CM

## 2021-11-23 DIAGNOSIS — F25.0 SCHIZOAFFECTIVE DISORDER, BIPOLAR TYPE (HCC): Primary | ICD-10-CM

## 2021-11-23 DIAGNOSIS — F43.10 POST TRAUMATIC STRESS DISORDER (PTSD): ICD-10-CM

## 2021-11-23 PROCEDURE — 90834 PSYTX W PT 45 MINUTES: CPT | Performed by: SOCIAL WORKER

## 2021-11-26 ENCOUNTER — TELEMEDICINE (OUTPATIENT)
Dept: PSYCHIATRY | Facility: CLINIC | Age: 31
End: 2021-11-26
Payer: COMMERCIAL

## 2021-11-26 DIAGNOSIS — F41.1 GAD (GENERALIZED ANXIETY DISORDER): ICD-10-CM

## 2021-11-26 DIAGNOSIS — F43.10 POST TRAUMATIC STRESS DISORDER (PTSD): ICD-10-CM

## 2021-11-26 DIAGNOSIS — F42.4 SKIN PICKING HABIT: ICD-10-CM

## 2021-11-26 DIAGNOSIS — F43.10 PTSD (POST-TRAUMATIC STRESS DISORDER): ICD-10-CM

## 2021-11-26 DIAGNOSIS — F25.0 SCHIZOAFFECTIVE DISORDER, BIPOLAR TYPE (HCC): Primary | ICD-10-CM

## 2021-11-26 PROCEDURE — 99214 OFFICE O/P EST MOD 30 MIN: CPT | Performed by: NURSE PRACTITIONER

## 2021-11-26 RX ORDER — OLANZAPINE 20 MG/1
20 TABLET ORAL
Qty: 30 TABLET | Refills: 5 | Status: SHIPPED | OUTPATIENT
Start: 2021-11-26 | End: 2022-06-10

## 2021-11-26 RX ORDER — PRAZOSIN HYDROCHLORIDE 2 MG/1
2 CAPSULE ORAL
Qty: 30 CAPSULE | Refills: 5 | Status: SHIPPED | OUTPATIENT
Start: 2021-11-26 | End: 2022-08-04 | Stop reason: SDUPTHER

## 2021-11-26 RX ORDER — LAMOTRIGINE 200 MG/1
200 TABLET ORAL DAILY
Qty: 30 TABLET | Refills: 5 | Status: SHIPPED | OUTPATIENT
Start: 2021-11-26 | End: 2022-03-24

## 2021-11-26 RX ORDER — CLONAZEPAM 0.5 MG/1
0.5 TABLET ORAL 2 TIMES DAILY
Qty: 60 TABLET | Refills: 2 | Status: SHIPPED | OUTPATIENT
Start: 2021-11-26 | End: 2022-02-01 | Stop reason: SDUPTHER

## 2021-12-10 ENCOUNTER — TELEMEDICINE (OUTPATIENT)
Dept: BEHAVIORAL/MENTAL HEALTH CLINIC | Facility: CLINIC | Age: 31
End: 2021-12-10
Payer: COMMERCIAL

## 2021-12-10 DIAGNOSIS — F43.10 POST TRAUMATIC STRESS DISORDER (PTSD): ICD-10-CM

## 2021-12-10 DIAGNOSIS — F25.0 SCHIZOAFFECTIVE DISORDER, BIPOLAR TYPE (HCC): Primary | ICD-10-CM

## 2021-12-10 DIAGNOSIS — F41.1 GAD (GENERALIZED ANXIETY DISORDER): ICD-10-CM

## 2021-12-10 PROCEDURE — 90834 PSYTX W PT 45 MINUTES: CPT | Performed by: SOCIAL WORKER

## 2021-12-30 ENCOUNTER — TELEMEDICINE (OUTPATIENT)
Dept: BEHAVIORAL/MENTAL HEALTH CLINIC | Facility: CLINIC | Age: 31
End: 2021-12-30
Payer: COMMERCIAL

## 2021-12-30 DIAGNOSIS — F43.10 POST TRAUMATIC STRESS DISORDER (PTSD): ICD-10-CM

## 2021-12-30 DIAGNOSIS — F25.0 SCHIZOAFFECTIVE DISORDER, BIPOLAR TYPE (HCC): Primary | ICD-10-CM

## 2021-12-30 DIAGNOSIS — F41.1 GAD (GENERALIZED ANXIETY DISORDER): ICD-10-CM

## 2021-12-30 PROCEDURE — 90834 PSYTX W PT 45 MINUTES: CPT | Performed by: SOCIAL WORKER

## 2022-01-09 DIAGNOSIS — F25.0 SCHIZOAFFECTIVE DISORDER, BIPOLAR TYPE (HCC): ICD-10-CM

## 2022-01-12 ENCOUNTER — TELEMEDICINE (OUTPATIENT)
Dept: BEHAVIORAL/MENTAL HEALTH CLINIC | Facility: CLINIC | Age: 32
End: 2022-01-12
Payer: COMMERCIAL

## 2022-01-12 DIAGNOSIS — F43.10 POST TRAUMATIC STRESS DISORDER (PTSD): ICD-10-CM

## 2022-01-12 DIAGNOSIS — F25.0 SCHIZOAFFECTIVE DISORDER, BIPOLAR TYPE (HCC): Primary | ICD-10-CM

## 2022-01-12 DIAGNOSIS — F41.1 GAD (GENERALIZED ANXIETY DISORDER): ICD-10-CM

## 2022-01-12 PROCEDURE — 90834 PSYTX W PT 45 MINUTES: CPT | Performed by: SOCIAL WORKER

## 2022-01-12 NOTE — PSYCH
Psychotherapy Provided: Individual Psychotherapy 45 minutes     Length of time in session: 45 minutes, follow up in 2 week    Encounter Diagnosis     ICD-10-CM    1  Post traumatic stress disorder (PTSD)  F43 10    2  Schizoaffective disorder, bipolar type ([facility] )  F25 0    3  GOOD (generalized anxiety disorder)  F41 1        Goals addressed in session: Goal 2     Pain:      none    0    Current suicide risk : Low     D:  Met with Janes for session  She has been having "mini meltdowns "  Discussed what happened today that led to her "meltdown "  She shared with a coworker who told her first about his wife being dx as schizoaffective, whom she has known for 2 years at work and they are friends that she is dx with schizoaffective disorder and her hx with it  She then, "became paranoid" and  began questioning should she of done that, etc    She has many co workers who tell her private things, who joke with her, etc   Discussed her "paranoia" with that  Discussed her childhood  She has a hx since elementary school of being bullied, not trusting people and not having friends  Discussed "normal paranoia" and clinical paranoia  A:  Mod progress on goal 2  It appears she has reasons to be paranoid  Having friends appears to feel uncomfortable due to this is all new to her  P:  To continue addressing [place] plan goals  Behavioral Health Treatment Plan [facility]: Diagnosis and Treatment Plan explained to Landry Moran relates understanding diagnosis and is agreeable to Treatment Plan  Yes             Virtual Regular Visit    Verification of patient location:    Patient is currently located in the state [facility]  Patient is currently located in a state in which I am licensed      Assessment/Plan:    Problem List Items Addressed This Visit     None          Goals addressed in session: 2         Reason for visit is   No chief complaint on file         Encounter provider Janet Chen    Provider located at [facility] THERAPIST [facility] [address]  [place] [facility] [id number] [phone]      Recent Visits  No visits were found meeting these conditions  Showing recent visits within past 7 days and meeting all other requirements  Future Appointments  No visits were found meeting these conditions  Showing future appointments within next 150 days and meeting all other requirements       The patient was identified by name and date of birth  Vesna Hancock was informed that this is a telemedicine visit and that the visit is being conducted through[employer] and patient was informed that this is a secure, HIPAA-compliant platform  She agrees to proceed     My office door was closed  No one else was in the room  She acknowledged consent and understanding of privacy and security of the video platform  The patient has agreed to participate and understands they can discontinue the visit at any time  Patient is aware this is a billable service  Subjective  Vesna Hancock is a 27 y o  female          HPI     Past Medical History:   Diagnosis Date    Bipolar 1 disorder ([facility] )     Cancer ([facility] )     thyroid    Depression     Schizoaffective disorder, bipolar type ([facility] )     Suicidal ideations     Thyroid disease        Past Surgical History:   Procedure Laterality Date    NECK SURGERY      US GUIDED THYROID BIOPSY      normal  Onset: 2012       Current Outpatient Medications   Medication Sig Dispense Refill    clonazePAM (KlonoPIN) 0 5 mg tablet Take 1 tablet (0 5 mg total) by mouth 2 (two) times a day 60 tablet 2    diphenhydrAMINE (BENADRYL) 50 mg capsule Take 1 capsule (50 mg total) by mouth daily at bedtime 30 capsule 5    lamoTRIgine (LaMICtal) 200 MG tablet Take 1 tablet (200 mg total) by mouth daily 30 tablet 5    levothyroxine 175 mcg tablet Take 200 mcg by mouth daily      Melatonin 10 MG TABS Take 1 tablet (10 mg total) by mouth daily at bedtime  OLANZapine (ZyPREXA) 20 MG tablet Take 1 tablet (20 mg total) by mouth daily at bedtime 30 tablet 5    prazosin (MINIPRESS) 2 mg capsule Take 1 capsule (2 mg total) by mouth daily at bedtime 30 capsule 5     No current facility-administered medications for this visit  No Known Allergies    Review of Systems    Video Exam    There were no vitals filed for this visit  Physical Exam     I spent 45 minutes directly with the patient during this [facility] verbally agrees to participate in [employer]  Pt is aware that [employer] could be limited without vital signs or the ability to perform a full hands-on physical [address] understands she or the provider may request at any time to terminate the video visit and request the patient to seek care or treatment in person

## 2022-01-13 ENCOUNTER — TELEPHONE (OUTPATIENT)
Dept: PSYCHIATRY | Facility: CLINIC | Age: 32
End: 2022-01-13

## 2022-01-13 NOTE — TELEPHONE ENCOUNTER
Patient's FMLA  in December and she needs to resubmit it to her work by 22  Janes was informed that Yelena Vega will not return to the office until   Patient will be dropping off FMLA paperwork

## 2022-01-24 RX ORDER — DIPHENHYDRAMINE HCL 50 MG/1
CAPSULE ORAL
Qty: 30 CAPSULE | Refills: 5 | Status: SHIPPED | OUTPATIENT
Start: 2022-01-24 | End: 2022-08-04 | Stop reason: SDUPTHER

## 2022-02-01 ENCOUNTER — TELEMEDICINE (OUTPATIENT)
Dept: PSYCHIATRY | Facility: CLINIC | Age: 32
End: 2022-02-01
Payer: COMMERCIAL

## 2022-02-01 DIAGNOSIS — F42.4 SKIN PICKING HABIT: ICD-10-CM

## 2022-02-01 DIAGNOSIS — F25.0 SCHIZOAFFECTIVE DISORDER, BIPOLAR TYPE (HCC): Primary | ICD-10-CM

## 2022-02-01 DIAGNOSIS — F41.1 GAD (GENERALIZED ANXIETY DISORDER): ICD-10-CM

## 2022-02-01 DIAGNOSIS — F43.10 POST TRAUMATIC STRESS DISORDER (PTSD): ICD-10-CM

## 2022-02-01 PROCEDURE — 99214 OFFICE O/P EST MOD 30 MIN: CPT | Performed by: NURSE PRACTITIONER

## 2022-02-01 RX ORDER — CLONAZEPAM 0.5 MG/1
0.5 TABLET ORAL 2 TIMES DAILY
Qty: 60 TABLET | Refills: 2 | Status: SHIPPED | OUTPATIENT
Start: 2022-02-01 | End: 2022-06-06 | Stop reason: SDUPTHER

## 2022-02-01 RX ORDER — OLANZAPINE 5 MG/1
5 TABLET ORAL
Qty: 30 TABLET | Refills: 5 | Status: SHIPPED | OUTPATIENT
Start: 2022-02-01 | End: 2022-06-10
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
## 2022-02-02 ENCOUNTER — TELEMEDICINE (OUTPATIENT)
Dept: BEHAVIORAL/MENTAL HEALTH CLINIC | Facility: CLINIC | Age: 32
End: 2022-02-02
Payer: COMMERCIAL

## 2022-02-02 DIAGNOSIS — F43.10 POST TRAUMATIC STRESS DISORDER (PTSD): Primary | ICD-10-CM

## 2022-02-02 DIAGNOSIS — F41.1 GAD (GENERALIZED ANXIETY DISORDER): ICD-10-CM

## 2022-02-02 DIAGNOSIS — F25.0 SCHIZOAFFECTIVE DISORDER, BIPOLAR TYPE (HCC): ICD-10-CM

## 2022-02-02 PROCEDURE — 90834 PSYTX W PT 45 MINUTES: CPT | Performed by: SOCIAL WORKER
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
## 2022-02-05 ENCOUNTER — APPOINTMENT (OUTPATIENT)
Dept: LAB | Facility: HOSPITAL | Age: 32
End: 2022-02-05
Payer: COMMERCIAL

## 2022-02-05 DIAGNOSIS — K21.9 GASTROESOPHAGEAL REFLUX DISEASE, UNSPECIFIED WHETHER ESOPHAGITIS PRESENT: ICD-10-CM

## 2022-02-05 DIAGNOSIS — E03.9 ACQUIRED HYPOTHYROIDISM: ICD-10-CM

## 2022-02-05 DIAGNOSIS — Z00.01 ENCOUNTER FOR GENERAL ADULT MEDICAL EXAMINATION WITH ABNORMAL FINDINGS: ICD-10-CM

## 2022-02-05 DIAGNOSIS — E55.9 VITAMIN D DEFICIENCY DISEASE: ICD-10-CM

## 2022-02-05 DIAGNOSIS — F25.0 SCHIZOAFFECTIVE DISORDER, BIPOLAR TYPE (HCC): ICD-10-CM

## 2022-02-05 LAB
25(OH)D3 SERPL-MCNC: 66.9 NG/ML (ref 30–100)
ALBUMIN SERPL BCP-MCNC: 3.2 G/DL (ref 3.5–5)
ALP SERPL-CCNC: 53 U/L (ref 46–116)
ALT SERPL W P-5'-P-CCNC: 23 U/L (ref 12–78)
ANION GAP SERPL CALCULATED.3IONS-SCNC: 11 MMOL/L (ref 4–13)
AST SERPL W P-5'-P-CCNC: 20 U/L (ref 5–45)
BASOPHILS # BLD AUTO: 0.03 THOUSANDS/ΜL (ref 0–0.1)
BASOPHILS NFR BLD AUTO: 0 % (ref 0–1)
BILIRUB SERPL-MCNC: 0.39 MG/DL (ref 0.2–1)
BUN SERPL-MCNC: 9 MG/DL (ref 5–25)
CALCIUM ALBUM COR SERPL-MCNC: 8 MG/DL (ref 8.3–10.1)
CALCIUM SERPL-MCNC: 7.4 MG/DL (ref 8.3–10.1)
CHLORIDE SERPL-SCNC: 107 MMOL/L (ref 100–108)
CHOLEST SERPL-MCNC: 209 MG/DL
CO2 SERPL-SCNC: 22 MMOL/L (ref 21–32)
CREAT SERPL-MCNC: 1.04 MG/DL (ref 0.6–1.3)
EOSINOPHIL # BLD AUTO: 0.2 THOUSAND/ΜL (ref 0–0.61)
EOSINOPHIL NFR BLD AUTO: 3 % (ref 0–6)
ERYTHROCYTE [DISTWIDTH] IN BLOOD BY AUTOMATED COUNT: 15.2 % (ref 11.6–15.1)
EST. AVERAGE GLUCOSE BLD GHB EST-MCNC: 114 MG/DL
GFR SERPL CREATININE-BSD FRML MDRD: 71 ML/MIN/1.73SQ M
GLUCOSE P FAST SERPL-MCNC: 102 MG/DL (ref 65–99)
HBA1C MFR BLD: 5.6 %
HCT VFR BLD AUTO: 40.4 % (ref 34.8–46.1)
HDLC SERPL-MCNC: 70 MG/DL
HGB BLD-MCNC: 12.4 G/DL (ref 11.5–15.4)
IMM GRANULOCYTES # BLD AUTO: 0.03 THOUSAND/UL (ref 0–0.2)
IMM GRANULOCYTES NFR BLD AUTO: 0 % (ref 0–2)
LDLC SERPL CALC-MCNC: 122 MG/DL (ref 0–100)
LYMPHOCYTES # BLD AUTO: 1.67 THOUSANDS/ΜL (ref 0.6–4.47)
LYMPHOCYTES NFR BLD AUTO: 22 % (ref 14–44)
MCH RBC QN AUTO: 27 PG (ref 26.8–34.3)
MCHC RBC AUTO-ENTMCNC: 30.7 G/DL (ref 31.4–37.4)
MCV RBC AUTO: 88 FL (ref 82–98)
MONOCYTES # BLD AUTO: 0.44 THOUSAND/ΜL (ref 0.17–1.22)
MONOCYTES NFR BLD AUTO: 6 % (ref 4–12)
NEUTROPHILS # BLD AUTO: 5.29 THOUSANDS/ΜL (ref 1.85–7.62)
NEUTS SEG NFR BLD AUTO: 69 % (ref 43–75)
NRBC BLD AUTO-RTO: 0 /100 WBCS
PLATELET # BLD AUTO: 280 THOUSANDS/UL (ref 149–390)
PMV BLD AUTO: 9.9 FL (ref 8.9–12.7)
POTASSIUM SERPL-SCNC: 4 MMOL/L (ref 3.5–5.3)
PROT SERPL-MCNC: 7.7 G/DL (ref 6.4–8.2)
RBC # BLD AUTO: 4.59 MILLION/UL (ref 3.81–5.12)
SODIUM SERPL-SCNC: 140 MMOL/L (ref 136–145)
T4 SERPL-MCNC: 10.4 UG/DL (ref 4.7–13.3)
TRIGL SERPL-MCNC: 84 MG/DL
TSH SERPL DL<=0.05 MIU/L-ACNC: 16.71 UIU/ML (ref 0.36–3.74)
WBC # BLD AUTO: 7.66 THOUSAND/UL (ref 4.31–10.16)

## 2022-02-05 PROCEDURE — 85025 COMPLETE CBC W/AUTO DIFF WBC: CPT

## 2022-02-05 PROCEDURE — 84436 ASSAY OF TOTAL THYROXINE: CPT

## 2022-02-05 PROCEDURE — 36415 COLL VENOUS BLD VENIPUNCTURE: CPT

## 2022-02-05 PROCEDURE — 80053 COMPREHEN METABOLIC PANEL: CPT

## 2022-02-05 PROCEDURE — 82306 VITAMIN D 25 HYDROXY: CPT

## 2022-02-05 PROCEDURE — 80061 LIPID PANEL: CPT

## 2022-02-05 PROCEDURE — 84443 ASSAY THYROID STIM HORMONE: CPT

## 2022-02-05 PROCEDURE — 83036 HEMOGLOBIN GLYCOSYLATED A1C: CPT

## 2022-02-22 ENCOUNTER — TELEMEDICINE (OUTPATIENT)
Dept: BEHAVIORAL/MENTAL HEALTH CLINIC | Facility: CLINIC | Age: 32
End: 2022-02-22
Payer: COMMERCIAL

## 2022-02-22 DIAGNOSIS — F25.0 SCHIZOAFFECTIVE DISORDER, BIPOLAR TYPE (HCC): Primary | ICD-10-CM

## 2022-02-22 DIAGNOSIS — F43.10 POST TRAUMATIC STRESS DISORDER (PTSD): ICD-10-CM

## 2022-02-22 DIAGNOSIS — F41.1 GAD (GENERALIZED ANXIETY DISORDER): ICD-10-CM

## 2022-02-22 PROCEDURE — 90834 PSYTX W PT 45 MINUTES: CPT | Performed by: SOCIAL WORKER

## 2022-02-22 NOTE — PSYCH
Psychotherapy Provided: Individual Psychotherapy 45 minutes     Length of time in session: 45 minutes, follow up in 2 week    Encounter Diagnosis     ICD-10-CM    1  Post traumatic stress disorder (PTSD)  F43 10    2  Schizoaffective disorder, bipolar type ([facility] )  F25 0    3  GOOD (generalized anxiety disorder)  F41 1        Goals addressed in session: Goal 2,1     Pain:      none    0    Current suicide risk : Low     D:  Met with Janes for session  Over the weekend "her brain would not stop "  She spent the weekend in bed worrying about something that happened at work  When she went into work she found out that what she was worrying about was nothing to worry about  Discussed copying skills to prevent her from "going down the rabbit whole "  Discussed other situations like passing her re-certification test where she wasn't worried but, should of been  MSW asked why wasn't she worried then informed her that what she did was applied coping skills and that is why she wasn't worried  A:  Mild progress on goal 2  It appears she has reasons to be paranoid  P:  To continue addressing [place] plan goals  Behavioral Health Treatment Plan [facility] includes the [facility]: Diagnosis and Treatment Plan explained to Author Jackelyn relates understanding diagnosis and is agreeable to Treatment Plan  Yes             Virtual Regular Visit    Verification of patient location:    Patient is currently located in the state [facility]  Patient is currently located in a state in which I am licensed      Assessment/Plan:    Problem List Items Addressed This Visit     None          Goals addressed in session: 2         Reason for visit is   No chief complaint on file  Encounter provider Candido Salter    Provider located at [address] [place] [address] [phone]      Recent Visits  No visits were found meeting these conditions    Showing recent visits within past 7 days and meeting all other requirements  Future Appointments  No visits were found meeting these conditions  Showing future appointments within next 150 days and meeting all other requirements       The patient was identified by name and date of birth  Derrick Rodriguez was informed that this is a telemedicine visit and [facility] the [facility] and patient was informed that this is a secure, HIPAA-compliant platform  She agrees to proceed     My office door was closed  No one else was in the room  She acknowledged consent and understanding of privacy and security of the video platform  The patient has agreed to participate and understands they can discontinue the visit at any time  Patient is aware this is a billable service  Subjective  Derrick Rodriguez is a 27 y o  female          HPI     Past Medical History:   Diagnosis Date    B[facility] )     Cancer ([facility] )     thyroid    Depression     Schizoaffective disorder, bipolar type ([facility] )     Suicidal ideations     Thyroid disease        Past Surgical History:   Procedure Laterality Date    NECK SURGERY      US GUIDED THYROID BIOPSY      normal  Onset: 2012       Current Outpatient Medications   Medication Sig Dispense Refill    Banophen 50 MG capsule TAKE 1 CAPSULE (50 MG TOTAL) BY MOUTH DAILY AT BEDTIME 30 capsule 5    clonazePAM (KlonoPIN) 0 5 mg tablet Take 1 tablet (0 5 mg total) by mouth 2 (two) times a day 60 tablet 2    lamoTRIgine (LaMICtal) 200 MG tablet Take 1 tablet (200 mg total) by mouth daily 30 tablet 5    levothyroxine 175 mcg tablet Take 200 mcg by mouth daily      Melatonin 10 MG TABS Take 1 tablet (10 mg total) by mouth daily at bedtime      OLANZapine (ZyPREXA) 20 MG tablet Take 1 tablet (20 mg total) by mouth daily at bedtime 30 tablet 5    OLANZapine (ZyPREXA) 5 mg tablet Take 1 tablet (5 mg total) by mouth daily at bedtime 30 tablet 5    prazosin (MINIPRESS) 2 mg capsule Take 1 capsule (2 mg total) by mouth daily at bedtime 30 capsule 5     No current facility-administered medications for this visit  No Known Allergies    Review of Systems    Video Exam    There were no vitals filed for this visit  Physical Exam     I spent 45 minutes directly with the patient during this [address] verbally agrees to participate in [employer]  Pt is aware that [employer] could be limited without vital signs or the ability to perform a full hands-on physical [address] understands she or the provider may request at any time to terminate the video visit and request the patient to seek care or treatment in person

## 2022-03-08 ENCOUNTER — TELEMEDICINE (OUTPATIENT)
Dept: BEHAVIORAL/MENTAL HEALTH CLINIC | Facility: CLINIC | Age: 32
End: 2022-03-08
Payer: COMMERCIAL

## 2022-03-08 DIAGNOSIS — F43.10 POST TRAUMATIC STRESS DISORDER (PTSD): Primary | ICD-10-CM

## 2022-03-08 DIAGNOSIS — F25.0 SCHIZOAFFECTIVE DISORDER, BIPOLAR TYPE (HCC): ICD-10-CM

## 2022-03-08 DIAGNOSIS — F41.1 GAD (GENERALIZED ANXIETY DISORDER): ICD-10-CM

## 2022-03-08 PROCEDURE — 90834 PSYTX W PT 45 MINUTES: CPT | Performed by: SOCIAL WORKER

## 2022-03-09 NOTE — PSYCH
Psychotherapy Provided: Individual Psychotherapy 45 minutes     Length of time in session: 45 minutes, follow up in 2 week    Encounter Diagnosis     ICD-10-CM    1  Post traumatic stress disorder (PTSD)  F43 10    2  Schizoaffective disorder, bipolar type ([facility] )  F25 0    3  GOOD (generalized anxiety disorder)  F41 1        Goals addressed in session: Goal 2,1     Pain:      none    0    Current suicide risk : Low     D:  Met with Janes for session  On Wed her 38 yo brother had open heart surgery  She could not visit him in the hospital due to it would of depressed her  "Her brother said, it was minor surgery but, when she googled it she found out it was major surgery  This scares her regarding her own health also  She is obese, vapes, does'nt eat healthy and doesn't exercise "  She wants to and listed off ways she wants to begin taking care of herself  A:  Mild progress on goal 2  P:  To continue addressing [place] plan goals  To start taking care of herself  Behavioral Health Treatment Plan [facility]: Diagnosis and Treatment Plan explained to Emelina Bradshaw relates understanding diagnosis and is agreeable to Treatment Plan  Yes             Virtual Regular Visit    Verification of patient location:    Patient is currently located in the [facility]  Patient is currently located in a state in which I am licensed      Assessment/Plan:    Problem List Items Addressed This Visit     None          Goals addressed in session: 2         Reason for visit is   No chief complaint on file  Encounter provider Mala Pichardo    Provider located at [address] [place] [id number] [phone]      Recent Visits  No visits were found meeting these conditions  Showing recent visits within past 7 days and meeting all other requirements  Future Appointments  No visits were found meeting these conditions    Showing future appointments within next 150 days and meeting all other requirements       The patient was identified by name and date of birth  Bernardino Holcomb was informed that this is a telemedicine visit and that the [facility] and patient was informed that this is a secure, HIPAA-compliant platform  She agrees to proceed     My office door was closed  No one else was in the room  She acknowledged consent and understanding of privacy and security of the video platform  The patient has agreed to participate and understands they can discontinue the visit at any time  Patient is aware this is a billable service  Subjective  Bernardino Holcomb is a 27 y o  female          HPI     Past Medical History:   Diagnosis Date    B[facility] 1 disorder ([facility] )     Cancer ([facility] )     thyroid    Depression     Schizoaffective disorder, bipolar type ([facility] )     Suicidal ideations     Thyroid disease        Past Surgical History:   Procedure Laterality Date    NECK SURGERY      US GUIDED THYROID BIOPSY      normal  Onset: 2012       Current Outpatient Medications   Medication Sig Dispense Refill    Banophen 50 MG capsule TAKE 1 CAPSULE (50 MG TOTAL) BY MOUTH DAILY AT BEDTIME 30 capsule 5    clonazePAM (KlonoPIN) 0 5 mg tablet Take 1 tablet (0 5 mg total) by mouth 2 (two) times a day 60 tablet 2    lamoTRIgine (LaMICtal) 200 MG tablet Take 1 tablet (200 mg total) by mouth daily 30 tablet 5    levothyroxine 175 mcg tablet Take 200 mcg by mouth daily      Melatonin 10 MG TABS Take 1 tablet (10 mg total) by mouth daily at bedtime      OLANZapine (ZyPREXA) 20 MG tablet Take 1 tablet (20 mg total) by mouth daily at bedtime 30 tablet 5    OLANZapine (ZyPREXA) 5 mg tablet Take 1 tablet (5 mg total) by mouth daily at bedtime 30 tablet 5    prazosin (MINIPRESS) 2 mg capsule Take 1 capsule (2 mg total) by mouth daily at bedtime 30 capsule 5     No current facility-administered medications for this visit  No Known Allergies    Review of Systems    Video Exam    There were no vitals filed for this visit  Physical Exam     I spent 45 minutes directly with the patient during this [address] verbally agrees to participate in [employer]  Pt is aware that [employer] could be limited without vital signs or the ability to perform a full hands-on physical [address] understands she or the provider may request at any time to terminate the video visit and request the patient to seek care or treatment in person

## 2022-03-24 ENCOUNTER — TELEMEDICINE (OUTPATIENT)
Dept: PSYCHIATRY | Facility: CLINIC | Age: 32
End: 2022-03-24
Payer: COMMERCIAL

## 2022-03-24 DIAGNOSIS — Z30.41 ENCOUNTER FOR SURVEILLANCE OF CONTRACEPTIVE PILLS: ICD-10-CM

## 2022-03-24 DIAGNOSIS — F43.10 POST TRAUMATIC STRESS DISORDER (PTSD): ICD-10-CM

## 2022-03-24 DIAGNOSIS — F25.0 SCHIZOAFFECTIVE DISORDER, BIPOLAR TYPE (HCC): Primary | ICD-10-CM

## 2022-03-24 DIAGNOSIS — F41.1 GAD (GENERALIZED ANXIETY DISORDER): ICD-10-CM

## 2022-03-24 PROCEDURE — 99214 OFFICE O/P EST MOD 30 MIN: CPT | Performed by: NURSE PRACTITIONER

## 2022-03-24 PROCEDURE — 90833 PSYTX W PT W E/M 30 MIN: CPT | Performed by: NURSE PRACTITIONER

## 2022-03-24 RX ORDER — FLUOXETINE 10 MG/1
10 CAPSULE ORAL DAILY
Qty: 30 CAPSULE | Refills: 1 | Status: SHIPPED | OUTPATIENT
Start: 2022-03-24 | End: 2022-05-11

## 2022-03-24 NOTE — PSYCH
Virtual Regular Visit    Problem List Items Addressed This Visit        Other    Post traumatic stress disorder (PTSD)    Relevant Medications    FLUoxetine (PROzac) 10 mg capsule    Schizoaffective disorder, bipolar type ([facility] ) - Primary    Relevant Medications    FLUoxetine (PROzac) 10 mg capsule    Other Relevant Orders    Ambulatory Referral to Obstetrics / Gynecology    GOOD (generalized anxiety disorder)    Relevant Medications    FLUoxetine (PROzac) 10 mg capsule      Other Visit Diagnoses     Encounter for surveillance of contraceptive pills        Relevant Orders    Ambulatory Referral to Obstetrics / Gynecology             Encounter provider GENESIS Pappas    Provider located at   [address]  [facility] [address] [phone]    Patient is located in the following state in which I hold an active license [place]    Recent Visits  No visits were found meeting these conditions  Showing recent visits within past 7 days and meeting all other requirements  Today's Visits  Date Type Provider Dept   03/24/22 Telemedicine [facility]   Showing today's visits and meeting all other requirements  Future Appointments  No visits were found meeting these conditions  Showing future appointments within next 150 days and meeting all other requirements     The patient was identified by name and date of birth  Manny Beard was informed that this is a telemedicine visit and that the visit is being conducted through [address] and patient was informed that this is a secure, HIPAA-compliant platform  Manny Beard agrees to proceed  My office door was closed  No one else was in the room  She acknowledged consent and understanding of privacy and security of the video platform  The patient has agreed to participate and understands they can discontinue the visit at any time      Patient is aware this is a billable service  HPI     Current Outpatient Medications   Medication Sig Dispense Refill    Banophen 50 MG capsule TAKE 1 CAPSULE (50 MG TOTAL) BY MOUTH DAILY AT BEDTIME 30 capsule 5    clonazePAM (KlonoPIN) 0 5 mg tablet Take 1 tablet (0 5 mg total) by mouth 2 (two) times a day 60 tablet 2    FLUoxetine (PROzac) 10 mg capsule Take 1 capsule (10 mg total) by mouth daily 30 capsule 1    levothyroxine 175 mcg tablet Take 200 mcg by mouth daily      Melatonin 10 MG TABS Take 1 tablet (10 mg total) by mouth daily at bedtime      OLANZapine (ZyPREXA) 20 MG tablet Take 1 tablet (20 mg total) by mouth daily at bedtime 30 tablet 5    OLANZapine (ZyPREXA) 5 mg tablet Take 1 tablet (5 mg total) by mouth daily at bedtime 30 tablet 5    prazosin (MINIPRESS) 2 mg capsule Take 1 capsule (2 mg total) by mouth daily at bedtime 30 capsule 5     No current facility-administered medications for this visit  Review of Systems  Video Exam    There were no vitals filed for this visit  Physical Exam   As a result of this visit, I have referred the patient for further respiratory evaluation  No    I spent 20 minutes directly with the patient during this visit  Sylvain acknowledges that she has consented to an online visit or consultation  She understands that the online visit is based solely on information provided by her, and that, in the absence of a face-to-face physical evaluation by the physician, the diagnosis she receives is both limited and provisional in terms of accuracy and completeness  This is not intended to replace a full medical face-to-face evaluation by the physician  Trae Puckett understands and accepts these terms      MEDICATION MANAGEMENT NOTE        [facility]    Name and Date of Birth:  Mo Franco  1990 MRN: [id number]    Date of Visit: March 24, 2022    No Known Allergies  SUBJECTIVE:    Vamshi Mcclelland is seen today for a follow up for PTSD, Generalized Anxiety Disorder and Schizoaffective Disorder  She reports that she has decompensated since the last visit  Patient reports that increase in olanzapine effectively eliminated feelings of paranoia  However, patient was experiencing prolonged heavy menstruation despite being on oral birth control the past 2 months  Patient believed that this was due to interaction between Lamictal and oral birth control(or worse control losing effectiveness) and stop Lamictal   Since then patient has had a significant increase in depressive symptoms including increased depressed mood, low motivation, increased irritability as well as an increase in auditory hallucinations of a voice whispering her name  During today's interview patient is reality based thoughts well organized  No evidence of delusion  Patient is open to starting Prozac the patient was for referred to OBGYN to discuss strategy for restarting lamotrigine while taking oral birth control for hormone regulation  Consider transition to risperidone/Invega Abilify Latuda for better control of psychotic symptoms and depression  She denies any side effects from medications  PLAN:  Initiate Prozac 10 mg p o  Daily; consider titration towards Prozac 40 mg daily  Continue olanzapine 25 mg p o  HS  Continue prazosin 2 mg p o  HS  Referral for OBGYN to discuss recommendations for patient's taking both oral contraceptive and a moderate dose of lamotrigine (200mg daily)  Continue individual therapy  Follow-up in 3-4 weeks  Aware of 24 hour and weekend coverage for urgent situations accessed by calling [facility] main practice number    Diagnoses and all orders for this visit:    Schizoaffective disorder, bipolar type ([facility] )  -     FLUoxetine (PROzac) 10 mg capsule;  Take 1 capsule (10 mg total) by mouth daily  -     Cancel: Ambulatory Referral to Obstetrics / Gynecology; Future  -     Ambulatory Referral to Obstetrics / Gynecology; Future    GOOD (generalized anxiety disorder)    Post traumatic stress disorder (PTSD)    Encounter for surveillance of contraceptive pills  -     Cancel: Ambulatory Referral to Obstetrics / Gynecology; Future  -     Ambulatory Referral to Obstetrics / Gynecology; Future        Current Outpatient Medications on File Prior to Visit   Medication Sig Dispense Refill    Banophen 50 MG capsule TAKE 1 CAPSULE (50 MG TOTAL) BY MOUTH DAILY AT BEDTIME 30 capsule 5    clonazePAM (KlonoPIN) 0 5 mg tablet Take 1 tablet (0 5 mg total) by mouth 2 (two) times a day 60 tablet 2    levothyroxine 175 mcg tablet Take 200 mcg by mouth daily      Melatonin 10 MG TABS Take 1 tablet (10 mg total) by mouth daily at bedtime      OLANZapine (ZyPREXA) 20 MG tablet Take 1 tablet (20 mg total) by mouth daily at bedtime 30 tablet 5    OLANZapine (ZyPREXA) 5 mg tablet Take 1 tablet (5 mg total) by mouth daily at bedtime 30 tablet 5    prazosin (MINIPRESS) 2 mg capsule Take 1 capsule (2 mg total) by mouth daily at bedtime 30 capsule 5    [DISCONTINUED] lamoTRIgine (LaMICtal) 200 MG tablet Take 1 tablet (200 mg total) by mouth daily 30 tablet 5     No current facility-administered medications on file prior to visit  Psychotherapy Provided:     Individual psychotherapy provided: Yes  Counseling was provided during the session today for 16 minutes  Supportive counseling provided  HPI ROS Appetite Changes and Sleep:     She reports normal sleep, normal appetite, low energy   Denies homicidal ideation, denies suicidal ideation    Review Of Systems:      General emotional problems and decreased functioning   Personality no change in personality   Constitutional negative   ENT negative   Cardiovascular negative   Respiratory negative   Gastrointestinal negative   Genitourinary negative   Musculoskeletal negative   Integumentary negative   Neurological negative Endocrine negative   Other Symptoms none, all other systems are negative     Mental Status Evaluation:    Appearance Adequate hygiene and grooming   Behavior calm and cooperative   Mood depressed, irritable and dysphoric  Depression Scale -  of 10 (0 = No depression)  Anxiety Scale -  of 10 (0 = No anxiety)   Speech Normal rate and volume   Affect mood-congruent   Thought Processes Goal directed and coherent   Thought Content Does not verbalize delusional material   Associations Tightly connected   Perceptual Disturbances Auditory hallucinations without commands   Risk Potential Suicidal/Homicidal Ideation - No evidence of suicidal or homicidal ideation and patient does not verbalize suicidal or homicidal ideation  Risk of Violence - No evidence of risk for violence found on assessment  Risk of Self Mutilation - No evidence of risk for self mutilation found on assessment   Orientation oriented to person, place, time/date and situation   Memory recent and remote memory grossly intact   Consciousness alert and awake   Attention/Concentration attention span and concentration are age appropriate   Insight intact   Judgement intact   Muscle Strength and Gait normal muscle strength and normal muscle tone, normal gait/station and normal balance   Motor Activity no abnormal movements   Language no difficulty naming common objects, no difficulty repeating a phrase, no difficulty writing a sentence   Fund of Knowledge adequate knowledge of current events  adequate fund of knowledge regarding past history  adequate fund of knowledge regarding vocabulary      Past Psychiatric History Update:     Inpatient Psychiatric Admission Since Last Encounter:   no  Suicide Attempt Or Self Mutilation Since Last Encounter:   no  Incidence of Violent Behavior Since Last Encounter:   no    Traumatic History Update:     New Onset of Abuse Since Last Encounter:   no  Traumatic Events Since Last Encounter:   no    Past Medical History:    Past Medical History:   Diagnosis Date    Bipolar [facility] disorder [facility] )     Cancer ([facility] )     thyroid    Depression     Schizoaffective disorder, bipolar type ([facility] )     Suicidal ideations     Thyroid disease      No past medical history pertinent negatives  Past Surgical History:   Procedure Laterality Date    NECK SURGERY      US GUIDED THYROID BIOPSY      normal  Onset: 2012     No Known Allergies  Substance Abuse History:    Social History     Substance and Sexual Activity   Alcohol Use Yes    Comment: occ     Social History     Substance and Sexual Activity   Drug Use No     Social History:    Social History     Socioeconomic History    Marital status: Single     Spouse name: Not on file    Number of children: Not on file    Years of education: Not on file    Highest education level: Not on file   Occupational History    Not on file   Tobacco Use    Smoking status: Former Smoker     Packs/day: 1 00     Types: Cigarettes    Smokeless tobacco: Never Used   Substance and Sexual Activity    Alcohol use: Yes     Comment: occ    Drug use: No    Sexual activity: Not on file   Other Topics Concern    Not on file   Social History Narrative    Not on file     Social Determinants of Health     Financial Resource Strain: Not on file   Food Insecurity: Not on file   Transportation Needs: Not on file   Physical Activity: Not on file   Stress: Not on file   Social Connections: Not on file   Intimate Partner Violence: Not on file   Housing Stability: Not on file     Family Psychiatric History:     No family history on file  History Review: The following portions of the patient's history were reviewed and updated as appropriate: allergies, current medications, past family history, past medical history, past social history, past surgical history and problem list     OBJECTIVE:     Vital signs in last 24 hours: There were no vitals filed for this visit    Laboratory Results: I have personally reviewed all pertinent laboratory/tests results  Suicide/Homicide Risk Assessment:    Risk of Harm to Self:   The following ratings are based on assessment at the time of the interview   Recent Specific Risk Factors include: current depressive symptoms, current anxiety symptoms, presence of hallucinations    Risk of Harm to Others:   The following ratings are based on assessment at the time of the interview   Recent Specific Risk Factors include: none  The following interventions are recommended: no intervention changes needed    Medications Risks/Benefits:      Risks, Benefits And Possible Side Effects Of Medications:    Discussed risks and benefits of treatment with patient including risk of suicidality, serotonin syndrome, increased QTc interval and SIADH related to treatment with antidepressants; Risk of induction of manic symptoms in certain patient populations, risk of parkinsonian symptoms, metabolic syndrome, tardive dyskinesia and neuroleptic malignant syndrome related to treatment with antipsychotic medications, risks of misuse, abuse or dependence, sedation and respiratory depression related to treatment with benzodiazepine medications and Risk of orthostatic hypotension with Prazosin     Controlled Medication Discussion:     Jessica Villalta has been filling controlled prescriptions on time as prescribed according to [place] Prescription Drug Monitoring Program    Treatment Plan:    Due for update/Updated:   GENESIS Arevalo 03/24/22    This note was shared with patient

## 2022-03-25 ENCOUNTER — TELEMEDICINE (OUTPATIENT)
Dept: BEHAVIORAL/MENTAL HEALTH CLINIC | Facility: CLINIC | Age: 32
End: 2022-03-25
Payer: COMMERCIAL

## 2022-03-25 DIAGNOSIS — F25.0 SCHIZOAFFECTIVE DISORDER, BIPOLAR TYPE (HCC): Primary | ICD-10-CM

## 2022-03-25 DIAGNOSIS — F41.1 GAD (GENERALIZED ANXIETY DISORDER): ICD-10-CM

## 2022-03-25 DIAGNOSIS — F43.10 POST TRAUMATIC STRESS DISORDER (PTSD): ICD-10-CM

## 2022-03-25 PROCEDURE — 90853 GROUP PSYCHOTHERAPY: CPT | Performed by: SOCIAL WORKER

## 2022-03-25 NOTE — PSYCH
Psychotherapy Provided: Individual Psychotherapy 45 minutes     Length of time in session: 45 minutes, follow up in 2 week    Encounter Diagnosis     ICD-10-CM    1  Post traumatic stress disorder (PTSD)  F43 10    2  Schizoaffective disorder, bipolar type ([facility] [address] )  F25 0    3  GOOD (generalized anxiety disorder)  F41 1        Goals addressed in session: Goal 2,1     Pain:      none    0    Current suicide risk : Low     D:  Met with Janes for session  She stopped taking her Lamical due to she had her period for 2 months  She did not say anything to anybody including her roommate  She had an appt with her PA yesterday  She "realizes she made a mistake "  We talked about what could of happened with her doing that  The PA added Prozac  She "feels shitty "  It has been about 2 weeks now that she stopped taking the med  She left work early today due to not feeling well and being tired  She slept from 12:15 until MSW called her due to she didn't arrive online for her appt  Discussed how she feels  A:  Mild progress on goal 2  P:  To continue addressing [place] plan goals  Behavioral Health Treatment Plan [facility]: Diagnosis and Treatment Plan explained to Sushila Chou relates understanding diagnosis and is agreeable to Treatment Plan  Yes             Virtual Regular Visit    Verification of patient location:    Patient is currently located in the state [facility]  Patient is currently located in a state in which I am licensed      Assessment/Plan:    Problem List Items Addressed This Visit     None          Goals addressed in session: 2         Reason for visit is   No chief complaint on file  Encounter provider Daily Mancilla    Provider located at [address] [place] [id number] [phone]      Recent Visits  No visits were found meeting these conditions    Showing recent visits within past 7 days and meeting all other requirements  Future Appointments  No visits were found meeting these conditions  Showing future appointments within next 150 days and meeting all other requirements       The patient was identified by name and date of birth  Fanny Cannon was informed that this is a telemedicine visit and that the visit is being conducted [facility] and patient was informed that this is a secure, HIPAA-compliant platform  She agrees to proceed     My office door was closed  No one else was in the room  She acknowledged consent and understanding of privacy and security of the video platform  The patient has agreed to participate and understands they can discontinue the visit at any time  Patient is aware this is a billable service  Subjective  Fanny Cannon is a 27 y o  female          HPI     Past Medical History:   Diagnosis Date    B[facility] [address] )     [facility]     thyroid    Depression     Schizoaffective disorder, bipolar type ([facility] )     Suicidal ideations     Thyroid disease        Past Surgical History:   Procedure Laterality Date    NECK SURGERY      US GUIDED THYROID BIOPSY      normal  Onset: 2012       Current Outpatient Medications   Medication Sig Dispense Refill    Banophen 50 MG capsule TAKE 1 CAPSULE (50 MG TOTAL) BY MOUTH DAILY AT BEDTIME 30 capsule 5    clonazePAM (KlonoPIN) 0 5 mg tablet Take 1 tablet (0 5 mg total) by mouth 2 (two) times a day 60 tablet 2    FLUoxetine (PROzac) 10 mg capsule Take 1 capsule (10 mg total) by mouth daily 30 capsule 1    levothyroxine 175 mcg tablet Take 200 mcg by mouth daily      Melatonin 10 MG TABS Take 1 tablet (10 mg total) by mouth daily at bedtime      OLANZapine (ZyPREXA) 20 MG tablet Take 1 tablet (20 mg total) by mouth daily at bedtime 30 tablet 5    OLANZapine (ZyPREXA) 5 mg tablet Take 1 tablet (5 mg total) by mouth daily at bedtime 30 tablet 5    prazosin (MINIPRESS) 2 mg capsule Take 1 capsule (2 mg total) by mouth daily at bedtime 30 capsule 5     No current facility-administered medications for this visit  No Known Allergies    Review of Systems    Video Exam    There were no vitals filed for this visit  Physical Exam     I spent 45 minutes directly with the patient during this [address] verbally agrees to participate in [employer]  Pt is aware that [employer] could be limited without vital signs or the ability to perform a full hands-on physical [address] understands she or the provider may request at any time to terminate the video visit and request the patient to seek care or treatment in person

## 2022-03-28 NOTE — MISCELLANEOUS
Provider Comments  Provider Comments:   Called patient, no response, left a voice message for patient to reschedule appointment        Signatures   Electronically signed by : Marianna Rosa, ; Oct 20 2017  9:44AM EST                       (Author) Health Care Proxy (HCP)

## 2022-03-30 ENCOUNTER — TELEPHONE (OUTPATIENT)
Dept: PSYCHIATRY | Facility: CLINIC | Age: 32
End: 2022-03-30

## 2022-04-01 ENCOUNTER — TELEPHONE (OUTPATIENT)
Dept: PSYCHIATRY | Facility: CLINIC | Age: 32
End: 2022-04-01

## 2022-04-01 NOTE — TELEPHONE ENCOUNTER
Called Janes back and informed her that Rebecca Wu would like her to stay on the medication a little longer to see if her moods level out  Also reminded her to make an appointment with OBGYN  She agrees with plan  Will refer to Tomer Elizondo for his information 
Can we get her in to see me next week? Would like to continue for a few more days and see if thing level off   Please remind patient to call obgyn and schedule an appointment or schedule with PCP for referral 
LM on VM that I was returning her call 
Received call from [facility] stating that she is having mood swings  Started Prozac last Friday and has been feeling very happy for several days, then feels as though she crashed  She wants to know if that is normal     Will refer to Geronimo Bowers for review 
Scheduled 4/4 at 3:30pm as patient can only make it to 3:30pm appointments
yes

## 2022-04-04 ENCOUNTER — TELEMEDICINE (OUTPATIENT)
Dept: PSYCHIATRY | Facility: CLINIC | Age: 32
End: 2022-04-04
Payer: COMMERCIAL

## 2022-04-04 DIAGNOSIS — F25.0 SCHIZOAFFECTIVE DISORDER, BIPOLAR TYPE (HCC): Primary | ICD-10-CM

## 2022-04-04 PROCEDURE — 99212 OFFICE O/P EST SF 10 MIN: CPT | Performed by: NURSE PRACTITIONER

## 2022-04-04 RX ORDER — OXCARBAZEPINE 150 MG/1
150 TABLET, FILM COATED ORAL 2 TIMES DAILY
Qty: 60 TABLET | Refills: 2 | Status: SHIPPED | OUTPATIENT
Start: 2022-04-04 | End: 2022-04-04

## 2022-04-05 NOTE — PSYCH
Virtual Brief Visit    Patient is located in the following state in which I hold an active license [place]      Assessment/Plan:    Brief check-in video visit with patient  Discussed patient's recent changes in mood since starting Prozac  Patient experienced several days of intermittent severe feelings of depression lasting 3-4 hours  She then experienced 1 day feeling elevated bordered on elated  Denies symptoms of grey currently  States that over the past several days the symptoms have receded and mood has been stable  Will continue Prozac at this time and follow-up in 2-3 weeks  If symptoms continue then will DC Prozac and look for alternative treatment for depressive symptoms  Problem List Items Addressed This Visit        Other    Schizoaffective disorder, bipolar type [facility]) - Primary          Recent Visits  Date Type Provider Dept   04/04/22 Telemedicine [facility] Courtney Hubbard   04/01/22 Telephone Susanne Collier, Faraz Torres   03/30/22 Telephone [facility] [address]   Showing recent visits within past 7 days and meeting all other requirements  Future Appointments  No visits were found meeting these conditions    Showing future appointments within next 150 days and meeting all other requirements         I spent 10 minutes directly with the patient during this visit

## 2022-04-06 ENCOUNTER — TELEPHONE (OUTPATIENT)
Dept: BEHAVIORAL/MENTAL HEALTH CLINIC | Facility: CLINIC | Age: 32
End: 2022-04-06

## 2022-04-18 ENCOUNTER — TELEPHONE (OUTPATIENT)
Dept: PSYCHIATRY | Facility: CLINIC | Age: 32
End: 2022-04-18

## 2022-04-18 NOTE — TELEPHONE ENCOUNTER
Writer called to reschedule an appointment on 4/28 with Cathy Kelly as Xander Arroyo will not be in the office

## 2022-04-19 ENCOUNTER — TELEPHONE (OUTPATIENT)
Dept: PSYCHIATRY | Facility: CLINIC | Age: 32
End: 2022-04-19

## 2022-04-19 ENCOUNTER — TELEMEDICINE (OUTPATIENT)
Dept: BEHAVIORAL/MENTAL HEALTH CLINIC | Facility: CLINIC | Age: 32
End: 2022-04-19
Payer: COMMERCIAL

## 2022-04-19 DIAGNOSIS — F25.0 SCHIZOAFFECTIVE DISORDER, BIPOLAR TYPE (HCC): Primary | ICD-10-CM

## 2022-04-19 DIAGNOSIS — F43.10 POST TRAUMATIC STRESS DISORDER (PTSD): ICD-10-CM

## 2022-04-19 DIAGNOSIS — F41.1 GAD (GENERALIZED ANXIETY DISORDER): ICD-10-CM

## 2022-04-19 PROCEDURE — 90834 PSYTX W PT 45 MINUTES: CPT | Performed by: SOCIAL WORKER

## 2022-04-19 NOTE — PSYCH
Psychotherapy Provided: Individual Psychotherapy 45 minutes     Length of time in session: 45 minutes, follow up in 2 week    Encounter Diagnosis     ICD-10-CM    1  Post traumatic stress disorder (PTSD)  F43 10    2  Schizoaffective disorder, bipolar type ([facility] [address] )  F25 0    3  GOOD (generalized anxiety disorder)  F41 1        Goals addressed in session: Goal 2,1     Pain:      none    0    Current suicide risk : Low     D:  Met with Janes for session  She described how she has been feeling since starting prozac  She did not know it was grey and depression which it appears to be but, she did know "that this isn't good "  Discussed everything that she has been feeling  MSW left a message for her PA     A:  Mild progress on goal 2  Progressing in treatment to where she is gaining insight into her symptoms  P:  To continue addressing [place] plan goals  Follow recommendation of PA  Behavioral Health Treatment Plan [facility]: Diagnosis and Treatment Plan explained to Valeria Francis relates understanding diagnosis and is agreeable to Treatment Plan  Yes             Virtual Regular Visit    Verification of patient location:    Patient is currently located in the state [facility]  Patient is currently located in a state in which I am licensed      Assessment/Plan:    Problem List Items Addressed This Visit     None          Goals addressed in session: 2         Reason for visit is   No chief complaint on file  Encounter provider Carolynn Montanez    Provider located at [address] [place] [id number] [phone]      Recent Visits  No visits were found meeting these conditions  Showing recent visits within past 7 days and meeting all other requirements  Future Appointments  No visits were found meeting these conditions    Showing future appointments within next 150 days and meeting all other requirements       The patient was identified by name and date of birth  Denice Giles was informed that this is a telemedicine visit and that the [facility] and patient was informed that this is a secure, HIPAA-compliant platform  She agrees to proceed     My office door was closed  No one else was in the room  She acknowledged consent and understanding of privacy and security of the video platform  The patient has agreed to participate and understands they can discontinue the visit at any time  Patient is aware this is a billable service  Subjective  Denice Giles is a 27 y o  female    HPI     Past Medical History:   Diagnosis Date    B[facility] disorder ([facility] [address] )     [facility]     thyroid    Depression     [facility] ([facility] )     Suicidal ideations     Thyroid disease        Past Surgical History:   Procedure Laterality Date    NECK SURGERY      US GUIDED THYROID BIOPSY      normal  Onset: 2012       Current Outpatient Medications   Medication Sig Dispense Refill    Banophen 50 MG capsule TAKE 1 CAPSULE (50 MG TOTAL) BY MOUTH DAILY AT BEDTIME 30 capsule 5    clonazePAM (KlonoPIN) 0 5 mg tablet Take 1 tablet (0 5 mg total) by mouth 2 (two) times a day 60 tablet 2    FLUoxetine (PROzac) 10 mg capsule Take 1 capsule (10 mg total) by mouth daily 30 capsule 1    levothyroxine 175 mcg tablet Take 200 mcg by mouth daily      Melatonin 10 MG TABS Take 1 tablet (10 mg total) by mouth daily at bedtime      OLANZapine (ZyPREXA) 20 MG tablet Take 1 tablet (20 mg total) by mouth daily at bedtime 30 tablet 5    OLANZapine (ZyPREXA) 5 mg tablet Take 1 tablet (5 mg total) by mouth daily at bedtime 30 tablet 5    prazosin (MINIPRESS) 2 mg capsule Take 1 capsule (2 mg total) by mouth daily at bedtime 30 capsule 5     No current facility-administered medications for this visit          No Known Allergies    Review of Systems    Video Exam    There were no vitals filed for this visit  Physical Exam     I spent 45 minutes directly with the patient during this [address] verbally agrees to participate in [employer]  Pt is aware that [employer] could be limited without vital signs or the ability to perform a full hands-on physical [address] understands she or the provider may request at any time to terminate the video visit and request the patient to seek care or treatment in person

## 2022-04-19 NOTE — TELEPHONE ENCOUNTER
Call from [facility] asking for refill on her Prozac  Informed her she has 1 refill remaining  Called [facility] and confirmed availability

## 2022-05-10 ENCOUNTER — TELEMEDICINE (OUTPATIENT)
Dept: BEHAVIORAL/MENTAL HEALTH CLINIC | Facility: CLINIC | Age: 32
End: 2022-05-10
Payer: COMMERCIAL

## 2022-05-10 DIAGNOSIS — F25.0 SCHIZOAFFECTIVE DISORDER, BIPOLAR TYPE (HCC): ICD-10-CM

## 2022-05-10 DIAGNOSIS — F41.1 GAD (GENERALIZED ANXIETY DISORDER): ICD-10-CM

## 2022-05-10 DIAGNOSIS — F43.10 POST TRAUMATIC STRESS DISORDER (PTSD): ICD-10-CM

## 2022-05-10 PROCEDURE — 90834 PSYTX W PT 45 MINUTES: CPT | Performed by: SOCIAL WORKER

## 2022-05-11 ENCOUNTER — TELEPHONE (OUTPATIENT)
Dept: PSYCHIATRY | Facility: CLINIC | Age: 32
End: 2022-05-11

## 2022-05-11 ENCOUNTER — TELEMEDICINE (OUTPATIENT)
Dept: PSYCHIATRY | Facility: CLINIC | Age: 32
End: 2022-05-11
Payer: COMMERCIAL

## 2022-05-11 DIAGNOSIS — F43.10 POST TRAUMATIC STRESS DISORDER (PTSD): ICD-10-CM

## 2022-05-11 DIAGNOSIS — F42.4 SKIN PICKING HABIT: ICD-10-CM

## 2022-05-11 DIAGNOSIS — F25.0 SCHIZOAFFECTIVE DISORDER, BIPOLAR TYPE (HCC): ICD-10-CM

## 2022-05-11 DIAGNOSIS — F99 INSOMNIA DUE TO OTHER MENTAL DISORDER: ICD-10-CM

## 2022-05-11 DIAGNOSIS — R63.2 INCREASED APPETITE: ICD-10-CM

## 2022-05-11 DIAGNOSIS — F51.05 INSOMNIA DUE TO OTHER MENTAL DISORDER: ICD-10-CM

## 2022-05-11 DIAGNOSIS — F41.1 GAD (GENERALIZED ANXIETY DISORDER): ICD-10-CM

## 2022-05-11 DIAGNOSIS — F25.0 SCHIZOAFFECTIVE DISORDER, BIPOLAR TYPE (HCC): Primary | ICD-10-CM

## 2022-05-11 PROCEDURE — 99214 OFFICE O/P EST MOD 30 MIN: CPT | Performed by: NURSE PRACTITIONER

## 2022-05-11 RX ORDER — TOPIRAMATE 25 MG/1
25 TABLET ORAL 2 TIMES DAILY
Qty: 60 TABLET | Refills: 0 | Status: SHIPPED | OUTPATIENT
Start: 2022-05-11 | End: 2022-06-16

## 2022-05-11 RX ORDER — FLUOXETINE 10 MG/1
CAPSULE ORAL
Qty: 30 CAPSULE | Refills: 1 | Status: SHIPPED | OUTPATIENT
Start: 2022-05-11 | End: 2022-06-10

## 2022-05-11 NOTE — PSYCH
Virtual Regular Visit    Problem List Items Addressed This Visit        Other    Post traumatic stress disorder (PTSD)    Schizoaffective disorder, bipolar type ([facility] ) - Primary    GOOD (generalized anxiety disorder)    Insomnia due to other mental disorder    Skin picking habit    Relevant Medications    topiramate (Topamax) 25 mg tablet      Other Visit Diagnoses     Increased appetite        Relevant Medications    topiramate (Topamax) 25 mg tablet             Encounter provider GENESIS Hernandez    Provider located at   [address]  [facility]   [address] [address] [address]  [facility] [address] [phone]    Patient is located in the following state in which I hold an active license [place]    Recent Visits  No visits were found meeting these conditions  Showing recent visits within past 7 days and meeting all other requirements  Today's Visits  Date Type Provider Dept   05/11/22 Telemedicine [facility]   Showing today's visits and meeting all other requirements  Future Appointments  No visits were found meeting these conditions  Showing future appointments within next 150 days and meeting all other requirements     The patient was identified by name and date of birth  Sandy Castle was informed that this is a telemedicine visit and that the visit is being conducted through [address] and patient was informed that this is a secure, HIPAA-compliant platform  Sandy Castle agrees to proceed  My office door was closed  No one else was in the room  She acknowledged consent and understanding of privacy and security of the video platform  The patient has agreed to participate and understands they can discontinue the visit at any time  Patient is aware this is a billable service       HPI     Current Outpatient Medications   Medication Sig Dispense Refill    topiramate (Topamax) 25 mg tablet Take 1 tablet (25 mg total) by mouth in the morning and 1 tablet (25 mg total) in the evening  60 tablet 0    Banophen 50 MG capsule TAKE 1 CAPSULE (50 MG TOTAL) BY MOUTH DAILY AT BEDTIME 30 capsule 5    clonazePAM (KlonoPIN) 0 5 mg tablet Take 1 tablet (0 5 mg total) by mouth 2 (two) times a day 60 tablet 2    FLUoxetine (PROzac) 10 mg capsule TAKE 1 CAPSULE BY MOUTH EVERY DAY 30 capsule 1    levothyroxine 175 mcg tablet Take 200 mcg by mouth daily      Melatonin 10 MG TABS Take 1 tablet (10 mg total) by mouth daily at bedtime      OLANZapine (ZyPREXA) 20 MG tablet Take 1 tablet (20 mg total) by mouth daily at bedtime 30 tablet 5    OLANZapine (ZyPREXA) 5 mg tablet Take 1 tablet (5 mg total) by mouth daily at bedtime 30 tablet 5    prazosin (MINIPRESS) 2 mg capsule Take 1 capsule (2 mg total) by mouth daily at bedtime 30 capsule 5     No current facility-administered medications for this visit  Review of Systems  Video Exam    There were no vitals filed for this visit  Physical Exam   As a result of this visit, I have referred the patient for further respiratory evaluation  No    I spent 15 minutes directly with the patient during this visit  Sylvain acknowledges that she has consented to an online visit or consultation  She understands that the online visit is based solely on information provided by her, and that, in the absence of a face-to-face physical evaluation by the physician, the diagnosis she receives is both limited and provisional in terms of accuracy and completeness  This is not intended to replace a full medical face-to-face evaluation by the physician  Mason Jeffries understands and accepts these terms      MEDICATION MANAGEMENT NOTE        [facility]    Name and Date of Birth:  Rama Cruz  1990 MRN: [id number]    Date of Visit: May 11, 2022    No Known Allergies  SUBJECTIVE:    Nancy Perez is seen today for a follow up for PTSD, Generalized Anxiety Disorder, Schizoaffective Disorder and insomnia  She reports that she has improved slightly since the last visit  States that she is no longer experiencing mood fluctuations  States that she does not feel depressed or anxious  Motivation has also improved  Denies symptoms of grey including increased impulsivity, racing thoughts, talkativeness or sleeplessness  Has had increase in appetite recently, craves sweets  States weight has been stable  Would like to remain on Prozac at current dose  Will initiate a trial of topiramate 25 mg p o  B i d  to help with appetite control  Discussed lowering barrier to healthy food choices removing sweets from the home keeping healthy food choices in the house  Continues to struggle with skin picking behavior  Has not created more lesions but does not allow existing lesions to heal   Recommended wearing gloves to help break the habit  PLAN:  Initiate topiramate 25 mg p o  B i d ; may stop medication if causing excessive sedation or mental dulling  Continue Prozac 10 mg p o  Daily; consider discontinue Prozac if mood destabilization suspected  Continue olanzapine 25 mg p o  Daily; consider EKG at next visit  Continue prazosin 2 mg p o  Hs  Continue clonazepam 0 5 mg p o  B i d   Continue individual therapy  Follow-up in 3-5 weeks  Aware of 24 hour and weekend coverage for urgent situations accessed by calling [facility] main practice number    Diagnoses and all orders for this visit:    Schizoaffective disorder, bipolar type ([facility] [address] )    GOOD (generalized anxiety disorder)    Insomnia due to other mental disorder    Skin picking habit  -     topiramate (Topamax) 25 mg tablet; Take 1 tablet (25 mg total) by mouth in the morning and 1 tablet (25 mg total) in the evening  Post traumatic stress disorder (PTSD)    Increased appetite  -     topiramate (Topamax) 25 mg tablet;  Take 1 tablet (25 mg total) by mouth in the morning and 1 tablet (25 mg total) in the evening  Current Outpatient Medications on File Prior to Visit   Medication Sig Dispense Refill    Banophen 50 MG capsule TAKE 1 CAPSULE (50 MG TOTAL) BY MOUTH DAILY AT BEDTIME 30 capsule 5    clonazePAM (KlonoPIN) 0 5 mg tablet Take 1 tablet (0 5 mg total) by mouth 2 (two) times a day 60 tablet 2    levothyroxine 175 mcg tablet Take 200 mcg by mouth daily      Melatonin 10 MG TABS Take 1 tablet (10 mg total) by mouth daily at bedtime      OLANZapine (ZyPREXA) 20 MG tablet Take 1 tablet (20 mg total) by mouth daily at bedtime 30 tablet 5    OLANZapine (ZyPREXA) 5 mg tablet Take 1 tablet (5 mg total) by mouth daily at bedtime 30 tablet 5    prazosin (MINIPRESS) 2 mg capsule Take 1 capsule (2 mg total) by mouth daily at bedtime 30 capsule 5    [DISCONTINUED] FLUoxetine (PROzac) 10 mg capsule Take 1 capsule (10 mg total) by mouth daily 30 capsule 1     No current facility-administered medications on file prior to visit  Psychotherapy Provided:     Individual psychotherapy provided: Yes  Counseling was provided during the session today for 16 minutes  Supportive counseling provided  HPI ROS Appetite Changes and Sleep:     She reports normal sleep, increased appetite, adequate energy level   Denies homicidal ideation, denies suicidal ideation    Review Of Systems:      General emotional problems and decreased functioning   Personality no change in personality   Constitutional negative   ENT negative   Cardiovascular negative   Respiratory negative   Gastrointestinal negative   Genitourinary negative   Musculoskeletal negative   Integumentary negative   Neurological negative   Endocrine negative   Other Symptoms none, all other systems are negative     Mental Status Evaluation:    Appearance Adequate hygiene and grooming   Behavior calm and cooperative   Mood anxious and depressed  Depression Scale -  of 10 (0 = No depression)  Anxiety Scale -  of 10 (0 = No anxiety)   Speech Normal rate and volume   Affect appropriate and mood-congruent   Thought Processes Goal directed and coherent   Thought Content Does not verbalize delusional material   Associations Tightly connected   Perceptual Disturbances Denies hallucinations and does not appear to be responding to internal stimuli   Risk Potential Suicidal/Homicidal Ideation - No evidence of suicidal or homicidal ideation and patient does not verbalize suicidal or homicidal ideation  Risk of Violence - No evidence of risk for violence found on assessment  Risk of Self Mutilation - No evidence of risk for self mutilation found on assessment   Orientation oriented to person, place, time/date and situation   Memory recent and remote memory grossly intact   Consciousness alert and awake   Attention/Concentration attention span and concentration are age appropriate   Insight partial   Judgement partial   Muscle Strength and Gait normal muscle strength and normal muscle tone, normal gait/station and normal balance   Motor Activity no abnormal movements   Language no difficulty naming common objects, no difficulty repeating a phrase, no difficulty writing a sentence   Fund of Knowledge adequate knowledge of current events  adequate fund of knowledge regarding past history  adequate fund of knowledge regarding vocabulary      Past Psychiatric History Update:     Inpatient Psychiatric Admission Since Last Encounter:   no  Suicide Attempt Or Self Mutilation Since Last Encounter:   no  Incidence of Violent Behavior Since Last Encounter:   no    Traumatic History Update:     New Onset of Abuse Since Last Encounter:   no  Traumatic Events Since Last Encounter:   no    Past Medical History:    Past Medical History:   Diagnosis Date    [facility]     Cancer ([facility] )     thyroid    Depression     Schizoaffective disorder, bipolar type ([facility] )     Suicidal ideations     Thyroid disease      No past medical history pertinent negatives  Past Surgical History:   Procedure Laterality Date    NECK SURGERY      US GUIDED THYROID BIOPSY      normal  Onset: 2012     No Known Allergies  Substance Abuse History:    Social History     Substance and Sexual Activity   Alcohol Use Yes    Comment: occ     Social History     Substance and Sexual Activity   Drug Use No     Social History:    Social History     Socioeconomic History    Marital status: Single     Spouse name: Not on file    Number of children: Not on file    Years of education: Not on file    Highest education level: Not on file   Occupational History    Not on file   Tobacco Use    Smoking status: Former Smoker     Packs/day: 1 00     Types: Cigarettes    Smokeless tobacco: Never Used   Substance and Sexual Activity    Alcohol use: Yes     Comment: occ    Drug use: No    Sexual activity: Not on file   Other Topics Concern    Not on file   Social History Narrative    Not on file     Social Determinants of Health     Financial Resource Strain: Not on file   Food Insecurity: Not on file   Transportation Needs: Not on file   Physical Activity: Not on file   Stress: Not on file   Social Connections: Not on file   Intimate Partner Violence: Not on file   Housing Stability: Not on file     Family Psychiatric History:     No family history on file  History Review: The following portions of the patient's history were reviewed and updated as appropriate: allergies, current medications, past family history, past medical history, past social history, past surgical history and problem list     OBJECTIVE:     Vital signs in last 24 hours: There were no vitals filed for this visit  Laboratory Results: I have personally reviewed all pertinent laboratory/tests results      Suicide/Homicide Risk Assessment:    Risk of Harm to Self:   The following ratings are based on assessment at the time of the interview   Recent Specific Risk Factors include: none    Risk of Harm to Others:   The following ratings are based on assessment at the time of the interview   Recent Specific Risk Factors include: none  The following interventions are recommended: no intervention changes needed    Medications Risks/Benefits:      Risks, Benefits And Possible Side Effects Of Medications:    Discussed risks and benefits of treatment with patient including risk of suicidality, serotonin syndrome, increased QTc interval and SIADH related to treatment with antidepressants; Risk of induction of manic symptoms in certain patient populations, risk of parkinsonian symptoms, metabolic syndrome, tardive dyskinesia and neuroleptic malignant syndrome related to treatment with antipsychotic medications, risks of misuse, abuse or dependence, sedation and respiratory depression related to treatment with benzodiazepine medications and Risk of orthostatic hypotension with Prazosin     Controlled Medication Discussion:     Neida Evans has been filling controlled prescriptions on time as prescribed according to [place] Prescription Drug Monitoring Program    Treatment Plan:    Due for update/Updated:   GENESIS Lui 05/11/22    This note was shared with patient

## 2022-05-11 NOTE — PSYCH
Psychotherapy Provided: Individual Psychotherapy 45 minutes     Length of time in session: 45 minutes, follow up in 2 week    Encounter Diagnosis     ICD-10-CM    1  Post traumatic stress disorder (PTSD)  F43 10    2  Schizoaffective disorder, bipolar type ([facility] )  F25 0    3  GOOD (generalized anxiety disorder)  F41 1        Goals addressed in session: Goal 2,1     Pain:      none    0    Current suicide risk : Low     D:  Met with Ivaneesh for session  She has not followed up with the gyno as per her PA's recommendation  She had many excuses why she hasn't  "Her bleeding as stopped now that she has been off of the Lamictal   She now likes the Prozac and wants to stay on it "  MSW reinforced the PA's recommendation  She reports, "She is not manic now and feels ok which is why she wants to stay on the Prozac "  Work is going ok  Shared, "she is having a Monster and two [place] bars for breakfast every morning to help her with energy "  MSW informed her of the dangers of drinking energy drinks with MH dx  Also, she wants to loose weight and Monsters have a great deal of sugar in them along with the two candy bars  A:  Mild progress on goal 2  P:  To continue addressing [place] plan goals  Follow recommendation of PA  MSW also followed up with [place]  Behavioral Health Treatment Plan [facility]: Diagnosis and Treatment Plan explained to Samson Antunez relates understanding diagnosis and is agreeable to Treatment Plan  Yes             Virtual Regular Visit    Verification of patient location:    Patient is currently located in the state of [place]  Patient is currently located in a state in which I am licensed      Assessment/Plan:    Problem List Items Addressed This Visit    None         Goals addressed in session: 2         Reason for visit is   No chief complaint on file         Encounter provider Sarah Gonzalez    Provider located at [address] [facility] [address] [facility] [address]  [phone]      Recent Visits  No visits were found meeting these conditions  Showing recent visits within past 7 days and meeting all other requirements  Future Appointments  No visits were found meeting these conditions  Showing future appointments within next 150 days and meeting all other requirements       The patient was identified by name and date of birth  Dalia Diaz was informed that this is a telemedicine visit and that the [facility] and patient was informed that this is a secure, HIPAA-compliant platform  She agrees to proceed     My office door was closed  No one else was in the room  She acknowledged consent and understanding of privacy and security of the video platform  The patient has agreed to participate and understands they can discontinue the visit at any time  Patient is aware this is a billable service  Subjective  Dalia Diaz is a 27 y o  female          HPI     Past Medical History:   Diagnosis Date    B[facility] [address] )     Cancer ([facility] )     thyroid    Depression     Schizoaffective disorder, bipolar type ([facility] )     Suicidal ideations     Thyroid disease        Past Surgical History:   Procedure Laterality Date    NECK SURGERY      US GUIDED THYROID BIOPSY      normal  Onset: 2012       Current Outpatient Medications   Medication Sig Dispense Refill    Banophen 50 MG capsule TAKE 1 CAPSULE (50 MG TOTAL) BY MOUTH DAILY AT BEDTIME 30 capsule 5    clonazePAM (KlonoPIN) 0 5 mg tablet Take 1 tablet (0 5 mg total) by mouth 2 (two) times a day 60 tablet 2    FLUoxetine (PROzac) 10 mg capsule Take 1 capsule (10 mg total) by mouth daily 30 capsule 1    levothyroxine 175 mcg tablet Take 200 mcg by mouth daily      Melatonin 10 MG TABS Take 1 tablet (10 mg total) by mouth daily at bedtime      OLANZapine (ZyPREXA) 20 MG tablet Take 1 tablet (20 mg total) by mouth daily at bedtime 30 tablet 5    OLANZapine (ZyPREXA) 5 mg tablet Take 1 tablet (5 mg total) by mouth daily at bedtime 30 tablet 5    prazosin (MINIPRESS) 2 mg capsule Take 1 capsule (2 mg total) by mouth daily at bedtime 30 capsule 5     No current facility-administered medications for this visit  No Known Allergies    Review of Systems    Video Exam    There were no vitals filed for this visit  Physical Exam     I spent 45 minutes directly with the patient during this [address] verbally agrees to participate in [facility]  Pt is aware that [address] [place] [address] could be limited without vital signs or the ability to perform a full hands-on physical [address] understands she or the provider may request at any time to terminate the video visit and request the patient to seek care or treatment in person

## 2022-05-11 NOTE — TELEPHONE ENCOUNTER
I would like to get Iveliss scheduled in next available slot   Can use an urgent patient slot if available

## 2022-05-11 NOTE — TELEPHONE ENCOUNTER
Called Tawnya and left a message to schedule a 1 month follow up with Shivani Tian, [address]

## 2022-05-26 ENCOUNTER — TELEMEDICINE (OUTPATIENT)
Dept: BEHAVIORAL/MENTAL HEALTH CLINIC | Facility: CLINIC | Age: 32
End: 2022-05-26
Payer: COMMERCIAL

## 2022-05-26 DIAGNOSIS — F25.0 SCHIZOAFFECTIVE DISORDER, BIPOLAR TYPE (HCC): ICD-10-CM

## 2022-05-26 DIAGNOSIS — F41.1 GAD (GENERALIZED ANXIETY DISORDER): ICD-10-CM

## 2022-05-26 DIAGNOSIS — F43.10 POST TRAUMATIC STRESS DISORDER (PTSD): Primary | ICD-10-CM

## 2022-05-26 PROCEDURE — 90834 PSYTX W PT 45 MINUTES: CPT | Performed by: SOCIAL WORKER

## 2022-05-26 NOTE — BH TREATMENT PLAN
Aaliyah Cason  1990       Date of Initial Treatment Plan: 11/9/20   Date of Current Treatment Plan: 05/26/22    Treatment Plan Number 3    Strengths/Personal Resources for Self Care: hard working and caring    Diagnosis:   1  Post traumatic stress disorder (PTSD)     2  Schizoaffective disorder, bipolar type ([facility] [address] )     3  GOOD (generalized anxiety disorder)         Area of Needs: I am still drinking one [place] and a monster drink once per day at work  Long Term Goal 1:  I want to continue to decrease my caffeine intake  Target Date:   11/22  Completion Date: N/A         Short Term Objectives for Goal 1:  I will drink a lot of water while at work  I will eat hard candy and or chew gum  I will continue to socialize with my peers  I will start setting boundaries and not socialize too much to where it is overwhelming  I will make sure to get enough rest                  I will talk to my dr about being tired  I will remember that energy drinks and caffeine are not good for me and have led people to the psych hosp  Long Term Goal 2:  NA    Target Date:  NA  Completion Date: N/A    Short Term Objectives for Goal 2:         Long Term Goal # 3: N/A        Target Date: N/A  Completion Date: N/A    Short Term Objectives for Goal 3: N/A    GOAL 1: Modality: Individual 2x per month   Completion Date Ivaneesh  and The person(s) responsible for carrying out the plan is  Iveliezerss  GOAL 2: Modality:  NA    GOAL 3: Modality: NA      Behavioral Health Treatment Plan [facility]: Diagnosis and Treatment Plan explained to Clifton Riggs relates understanding diagnosis and is agreeable to Treatment Plan         Client Comments : Please share your thoughts, feelings, need and/or experiences regarding your treatment plan:         Aaliyah Cason, 1990, actively participated in the review and update of this treatment plan during a virtual session, using the [employer] platform  Tej Glover  provided verbal consent on 5/26/2022 at 6:00 PM  The treatment plan was transcribed into the [employer] at a later time

## 2022-05-26 NOTE — PSYCH
Psychotherapy Provided: Individual Psychotherapy 45 minutes     Length of time in session: 45 minutes, follow up in 2 week    Encounter Diagnosis     ICD-10-CM    1  Post traumatic stress disorder (PTSD)  F43 10    2  Schizoaffective disorder, bipolar type ([facility] )  F25 0    3  GOOD (generalized anxiety disorder)  F41 1        Goals addressed in session: Goal 2,1     Pain:      none    0    Current suicide risk : Low     D:  Met with Janes for session  She has decreased her caffeine intake but, is still drinking one monster and one [id number]  She spoke to her Dr regarding this who also recommended she stop drinking those drinks  Reviewed tx plan goals and developed new tx plan  A:  Mod progress on goal 2  P:  To begin addressing new [place] plan goals  Behavioral Health Treatment Plan [facility]: Diagnosis and Treatment Plan explained to Carrington Sutherland relates understanding diagnosis and is agreeable to Treatment Plan  Yes             Virtual Regular Visit    Verification of patient location:    Patient is currently located in the state [facility]  Patient is currently located in a state in which I am licensed      Assessment/Plan:    Problem List Items Addressed This Visit        Other    Post traumatic stress disorder (PTSD) - Primary    Schizoaffective disorder, bipolar type ([facility] )    GOOD (generalized anxiety disorder)          Goals addressed in session: 2         Reason for visit is   No chief complaint on file  Encounter provider Gregoria Fuentes    Provider located at [address] [place] [id number] [phone]      Recent Visits  No visits were found meeting these conditions    Showing recent visits within past 7 days and meeting all other requirements  Today's Visits  Date Type Provider Dept   05/26/22 [address] [place] [address]  today's visits and meeting all other requirements  Future Appointments  No visits were found meeting these conditions  Showing future appointments within next 150 days and meeting all other requirements       The patient was identified by name and date of birth  Maisha Lopez was informed that this is a telemedicine visit and that the visit is being conducted through[employer] and patient was informed that this is a secure, HIPAA-compliant platform  She agrees to proceed     My office door was closed  No one else was in the room  She acknowledged consent and understanding of privacy and security of the video platform  The patient has agreed to participate and understands they can discontinue the visit at any time  Patient is aware this is a billable service  Subjective  Maisha Lopez is a 27 y o  female          HPI     Past Medical History:   Diagnosis Date    [facility] )     [facility] )     thyroid    Depression     [facility]oaffective disorder, bipolar type [facility] )     Suicidal ideations     Thyroid disease        Past Surgical History:   Procedure Laterality Date    NECK SURGERY      US GUIDED THYROID BIOPSY      normal  Onset: 2012       Current Outpatient Medications   Medication Sig Dispense Refill    Banophen 50 MG capsule TAKE 1 CAPSULE (50 MG TOTAL) BY MOUTH DAILY AT BEDTIME 30 capsule 5    clonazePAM (KlonoPIN) 0 5 mg tablet Take 1 tablet (0 5 mg total) by mouth 2 (two) times a day 60 tablet 2    FLUoxetine (PROzac) 10 mg capsule TAKE 1 CAPSULE BY MOUTH EVERY DAY 30 capsule 1    levothyroxine 175 mcg tablet Take 200 mcg by mouth daily      Melatonin 10 MG TABS Take 1 tablet (10 mg total) by mouth daily at bedtime      OLANZapine (ZyPREXA) 20 MG tablet Take 1 tablet (20 mg total) by mouth daily at bedtime 30 tablet 5    OLANZapine (ZyPREXA) 5 mg tablet Take 1 tablet (5 mg total) by mouth daily at bedtime 30 tablet 5    prazosin (MINIPRESS) 2 mg capsule Take 1 capsule (2 mg total) by mouth daily at bedtime 30 capsule 5    topiramate (Topamax) 25 mg tablet Take 1 tablet (25 mg total) by mouth in the morning and 1 tablet (25 mg total) in the evening  60 tablet 0     No current facility-administered medications for this visit  No Known Allergies    Review of Systems    Video Exam    There were no vitals filed for this visit  Physical Exam     I spent 45 minutes directly with the patient during this [address] verbally agrees to participate in [employer]  Pt is aware that [employer] could be limited without vital signs or the ability to perform a full hands-on physical [address] understands she or the provider may request at any time to terminate the video visit and request the patient to seek care or treatment in person

## 2022-06-06 DIAGNOSIS — F41.1 GAD (GENERALIZED ANXIETY DISORDER): ICD-10-CM

## 2022-06-06 DIAGNOSIS — F42.4 SKIN PICKING HABIT: ICD-10-CM

## 2022-06-06 RX ORDER — CLONAZEPAM 0.5 MG/1
0.5 TABLET ORAL 2 TIMES DAILY
Qty: 60 TABLET | Refills: 2 | Status: SHIPPED | OUTPATIENT
Start: 2022-06-06

## 2022-06-08 ENCOUNTER — TELEMEDICINE (OUTPATIENT)
Dept: BEHAVIORAL/MENTAL HEALTH CLINIC | Facility: CLINIC | Age: 32
End: 2022-06-08
Payer: COMMERCIAL

## 2022-06-08 DIAGNOSIS — F25.0 SCHIZOAFFECTIVE DISORDER, BIPOLAR TYPE (HCC): ICD-10-CM

## 2022-06-08 DIAGNOSIS — F41.1 GAD (GENERALIZED ANXIETY DISORDER): ICD-10-CM

## 2022-06-08 DIAGNOSIS — F43.10 POST TRAUMATIC STRESS DISORDER (PTSD): Primary | ICD-10-CM

## 2022-06-08 PROCEDURE — 90834 PSYTX W PT 45 MINUTES: CPT | Performed by: SOCIAL WORKER

## 2022-06-08 NOTE — PSYCH
Psychotherapy Provided: Individual Psychotherapy 45 minutes     Length of time in session: 45 minutes, follow up in 2 week    Encounter Diagnosis     ICD-10-CM    1  Post traumatic stress disorder (PTSD)  F43 10    2  Schizoaffective disorder, bipolar type ([facility] )  F25 0    3  GOOD (generalized anxiety disorder)  F41 1        Goals addressed in session: Goal 1     Pain:      none    0    Current suicide risk : Low     D:  Met with Janes for session  She was very upset and tearful in session discussing her brother  He "wants her to stop taking meds  She doesn't need them  He doesn't understand "  She has excepted the fact that "that she needs to be on meds "  She discussed how "bad" her symptoms are when she is not on her meds and "she doesn't want to feel that way again"  Discussed why her brother doesn't want her to take the meds  Coping skills - say to him, "I know you love you, thank you "  Her brother had a blocked arteri at 38 yo and will not take meds  Today she did not drink a Monster and only drank one cup of coffee  A:  Mod progress on goal 2  P:  To continue addressing  TX plan goals  Behavioral Health Treatment Plan [facility]: Diagnosis and Treatment Plan explained to Faustino Caballero relates understanding diagnosis and is agreeable to Treatment Plan  Yes             Virtual Regular Visit    Verification of patient location:    Patient is currently located in the state [facility]  Patient is currently located in a state in which I am licensed      Assessment/Plan:    Problem List Items Addressed This Visit        Other    Post traumatic stress disorder (PTSD) - Primary    Schizoaffective disorder, bipolar type ([facility] )    GOOD (generalized anxiety disorder)          Goals addressed in session: 2         Reason for visit is   No chief complaint on file         Encounter provider Ammon Vernon    Provider located at [address] [facility] [facility] [address]  [phone]      Recent Visits  No visits were found meeting these conditions  Showing recent visits within past 7 days and meeting all other requirements  Future Appointments  No visits were found meeting these conditions  Showing future appointments within next 150 days and meeting all other requirements       The patient was identified by name and date of birth  Jerene Meigs was informed that this is a telemedicine visit and that the visit [facility] being [facility] and patient was informed that this is a secure, HIPAA-compliant platform  She agrees to proceed     My office door was closed  No one else was in the room  She acknowledged consent and understanding of privacy and security of the video platform  The patient has agreed to participate and understands they can discontinue the visit at any time  Patient is aware this is a billable service  Subjective  Jerene Meigs is a 27 y o  female          HPI     Past Medical History:   Diagnosis Date    B[facility] )     Cancer ([facility] )     thyroid    Depression     Schizoaffective disorder, bipolar type ([facility] )     Suicidal ideations     Thyroid disease        Past Surgical History:   Procedure Laterality Date    NECK SURGERY      US GUIDED THYROID BIOPSY      normal  Onset: 2012       Current Outpatient Medications   Medication Sig Dispense Refill    Banophen 50 MG capsule TAKE 1 CAPSULE (50 MG TOTAL) BY MOUTH DAILY AT BEDTIME 30 capsule 5    clonazePAM (KlonoPIN) 0 5 mg tablet Take 1 tablet (0 5 mg total) by mouth 2 (two) times a day 60 tablet 2    FLUoxetine (PROzac) 10 mg capsule TAKE 1 CAPSULE BY MOUTH EVERY DAY 30 capsule 1    levothyroxine 175 mcg tablet Take 200 mcg by mouth daily      Melatonin 10 MG TABS Take 1 tablet (10 mg total) by mouth daily at bedtime      OLANZapine (ZyPREXA) 20 MG tablet Take 1 tablet (20 mg total) by mouth daily at bedtime 30 tablet 5    OLANZapine (ZyPREXA) 5 mg tablet Take 1 tablet (5 mg total) by mouth daily at bedtime 30 tablet 5    prazosin (MINIPRESS) 2 mg capsule Take 1 capsule (2 mg total) by mouth daily at bedtime 30 capsule 5    topiramate (Topamax) 25 mg tablet Take 1 tablet (25 mg total) by mouth in the morning and 1 tablet (25 mg total) in the evening  60 tablet 0     No current facility-administered medications for this visit  No Known Allergies    Review of Systems    Video Exam    There were no vitals filed for this visit  Physical Exam     I spent 45 minutes directly with the patient during this [facility] verbally agrees to participate in [employer]  Pt is aware that [employer] could be limited without vital signs or the ability to perform a full hands-on physical [address] understands she or the provider may request at any time to terminate the video visit and request the patient to seek care or treatment in person

## 2022-06-10 ENCOUNTER — TELEPHONE (OUTPATIENT)
Dept: PSYCHIATRY | Facility: CLINIC | Age: 32
End: 2022-06-10

## 2022-06-10 DIAGNOSIS — F25.0 SCHIZOAFFECTIVE DISORDER, BIPOLAR TYPE (HCC): ICD-10-CM

## 2022-06-10 RX ORDER — FLUOXETINE 10 MG/1
10 CAPSULE ORAL DAILY
Qty: 90 CAPSULE | Refills: 1 | Status: SHIPPED | OUTPATIENT
Start: 2022-06-10 | End: 2022-06-24

## 2022-06-10 RX ORDER — OLANZAPINE 20 MG/1
20 TABLET ORAL
Qty: 90 TABLET | Refills: 1 | Status: SHIPPED | OUTPATIENT
Start: 2022-06-10 | End: 2022-08-04 | Stop reason: SDUPTHER

## 2022-06-10 RX ORDER — OLANZAPINE 5 MG/1
5 TABLET ORAL
Qty: 90 TABLET | Refills: 1 | Status: SHIPPED | OUTPATIENT
Start: 2022-06-10 | End: 2022-08-04 | Stop reason: SDUPTHER

## 2022-06-10 NOTE — TELEPHONE ENCOUNTER
[facility] is requesting a 90 day supply of Iveliss' Prozac and Zyprexa       [facility]

## 2022-06-16 ENCOUNTER — TELEMEDICINE (OUTPATIENT)
Dept: PSYCHIATRY | Facility: CLINIC | Age: 32
End: 2022-06-16
Payer: COMMERCIAL

## 2022-06-16 DIAGNOSIS — F25.0 SCHIZOAFFECTIVE DISORDER, BIPOLAR TYPE (HCC): Primary | ICD-10-CM

## 2022-06-16 DIAGNOSIS — F41.1 GAD (GENERALIZED ANXIETY DISORDER): ICD-10-CM

## 2022-06-16 DIAGNOSIS — F99 INSOMNIA DUE TO OTHER MENTAL DISORDER: ICD-10-CM

## 2022-06-16 DIAGNOSIS — F42.4 SKIN PICKING HABIT: ICD-10-CM

## 2022-06-16 DIAGNOSIS — F43.10 POST TRAUMATIC STRESS DISORDER (PTSD): ICD-10-CM

## 2022-06-16 DIAGNOSIS — F51.05 INSOMNIA DUE TO OTHER MENTAL DISORDER: ICD-10-CM

## 2022-06-16 PROCEDURE — 99214 OFFICE O/P EST MOD 30 MIN: CPT | Performed by: NURSE PRACTITIONER

## 2022-06-16 NOTE — PSYCH
Virtual Regular Visit    Problem List Items Addressed This Visit        Other    Post traumatic stress disorder (PTSD)    Schizoaffective disorder, bipolar type ([facility] ) - Primary    GOOD (generalized anxiety disorder)    Insomnia due to other mental disorder    Skin picking habit             Encounter provider GENESIS De La Cruz    Provider located at   [address]  [facility]  [place] [facility] [id number] [phone]    Patient is located in the following state in which I hold an active license [place]    Recent Visits  Date Type Provider Dept   06/10/22 Telephone Binu Conway, [address] [facility] recent visits within past 7 days and meeting all other requirements  Future Appointments  No visits were found meeting these conditions  Showing future appointments within next 150 days and meeting all other requirements     The patient was identified by name and date of birth  Belen Sagastume was informed that this is a telemedicine visit and that the visit is being conducted through [address] and patient was informed that this is a secure, HIPAA-compliant platform  Belen Sagastume agrees to proceed  My office door was closed  a PMHNP student was in the room with me and the patient was informed of the student's presence  She acknowledged consent and understanding of privacy and security of the video platform  The patient has agreed to participate and understands they can discontinue the visit at any time  Patient is aware this is a billable service       HPI     Current Outpatient Medications   Medication Sig Dispense Refill    Banophen 50 MG capsule TAKE 1 CAPSULE (50 MG TOTAL) BY MOUTH DAILY AT BEDTIME 30 capsule 5    clonazePAM (KlonoPIN) 0 5 mg tablet Take 1 tablet (0 5 mg total) by mouth 2 (two) times a day 60 tablet 2    FLUoxetine (PROzac) 10 mg capsule Take 1 capsule (10 mg total) by mouth daily 90 capsule 1  levothyroxine 175 mcg tablet Take 200 mcg by mouth daily      Melatonin 10 MG TABS Take 1 tablet (10 mg total) by mouth daily at bedtime      OLANZapine (ZyPREXA) 20 MG tablet Take 1 tablet (20 mg total) by mouth daily at bedtime 90 tablet 1    OLANZapine (ZyPREXA) 5 mg tablet Take 1 tablet (5 mg total) by mouth daily at bedtime 90 tablet 1    prazosin (MINIPRESS) 2 mg capsule Take 1 capsule (2 mg total) by mouth daily at bedtime 30 capsule 5     No current facility-administered medications for this visit  Review of Systems  Video Exam    There were no vitals filed for this visit  Physical Exam   As a result of this visit, I have referred the patient for further respiratory evaluation  No    I spent 20 minutes directly with the patient during this visit  Sylvain acknowledges that she has consented to an online visit or consultation  She understands that the online visit is based solely on information provided by her, and that, in the absence of a face-to-face physical evaluation by the physician, the diagnosis she receives is both limited and provisional in terms of accuracy and completeness  This is not intended to replace a full medical face-to-face evaluation by the physician  Kamala Mcneil understands and accepts these terms  MEDICATION MANAGEMENT NOTE        [address]    Name and Date of Birth:  Kamala Mcneil 27 y o  1990 MRN: [id number]    Date of Visit: June 16, 2022    No Known Allergies  SUBJECTIVE:    Lalo Altamirano is seen today for a follow up for PTSD, Generalized Anxiety Disorder, Schizoaffective Disorder and skin picking disorder  She reports that she has experienced on and off depressive symptoms since the last visit  Patient reports that she feels generally  Feeling either depressed nor elevated  Emotion tends to be boredom    Has not yet established OBGYN and to discuss lamotrigine birth control combination and may be effective  Recommend patient follow-up with PCP in meantime  Did not tolerate topiramate as patient states this caused spotting as well  Continues to struggle with skin picking  Continues to abstain from alcohol and cannabis  No new acute stressors  She denies any side effects from medications  PLAN:  Continue Prozac 10 mg p o  Daily; consider DC at next visit due to lack of efficacy and suspicion mood stabilization  Discontinue topiramate  Continue olanzapine 25 mg p o  Daily  Continue prazosin 2 mg p o  Daily  Aware of 24 hour and weekend coverage for urgent situations accessed by calling [facility] main practice number    Diagnoses and all orders for this visit:    Schizoaffective disorder, bipolar type ([facility] )    Post traumatic stress disorder (PTSD)    GOOD (generalized anxiety disorder)    Insomnia due to other mental disorder    Skin picking habit        Current Outpatient Medications on File Prior to Visit   Medication Sig Dispense Refill    Banophen 50 MG capsule TAKE 1 CAPSULE (50 MG TOTAL) BY MOUTH DAILY AT BEDTIME 30 capsule 5    clonazePAM (KlonoPIN) 0 5 mg tablet Take 1 tablet (0 5 mg total) by mouth 2 (two) times a day 60 tablet 2    FLUoxetine (PROzac) 10 mg capsule Take 1 capsule (10 mg total) by mouth daily 90 capsule 1    levothyroxine 175 mcg tablet Take 200 mcg by mouth daily      Melatonin 10 MG TABS Take 1 tablet (10 mg total) by mouth daily at bedtime      OLANZapine (ZyPREXA) 20 MG tablet Take 1 tablet (20 mg total) by mouth daily at bedtime 90 tablet 1    OLANZapine (ZyPREXA) 5 mg tablet Take 1 tablet (5 mg total) by mouth daily at bedtime 90 tablet 1    prazosin (MINIPRESS) 2 mg capsule Take 1 capsule (2 mg total) by mouth daily at bedtime 30 capsule 5    [DISCONTINUED] topiramate (Topamax) 25 mg tablet Take 1 tablet (25 mg total) by mouth in the morning and 1 tablet (25 mg total) in the evening   61 tablet 0     No current facility-administered medications on file prior to visit  Psychotherapy Provided:     Individual psychotherapy provided: Yes  Counseling was provided during the session today for 16 minutes  Supportive counseling provided  HPI ROS Appetite Changes and Sleep:     She reports normal sleep, normal appetite, low energy   Denies homicidal ideation, denies suicidal ideation    Review Of Systems:      General emotional problems and decreased functioning   Personality no change in personality   Constitutional negative   ENT negative   Cardiovascular negative   Respiratory negative   Gastrointestinal negative   Genitourinary negative   Musculoskeletal negative   Integumentary negative   Neurological negative   Endocrine negative   Other Symptoms none, all other systems are negative     Mental Status Evaluation:    Appearance Adequate hygiene and grooming   Behavior cooperative   Mood Apathetic  Depression Scale -  of 10 (0 = No depression)  Anxiety Scale -  of 10 (0 = No anxiety)   Speech Normal rate and volume   Affect mood-congruent and labile   Thought Processes Goal directed and coherent   Thought Content Does not verbalize delusional material   Associations Tightly connected   Perceptual Disturbances Denies hallucinations and does not appear to be responding to internal stimuli   Risk Potential Suicidal/Homicidal Ideation - No evidence of suicidal or homicidal ideation and patient does not verbalize suicidal or homicidal ideation  Risk of Violence - No evidence of risk for violence found on assessment  Risk of Self Mutilation - No evidence of risk for self mutilation found on assessment   Orientation oriented to person, place, time/date and situation   Memory recent and remote memory grossly intact   Consciousness alert and awake   Attention/Concentration attention span and concentration are age appropriate   Insight partial   Judgement partial   Muscle Strength and Gait normal muscle strength and normal muscle tone, normal gait/station and normal balance   Motor Activity no abnormal movements   Language no difficulty naming common objects, no difficulty repeating a phrase, no difficulty writing a sentence   Fund of Knowledge adequate knowledge of current events  adequate fund of knowledge regarding past history  adequate fund of knowledge regarding vocabulary      Past Psychiatric History Update:     Inpatient Psychiatric Admission Since Last Encounter:   no  Suicide Attempt Or Self Mutilation Since Last Encounter:   no  Incidence of Violent Behavior Since Last Encounter:   no    Traumatic History Update:     New Onset of Abuse Since Last Encounter:   no  Traumatic Events Since Last Encounter:   no    Past Medical History:    Past Medical History:   Diagnosis Date    [facility]     Cancer ([place] [address] )     thyroid    Depression     Schizoaffective disorder, bipolar type ([place] [address] )     Suicidal ideations     Thyroid disease         Past Surgical History:   Procedure Laterality Date    NECK SURGERY      US GUIDED THYROID BIOPSY      normal  Onset: 2012     No Known Allergies  Substance Abuse History:    Social History     Substance and Sexual Activity   Alcohol Use Yes    Comment: occ     Social History     Substance and Sexual Activity   Drug Use No     Social History:    Social History     Socioeconomic History    Marital status: Single     Spouse name: Not on file    Number of children: Not on file    Years of education: Not on file    Highest education level: Not on file   Occupational History    Not on file   Tobacco Use    Smoking status: Former Smoker     Packs/day: 1 00     Types: Cigarettes    Smokeless tobacco: Never Used   Substance and Sexual Activity    Alcohol use: Yes     Comment: occ    Drug use: No    Sexual activity: Not on file   Other Topics Concern    Not on file   Social History Narrative    Not on file     Social Determinants of Health     Financial Resource Strain: Not on file   Food Insecurity: Not on file   Transportation Needs: Not on file   Physical Activity: Not on file   Stress: Not on file   Social Connections: Not on file   Intimate Partner Violence: Not on file   Housing Stability: Not on file     Family Psychiatric History:     No family history on file  History Review: The following portions of the patient's history were reviewed and updated as appropriate: allergies, current medications, past family history, past medical history, past social history, past surgical history and problem list     OBJECTIVE:     Vital signs in last 24 hours: There were no vitals filed for this visit  Laboratory Results: I have personally reviewed all pertinent laboratory/tests results  Suicide/Homicide Risk Assessment:    Risk of Harm to Self:   The following ratings are based on assessment at the time of the interview   Recent Specific Risk Factors include: current depressive symptoms, current anxiety symptoms    Risk of Harm to Others:   The following ratings are based on assessment at the time of the interview   Recent Specific Risk Factors include: none  The following interventions are recommended: no intervention changes needed    Medications Risks/Benefits:      Risks, Benefits And Possible Side Effects Of Medications:    Discussed risks and benefits of treatment with patient including risk of suicidality, serotonin syndrome, increased QTc interval and SIADH related to treatment with antidepressants;  Risk of induction of manic symptoms in certain patient populations, risk of parkinsonian symptoms, metabolic syndrome, tardive dyskinesia and neuroleptic malignant syndrome related to treatment with antipsychotic medications and Risk of orthostatic hypotension with Prazosin     Controlled Medication Discussion:     Marco Antonio Sanam has been filling controlled prescriptions on time as prescribed according to [facility]    Treatment Plan:    Due for update/Updated:   GENESIS Lugo 06/16/22    This note was shared with patient

## 2022-06-17 ENCOUNTER — TELEMEDICINE (OUTPATIENT)
Dept: BEHAVIORAL/MENTAL HEALTH CLINIC | Facility: CLINIC | Age: 32
End: 2022-06-17
Payer: COMMERCIAL

## 2022-06-17 DIAGNOSIS — F25.0 SCHIZOAFFECTIVE DISORDER, BIPOLAR TYPE (HCC): Primary | ICD-10-CM

## 2022-06-17 DIAGNOSIS — F43.10 POST TRAUMATIC STRESS DISORDER (PTSD): ICD-10-CM

## 2022-06-17 DIAGNOSIS — F41.1 GAD (GENERALIZED ANXIETY DISORDER): ICD-10-CM

## 2022-06-17 PROCEDURE — 90834 PSYTX W PT 45 MINUTES: CPT | Performed by: SOCIAL WORKER

## 2022-06-17 NOTE — PSYCH
Psychotherapy Provided: Individual Psychotherapy 45 minutes     Length of time in session: 45 minutes, follow up in 2 week    Encounter Diagnosis     ICD-10-CM    1  Post traumatic stress disorder (PTSD)  F43 10    2  Schizoaffective disorder, bipolar type ([facility] )  F25 0    3  GOOD (generalized anxiety disorder)  F41 1        Goals addressed in session: Goal 1     Pain:      none    0    Current suicide risk : Low     D:  Met with Janes for session  Her birthday is [birth year]  She "didn't have a good day yesterday  She thinks it was due to with she was tired yesterday  Today she was off work, feel asleep and slept for 4 -5 hrs  She has been playing video games virtually with her ex-boyfriend  She was able to describe the coping skills she uses in order to cope with her ex and the boundaries she has set with him  She hadd her appt yesterday with her psych PA  No changes in meds  "He mentioned again that he wants her to follow up with GYN "   A:  Mod progress on goal 2  P:  To continue addressing  [place] plan goals  Behavioral Health Treatment Plan [facility]: Diagnosis and Treatment Plan explained to Concepción Gillis relates understanding diagnosis and is agreeable to Treatment Plan  Yes             Virtual Regular Visit    Verification of patient location:    Patient is currently located in the state [facility]  Patient is currently located in a state in which I am licensed      Assessment/Plan:    Problem List Items Addressed This Visit    None         Goals addressed in session: 2         Reason for visit is   No chief complaint on file  Encounter provider Deepthi Barbour located at [address] [place] [id number] [phone]      Recent Visits  No visits were found meeting these conditions    Showing recent visits within past 7 days and meeting all other requirements  Future Appointments  No visits were found meeting these conditions  Showing future appointments within next 150 days and meeting all other requirements       The patient was identified by name and date of birth  Johnathan Crow was informed that this is a telemedicine visit and that the visit is being conducted [facility] [employer] [facility] and patient was informed that this is a secure, HIPAA-compliant platform  She agrees to proceed     My office door was closed  No one else was in the room  She acknowledged consent and understanding of privacy and security of the video platform  The patient has agreed to participate and understands they can discontinue the visit at any time  Patient is aware this is a billable service  Subjective  Johnathan Crow is a 27 y o  female          HPI     Past Medical History:   Diagnosis Date    B[facility] disorder [facility] )     Cancer ([facility] )     thyroid    Depression     Schizoaffective disorder, bipolar type ([facility] )     Suicidal ideations     Thyroid disease        Past Surgical History:   Procedure Laterality Date    NECK SURGERY      US GUIDED THYROID BIOPSY      normal  Onset: 2012       Current Outpatient Medications   Medication Sig Dispense Refill    Banophen 50 MG capsule TAKE 1 CAPSULE (50 MG TOTAL) BY MOUTH DAILY AT BEDTIME 30 capsule 5    clonazePAM (KlonoPIN) 0 5 mg tablet Take 1 tablet (0 5 mg total) by mouth 2 (two) times a day 60 tablet 2    FLUoxetine (PROzac) 10 mg capsule Take 1 capsule (10 mg total) by mouth daily 90 capsule 1    levothyroxine 175 mcg tablet Take 200 mcg by mouth daily      Melatonin 10 MG TABS Take 1 tablet (10 mg total) by mouth daily at bedtime      OLANZapine (ZyPREXA) 20 MG tablet Take 1 tablet (20 mg total) by mouth daily at bedtime 90 tablet 1    OLANZapine (ZyPREXA) 5 mg tablet Take 1 tablet (5 mg total) by mouth daily at bedtime 90 tablet 1    prazosin (MINIPRESS) 2 mg capsule Take 1 capsule (2 mg total) by mouth daily at bedtime 30 capsule 5     No current facility-administered medications for this visit  No Known Allergies    Review of Systems    Video Exam    There were no vitals filed for this visit  Physical Exam     I spent 45 minutes directly with the patient during this [address] verbally agrees to participate in [employer]  Pt is aware that [employer] could be limited without vital signs or the ability to perform a full hands-on physical [address] understands she or the provider may request at any time to terminate the video visit and request the patient to seek care or treatment in person

## 2022-06-24 ENCOUNTER — OFFICE VISIT (OUTPATIENT)
Dept: PSYCHIATRY | Facility: CLINIC | Age: 32
End: 2022-06-24
Payer: COMMERCIAL

## 2022-06-24 DIAGNOSIS — F43.10 POST TRAUMATIC STRESS DISORDER (PTSD): ICD-10-CM

## 2022-06-24 DIAGNOSIS — F41.1 GAD (GENERALIZED ANXIETY DISORDER): ICD-10-CM

## 2022-06-24 DIAGNOSIS — F25.0 SCHIZOAFFECTIVE DISORDER, BIPOLAR TYPE (HCC): Primary | ICD-10-CM

## 2022-06-24 DIAGNOSIS — F42.4 SKIN PICKING HABIT: ICD-10-CM

## 2022-06-24 PROCEDURE — 99214 OFFICE O/P EST MOD 30 MIN: CPT | Performed by: NURSE PRACTITIONER

## 2022-06-24 RX ORDER — FLUOXETINE HYDROCHLORIDE 20 MG/1
20 CAPSULE ORAL DAILY
Start: 2022-06-24 | End: 2022-07-05 | Stop reason: SDUPTHER

## 2022-06-24 NOTE — PSYCH
MEDICATION MANAGEMENT NOTE        [facility]    Name and Date of Birth:  Mae Sen 34 y o  1990 MRN: [id number]    Date of Visit: June 24, 2022    No Known Allergies  SUBJECTIVE:    Fran Kirkpatrick is seen today for a follow up for Schizoaffective Disorder  She reports that she has experienced ongoing symptoms since the last visit  Patient reports she continues to feel down and depressed  Symptoms have begun to feel more overwhelming  Patient called out of work today due to extreme low energy, severe anxiety, anhedonia, lack of motivation  Feels I am not able to go into work anymore   Completed intermittent FMLA with the patient  Patient is scheduled to see her PCP to discuss alternative birth control that may be more compatible with resuming lamotrigine  Also discussed the possibility of adding low-dose lithium if unable to restart lamotrigine  As of today will increase Prozac 20 mg and remain vigilant for manic activation  Denies paranoia or auditory hallucinations  No evidence psychotic symptoms, delusions, internal preoccupation, paranoia, observed during today's visit  She denies any side effects from medications  PLAN:  Increase Prozac to 20 mg p o  Daily  Continue olanzapine 25 mg p o  HS  Continue prazosin 2 mg p o  HS  Continue clonazepam 0 5 mg p o  B i d   Consider restart lamotrigine versus initiate low-dose lithium  Continue individual therapy  Follow-up in 1 month or earlier if needed  Aware of 24 hour and weekend coverage for urgent situations accessed by calling [facility] main practice number    Diagnoses and all orders for this visit:    Schizoaffective disorder, bipolar type (HCC)  -     FLUoxetine (PROzac) 20 mg capsule;  Take 1 capsule (20 mg total) by mouth daily    Post traumatic stress disorder (PTSD)    GOOD (generalized anxiety disorder)    Skin picking habit        Current Outpatient Medications on File Prior to Visit   Medication Sig Dispense Refill    Banophen 50 MG capsule TAKE 1 CAPSULE (50 MG TOTAL) BY MOUTH DAILY AT BEDTIME 30 capsule 5    clonazePAM (KlonoPIN) 0 5 mg tablet Take 1 tablet (0 5 mg total) by mouth 2 (two) times a day 60 tablet 2    levothyroxine 175 mcg tablet Take 200 mcg by mouth daily      Melatonin 10 MG TABS Take 1 tablet (10 mg total) by mouth daily at bedtime      OLANZapine (ZyPREXA) 20 MG tablet Take 1 tablet (20 mg total) by mouth daily at bedtime 90 tablet 1    OLANZapine (ZyPREXA) 5 mg tablet Take 1 tablet (5 mg total) by mouth daily at bedtime 90 tablet 1    prazosin (MINIPRESS) 2 mg capsule Take 1 capsule (2 mg total) by mouth daily at bedtime 30 capsule 5    [DISCONTINUED] FLUoxetine (PROzac) 10 mg capsule Take 1 capsule (10 mg total) by mouth daily 90 capsule 1     No current facility-administered medications on file prior to visit  Psychotherapy Provided:     Individual psychotherapy provided: Yes  Counseling was provided during the session today for 16 minutes  Supportive counseling provided  HPI ROS Appetite Changes and Sleep:     She reports adequate number of sleep hours (8+ hours), adequate appetite, low energy   Denies homicidal ideation, denies suicidal ideation    Review Of Systems:      General emotional problems and decreased functioning   Personality no change in personality   Constitutional negative   ENT negative   Cardiovascular negative   Respiratory negative   Gastrointestinal negative   Genitourinary negative   Musculoskeletal negative   Integumentary negative   Neurological negative   Endocrine negative   Other Symptoms none, all other systems are negative     Mental Status Evaluation:    Appearance Marginal/poor hygiene and Poor eye contact   Behavior cooperative and friendly   Mood anxious, depressed and dysphoric  Depression Scale -  of 10 (0 = No depression)  Anxiety Scale -  of 10 (0 = No anxiety)   Speech Normal rate and volume   Affect mood-congruent and constricted   Thought Processes Goal directed and coherent   Thought Content Does not verbalize delusional material   Associations Tightly connected   Perceptual Disturbances Denies hallucinations and does not appear to be responding to internal stimuli   Risk Potential Suicidal/Homicidal Ideation - No evidence of suicidal or homicidal ideation and patient does not verbalize suicidal or homicidal ideation  Risk of Violence - No evidence of risk for violence found on assessment  Risk of Self Mutilation - No evidence of risk for self mutilation found on assessment   Orientation oriented to person, place, time/date and situation   Memory recent and remote memory grossly intact   Consciousness alert and awake   Attention/Concentration attention span and concentration are age appropriate   Insight fair   Judgement fair   Muscle Strength and Gait normal muscle strength and normal muscle tone, normal gait/station and normal balance   Motor Activity no abnormal movements   Language no difficulty naming common objects, no difficulty repeating a phrase, no difficulty writing a sentence   Fund of Knowledge adequate knowledge of current events  adequate fund of knowledge regarding past history  adequate fund of knowledge regarding vocabulary      Past Psychiatric History Update:     Inpatient Psychiatric Admission Since Last Encounter:   no  Changes to Outpatient Psychiatric Treatment Team:    no  Suicide Attempt Or Self Mutilation Since Last Encounter:   no  Incidence of Violent Behavior Since Last Encounter:   no    Traumatic History Update:     New Onset of Abuse Since Last Encounter:   no  Traumatic Events Since Last Encounter:   no    Past Medical History:    Past Medical History:   Diagnosis Date    [facility]     Cancer ([facility] )     thyroid    Depression     Schizoaffective disorder, bipolar type ([facility] )     Suicidal ideations     Thyroid disease         Past Surgical History:   Procedure Laterality Date    NECK SURGERY      US GUIDED THYROID BIOPSY      normal  Onset: 2012     No Known Allergies  Substance Abuse History:    Social History     Substance and Sexual Activity   Alcohol Use Yes    Comment: occ     Social History     Substance and Sexual Activity   Drug Use No     Social History:    Social History     Socioeconomic History    Marital status: Single     Spouse name: Not on file    Number of children: Not on file    Years of education: Not on file    Highest education level: Not on file   Occupational History    Not on file   Tobacco Use    Smoking status: Former Smoker     Packs/day: 1 00     Types: Cigarettes    Smokeless tobacco: Never Used   Substance and Sexual Activity    Alcohol use: Yes     Comment: occ    Drug use: No    Sexual activity: Not on file   Other Topics Concern    Not on file   Social History Narrative    Not on file     Social Determinants of Health     Financial Resource Strain: Not on file   Food Insecurity: Not on file   Transportation Needs: Not on file   Physical Activity: Not on file   Stress: Not on file   Social Connections: Not on file   Intimate Partner Violence: Not on file   Housing Stability: Not on file     Family Psychiatric History:     No family history on file  History Review: The following portions of the patient's history were reviewed and updated as appropriate: allergies, current medications, past family history, past medical history, past social history, past surgical history and problem list     OBJECTIVE:     Vital signs in last 24 hours: There were no vitals filed for this visit  Laboratory Results: I have personally reviewed all pertinent laboratory/tests results      Suicide/Homicide Risk Assessment:    Risk of Harm to Self:   The following ratings are based on assessment at the time of the interview   Recent Specific Risk Factors include: current depressive symptoms, current anxiety symptoms    Risk of Harm to Others:   The following ratings are based on assessment at the time of the interview   Recent Specific Risk Factors include: none  The following interventions are recommended: no intervention changes needed    Medications Risks/Benefits:      Risks, Benefits And Possible Side Effects Of Medications:    Discussed risks and benefits of treatment with patient including risk of suicidality, serotonin syndrome, increased QTc interval and SIADH related to treatment with antidepressants; Risk of induction of manic symptoms in certain patient populations, risk of parkinsonian symptoms, metabolic syndrome, tardive dyskinesia and neuroleptic malignant syndrome related to treatment with antipsychotic medications, risks of misuse, abuse or dependence, sedation and respiratory depression related to treatment with benzodiazepine medications and Risk of orthostatic hypotension with Prazosin     Controlled Medication Discussion:     Constanza Lopez has been filling controlled prescriptions on time as prescribed according to [place] Prescription Drug Monitoring Program    Treatment Plan:    Due for update/Updated:   GENESIS Degroot 06/24/22    This note was shared with patient

## 2022-07-05 DIAGNOSIS — F25.0 SCHIZOAFFECTIVE DISORDER, BIPOLAR TYPE (HCC): ICD-10-CM

## 2022-07-05 RX ORDER — FLUOXETINE HYDROCHLORIDE 20 MG/1
20 CAPSULE ORAL DAILY
Qty: 30 CAPSULE | Refills: 2 | Status: SHIPPED | OUTPATIENT
Start: 2022-07-05 | End: 2022-08-04

## 2022-07-12 ENCOUNTER — TELEMEDICINE (OUTPATIENT)
Dept: BEHAVIORAL/MENTAL HEALTH CLINIC | Facility: CLINIC | Age: 32
End: 2022-07-12
Payer: COMMERCIAL

## 2022-07-12 DIAGNOSIS — F25.0 SCHIZOAFFECTIVE DISORDER, BIPOLAR TYPE (HCC): Primary | ICD-10-CM

## 2022-07-12 DIAGNOSIS — F41.1 GAD (GENERALIZED ANXIETY DISORDER): ICD-10-CM

## 2022-07-12 DIAGNOSIS — F43.10 POST TRAUMATIC STRESS DISORDER (PTSD): ICD-10-CM

## 2022-07-12 PROCEDURE — 90834 PSYTX W PT 45 MINUTES: CPT | Performed by: SOCIAL WORKER

## 2022-07-12 NOTE — PSYCH
Psychotherapy Provided: Individual Psychotherapy 45 minutes     Length of time in session: 45 minutes, follow up in 2 week    Encounter Diagnosis     ICD-10-CM    1  Post traumatic stress disorder (PTSD)  F43 10    2  Schizoaffective disorder, bipolar type ([facility] )  F25 0    3  GOOD (generalized anxiety disorder)  F41 1        Goals addressed in session: Goal 1     Pain:      none    0    Current suicide risk : Low     D:  Met with Janes for session  Her dr put her on Prozac and wants her to go to her qyno due to her past issues with Lamictal but, she has not gone,  does not want to go, and is not going  Discussed the different reasons why she should go  When she started the Prozac she was manic but, then leveled out  "She is fine staying on the Prozac"  She requested FLMA which she got for in case she needs the days off  Discussed her frustration with work  She leaves at the end of the month for a weeks vacation to the   A:  Mod progress on goal 2  P:  To continue addressing  [place] plan goals  Behavioral Health Treatment Plan [facility]: Diagnosis and Treatment Plan explained to Sofya Reed relates understanding diagnosis and is agreeable to Treatment Plan  Yes             Virtual Regular Visit    Verification of patient location:    Patient is currently located in the [facility]  Patient is currently located in a state in which I am licensed      Assessment/Plan:    Problem List Items Addressed This Visit    None         Goals addressed in session: 2         Reason for visit is   No chief complaint on file  Encounter provider Markie Pendleton    Provider located at [address] [place] [id number] [phone]      Recent Visits  No visits were found meeting these conditions    Showing recent visits within past 7 days and meeting all other requirements  Future Appointments  No visits were found meeting these conditions  Showing future appointments within next 150 days and meeting all other requirements       The patient was identified by name and date of birth  Deo Burch was informed that this is a telemedicine visit and that the [facility] and patient was informed that this is a secure, HIPAA-compliant platform  She agrees to proceed     My office door was closed  No one else was in the room  She acknowledged consent and understanding of privacy and security of the video platform  The patient has agreed to participate and understands they can discontinue the visit at any time  Patient is aware this is a billable service  Subjective  Deo Burch is a 34 y o  female          HPI     Past Medical History:   Diagnosis Date    [facility] disorder [facility] )     Cancer ([facility] )     thyroid    Depression     Schizoaffective disorder, bipolar type ([facility] )     Suicidal ideations     Thyroid disease        Past Surgical History:   Procedure Laterality Date    NECK SURGERY      US GUIDED THYROID BIOPSY      normal  Onset: 2012       Current Outpatient Medications   Medication Sig Dispense Refill    Banophen 50 MG capsule TAKE 1 CAPSULE (50 MG TOTAL) BY MOUTH DAILY AT BEDTIME 30 capsule 5    clonazePAM (KlonoPIN) 0 5 mg tablet Take 1 tablet (0 5 mg total) by mouth 2 (two) times a day 60 tablet 2    FLUoxetine (PROzac) 20 mg capsule Take 1 capsule (20 mg total) by mouth daily 30 capsule 2    levothyroxine 175 mcg tablet Take 200 mcg by mouth daily      Melatonin 10 MG TABS Take 1 tablet (10 mg total) by mouth daily at bedtime      OLANZapine (ZyPREXA) 20 MG tablet Take 1 tablet (20 mg total) by mouth daily at bedtime 90 tablet 1    OLANZapine (ZyPREXA) 5 mg tablet Take 1 tablet (5 mg total) by mouth daily at bedtime 90 tablet 1    prazosin (MINIPRESS) 2 mg capsule Take 1 capsule (2 mg total) by mouth daily at bedtime 30 capsule 5     No current facility-administered medications for this visit  No Known Allergies    Review of Systems    Video Exam    There were no vitals filed for this visit  Physical Exam     I spent 45 minutes directly with the patient during this [address] verbally agrees to participate in [employer]  Pt is aware that [employer] could be limited without vital signs or the ability to perform a full hands-on physical [address] understands she or the provider may request at any time to terminate the video visit and request the patient to seek care or treatment in person

## 2022-08-04 ENCOUNTER — TELEMEDICINE (OUTPATIENT)
Dept: PSYCHIATRY | Facility: CLINIC | Age: 32
End: 2022-08-04
Payer: COMMERCIAL

## 2022-08-04 DIAGNOSIS — F41.1 GAD (GENERALIZED ANXIETY DISORDER): ICD-10-CM

## 2022-08-04 DIAGNOSIS — F25.0 SCHIZOAFFECTIVE DISORDER, BIPOLAR TYPE (HCC): Primary | ICD-10-CM

## 2022-08-04 DIAGNOSIS — Z51.81 ENCOUNTER FOR THERAPEUTIC DRUG MONITORING: ICD-10-CM

## 2022-08-04 DIAGNOSIS — F43.10 POST TRAUMATIC STRESS DISORDER (PTSD): ICD-10-CM

## 2022-08-04 DIAGNOSIS — F43.10 PTSD (POST-TRAUMATIC STRESS DISORDER): ICD-10-CM

## 2022-08-04 PROCEDURE — 99214 OFFICE O/P EST MOD 30 MIN: CPT | Performed by: NURSE PRACTITIONER

## 2022-08-04 RX ORDER — OLANZAPINE 20 MG/1
20 TABLET ORAL
Qty: 90 TABLET | Refills: 1 | Status: SHIPPED | OUTPATIENT
Start: 2022-08-04

## 2022-08-04 RX ORDER — FLUOXETINE HYDROCHLORIDE 20 MG/1
20 CAPSULE ORAL DAILY
Qty: 30 CAPSULE | Refills: 5 | Status: SHIPPED | OUTPATIENT
Start: 2022-08-04 | End: 2022-10-20

## 2022-08-04 RX ORDER — PRAZOSIN HYDROCHLORIDE 2 MG/1
2 CAPSULE ORAL
Qty: 30 CAPSULE | Refills: 5 | Status: SHIPPED | OUTPATIENT
Start: 2022-08-04 | End: 2022-10-20

## 2022-08-04 RX ORDER — DIPHENHYDRAMINE HCL 50 MG
50 CAPSULE ORAL
Qty: 30 CAPSULE | Refills: 5 | Status: SHIPPED | OUTPATIENT
Start: 2022-08-04

## 2022-08-04 RX ORDER — OLANZAPINE 5 MG/1
5 TABLET ORAL
Qty: 90 TABLET | Refills: 1 | Status: SHIPPED | OUTPATIENT
Start: 2022-08-04

## 2022-08-04 NOTE — BH TREATMENT PLAN
TREATMENT PLAN (Medication Management Only)        [employer] [facility]    Name and Date of Birth:  Aysha Long 34 y o  1990  Date of Treatment Plan: August 4, 2022  Diagnosis/Diagnoses:    1  Schizoaffective disorder, bipolar type ([facility] )    2  Post traumatic stress disorder (PTSD)    3  GOOD (generalized anxiety disorder)    4  PTSD (post-traumatic stress disorder)      Strengths/Personal Resources for Self-Care: motivation for treatment, taking medications as prescribed, ability to communicate well, ability to listen, ability to reason  Area/Areas of need (in own words): depressive symptoms, anxiety symptoms, mood instability, psychotic symptoms  1  Long Term Goal: maintain improvement in anxiety, maintain improvement in depression and maintain improvement in psychotic symptoms  Target Date: 6 months - 2/4/2023  Person/Persons responsible for completion of goal: Aysha Long  2  Short Term Objective (s) - How will we reach this goal?:   A  Provider new recommended medication/dosage changes and/or continue medication(s): continue current medications as prescribed  B   Attend medication management appointments regularly  C   Attend psychotherapy regularly  Target Date: 6 months - 2/4/2023  Person/Persons Responsible for Completion of Goal: Aysha Long  Progress Towards Goals: continuing treatment  Treatment Modality: medication management with psychotherapy every 3 months  Review due 90 to 120 days from date of this plan: 6 months - 2/4/2023  Expected length of service: maintenance unless revised   My Physician/PA/NP and I have developed this plan together and I agree to work on the goals and objectives  I understand the treatment goals that were developed for my treatment    Treatment Plan Verbal Consent - Virtual Visit

## 2022-08-04 NOTE — PSYCH
Virtual Regular Visit    Problem List Items Addressed This Visit        Other    Post traumatic stress disorder (PTSD)    Relevant Medications    OLANZapine (ZyPREXA) 5 mg tablet    OLANZapine (ZyPREXA) 20 MG tablet    FLUoxetine (PROzac) 20 mg capsule    Schizoaffective disorder, bipolar type (HCC) - Primary    Relevant Medications    OLANZapine (ZyPREXA) 5 mg tablet    OLANZapine (ZyPREXA) 20 MG tablet    FLUoxetine (PROzac) 20 mg capsule    diphenhydrAMINE (Banophen) 50 mg capsule    GOOD (generalized anxiety disorder)    Relevant Medications    OLANZapine (ZyPREXA) 5 mg tablet    OLANZapine (ZyPREXA) 20 MG tablet    FLUoxetine (PROzac) 20 mg capsule      Other Visit Diagnoses     PTSD (post-traumatic stress disorder)        Relevant Medications    prazosin (MINIPRESS) 2 mg capsule    OLANZapine (ZyPREXA) 5 mg tablet    OLANZapine (ZyPREXA) 20 MG tablet    FLUoxetine (PROzac) 20 mg capsule             Encounter provider GENESIS Ngo    Provider located at   [facility]  [address] [address] [phone]    Patient is located in the following state in which I hold an active license [place]    Recent Visits  No visits were found meeting these conditions  Showing recent visits within past 7 days and meeting all other requirements  Today's Visits  Date Type Provider Dept   08/04/22 Telemedicine GENESIS Colón  [facility]   Showing today's visits and meeting all other requirements  Future Appointments  No visits were found meeting these conditions  Showing future appointments within next 150 days and meeting all other requirements     The patient was identified by name and date of birth  Monty Ireland was informed that this is a telemedicine visit and that the visit is being conducted through  [address] and patient was informed that this is a secure, HIPAA-compliant platform   Monty Ireland agrees to proceed  My office door was closed  No one else was in the room  She acknowledged consent and understanding of privacy and security of the video platform  The patient has agreed to participate and understands they can discontinue the visit at any time  Patient is aware this is a billable service  HPI     Current Outpatient Medications   Medication Sig Dispense Refill    diphenhydrAMINE (Banophen) 50 mg capsule Take 1 capsule (50 mg total) by mouth daily at bedtime 30 capsule 5    FLUoxetine (PROzac) 20 mg capsule Take 1 capsule (20 mg total) by mouth daily 30 capsule 5    OLANZapine (ZyPREXA) 20 MG tablet Take 1 tablet (20 mg total) by mouth daily at bedtime 90 tablet 1    OLANZapine (ZyPREXA) 5 mg tablet Take 1 tablet (5 mg total) by mouth daily at bedtime 90 tablet 1    prazosin (MINIPRESS) 2 mg capsule Take 1 capsule (2 mg total) by mouth daily at bedtime 30 capsule 5    clonazePAM (KlonoPIN) 0 5 mg tablet Take 1 tablet (0 5 mg total) by mouth 2 (two) times a day 60 tablet 2    levothyroxine 175 mcg tablet Take 200 mcg by mouth daily      Melatonin 10 MG TABS Take 1 tablet (10 mg total) by mouth daily at bedtime       No current facility-administered medications for this visit  Review of Systems  Video Exam    There were no vitals filed for this visit  Physical Exam   As a result of this visit, I have referred the patient for further respiratory evaluation  No    I spent 20 minutes directly with the patient during this visit  Sylvain acknowledges that she has consented to an online visit or consultation  She understands that the online visit is based solely on information provided by her, and that, in the absence of a face-to-face physical evaluation by the physician, the diagnosis she receives is both limited and provisional in terms of accuracy and completeness   This is not intended to replace a full medical face-to-face evaluation by the physician  Jose Enrique Méndez understands and accepts these terms  This note was not shared with the patient due to reasonable likelihood of causing patient harm   MEDICATION MANAGEMENT NOTE        Lane Gregory    Name and Date of Birth:  Jose Enrique Méndez 34 y o  1990 MRN: [id number]    Date of Visit: August 4, 2022    No Known Allergies  SUBJECTIVE:    Blayne Barragan is seen today for a follow up for depression, PTSD, Generalized Anxiety Disorder, Schizoaffective Disorder and excoriation disorder  She reports that she has improved since the last visit  Patient reports that depression anxiety is now well controlled since increase in Prozac  Patient also recently return from vacation  States that in past she is has had difficulty enjoying herself due to anxiety about traveling/worry about the worst possible outcome  States that during this vacation she feels she is able to relax  Number she is home she is able to do the things that she usually enjoys and maintain her responsibilities  Patient continues to struggle with skin picking behavior, patient provided with information on CBT based treatment for skin picking/excoriation disorder  Consider treatment with naltrexone if unable to stop this behavior on her own    She denies any side effects from medications  PLAN:  Obtain CBC, CMP and lipid panel  Continue olanzapine 25 mg p o  HS  Continue Prozac 20 mg p o  Daily  Continue prazosin 2 mg p o  HS  Continue clonazepam 0 5 mg p o  B i d   Continue individual therapy and begin CBT practices for reducing elimination of skin picking behavior (ww2 stoppicking  com)  Follow-up in 2-3 months  Aware of 24 hour and weekend coverage for urgent situations accessed by calling [facility] main practice number    Diagnoses and all orders for this visit:    Schizoaffective disorder, bipolar type ([facility] [address] )  -     OLANZapine (ZyPREXA) 5 mg tablet; Take 1 tablet (5 mg total) by mouth daily at bedtime  -     OLANZapine (ZyPREXA) 20 MG tablet; Take 1 tablet (20 mg total) by mouth daily at bedtime  -     FLUoxetine (PROzac) 20 mg capsule; Take 1 capsule (20 mg total) by mouth daily  -     diphenhydrAMINE (Banophen) 50 mg capsule; Take 1 capsule (50 mg total) by mouth daily at bedtime    Post traumatic stress disorder (PTSD)    GOOD (generalized anxiety disorder)    PTSD (post-traumatic stress disorder)  -     prazosin (MINIPRESS) 2 mg capsule; Take 1 capsule (2 mg total) by mouth daily at bedtime        Current Outpatient Medications on File Prior to Visit   Medication Sig Dispense Refill    clonazePAM (KlonoPIN) 0 5 mg tablet Take 1 tablet (0 5 mg total) by mouth 2 (two) times a day 60 tablet 2    levothyroxine 175 mcg tablet Take 200 mcg by mouth daily      Melatonin 10 MG TABS Take 1 tablet (10 mg total) by mouth daily at bedtime      [DISCONTINUED] Banophen 50 MG capsule TAKE 1 CAPSULE (50 MG TOTAL) BY MOUTH DAILY AT BEDTIME 30 capsule 5    [DISCONTINUED] FLUoxetine (PROzac) 20 mg capsule Take 1 capsule (20 mg total) by mouth daily 30 capsule 2    [DISCONTINUED] OLANZapine (ZyPREXA) 20 MG tablet Take 1 tablet (20 mg total) by mouth daily at bedtime 90 tablet 1    [DISCONTINUED] OLANZapine (ZyPREXA) 5 mg tablet Take 1 tablet (5 mg total) by mouth daily at bedtime 90 tablet 1    [DISCONTINUED] prazosin (MINIPRESS) 2 mg capsule Take 1 capsule (2 mg total) by mouth daily at bedtime 30 capsule 5     No current facility-administered medications on file prior to visit  Psychotherapy Provided:     Individual psychotherapy provided: Yes  Counseling was provided during the session today for 16 minutes  Supportive counseling provided  HPI ROS Appetite Changes and Sleep:     She reports normal sleep, normal appetite, normal energy level   Denies homicidal ideation, denies suicidal ideation    Review Of Systems:      General normal    Personality no change in personality   Constitutional negative   ENT negative   Cardiovascular negative   Respiratory negative   Gastrointestinal negative   Genitourinary negative   Musculoskeletal negative   Integumentary negative   Neurological negative   Endocrine negative   Other Symptoms none, all other systems are negative     Mental Status Evaluation:    Appearance Adequate hygiene and grooming   Behavior calm and cooperative   Mood euthymic  Depression Scale -  of 10 (0 = No depression)  Anxiety Scale -  of 10 (0 = No anxiety)   Speech Normal rate and volume   Affect appropriate and mood-congruent   Thought Processes Goal directed and coherent   Thought Content Does not verbalize delusional material   Associations Tightly connected   Perceptual Disturbances Denies hallucinations and does not appear to be responding to internal stimuli   Risk Potential Suicidal/Homicidal Ideation - No evidence of suicidal or homicidal ideation and patient does not verbalize suicidal or homicidal ideation  Risk of Violence - No evidence of risk for violence found on assessment  Risk of Self Mutilation - No evidence of risk for self mutilation found on assessment   Orientation oriented to person, place, time/date and situation   Memory recent and remote memory grossly intact   Consciousness alert and awake   Attention/Concentration attention span and concentration are age appropriate   Insight fair   Judgement fair   Muscle Strength and Gait normal muscle strength and normal muscle tone, normal gait/station and normal balance   Motor Activity no abnormal movements   Language no difficulty naming common objects, no difficulty repeating a phrase, no difficulty writing a sentence   Fund of Knowledge adequate knowledge of current events  adequate fund of knowledge regarding past history  adequate fund of knowledge regarding vocabulary      Past Psychiatric History Update:     Inpatient Psychiatric Admission Since Last Encounter:   no  Suicide Attempt Or Self Mutilation Since Last Encounter:   no  Incidence of Violent Behavior Since Last Encounter:   no    Traumatic History Update:     New Onset of Abuse Since Last Encounter:   no  Traumatic Events Since Last Encounter:   no    Past Medical History:    Past Medical History:   Diagnosis Date    Bipolar 1 disorder ([facility] )     Cancer ([place] [address] )     thyroid    Depression     Schizoaffective disorder, bipolar type ([place] [address] )     Suicidal ideations     Thyroid disease         Past Surgical History:   Procedure Laterality Date    NECK SURGERY      US GUIDED THYROID BIOPSY      normal  Onset: 2012     No Known Allergies  Substance Abuse History:    Social History     Substance and Sexual Activity   Alcohol Use Yes    Comment: occ     Social History     Substance and Sexual Activity   Drug Use No     Social History:    Social History     Socioeconomic History    Marital status: Single     Spouse name: Not on file    Number of children: Not on file    Years of education: Not on file    Highest education level: Not on file   Occupational History    Not on file   Tobacco Use    Smoking status: Former Smoker     Packs/day: 1 00     Types: Cigarettes    Smokeless tobacco: Never Used   Substance and Sexual Activity    Alcohol use: Yes     Comment: occ    Drug use: No    Sexual activity: Not on file   Other Topics Concern    Not on file   Social History Narrative    Not on file     Social Determinants of Health     Financial Resource Strain: Not on file   Food Insecurity: Not on file   Transportation Needs: Not on file   Physical Activity: Not on file   Stress: Not on file   Social Connections: Not on file   Intimate Partner Violence: Not on file   Housing Stability: Not on file     Family Psychiatric History:     No family history on file  History Review: The following portions of the patient's history were reviewed and updated as appropriate: allergies, current medications, past family history, past medical history, past social history, past surgical history and problem list     OBJECTIVE:     Vital signs in last 24 hours: There were no vitals filed for this visit  Laboratory Results: I have personally reviewed all pertinent laboratory/tests results  Suicide/Homicide Risk Assessment:    Risk of Harm to Self:   The following ratings are based on assessment at the time of the interview   Recent Specific Risk Factors include: current depressive symptoms, current anxiety symptoms    Risk of Harm to Others:   The following ratings are based on assessment at the time of the interview   Recent Specific Risk Factors include: none  The following interventions are recommended: no intervention changes needed    Medications Risks/Benefits:      Risks, Benefits And Possible Side Effects Of Medications:    Discussed risks and benefits of treatment with patient including risk of suicidality, serotonin syndrome, increased QTc interval and SIADH related to treatment with antidepressants; Risk of induction of manic symptoms in certain patient populations, risk of parkinsonian symptoms, metabolic syndrome, tardive dyskinesia and neuroleptic malignant syndrome related to treatment with antipsychotic medications and Risk of orthostatic hypotension with prazosin     Controlled Medication Discussion:     [facility] has been filling controlled prescriptions on time as prescribed according to [place] Prescription Drug Monitoring Program    Treatment Plan:    Due for update/Updated:   yes    GENESIS De La Torre 08/04/22    This note was shared with patient

## 2022-08-09 ENCOUNTER — TELEMEDICINE (OUTPATIENT)
Dept: BEHAVIORAL/MENTAL HEALTH CLINIC | Facility: CLINIC | Age: 32
End: 2022-08-09
Payer: COMMERCIAL

## 2022-08-09 DIAGNOSIS — F41.1 GAD (GENERALIZED ANXIETY DISORDER): ICD-10-CM

## 2022-08-09 DIAGNOSIS — F25.0 SCHIZOAFFECTIVE DISORDER, BIPOLAR TYPE (HCC): ICD-10-CM

## 2022-08-09 PROCEDURE — 90834 PSYTX W PT 45 MINUTES: CPT | Performed by: SOCIAL WORKER
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
## 2022-08-30 ENCOUNTER — TELEMEDICINE (OUTPATIENT)
Dept: BEHAVIORAL/MENTAL HEALTH CLINIC | Facility: CLINIC | Age: 32
End: 2022-08-30
Payer: COMMERCIAL

## 2022-08-30 DIAGNOSIS — F43.10 POST TRAUMATIC STRESS DISORDER (PTSD): ICD-10-CM

## 2022-08-30 DIAGNOSIS — F25.0 SCHIZOAFFECTIVE DISORDER, BIPOLAR TYPE (HCC): Primary | ICD-10-CM

## 2022-08-30 DIAGNOSIS — F41.1 GAD (GENERALIZED ANXIETY DISORDER): ICD-10-CM

## 2022-08-30 PROCEDURE — 90834 PSYTX W PT 45 MINUTES: CPT | Performed by: SOCIAL WORKER

## 2022-08-30 NOTE — PSYCH
Psychotherapy Provided: Individual Psychotherapy 45 minutes     Length of time in session: 45 minutes, follow up in 2 week    Encounter Diagnosis     ICD-10-CM    1  Post traumatic stress disorder (PTSD)  43 10    2  Schizoaffective disorder, bipolar type ([facility] [address] )  25 0    3  GOOD (generalized anxiety disorder)  41 1        Goals addressed in session: Goal 1     Pain:      none    0    Current suicide risk : Low     D:  Met with Idania for session  She had a "mini manic episode" over the weekend  She started Keto again and has lost 16 lbs in 2 weeks  She has decreased her coffee and monster intake but,  has been drinking caffeine flavored water  Discussed her [place] plan goal   She reports, "She needs the caffeine for work due to the Special making her tired "  Discussed other things she can do to help with that  She has been picking a great deal   Discussed coping skills for this  A:  Mod progress on goal 2  CP:  To continue addressing  [place] plan goals  Behavioral Health Treatment Plan [facility]: Diagnosis and Treatment Plan explained to Jennyfer Arias relates understanding diagnosis and is agreeable to Treatment Plan  Yes             Virtual Regular Visit    Verification of patient location:    Patient is currently located in the [facility]  Patient is currently located in a state in which I am licensed      Assessment/Plan:    Problem List Items Addressed This Visit    None         Goals addressed in session: 2         Reason for visit is   No chief complaint on file  Encounter provider Janet Chen    Provider located at [address] [place] [id number] [phone]      Recent Visits  No visits were found meeting these conditions  Showing recent visits within past 7 days and meeting all other requirements  Future Appointments  No visits were found meeting these conditions    Showing future appointments within next 150 days and meeting all other requirements       The patient was identified by name and date of birth  Kristina Miner was informed that this is a telemedicine visit and that the [facility] and patient was informed that this is a secure, HIPAA-compliant platform  She agrees to proceed     My office door was closed  No one else was in the room  She acknowledged consent and understanding of privacy and security of the video platform  The patient has agreed to participate and understands they can discontinue the visit at any time  Patient is aware this is a billable service  Subjective  Kristina Miner is a 34 y o  female    HPI     Past Medical History:   Diagnosis Date    [facility] ([facility] )     [facility]     thyroid    Depression     Schizoaffective disorder, bipolar type ([facility] )     Suicidal ideations     Thyroid disease        Past Surgical History:   Procedure Laterality Date    NECK SURGERY      US GUIDED THYROID BIOPSY      normal  Onset: 2012       Current Outpatient Medications   Medication Sig Dispense Refill    clonazePAM (KlonoPIN) 0 5 mg tablet Take 1 tablet (0 5 mg total) by mouth 2 (two) times a day 60 tablet 2    diphenhydrAMINE (Banophen) 50 mg capsule Take 1 capsule (50 mg total) by mouth daily at bedtime 30 capsule 5    FLUoxetine (PROzac) 20 mg capsule Take 1 capsule (20 mg total) by mouth daily 30 capsule 5    levothyroxine 175 mcg tablet Take 200 mcg by mouth daily      Melatonin 10 MG TABS Take 1 tablet (10 mg total) by mouth daily at bedtime      OLANZapine (ZyPREXA) 20 MG tablet Take 1 tablet (20 mg total) by mouth daily at bedtime 90 tablet 1    OLANZapine (ZyPREXA) 5 mg tablet Take 1 tablet (5 mg total) by mouth daily at bedtime 90 tablet 1    prazosin (MINIPRESS) 2 mg capsule Take 1 capsule (2 mg total) by mouth daily at bedtime 30 capsule 5     No current facility-administered medications for this visit  No Known Allergies    Review of Systems    Video Exam    There were no vitals filed for this visit  Physical Exam     I spent 45 minutes directly with the patient during this [address] verbally agrees to participate in [employer]  Pt is aware that [employer] could be limited without vital signs or the ability to perform a full hands-on physical [address] understands she or the provider may request at any time to terminate the video visit and request the patient to seek care or treatment in person

## 2022-09-01 ENCOUNTER — TELEPHONE (OUTPATIENT)
Dept: BEHAVIORAL/MENTAL HEALTH CLINIC | Facility: CLINIC | Age: 32
End: 2022-09-01

## 2022-09-03 ENCOUNTER — APPOINTMENT (OUTPATIENT)
Dept: LAB | Facility: HOSPITAL | Age: 32
End: 2022-09-03
Payer: COMMERCIAL

## 2022-09-03 DIAGNOSIS — E55.9 AVITAMINOSIS D: ICD-10-CM

## 2022-09-03 DIAGNOSIS — Z51.81 ENCOUNTER FOR THERAPEUTIC DRUG MONITORING: ICD-10-CM

## 2022-09-03 DIAGNOSIS — F25.0 SCHIZOAFFECTIVE DISORDER, BIPOLAR TYPE (HCC): ICD-10-CM

## 2022-09-03 DIAGNOSIS — I51.9 MYXEDEMA HEART DISEASE: ICD-10-CM

## 2022-09-03 DIAGNOSIS — E03.9 MYXEDEMA HEART DISEASE: ICD-10-CM

## 2022-09-03 DIAGNOSIS — E83.51 HYPOCALCEMIA: ICD-10-CM

## 2022-09-03 DIAGNOSIS — R63.5 ABNORMAL WEIGHT GAIN: ICD-10-CM

## 2022-09-03 DIAGNOSIS — K21.9 GASTROESOPHAGEAL REFLUX DISEASE WITHOUT ESOPHAGITIS: ICD-10-CM

## 2022-09-03 LAB
25(OH)D3 SERPL-MCNC: 68.1 NG/ML (ref 30–100)
ALBUMIN SERPL BCP-MCNC: 3.2 G/DL (ref 3.5–5)
ALP SERPL-CCNC: 51 U/L (ref 46–116)
ALT SERPL W P-5'-P-CCNC: 75 U/L (ref 12–78)
ANION GAP SERPL CALCULATED.3IONS-SCNC: 12 MMOL/L (ref 4–13)
AST SERPL W P-5'-P-CCNC: 40 U/L (ref 5–45)
BASOPHILS # BLD AUTO: 0.03 THOUSANDS/ΜL (ref 0–0.1)
BASOPHILS NFR BLD AUTO: 0 % (ref 0–1)
BILIRUB SERPL-MCNC: 0.44 MG/DL (ref 0.2–1)
BUN SERPL-MCNC: 11 MG/DL (ref 5–25)
CALCIUM ALBUM COR SERPL-MCNC: 8 MG/DL (ref 8.3–10.1)
CALCIUM SERPL-MCNC: 7.4 MG/DL (ref 8.3–10.1)
CHLORIDE SERPL-SCNC: 105 MMOL/L (ref 96–108)
CHOLEST SERPL-MCNC: 204 MG/DL
CO2 SERPL-SCNC: 22 MMOL/L (ref 21–32)
CREAT SERPL-MCNC: 1.01 MG/DL (ref 0.6–1.3)
EOSINOPHIL # BLD AUTO: 0.17 THOUSAND/ΜL (ref 0–0.61)
EOSINOPHIL NFR BLD AUTO: 2 % (ref 0–6)
ERYTHROCYTE [DISTWIDTH] IN BLOOD BY AUTOMATED COUNT: 15.3 % (ref 11.6–15.1)
GFR SERPL CREATININE-BSD FRML MDRD: 73 ML/MIN/1.73SQ M
GLUCOSE P FAST SERPL-MCNC: 95 MG/DL (ref 65–99)
HCT VFR BLD AUTO: 37.5 % (ref 34.8–46.1)
HDLC SERPL-MCNC: 63 MG/DL
HGB BLD-MCNC: 12.2 G/DL (ref 11.5–15.4)
IMM GRANULOCYTES # BLD AUTO: 0.03 THOUSAND/UL (ref 0–0.2)
IMM GRANULOCYTES NFR BLD AUTO: 0 % (ref 0–2)
LDLC SERPL CALC-MCNC: 124 MG/DL (ref 0–100)
LYMPHOCYTES # BLD AUTO: 1.53 THOUSANDS/ΜL (ref 0.6–4.47)
LYMPHOCYTES NFR BLD AUTO: 22 % (ref 14–44)
MCH RBC QN AUTO: 28.4 PG (ref 26.8–34.3)
MCHC RBC AUTO-ENTMCNC: 32.5 G/DL (ref 31.4–37.4)
MCV RBC AUTO: 87 FL (ref 82–98)
MONOCYTES # BLD AUTO: 0.33 THOUSAND/ΜL (ref 0.17–1.22)
MONOCYTES NFR BLD AUTO: 5 % (ref 4–12)
NEUTROPHILS # BLD AUTO: 5 THOUSANDS/ΜL (ref 1.85–7.62)
NEUTS SEG NFR BLD AUTO: 71 % (ref 43–75)
NRBC BLD AUTO-RTO: 0 /100 WBCS
PHOSPHATE SERPL-MCNC: 3.4 MG/DL (ref 2.7–4.5)
PLATELET # BLD AUTO: 330 THOUSANDS/UL (ref 149–390)
PMV BLD AUTO: 9.7 FL (ref 8.9–12.7)
POTASSIUM SERPL-SCNC: 3.8 MMOL/L (ref 3.5–5.3)
PROT SERPL-MCNC: 8.1 G/DL (ref 6.4–8.4)
PTH-INTACT SERPL-MCNC: 37 PG/ML (ref 18.4–80.1)
RBC # BLD AUTO: 4.3 MILLION/UL (ref 3.81–5.12)
SODIUM SERPL-SCNC: 139 MMOL/L (ref 135–147)
T4 SERPL-MCNC: 19.4 UG/DL (ref 4.7–13.3)
TRIGL SERPL-MCNC: 83 MG/DL
TSH SERPL DL<=0.05 MIU/L-ACNC: 0.53 UIU/ML (ref 0.45–4.5)
WBC # BLD AUTO: 7.09 THOUSAND/UL (ref 4.31–10.16)

## 2022-09-03 PROCEDURE — 84443 ASSAY THYROID STIM HORMONE: CPT

## 2022-09-03 PROCEDURE — 84436 ASSAY OF TOTAL THYROXINE: CPT

## 2022-09-03 PROCEDURE — 83970 ASSAY OF PARATHORMONE: CPT

## 2022-09-03 PROCEDURE — 85025 COMPLETE CBC W/AUTO DIFF WBC: CPT

## 2022-09-03 PROCEDURE — 80053 COMPREHEN METABOLIC PANEL: CPT

## 2022-09-03 PROCEDURE — 80061 LIPID PANEL: CPT

## 2022-09-03 PROCEDURE — 36415 COLL VENOUS BLD VENIPUNCTURE: CPT

## 2022-09-03 PROCEDURE — 84100 ASSAY OF PHOSPHORUS: CPT

## 2022-09-03 PROCEDURE — 82306 VITAMIN D 25 HYDROXY: CPT

## 2022-09-13 ENCOUNTER — TELEPHONE (OUTPATIENT)
Dept: PSYCHIATRY | Facility: CLINIC | Age: 32
End: 2022-09-13

## 2022-09-13 DIAGNOSIS — F42.4 SKIN PICKING HABIT: ICD-10-CM

## 2022-09-13 DIAGNOSIS — F41.1 GAD (GENERALIZED ANXIETY DISORDER): ICD-10-CM

## 2022-09-13 NOTE — TELEPHONE ENCOUNTER
Aysha Long is requesting a refill on their Klonopin   Patient's next appointment with Carey Ip is on 10/6

## 2022-09-14 DIAGNOSIS — F41.1 GAD (GENERALIZED ANXIETY DISORDER): ICD-10-CM

## 2022-09-14 DIAGNOSIS — F42.4 SKIN PICKING HABIT: ICD-10-CM

## 2022-09-14 RX ORDER — CLONAZEPAM 0.5 MG/1
0.5 TABLET ORAL 2 TIMES DAILY
Qty: 60 TABLET | Refills: 2 | Status: SHIPPED | OUTPATIENT
Start: 2022-09-14

## 2022-09-22 ENCOUNTER — TELEPHONE (OUTPATIENT)
Dept: PSYCHIATRY | Facility: CLINIC | Age: 32
End: 2022-09-22

## 2022-09-22 NOTE — TELEPHONE ENCOUNTER
A message was left requesting a call back to the office as appointment on 10/6/22 with provider needs to be rescheduled as provider will not be in office      ** When patient returns call, please assist with scheduling patient**

## 2022-09-30 ENCOUNTER — TELEMEDICINE (OUTPATIENT)
Dept: BEHAVIORAL/MENTAL HEALTH CLINIC | Facility: CLINIC | Age: 32
End: 2022-09-30

## 2022-09-30 DIAGNOSIS — F43.10 POST TRAUMATIC STRESS DISORDER (PTSD): ICD-10-CM

## 2022-09-30 DIAGNOSIS — F25.0 SCHIZOAFFECTIVE DISORDER, BIPOLAR TYPE (HCC): Primary | ICD-10-CM

## 2022-09-30 DIAGNOSIS — F41.1 GAD (GENERALIZED ANXIETY DISORDER): ICD-10-CM

## 2022-10-12 ENCOUNTER — TELEMEDICINE (OUTPATIENT)
Dept: BEHAVIORAL/MENTAL HEALTH CLINIC | Facility: CLINIC | Age: 32
End: 2022-10-12
Payer: COMMERCIAL

## 2022-10-12 DIAGNOSIS — F41.1 GAD (GENERALIZED ANXIETY DISORDER): ICD-10-CM

## 2022-10-12 DIAGNOSIS — F25.0 SCHIZOAFFECTIVE DISORDER, BIPOLAR TYPE (HCC): Primary | ICD-10-CM

## 2022-10-12 DIAGNOSIS — F43.10 POST TRAUMATIC STRESS DISORDER (PTSD): ICD-10-CM

## 2022-10-12 PROCEDURE — 90834 PSYTX W PT 45 MINUTES: CPT | Performed by: SOCIAL WORKER

## 2022-10-12 NOTE — PSYCH
Psychotherapy Provided: Individual Psychotherapy 45 minutes     Length of time in session: 45 minutes, follow up in 2 week    Encounter Diagnosis     ICD-10-CM    1  Post traumatic stress disorder (PTSD)  43 10    2  Schizoaffective disorder, bipolar type ([facility] )  25 0    3  GOOD (generalized anxiety disorder)  41 1        Goals addressed in session: Goal 1     Pain:      none    0    Current suicide risk : Low     D:  Met with Kiketerries for session  Discussed home and work  Discussed her relationship with her "roommate "  MSW asked if her roommate and her were a couple  She hesitated and then replied, "yes "  Dicussed why she never told MSW  Her defense is, "She is used to having her guard up at work and denies her relationship at work  Discussed how this changes the dynamics of things regarding her relationship when she talks about this with girlfriend such as bills, etc     A:  Mod progress on goal 2  P:  To continue addressing  TX plan goals  Behavioral Health Treatment Plan [facility]: Diagnosis and Treatment Plan explained to Rika Mann relates understanding diagnosis and is agreeable to Treatment Plan  Yes             Virtual Regular Visit    Verification of patient location:    Patient is currently located in the [facility]  Patient is currently located in a state in which I am licensed      Assessment/Plan:    Problem List Items Addressed This Visit    None         Goals addressed in session: 2         Reason for visit is   No chief complaint on file  Encounter provider Sunni Smith    Provider located at [address] [place] [id number] [phone]      Recent Visits  No visits were found meeting these conditions  Showing recent visits within past 7 days and meeting all other requirements  Future Appointments  No visits were found meeting these conditions    Showing future appointments within next 150 days and meeting all other requirements       The patient was identified by name and date of birth  Jose Enrique Méndez was informed that this is a telemedicine visit and that the [facility] and patient was informed that this is a secure, HIPAA-compliant platform  She agrees to proceed     My office door was closed  No one else was in the room  She acknowledged consent and understanding of privacy and security of the video platform  The patient has agreed to participate and understands they can discontinue the visit at any time  Patient is aware this is a billable service  Subjective  Jose Enrique Méndez is a 34 y o  female    HPI     Past Medical History:   Diagnosis Date   • Bipolar 1 disorder ([facility] [address] )    • Cancer ([facility] )     thyroid   • Depression    • Schizoaffective disorder, bipolar type ([facility] )    • Suicidal ideations    • Thyroid disease        Past Surgical History:   Procedure Laterality Date   • NECK SURGERY     • US GUIDED THYROID BIOPSY      normal  Onset: 2012       Current Outpatient Medications   Medication Sig Dispense Refill   • clonazePAM (KlonoPIN) 0 5 mg tablet Take 1 tablet (0 5 mg total) by mouth 2 (two) times a day 60 tablet 2   • diphenhydrAMINE (Banophen) 50 mg capsule Take 1 capsule (50 mg total) by mouth daily at bedtime 30 capsule 5   • FLUoxetine (PROzac) 20 mg capsule Take 1 capsule (20 mg total) by mouth daily 30 capsule 5   • levothyroxine 175 mcg tablet Take 200 mcg by mouth daily     • Melatonin 10 MG TABS Take 1 tablet (10 mg total) by mouth daily at bedtime     • OLANZapine (ZyPREXA) 20 MG tablet Take 1 tablet (20 mg total) by mouth daily at bedtime 90 tablet 1   • OLANZapine (ZyPREXA) 5 mg tablet Take 1 tablet (5 mg total) by mouth daily at bedtime 90 tablet 1   • prazosin (MINIPRESS) 2 mg capsule Take 1 capsule (2 mg total) by mouth daily at bedtime 30 capsule 5     No current facility-administered medications for this visit  No Known Allergies    Review of Systems    Video Exam    There were no vitals filed for this visit  Physical Exam     I spent 45 minutes directly with the patient during this [address] verbally agrees to participate in [employer]  Pt is aware that [employer] could be limited without vital signs or the ability to perform a full hands-on physical [address] understands she or the provider may request at any time to terminate the video visit and request the patient to seek care or treatment in person

## 2022-10-19 ENCOUNTER — TELEPHONE (OUTPATIENT)
Dept: PSYCHIATRY | Facility: CLINIC | Age: 32
End: 2022-10-19

## 2022-10-19 DIAGNOSIS — F43.10 PTSD (POST-TRAUMATIC STRESS DISORDER): ICD-10-CM

## 2022-10-19 DIAGNOSIS — F25.0 SCHIZOAFFECTIVE DISORDER, BIPOLAR TYPE (HCC): ICD-10-CM

## 2022-10-19 NOTE — TELEPHONE ENCOUNTER
Fax from [facility] requesting 90 day script for Prazosin  Will refer to Corie Mancilla for review

## 2022-10-20 RX ORDER — PRAZOSIN HYDROCHLORIDE 2 MG/1
2 CAPSULE ORAL
Qty: 90 CAPSULE | Refills: 1 | Status: SHIPPED | OUTPATIENT
Start: 2022-10-20

## 2022-10-20 RX ORDER — FLUOXETINE HYDROCHLORIDE 20 MG/1
20 CAPSULE ORAL DAILY
Qty: 90 CAPSULE | Refills: 1 | Status: SHIPPED | OUTPATIENT
Start: 2022-10-20

## 2022-10-25 ENCOUNTER — TELEMEDICINE (OUTPATIENT)
Dept: BEHAVIORAL/MENTAL HEALTH CLINIC | Facility: CLINIC | Age: 32
End: 2022-10-25

## 2022-10-25 DIAGNOSIS — F43.10 POST TRAUMATIC STRESS DISORDER (PTSD): ICD-10-CM

## 2022-10-25 DIAGNOSIS — F41.1 GAD (GENERALIZED ANXIETY DISORDER): ICD-10-CM

## 2022-10-25 DIAGNOSIS — F25.0 SCHIZOAFFECTIVE DISORDER, BIPOLAR TYPE (HCC): Primary | ICD-10-CM

## 2022-10-25 NOTE — PSYCH
Visit Time    Visit Start Time: 5:15 pm  Visit Stop Time: 6:00pm  Total Visit Duration: 45 minutes          Psychotherapy Provided: Individual Psychotherapy 45 minutes     Length of time in session: 45 minutes, follow up in 2 week    Encounter Diagnosis     ICD-10-CM    1  Post traumatic stress disorder (PTSD)  43 10    2  Schizoaffective disorder, bipolar type ([facility] [address] [address] )  25 0    3  GOOD (generalized anxiety disorder)  41 1        Goals addressed in session: Goal 1     Pain:      none    0    Current suicide risk : Low     D:  Met with Idania for session  Just before session she became very angry at her mom and smashed her plate with food on it  Discussed the reason why she is so angry, coping skills for her anger and her behavior  She lives in her dad's house but, she pays the mortgage and all the bills  Her parents live 3 months in the  and then 3 months here, then 3 months in , etc   When her parents are here they live in their house and her mom treats her like a child  One thing that really makes her angry is when her mother asks her, "Did you wash your hands before she gets something to eat?    A:  Mild progress on goal 2  P:  To continue addressing  TX plan goals  Behavioral Health Treatment Plan [facility]: Diagnosis and Treatment Plan explained to Theesuhail Santa relates understanding diagnosis and is agreeable to Treatment Plan  Yes             Virtual Regular Visit    Verification of patient location:    Patient is currently located in the [facility]  Patient is currently located in a state in which I am licensed      Assessment/Plan:    Problem List Items Addressed This Visit    None         Goals addressed in session: 2         Reason for visit is   No chief complaint on file         Encounter provider Hulan Canavan    Provider located at [address] [place] [id number]  [phone]      Recent Visits  No visits were found meeting these conditions  Showing recent visits within past 7 days and meeting all other requirements  Future Appointments  No visits were found meeting these conditions  Showing future appointments within next 150 days and meeting all other requirements       The patient was identified by name and date of birth  [employer] was informed that this is a telemedicine visit and that the visit is being conducted through[facility] and patient was informed that this is a secure, HIPAA-compliant platform  She agrees to proceed     My office door was closed  No one else was in the room  She acknowledged consent and understanding of privacy and security of the video platform  The patient has agreed to participate and understands they can discontinue the visit at any time  Patient is aware this is a billable service  Subjective  Urban Capps is a 34 y o  female          HPI     Past Medical History:   Diagnosis Date   • Bipolar 1 disorder ([facility] )    • Cancer ([facility] [address] )     thyroid   • Depression    • Schizoaffective disorder, bipolar type ([facility] )    • Suicidal ideations    • Thyroid disease        Past Surgical History:   Procedure Laterality Date   • NECK SURGERY     • US GUIDED THYROID BIOPSY      normal  Onset: 2012       Current Outpatient Medications   Medication Sig Dispense Refill   • clonazePAM (KlonoPIN) 0 5 mg tablet Take 1 tablet (0 5 mg total) by mouth 2 (two) times a day 60 tablet 2   • diphenhydrAMINE (Banophen) 50 mg capsule Take 1 capsule (50 mg total) by mouth daily at bedtime 30 capsule 5   • FLUoxetine (PROzac) 20 mg capsule Take 1 capsule (20 mg total) by mouth daily 90 capsule 1   • levothyroxine 175 mcg tablet Take 200 mcg by mouth daily     • Melatonin 10 MG TABS Take 1 tablet (10 mg total) by mouth daily at bedtime     • OLANZapine (ZyPREXA) 20 MG tablet Take 1 tablet (20 mg total) by mouth daily at bedtime 90 tablet 1   • OLANZapine (ZyPREXA) 5 mg tablet Take 1 tablet (5 mg total) by mouth daily at bedtime 90 tablet 1   • prazosin (MINIPRESS) 2 mg capsule Take 1 capsule (2 mg total) by mouth daily at bedtime 90 capsule 1     No current facility-administered medications for this visit  No Known Allergies    Review of Systems    Video Exam    There were no vitals filed for this visit  Physical Exam     I spent 45 minutes directly with the patient during this [address] verbally agrees to participate in [employer]  Pt is aware that [employer] could be limited without vital signs or the ability to perform a full hands-on physical [address] understands she or the provider may request at any time to terminate the video visit and request the patient to seek care or treatment in person

## 2022-11-03 ENCOUNTER — TELEPHONE (OUTPATIENT)
Dept: PSYCHIATRY | Facility: CLINIC | Age: 32
End: 2022-11-03

## 2022-11-03 ENCOUNTER — TELEMEDICINE (OUTPATIENT)
Dept: PSYCHIATRY | Facility: CLINIC | Age: 32
End: 2022-11-03

## 2022-11-03 DIAGNOSIS — F25.0 SCHIZOAFFECTIVE DISORDER, BIPOLAR TYPE (HCC): Primary | ICD-10-CM

## 2022-11-03 DIAGNOSIS — F99 INSOMNIA DUE TO OTHER MENTAL DISORDER: ICD-10-CM

## 2022-11-03 DIAGNOSIS — Z51.81 ENCOUNTER FOR THERAPEUTIC DRUG MONITORING: ICD-10-CM

## 2022-11-03 DIAGNOSIS — F51.05 INSOMNIA DUE TO OTHER MENTAL DISORDER: ICD-10-CM

## 2022-11-03 DIAGNOSIS — F43.10 POST TRAUMATIC STRESS DISORDER (PTSD): ICD-10-CM

## 2022-11-03 DIAGNOSIS — F41.1 GAD (GENERALIZED ANXIETY DISORDER): ICD-10-CM

## 2022-11-03 RX ORDER — ZALEPLON 5 MG/1
5 CAPSULE ORAL
Qty: 30 CAPSULE | Refills: 2 | Status: SHIPPED | OUTPATIENT
Start: 2022-11-03

## 2022-11-03 NOTE — TELEPHONE ENCOUNTER
Patient called stating new medication prescribed today, zaleplon, is not covered by her insurance  We may need to get prior authorization

## 2022-11-03 NOTE — PSYCH
Visit Time    Visit Start Time: 2955  Visit Stop Time: 6129  Total Visit Duration: 25 minutes   Virtual Regular Visit    Problem List Items Addressed This Visit        Other    Post traumatic stress disorder (PTSD)    Relevant Medications    zaleplon (SONATA) 5 MG capsule    Schizoaffective disorder, bipolar type ([place] [address] ) - Primary    Relevant Medications    zaleplon (SONATA) 5 MG capsule    Other Relevant Orders    ECG with rhythm strip    GOOD (generalized anxiety disorder)    Relevant Medications    zaleplon (SONATA) 5 MG capsule    Insomnia due to other mental disorder    Relevant Medications    zaleplon (SONATA) 5 MG capsule      Other Visit Diagnoses     Encounter for therapeutic drug monitoring        Relevant Orders    ECG with rhythm strip             Encounter provider GENESIS Gil    Provider located at   [address]  [facility]   [address] [facility] [address]  [facility] [id number] [phone]    Patient is located in the following state in which I hold an active license [place]    Recent Visits  No visits were found meeting these conditions  Showing recent visits within past 7 days and meeting all other requirements  Today's Visits  Date Type Provider Dept   11/03/22 Telemedicine [facility]IS [facility]   Showing today's visits and meeting all other requirements  Future Appointments  No visits were found meeting these conditions  Showing future appointments within next 150 days and meeting all other requirements     The patient was identified by name and date of birth  [employer] was informed that this is a telemedicine visit and that the visit is being conducted through [facility] and patient was informed that this is a secure, HIPAA-compliant platform  [employer] agrees to proceed  My office door was closed  No one else was in the room    She acknowledged consent and understanding of privacy and security of the video platform  The patient has agreed to participate and understands they can discontinue the visit at any time  Patient is aware this is a billable service  HPI     Current Outpatient Medications   Medication Sig Dispense Refill   • zaleplon (SONATA) 5 MG capsule Take 1 capsule (5 mg total) by mouth daily at bedtime 30 capsule 2   • clonazePAM (KlonoPIN) 0 5 mg tablet Take 1 tablet (0 5 mg total) by mouth 2 (two) times a day 60 tablet 2   • FLUoxetine (PROzac) 20 mg capsule Take 1 capsule (20 mg total) by mouth daily 90 capsule 1   • levothyroxine 175 mcg tablet Take 200 mcg by mouth daily     • Melatonin 10 MG TABS Take 1 tablet (10 mg total) by mouth daily at bedtime     • OLANZapine (ZyPREXA) 20 MG tablet Take 1 tablet (20 mg total) by mouth daily at bedtime 90 tablet 1   • OLANZapine (ZyPREXA) 5 mg tablet Take 1 tablet (5 mg total) by mouth daily at bedtime 90 tablet 1   • prazosin (MINIPRESS) 2 mg capsule Take 1 capsule (2 mg total) by mouth daily at bedtime 90 capsule 1     No current facility-administered medications for this visit  Review of Systems  Video Exam    There were no vitals filed for this visit  Physical Exam   As a result of this visit, I have referred the patient for further respiratory evaluation  No    VIRTUAL VISIT DISCLAIMER    Trung Montgomery acknowledges that she has consented to an online visit or consultation  She understands that the online visit is based solely on information provided by her, and that, in the absence of a face-to-face physical evaluation by the physician, the diagnosis she receives is both limited and provisional in terms of accuracy and completeness  This is not intended to replace a full medical face-to-face evaluation by the physician  Trung Montgomery understands and accepts these terms      This note was not shared with the patient due to reasonable likelihood of causing patient harm   MEDICATION MANAGEMENT NOTE        Charito Murrell [address] [place] [address]    Name and Date of Birth:  Mable Ng 34 y o  1990 MRN: [id number]    Date of Visit: November 3, 2022    No Known Allergies  SUBJECTIVE:    Yovany Mcqueen is seen today for a follow up for PTSD, Schizoaffective Disorder and insomnia  She reports that she has decompensated slightly since the last visit  Patient reports that with past 2 months patient has experienced some fluctuations in mood  Patient experienced a brief increase in depression started the fall season  Since then mood is euthymic  With past 1-2 weeks patient has been experiencing sleeplessness  Per patient endocrine intentionally increased levothyroxine rule out metabolism and T4 level to run high  Suspect sleeplessness may be associated with elevated T4  Recommend follow-up with Endocrine as soon as possible to discuss sleeplessness and consider reducing levothyroxine  Currently patient has been taking 6 tablets of diphenhydramine per night for sleeping  Patient to obtain ECG to rule out QTC elevation    Will begin short-term course sonata 5 mg p o  HS to assist with sleep  Discontinue diphenhydramine use, obtain ECG elevated QTC    Patient is concerned about weight gain  Consider addition of metformin     She denies any side effects from medications  PLAN:  Initiate sonata 5 mg p o  HS as needed for sleep  Continue olanzapine 25 mg p o  HS  Continue prazosin 2 mg p o  HS  Continue fluoxetine 20 mg p o  Daily  Continue clonazepam 0 5 mg p o  B i d   Continue individual therapy  Follow-up with Endocrine regarding elevated T4  Follow-up in 3-4 weeks  Aware of 24 hour and weekend coverage for urgent situations accessed by calling [facility] main practice number    Diagnoses and all orders for this visit:    [facility]  -     ECG with rhythm strip;  Future    Post traumatic stress disorder (PTSD)    Insomnia due to other mental disorder  - zaleplon (SONATA) 5 MG capsule; Take 1 capsule (5 mg total) by mouth daily at bedtime    GOOD (generalized anxiety disorder)    Encounter for therapeutic drug monitoring  -     ECG with rhythm strip; Future      Current Outpatient Medications on File Prior to Visit   Medication Sig Dispense Refill   • clonazePAM (KlonoPIN) 0 5 mg tablet Take 1 tablet (0 5 mg total) by mouth 2 (two) times a day 60 tablet 2   • FLUoxetine (PROzac) 20 mg capsule Take 1 capsule (20 mg total) by mouth daily 90 capsule 1   • levothyroxine 175 mcg tablet Take 200 mcg by mouth daily     • Melatonin 10 MG TABS Take 1 tablet (10 mg total) by mouth daily at bedtime     • OLANZapine (ZyPREXA) 20 MG tablet Take 1 tablet (20 mg total) by mouth daily at bedtime 90 tablet 1   • OLANZapine (ZyPREXA) 5 mg tablet Take 1 tablet (5 mg total) by mouth daily at bedtime 90 tablet 1   • prazosin (MINIPRESS) 2 mg capsule Take 1 capsule (2 mg total) by mouth daily at bedtime 90 capsule 1   • [DISCONTINUED] diphenhydrAMINE (Banophen) 50 mg capsule Take 1 capsule (50 mg total) by mouth daily at bedtime 30 capsule 5     No current facility-administered medications on file prior to visit  Psychotherapy Provided:     Individual psychotherapy provided: Yes  Counseling was provided during the session today for 16 minutes  Supportive counseling provided  HPI ROS Appetite Changes and Sleep:     She reports difficulty falling asleep, decreased appetite, low energy   Denies homicidal ideation, denies suicidal ideation    Review Of Systems:      General emotional problems, decreased functioning and sleep disturbances   Personality no change in personality   Constitutional negative   ENT negative   Cardiovascular negative   Respiratory negative   Gastrointestinal negative   Genitourinary negative   Musculoskeletal negative   Integumentary negative   Neurological negative   Endocrine negative   Other Symptoms none, all other systems are negative     Mental Status Evaluation:    Appearance Adequate hygiene and grooming   Behavior calm and cooperative   Mood euthymic  Depression Scale -  of 10 (0 = No depression)  Anxiety Scale -  of 10 (0 = No anxiety)   Speech Normal rate and volume   Affect appropriate and mood-congruent   Thought Processes Goal directed and coherent   Thought Content Does not verbalize delusional material   Associations Tightly connected   Perceptual Disturbances Denies hallucinations and does not appear to be responding to internal stimuli   Risk Potential Suicidal/Homicidal Ideation - No evidence of suicidal or homicidal ideation and patient does not verbalize suicidal or homicidal ideation  Risk of Violence - No evidence of risk for violence found on assessment  Risk of Self Mutilation - No evidence of risk for self mutilation found on assessment   Orientation oriented to person, place, time/date and situation   Memory recent and remote memory grossly intact   Consciousness alert and awake   Attention/Concentration attention span and concentration are age appropriate   Insight intact   Judgement intact   Muscle Strength and Gait normal muscle strength and normal muscle tone, normal gait/station and normal balance   Motor Activity no abnormal movements   Language no difficulty naming common objects, no difficulty repeating a phrase, no difficulty writing a sentence   Fund of Knowledge adequate knowledge of current events  adequate fund of knowledge regarding past history  adequate fund of knowledge regarding vocabulary      Past Psychiatric History Update:     Inpatient Psychiatric Admission Since Last Encounter:   no  Suicide Attempt Or Self Mutilation Since Last Encounter:   no  Incidence of Violent Behavior Since Last Encounter:   no    Traumatic History Update:     New Onset of Abuse Since Last Encounter:   no  Traumatic Events Since Last Encounter:   no    Past Medical History:    Past Medical History:   Diagnosis Date   • Bipolar 1 disorder ([facility] )    • Cancer ([place] [address] )     thyroid   • Depression    • Schizoaffective disorder, bipolar type ([place] [address] )    • Suicidal ideations    • Thyroid disease         Past Surgical History:   Procedure Laterality Date   • NECK SURGERY     • US GUIDED THYROID BIOPSY      normal  Onset: 2012     No Known Allergies  Substance Abuse History:    Social History     Substance and Sexual Activity   Alcohol Use Yes    Comment: occ     Social History     Substance and Sexual Activity   Drug Use No     Social History:    Social History     Socioeconomic History   • Marital status: Single     Spouse name: Not on file   • Number of children: Not on file   • Years of education: Not on file   • Highest education level: Not on file   Occupational History   • Not on file   Tobacco Use   • Smoking status: Former Smoker     Packs/day: 1 00     Types: Cigarettes   • Smokeless tobacco: Never Used   Substance and Sexual Activity   • Alcohol use: Yes     Comment: occ   • Drug use: No   • Sexual activity: Not on file   Other Topics Concern   • Not on file   Social History Narrative   • Not on file     Social Determinants of Health     Financial Resource Strain: Not on file   Food Insecurity: Not on file   Transportation Needs: Not on file   Physical Activity: Not on file   Stress: Not on file   Social Connections: Not on file   Intimate Partner Violence: Not on file   Housing Stability: Not on file     Family Psychiatric History:     No family history on file  History Review: The following portions of the patient's history were reviewed and updated as appropriate: allergies, current medications, past family history, past medical history, past social history, past surgical history and problem list     OBJECTIVE:     Vital signs in last 24 hours: There were no vitals filed for this visit  Laboratory Results: I have personally reviewed all pertinent laboratory/tests results      Suicide/Homicide Risk Assessment:    Risk of Harm to Self:  The following ratings are based on assessment at the time of the interview  Recent Specific Risk Factors include: none    Risk of Harm to Others: The following ratings are based on assessment at the time of the interview    The following interventions are recommended: no intervention changes needed    Medications Risks/Benefits:      Risks, Benefits And Possible Side Effects Of Medications:    Discussed risks and benefits of treatment with patient including risk of suicidality, serotonin syndrome, increased QTc interval and SIADH related to treatment with antidepressants;  Risk of induction of manic symptoms in certain patient populations, risk of parkinsonian symptoms, metabolic syndrome, tardive dyskinesia and neuroleptic malignant syndrome related to treatment with antipsychotic medications, risks of misuse, abuse or dependence, sedation and respiratory depression related to treatment with benzodiazepine medications and risk of impaired next-day mental alertness, complex sleep-related behavior and dependence related to treatment with hypnotic medications     Controlled Medication Discussion:     Victor Manuel Browning has been filling controlled prescriptions on time as prescribed according to [place] Prescription Drug Monitoring Program    Treatment Plan:    Due for update/Updated:   yes    GENESIS Wiggins 11/03/22

## 2022-11-04 NOTE — TELEPHONE ENCOUNTER
Called pharmacy to confirm that Prior Authorization is needed for Zaleplon 5 mg capsules  Pharmacist states [place] is needed and provided insurance information of ID # [id number], BIN: [id number], PCN: [id number]  Prior Authorization faxed to [facility] via [address] [place] [address]  Decision pending  Will refer to Linda Child for his information

## 2022-11-04 NOTE — TELEPHONE ENCOUNTER
The following statement may be used as supporting evidence for medical necessity of zaleplon: patient has had a number of prior medication trials for treatment of insomnia including hydroxyzine, diphenhydramine, quetiapine, trazodone  These medications where either ineffective or caused intolerable side effects  IN the past patient was treated with zaleplon and this medication was highly efficacious

## 2022-11-11 ENCOUNTER — TELEMEDICINE (OUTPATIENT)
Dept: BEHAVIORAL/MENTAL HEALTH CLINIC | Facility: CLINIC | Age: 32
End: 2022-11-11

## 2022-11-11 DIAGNOSIS — F43.10 POST TRAUMATIC STRESS DISORDER (PTSD): ICD-10-CM

## 2022-11-11 DIAGNOSIS — F25.0 SCHIZOAFFECTIVE DISORDER, BIPOLAR TYPE (HCC): Primary | ICD-10-CM

## 2022-11-11 DIAGNOSIS — F41.1 GAD (GENERALIZED ANXIETY DISORDER): ICD-10-CM

## 2022-11-11 NOTE — PSYCH
Psychotherapy Provided: Individual Psychotherapy 45 minutes     Length of time in session: 45 minutes, follow up in 2 week    Encounter Diagnosis     ICD-10-CM    1  Post traumatic stress disorder (PTSD)  43 10    2  Schizoaffective disorder, bipolar type ([facility] )  25 0    3  GOOD (generalized anxiety disorder)  41 1        Goals addressed in session: Goal 1     Pain:      none    0    Current suicide risk : Low     D:  Met with Idania for session  "She is and has been in a mode and is not sure why "  She also has been having trouble sleeping and shared with her psych PA that she was taking an over the counter sleep aid  He discussed that with her  She was taking 6 pills per night  MSW had her look at the bottle at the recommended dose which is 2 pills  Her parents and brother told her her father is buying a water softener and she spend $14,000 to convert the house to gas from oil and she has to stay there 5 years to pay it off  She wanted to get another place and move out in a year  She has began picking again  Reviewed tx plan and developed a new       She switched from soda and energy drinks to 3 coffees per day  Willie Rowell A:  Mild progress on goal 1  Appears to be "rr" due to feeling  Stuck in the house situation  She agreed  P:  To continue addressing  TX plan goals  Behavioral Health Treatment Plan [facility]: Diagnosis and Treatment Plan explained to Jamas Cogan relates understanding diagnosis and is agreeable to Treatment Plan   Yes             Virtual Regular Visit    Verification of patient location:    Patient is currently located in the [facility]  Patient is currently located in a state in which I am licensed      Assessment/Plan:    Problem List Items Addressed This Visit        Other    Post traumatic stress disorder (PTSD)    Schizoaffective disorder, bipolar type ([facility] ) - Primary    GOOD (generalized anxiety disorder)          Goals addressed in session: 2         Reason for visit is   No chief complaint on file  Encounter provider Laci Cuevas    Provider located at [address] [place] [id number] [phone]      Recent Visits  No visits were found meeting these conditions  Showing recent visits within past 7 days and meeting all other requirements  Today's Visits  Date Type Provider Dept   11/11/22 [address] [facility] [address] cloud's visits and meeting all other requirements  Future Appointments  No visits were found meeting these conditions  Showing future appointments within next 150 days and meeting all other requirements       The patient was identified by name and date of birth  Isabela Draper was informed that this is a telemedicine visit and that the visit is being conducted through[employer] and patient was informed that this is a secure, HIPAA-compliant platform  She agrees to proceed     My office door was closed  No one else was in the room  She acknowledged consent and understanding of privacy and security of the video platform  The patient has agreed to participate and understands they can discontinue the visit at any time  Patient is aware this is a billable service  Subjective  Isabela Draper is a 34 y o  female          HPI     Past Medical History:   Diagnosis Date   • Bipolar 1 disorder ([facility] )    • Cancer ([facility] )     thyroid   • Depression    • Schizoaffective disorder, bipolar type ([facility] )    • Suicidal ideations    • Thyroid disease        Past Surgical History:   Procedure Laterality Date   • NECK SURGERY     • US GUIDED THYROID BIOPSY      normal  Onset: 2012       Current Outpatient Medications   Medication Sig Dispense Refill   • clonazePAM (KlonoPIN) 0 5 mg tablet Take 1 tablet (0 5 mg total) by mouth 2 (two) times a day 60 tablet 2   • FLUoxetine (PROzac) 20 mg capsule Take 1 capsule (20 mg total) by mouth daily 90 capsule 1   • levothyroxine 175 mcg tablet Take 200 mcg by mouth daily     • Melatonin 10 MG TABS Take 1 tablet (10 mg total) by mouth daily at bedtime     • OLANZapine (ZyPREXA) 20 MG tablet Take 1 tablet (20 mg total) by mouth daily at bedtime 90 tablet 1   • OLANZapine (ZyPREXA) 5 mg tablet Take 1 tablet (5 mg total) by mouth daily at bedtime 90 tablet 1   • prazosin (MINIPRESS) 2 mg capsule Take 1 capsule (2 mg total) by mouth daily at bedtime 90 capsule 1   • zaleplon (SONATA) 5 MG capsule Take 1 capsule (5 mg total) by mouth daily at bedtime 30 capsule 2     No current facility-administered medications for this visit  No Known Allergies    Review of Systems    Video Exam    There were no vitals filed for this visit  Physical Exam     I spent 45 minutes directly with the patient during this [address] verbally agrees to participate in [employer]  Pt is aware that [employer] could be limited without vital signs or the ability to perform a full hands-on physical 1000 Xiomara Cuevas understands she or the provider may request at any time to terminate the video visit and request the patient to seek care or treatment in person

## 2022-11-11 NOTE — TELEPHONE ENCOUNTER
Follow up call to [employer] for status of Prior Authorization for Zaleplon  PA approved effective 10/5/22 - 2/2/23      Notified pharmacy of approval     Kellie Edwards and informed her of approval

## 2022-11-11 NOTE — BH TREATMENT PLAN
Kamala Ewa  1990       Date of Initial Treatment Plan: 11/9/20   Date of Current Treatment Plan: 11/11/22    Treatment Plan Number 4    Strengths/Personal Resources for Self Care: hard working and caring    Diagnosis:   1  Schizoaffective disorder, bipolar type ([facility] )     2  Post traumatic stress disorder (PTSD)     3  GOOD (generalized anxiety disorder)         Area of Needs: I am in "rrr" mode because I am stressed out  Long Term Goal 1:  I want to feel better  Target Date:   5/23  Completion Date: N/A         Short Term Objectives for Goal 1:   I will look into [employer] rings to help with the picking  I will decrease my coffee intake to two coffees  I will look at the 5 yr plan with the house as an investment  I will remember just because there is a 5 yr plan does not mean I have to stay here for 5 years  I will remember when stressed out about something I can change the way I think about it which then decreases my stress and also decreases anger  I will talk to my parents and brother about how we change the layout of the house to give me my independence of when my parents are here  Long Term Goal 2:  NA    Target Date:  NA  Completion Date: N/A    Short Term Objectives for Goal 2:         Long Term Goal # 3: N/A        Target Date: N/A  Completion Date: N/A    Short Term Objectives for Goal 3: N/A    GOAL 1: Modality: Individual 2x per month   Completion Date Iveliss  and The person(s) responsible for carrying out the plan is  Iveliss  GOAL 2: Modality:  NA    GOAL 3: Modality: NA      Behavioral Health Treatment Plan [facility]: Diagnosis and Treatment Plan explained to Judson Prabhakar relates understanding diagnosis and is agreeable to Treatment Plan         Client Comments : Please share your thoughts, feelings, need and/or experiences regarding your treatment plan:         Kamala Mcneil, 1990, actively participated in the review and update of this treatment plan during a virtual session, using the [employer] platform  Rosy Juan Carlos  provided verbal consent on 11/11/2022 at 6:00 PM  The treatment plan was transcribed into the [facility] Record at a later time         Louise Ramirez LCSW 11/11/22 @ 6:00pm

## 2022-11-15 ENCOUNTER — TELEPHONE (OUTPATIENT)
Dept: PSYCHIATRY | Facility: CLINIC | Age: 32
End: 2022-11-15

## 2022-11-15 NOTE — TELEPHONE ENCOUNTER
Pt phoned in because she noticed via my chart that 2 of her appts scheduled were for in person and she wanted them virtual  Writer was able to change that per the pts request

## 2022-11-22 ENCOUNTER — TELEMEDICINE (OUTPATIENT)
Dept: BEHAVIORAL/MENTAL HEALTH CLINIC | Facility: CLINIC | Age: 32
End: 2022-11-22

## 2022-11-22 DIAGNOSIS — F41.1 GAD (GENERALIZED ANXIETY DISORDER): ICD-10-CM

## 2022-11-22 DIAGNOSIS — F25.0 SCHIZOAFFECTIVE DISORDER, BIPOLAR TYPE (HCC): Primary | ICD-10-CM

## 2022-11-22 DIAGNOSIS — F43.10 POST TRAUMATIC STRESS DISORDER (PTSD): ICD-10-CM

## 2022-11-22 NOTE — PSYCH
Psychotherapy Provided: Individual Psychotherapy 45 minutes     Length of time in session: 45 minutes, follow up in 2 week    Encounter Diagnosis     ICD-10-CM    1  Post traumatic stress disorder (PTSD)  43 10    2  Schizoaffective disorder, bipolar type (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  She is going to start doing it for fun first and as a way to socialize  Next week she is off work  She already organized her pills for next week in pill boxes and she is going to set her alarm on her phone with reminders to take her meds  Discussed her relaltionship and how that relationship grounds her due to she doesn't want her to deal with her crazy  Her girlfriend "really cares about her "  She stopped taking the OT sleep meds because the PA and myself told her not to but, the sleep meds the PA prescribed are not working  MSW sent a message to her PA informing him of this  A:  Mild progress on goal 1  P:  To continue addressing  TX plan goals  Behavioral Health Treatment Plan [facility]: Diagnosis and Treatment Plan explained to Galileo Livingston relates understanding diagnosis and is agreeable to Treatment Plan  Yes             Virtual Regular Visit    Verification of patient location:    Patient is currently located in the state [facility]  Patient is currently located in a state in which I am licensed      Assessment/Plan:    Problem List Items Addressed This Visit    None      Goals addressed in session: 2         Reason for visit is   No chief complaint on file         Encounter provider Tj Vaz    Provider located at [address] [place] [address]  [address] [facility] [place] [address]  [phone]      Recent Visits  Date Type Provider Dept   11/15/22 Telephone [address] recent visits within past 7 days and meeting all other requirements  Today's Visits  Date Type Provider Dept   11/22/22 [facility] Therapist [facility]   Showing today's visits and meeting all other requirements  Future Appointments  No visits were found meeting these conditions  Showing future appointments within next 150 days and meeting all other requirements       The patient was identified by name and date of birth  Mason Jeffries was informed that this is a telemedicine visit and that the visit is being conducted through[employer] and patient was informed that this is a secure, HIPAA-compliant platform  She agrees to proceed     My office door was closed  No one else was in the room  She acknowledged consent and understanding of privacy and security of the video platform  The patient has agreed to participate and understands they can discontinue the visit at any time  Patient is aware this is a billable service  Subjective  Mason Jeffries is a 34 y o  female          HPI     Past Medical History:   Diagnosis Date   • B[facility] )    • Cancer ([facility] )     thyroid   • Depression    • Schizoaffective disorder, bipolar type ([facility] )    • Suicidal ideations    • Thyroid disease        Past Surgical History:   Procedure Laterality Date   • NECK SURGERY     • US GUIDED THYROID BIOPSY      normal  Onset: 2012       Current Outpatient Medications   Medication Sig Dispense Refill   • clonazePAM (KlonoPIN) 0 5 mg tablet Take 1 tablet (0 5 mg total) by mouth 2 (two) times a day 60 tablet 2   • FLUoxetine (PROzac) 20 mg capsule Take 1 capsule (20 mg total) by mouth daily 90 capsule 1   • levothyroxine 175 mcg tablet Take 200 mcg by mouth daily     • Melatonin 10 MG TABS Take 1 tablet (10 mg total) by mouth daily at bedtime     • OLANZapine (ZyPREXA) 20 MG tablet Take 1 tablet (20 mg total) by mouth daily at bedtime 90 tablet 1   • OLANZapine (ZyPREXA) 5 mg tablet Take 1 tablet (5 mg total) by mouth daily at bedtime 90 tablet 1   • prazosin (MINIPRESS) 2 mg capsule Take 1 capsule (2 mg total) by mouth daily at bedtime 90 capsule 1   • zaleplon (SONATA) 5 MG capsule Take 1 capsule (5 mg total) by mouth daily at bedtime 30 capsule 2     No current facility-administered medications for this visit  No Known Allergies    Review of Systems    Video Exam    There were no vitals filed for this visit  Physical Exam     I spent 45 minutes directly with the patient during this [address] verbally agrees to participate in [employer]  Pt is aware that [employer] could be limited without vital signs or the ability to perform a full hands-on physical 1000 Xiomara Cuevas understands she or the provider may request at any time to terminate the video visit and request the patient to seek care or treatment in person

## 2022-11-28 DIAGNOSIS — F51.05 INSOMNIA DUE TO OTHER MENTAL DISORDER: ICD-10-CM

## 2022-11-28 DIAGNOSIS — F99 INSOMNIA DUE TO OTHER MENTAL DISORDER: ICD-10-CM

## 2022-11-28 RX ORDER — ZALEPLON 10 MG/1
10 CAPSULE ORAL
Qty: 30 CAPSULE | Refills: 0 | Status: SHIPPED | OUTPATIENT
Start: 2022-11-28 | End: 2022-12-23

## 2022-12-02 ENCOUNTER — TELEPHONE (OUTPATIENT)
Dept: BEHAVIORAL/MENTAL HEALTH CLINIC | Facility: CLINIC | Age: 32
End: 2022-12-02

## 2022-12-02 NOTE — TELEPHONE ENCOUNTER
Called and lvm to remind virtual appt 12/6/22 and asked to signed virtual care consent form that was sent via my chart

## 2022-12-05 NOTE — PSYCH
Psychotherapy Provided: Individual Psychotherapy 45 minutes     Length of time in session: 45 minutes, follow up in 2 week    Encounter Diagnosis     ICD-10-CM    1  Post traumatic stress disorder (PTSD)  43 10    2  Schizoaffective disorder, bipolar type ([facility] )  25 0    3  GOOD (generalized anxiety disorder)  41 1        Goals addressed in session: Goal 1     Pain:      none    0    Current suicide risk : Low     D:  Met with Idania for session  Discussed home and work and the struggles she has  A:  Mod progress on goal 2  CP:  To continue addressing  TX plan goals  Behavioral Health Treatment Plan [facility]: Diagnosis and Treatment Plan explained to Ai Gonzales relates understanding diagnosis and is agreeable to Treatment Plan  Yes             Virtual Regular Visit    Verification of patient location:    Patient is currently located in the [facility]  Patient is currently located in a state in which I am licensed      Assessment/Plan:    Problem List Items Addressed This Visit    None      Goals addressed in session: 2         Reason for visit is   No chief complaint on file  Encounter provider Yen Herrera    Provider located at [address] [place] [id number] [phone]      Recent Visits  Date Type Provider Dept   12/02/22 Telephone Yen [facility]rera [facility] Therapist [address]   Showing recent visits within past 7 days and meeting all other requirements  Future Appointments  No visits were found meeting these conditions  Showing future appointments within next 150 days and meeting all other requirements       The patient was identified by name and date of birth  Veronica Burns was informed that this is a telemedicine visit and that the visit is being conducted through telephone and patient was informed that this is a secure, HIPAA-compliant platform  She agrees to proceed     My office door was closed  No one else was in the room  She acknowledged consent and understanding of privacy and security of the video platform  The patient has agreed to participate and understands they can discontinue the visit at any time  Patient is aware this is a billable service  Subjective  João Monroy is a 34 y o  female    HPI     Past Medical History:   Diagnosis Date   • Bipolar 1 disorder ([facility] )    • Cancer ([facility] )     thyroid   • Depression    • Schizoaffective disorder, bipolar type ([facility] )    • Suicidal ideations    • Thyroid disease        Past Surgical History:   Procedure Laterality Date   • NECK SURGERY     • US GUIDED THYROID BIOPSY      normal  Onset: 2012       Current Outpatient Medications   Medication Sig Dispense Refill   • clonazePAM (KlonoPIN) 0 5 mg tablet Take 1 tablet (0 5 mg total) by mouth 2 (two) times a day 60 tablet 2   • FLUoxetine (PROzac) 20 mg capsule Take 1 capsule (20 mg total) by mouth daily 90 capsule 1   • levothyroxine 175 mcg tablet Take 200 mcg by mouth daily     • Melatonin 10 MG TABS Take 1 tablet (10 mg total) by mouth daily at bedtime     • OLANZapine (ZyPREXA) 20 MG tablet Take 1 tablet (20 mg total) by mouth daily at bedtime 90 tablet 1   • OLANZapine (ZyPREXA) 5 mg tablet Take 1 tablet (5 mg total) by mouth daily at bedtime 90 tablet 1   • prazosin (MINIPRESS) 2 mg capsule Take 1 capsule (2 mg total) by mouth daily at bedtime 90 capsule 1   • zaleplon (SONATA) 10 MG capsule Take 1 capsule (10 mg total) by mouth daily at bedtime 30 capsule 0     No current facility-administered medications for this visit  No Known Allergies    Review of Systems    Video Exam    There were no vitals filed for this visit  Physical Exam     I spent 45 minutes directly with the patient during this [address] verbally agrees to participate in [employer]   Pt is aware that Virtual Care Services could be limited without vital signs or the ability to perform a full hands-on physical 1000 Xiomara Cuevas understands she or the provider may request at any time to terminate the video visit and request the patient to seek care or treatment in person

## 2022-12-06 ENCOUNTER — TELEMEDICINE (OUTPATIENT)
Dept: BEHAVIORAL/MENTAL HEALTH CLINIC | Facility: CLINIC | Age: 32
End: 2022-12-06

## 2022-12-06 DIAGNOSIS — F43.10 POST TRAUMATIC STRESS DISORDER (PTSD): ICD-10-CM

## 2022-12-06 DIAGNOSIS — F25.0 SCHIZOAFFECTIVE DISORDER, BIPOLAR TYPE (HCC): Primary | ICD-10-CM

## 2022-12-06 DIAGNOSIS — F41.1 GAD (GENERALIZED ANXIETY DISORDER): ICD-10-CM

## 2022-12-06 NOTE — PSYCH
Psychotherapy Provided: Individual Psychotherapy 45 minutes     Length of time in session: 45 minutes, follow up in 2 week    Encounter Diagnosis     ICD-10-CM    1  Post traumatic stress disorder (PTSD)  43 10    2  Schizoaffective disorder, bipolar type ([facility] )  25 0    3  GOOD (generalized anxiety disorder)  41 1        Goals addressed in session: Goal 1     Pain:      none    0    Current suicide risk : Low     D:  Met with Idania for session  She is "calm "  Her parents left on Sat and went back to the [facility]  She asked her father to leave her mother because "she is toxic  She is going to ask him again on this Sat coming up  He takes care of her "  Discussed work  She applied for another position at her company but, didn't get it  She has been doing the same job for 3 years  She now sleeping  Due to the increase in her meds  She stopped talking the OTC med to sleep  A:  Mild progress on goal 1  P:  To continue addressing  TX plan goals  Behavioral Health Treatment Plan [facility]: Diagnosis and Treatment Plan explained to Helen Pastor relates understanding diagnosis and is agreeable to Treatment Plan  Yes             Virtual Regular Visit    Verification of patient location:    Patient is currently located in the [facility]  Patient is currently located in a state in which I am licensed      Assessment/Plan:    Problem List Items Addressed This Visit    None      Goals addressed in session: 2         Reason for visit is   No chief complaint on file         Encounter provider Daily Mancilla    Provider located at [address] [place] [id number] [phone]      Recent Visits  Date Type Provider Dept   12/02/22 Telephone Daily Mancilla [facility] Therapist [place]   Showing recent visits within past 7 days and meeting all other requirements  Today's Visits  Date Type Provider Dept 12/06/22 [facility] Therapist [place]   Showing today's visits and meeting all other requirements  Future Appointments  No visits were found meeting these conditions  Showing future appointments within next 150 days and meeting all other requirements       The patient was identified by name and date of birth  Fatoumata Sender was informed that this is a telemedicine visit and that the visit is being conducted through[employer] and patient was informed that this is a secure, HIPAA-compliant platform  She agrees to proceed     My office door was closed  No one else was in the room  She acknowledged consent and understanding of privacy and security of the video platform  The patient has agreed to participate and understands they can discontinue the visit at any time  Patient is aware this is a billable service  Subjective  Fatoumata Sender is a 34 y o  female          HPI     Past Medical History:   Diagnosis Date   • Bipolar 1 disorder ([facility] )    • Cancer ([facility] )     thyroid   • Depression    • [facility]    • Suicidal ideations    • Thyroid disease        Past Surgical History:   Procedure Laterality Date   • NECK SURGERY     • US GUIDED THYROID BIOPSY      normal  Onset: 2012       Current Outpatient Medications   Medication Sig Dispense Refill   • clonazePAM (KlonoPIN) 0 5 mg tablet Take 1 tablet (0 5 mg total) by mouth 2 (two) times a day 60 tablet 2   • FLUoxetine (PROzac) 20 mg capsule Take 1 capsule (20 mg total) by mouth daily 90 capsule 1   • levothyroxine 175 mcg tablet Take 200 mcg by mouth daily     • Melatonin 10 MG TABS Take 1 tablet (10 mg total) by mouth daily at bedtime     • OLANZapine (ZyPREXA) 20 MG tablet Take 1 tablet (20 mg total) by mouth daily at bedtime 90 tablet 1   • OLANZapine (ZyPREXA) 5 mg tablet Take 1 tablet (5 mg total) by mouth daily at bedtime 90 tablet 1   • prazosin (MINIPRESS) 2 mg capsule Take 1 capsule (2 mg total) by mouth daily at bedtime 90 capsule 1   • zaleplon (SONATA) 10 MG capsule Take 1 capsule (10 mg total) by mouth daily at bedtime 30 capsule 0     No current facility-administered medications for this visit  No Known Allergies    Review of Systems    Video Exam    There were no vitals filed for this visit  Physical Exam     I spent 45 minutes directly with the patient during this [address] [facility] verbally agrees to participate in [employer]  Pt is aware that [employer] could be limited without vital signs or the ability to perform a full hands-on physical 1000 Xiomara Cuevas understands she or the provider may request at any time to terminate the video visit and request the patient to seek care or treatment in person

## 2022-12-22 ENCOUNTER — TELEMEDICINE (OUTPATIENT)
Dept: BEHAVIORAL/MENTAL HEALTH CLINIC | Facility: CLINIC | Age: 32
End: 2022-12-22

## 2022-12-22 DIAGNOSIS — F25.0 SCHIZOAFFECTIVE DISORDER, BIPOLAR TYPE (HCC): Primary | ICD-10-CM

## 2022-12-22 DIAGNOSIS — F41.1 GAD (GENERALIZED ANXIETY DISORDER): ICD-10-CM

## 2022-12-22 DIAGNOSIS — F43.10 POST TRAUMATIC STRESS DISORDER (PTSD): ICD-10-CM

## 2022-12-22 NOTE — PSYCH
Psychotherapy Provided: Individual Psychotherapy 45 minutes     Length of time in session: 45 minutes, follow up in 2 week    Encounter Diagnosis     ICD-10-CM    1  Schizoaffective disorder, bipolar type ([facility] )  F25 0       2  Post traumatic stress disorder (PTSD)  F43 10       3  GOOD (generalized anxiety disorder)  F41 1           Goals addressed in session: Goal 1     Pain:      none    0    Current suicide risk : Low     D:  Met with Janes for session  She is "having a problem "  She made a deal with her girlfriend that if she her girlfriend doesn't talk to a friend of hers that Ivaneesh doesn't like than Ivaneesh won't talk to her ex whom her girlfriend doesn't like  Then Janes talked to her friend/ex that she said she wouldn't talk to  Discussed that they don't want the other to talk to these people out of caring not jealousy and strategies to resolve the issue  She had a day off this past week and slept all day and therefore, didn't sleep at night  This led her to a manic episode  She recognized it and adjusted her sleep and feels better now  Starting today she has off work until after the holidays  Discussed what to do so she doesn't repeat the same cycle when off now  A:   Mild progress on tx plan goals  P:  To continue with tx plan goals  Behavioral Health Treatment Plan [facility]: Diagnosis and Treatment Plan explained to Jerman Guardado relates understanding diagnosis and is agreeable to Treatment Plan   Yes     Visit start and stop times:    12/22/22  Start Time: 5675  Stop Time: 1800  Total Visit Time: 45 minutes    Virtual Regular Visit    Verification of patient location:    Patient is located in the following state in which I hold an active license [place]      Assessment/Plan:    Problem List Items Addressed This Visit        Other    Post traumatic stress disorder (PTSD)    Schizoaffective disorder, bipolar type ([facility] ) - Primary    GOOD (generalized anxiety disorder)       Goals addressed in session: Goal 1          Reason for visit is   Chief Complaint   Patient presents with   • Virtual Regular Visit        Encounter provider Kimberley Saldaña    Provider located at [address] [place], [place] [address]mathieu  [facility] [id number] [phone]      Recent Visits  No visits were found meeting these conditions  Showing recent visits within past 7 days and meeting all other requirements  Today's Visits  Date Type Provider Dept   12/22/22 2400 [facility]  today's visits and meeting all other requirements  Future Appointments  No visits were found meeting these conditions  Showing future appointments within next 150 days and meeting all other requirements       The patient was identified by name and date of birth  Edilma Khan was informed that this is a telemedicine visit and that the visit is being conducted throughthe [employer] platform  She agrees to proceed     My office door was closed  No one else was in the room  She acknowledged consent and understanding of privacy and security of the video platform  The patient has agreed to participate and understands they can discontinue the visit at any time  Patient is aware this is a billable service  Subjective  Edilma Khan is a 34 y o  female          HPI     Past Medical History:   Diagnosis Date   • Bipolar 1 disorder ([facility] )    • Cancer ([facility] )     thyroid   • Depression    • [facility]ffective disorder, [facility]    • Suicidal ideations    • Thyroid disease        Past Surgical History:   Procedure Laterality Date   • NECK SURGERY     • US GUIDED THYROID BIOPSY      normal  Onset: 2012       Current Outpatient Medications   Medication Sig Dispense Refill   • clonazePAM (KlonoPIN) 0 5 mg tablet Take 1 tablet (0 5 mg total) by mouth 2 (two) times a day 60 tablet 2   • FLUoxetine (PROzac) 20 mg capsule Take 1 capsule (20 mg total) by mouth daily 90 capsule 1   • levothyroxine 175 mcg tablet Take 200 mcg by mouth daily     • Melatonin 10 MG TABS Take 1 tablet (10 mg total) by mouth daily at bedtime     • OLANZapine (ZyPREXA) 20 MG tablet Take 1 tablet (20 mg total) by mouth daily at bedtime 90 tablet 1   • OLANZapine (ZyPREXA) 5 mg tablet Take 1 tablet (5 mg total) by mouth daily at bedtime 90 tablet 1   • prazosin (MINIPRESS) 2 mg capsule Take 1 capsule (2 mg total) by mouth daily at bedtime 90 capsule 1   • zaleplon (SONATA) 10 MG capsule Take 1 capsule (10 mg total) by mouth daily at bedtime 30 capsule 0     No current facility-administered medications for this visit  No Known Allergies    Review of Systems    Video Exam    There were no vitals filed for this visit      Physical Exam

## 2022-12-23 ENCOUNTER — TELEMEDICINE (OUTPATIENT)
Dept: PSYCHIATRY | Facility: CLINIC | Age: 32
End: 2022-12-23

## 2022-12-23 DIAGNOSIS — F42.4 SKIN PICKING HABIT: ICD-10-CM

## 2022-12-23 DIAGNOSIS — F51.05 INSOMNIA DUE TO OTHER MENTAL DISORDER: ICD-10-CM

## 2022-12-23 DIAGNOSIS — F43.10 PTSD (POST-TRAUMATIC STRESS DISORDER): ICD-10-CM

## 2022-12-23 DIAGNOSIS — F41.1 GAD (GENERALIZED ANXIETY DISORDER): ICD-10-CM

## 2022-12-23 DIAGNOSIS — F25.0 SCHIZOAFFECTIVE DISORDER, BIPOLAR TYPE (HCC): Primary | ICD-10-CM

## 2022-12-23 DIAGNOSIS — F99 INSOMNIA DUE TO OTHER MENTAL DISORDER: ICD-10-CM

## 2022-12-23 DIAGNOSIS — F43.10 POST TRAUMATIC STRESS DISORDER (PTSD): ICD-10-CM

## 2022-12-23 RX ORDER — PRAZOSIN HYDROCHLORIDE 2 MG/1
2 CAPSULE ORAL
Qty: 90 CAPSULE | Refills: 1 | Status: SHIPPED | OUTPATIENT
Start: 2022-12-23

## 2022-12-23 RX ORDER — OLANZAPINE 20 MG/1
20 TABLET ORAL
Qty: 90 TABLET | Refills: 1 | Status: SHIPPED | OUTPATIENT
Start: 2022-12-23

## 2022-12-23 RX ORDER — ZALEPLON 10 MG/1
10 CAPSULE ORAL
Qty: 30 CAPSULE | Refills: 2 | Status: SHIPPED | OUTPATIENT
Start: 2022-12-23

## 2022-12-23 RX ORDER — FLUOXETINE HYDROCHLORIDE 20 MG/1
20 CAPSULE ORAL DAILY
Qty: 90 CAPSULE | Refills: 1 | Status: SHIPPED | OUTPATIENT
Start: 2022-12-23

## 2022-12-23 RX ORDER — CLONAZEPAM 0.5 MG/1
0.5 TABLET ORAL 2 TIMES DAILY
Qty: 60 TABLET | Refills: 2 | Status: SHIPPED | OUTPATIENT
Start: 2022-12-23

## 2022-12-23 RX ORDER — OLANZAPINE 5 MG/1
5 TABLET ORAL
Qty: 90 TABLET | Refills: 1 | Status: SHIPPED | OUTPATIENT
Start: 2022-12-23

## 2022-12-23 NOTE — PSYCH
Visit Time    Visit Start Time: 2367  Visit Stop Time: 1831    Total Visit Duration: 20 minutes   Virtual Regular Visit    Problem List Items Addressed This Visit        Other    Post traumatic stress disorder (PTSD)    Relevant Medications    zaleplon (SONATA) 10 MG capsule    OLANZapine (ZyPREXA) 5 mg tablet    OLANZapine (ZyPREXA) 20 MG tablet    FLUoxetine (PROzac) 20 mg capsule    Schizoaffective disorder, bipolar type ([facility] ) - Primary    Relevant Medications    zaleplon (SONATA) 10 MG capsule    OLANZapine (ZyPREXA) 5 mg tablet    OLANZapine (ZyPREXA) 20 MG tablet    FLUoxetine (PROzac) 20 mg capsule    GOOD (generalized anxiety disorder)    Relevant Medications    zaleplon (SONATA) 10 MG capsule    OLANZapine (ZyPREXA) 5 mg tablet    OLANZapine (ZyPREXA) 20 MG tablet    FLUoxetine (PROzac) 20 mg capsule    clonazePAM (KlonoPIN) 0 5 mg tablet    Insomnia due to other mental disorder    Relevant Medications    zaleplon (SONATA) 10 MG capsule    OLANZapine (ZyPREXA) 5 mg tablet    OLANZapine (ZyPREXA) 20 MG tablet    FLUoxetine (PROzac) 20 mg capsule    Skin picking habit    Relevant Medications    clonazePAM (KlonoPIN) 0 5 mg tablet   Other Visit Diagnoses     PTSD (post-traumatic stress disorder)        Relevant Medications    zaleplon (SONATA) 10 MG capsule    OLANZapine (ZyPREXA) 5 mg tablet    prazosin (MINIPRESS) 2 mg capsule    OLANZapine (ZyPREXA) 20 MG tablet    FLUoxetine (PROzac) 20 mg capsule             Encounter provider GENESIS Luis    Provider located at   [address] [place] [id number] [phone]    Patient is located in the following state in which I hold an active license [place]    Recent Visits  No visits were found meeting these conditions    Showing recent visits within past 7 days and meeting all other requirements  Today's Visits  Date Type Provider Dept   12/23/22 Telemedicine [facility] Tamiko Khan   Showing today's visits and meeting all other requirements  Future Appointments  No visits were found meeting these conditions  Showing future appointments within next 150 days and meeting all other requirements     The patient was identified by name and date of birth  Nafisa Eller was informed that this is a telemedicine visit and that the visit is being conducted through [facility] and patient was informed that this is a secure, HIPAA-compliant platform  Nafisa Eller agrees to proceed  My office door was closed  No one else was in the room  She acknowledged consent and understanding of privacy and security of the video platform  The patient has agreed to participate and understands they can discontinue the visit at any time  Patient is aware this is a billable service  HPI     Current Outpatient Medications   Medication Sig Dispense Refill   • clonazePAM (KlonoPIN) 0 5 mg tablet Take 1 tablet (0 5 mg total) by mouth 2 (two) times a day 60 tablet 2   • FLUoxetine (PROzac) 20 mg capsule Take 1 capsule (20 mg total) by mouth daily 90 capsule 1   • OLANZapine (ZyPREXA) 20 MG tablet Take 1 tablet (20 mg total) by mouth daily at bedtime 90 tablet 1   • OLANZapine (ZyPREXA) 5 mg tablet Take 1 tablet (5 mg total) by mouth daily at bedtime 90 tablet 1   • prazosin (MINIPRESS) 2 mg capsule Take 1 capsule (2 mg total) by mouth daily at bedtime 90 capsule 1   • zaleplon (SONATA) 10 MG capsule Take 1 capsule (10 mg total) by mouth daily at bedtime 30 capsule 2   • levothyroxine 175 mcg tablet Take 200 mcg by mouth daily     • Melatonin 10 MG TABS Take 1 tablet (10 mg total) by mouth daily at bedtime       No current facility-administered medications for this visit  Review of Systems  Video Exam    There were no vitals filed for this visit  Physical Exam   As a result of this visit, I have referred the patient for further respiratory evaluation   No    VIRTUAL VISIT DISCLAIMER    Amadeo El acknowledges that she has consented to an online visit or consultation  She understands that the online visit is based solely on information provided by her, and that, in the absence of a face-to-face physical evaluation by the physician, the diagnosis she receives is both limited and provisional in terms of accuracy and completeness  This is not intended to replace a full medical face-to-face evaluation by the physician  Amadeo El understands and accepts these terms  This note was not shared with the patient due to reasonable likelihood of causing patient harm   MEDICATION MANAGEMENT NOTE        [address] [place] [address]    Name and Date of Birth:  Amadeo El 34 y o  1990 MRN: [id number]    Date of Visit: December 23, 2022    No Known Allergies  SUBJECTIVE:    Angelique Schmitt is seen today for a follow up for PTSD, Generalized Anxiety Disorder, Schizoaffective Disorder and insomnia  She has done fairly well since the last visit  Patient reports that she continues to have brief episodes of hypomanic symptoms  States that if she stays up too late or sleeps during the day this will trigger an erratic sleep cycle and may result in some increased impulsivity or talkativeness followed by more difficulty with sleep the following nights  Patient does identify the importance of maintaining a rigid schedule with consistent medication administration times and sleep times  Is a bit nervous about upcoming weeklong winter break from work  Patient encouraged to utilize her supports and to write out a schedule that is as consistent with her work schedule as it can be  Zach Fanning has been a helpful addition for sleep when needed  She denies any side effects from medications      PLAN:  Continue clonazepam 0 5 mg p o  twice daily  Continue fluoxetine 20 mg p o  daily  Continue olanzapine 20 mg p o  at bedtime  Continue prazosin 2 mg p o  at bedtime  Continue sonata 10mg po hs   Aware of 24 hour and weekend coverage for urgent situations accessed by calling [facility] main practice number    Diagnoses and all orders for this visit:    Schizoaffective disorder, bipolar type ([facility] )  -     OLANZapine (ZyPREXA) 5 mg tablet; Take 1 tablet (5 mg total) by mouth daily at bedtime  -     OLANZapine (ZyPREXA) 20 MG tablet; Take 1 tablet (20 mg total) by mouth daily at bedtime  -     FLUoxetine (PROzac) 20 mg capsule; Take 1 capsule (20 mg total) by mouth daily    Post traumatic stress disorder (PTSD)    Insomnia due to other mental disorder  -     zaleplon (SONATA) 10 MG capsule; Take 1 capsule (10 mg total) by mouth daily at bedtime    GOOD (generalized anxiety disorder)  -     clonazePAM (KlonoPIN) 0 5 mg tablet; Take 1 tablet (0 5 mg total) by mouth 2 (two) times a day    PTSD (post-traumatic stress disorder)  -     prazosin (MINIPRESS) 2 mg capsule; Take 1 capsule (2 mg total) by mouth daily at bedtime    Skin picking habit  -     clonazePAM (KlonoPIN) 0 5 mg tablet;  Take 1 tablet (0 5 mg total) by mouth 2 (two) times a day      Current Outpatient Medications on File Prior to Visit   Medication Sig Dispense Refill   • levothyroxine 175 mcg tablet Take 200 mcg by mouth daily     • Melatonin 10 MG TABS Take 1 tablet (10 mg total) by mouth daily at bedtime     • [DISCONTINUED] clonazePAM (KlonoPIN) 0 5 mg tablet Take 1 tablet (0 5 mg total) by mouth 2 (two) times a day 60 tablet 2   • [DISCONTINUED] FLUoxetine (PROzac) 20 mg capsule Take 1 capsule (20 mg total) by mouth daily 90 capsule 1   • [DISCONTINUED] OLANZapine (ZyPREXA) 20 MG tablet Take 1 tablet (20 mg total) by mouth daily at bedtime 90 tablet 1   • [DISCONTINUED] OLANZapine (ZyPREXA) 5 mg tablet Take 1 tablet (5 mg total) by mouth daily at bedtime 90 tablet 1   • [DISCONTINUED] prazosin (MINIPRESS) 2 mg capsule Take 1 capsule (2 mg total) by mouth daily at bedtime 90 capsule 1   • [DISCONTINUED] zaleplon (SONATA) 10 MG capsule Take 1 capsule (10 mg total) by mouth daily at bedtime 30 capsule 0     No current facility-administered medications on file prior to visit  Psychotherapy Provided:     Individual psychotherapy provided: Yes  Counseling was provided during the session today for 16 minutes  Supportive counseling provided  HPI ROS Appetite Changes and Sleep:     She reports fluctuating sleep pattern, adequate appetite, adequate energy level   Denies homicidal ideation, denies suicidal ideation    Review Of Systems:      General emotional problems   Personality no change in personality   Constitutional negative   ENT negative   Cardiovascular negative   Respiratory negative   Gastrointestinal negative   Genitourinary negative   Musculoskeletal negative   Integumentary negative   Neurological negative   Endocrine negative   Other Symptoms none, all other systems are negative     Mental Status Evaluation:    Appearance Adequate hygiene and grooming   Behavior calm and cooperative   Mood euthymic  Depression Scale -  of 10 (0 = No depression)  Anxiety Scale -  of 10 (0 = No anxiety)   Speech Normal rate and volume   Affect appropriate and mood-congruent   Thought Processes Goal directed and coherent   Thought Content Does not verbalize delusional material   Associations Tightly connected   Perceptual Disturbances Denies hallucinations and does not appear to be responding to internal stimuli   Risk Potential Suicidal/Homicidal Ideation - No evidence of suicidal or homicidal ideation and patient does not verbalize suicidal or homicidal ideation  Risk of Violence - No evidence of risk for violence found on assessment  Risk of Self Mutilation - No evidence of risk for self mutilation found on assessment   Orientation oriented to person, place, time/date and situation   Memory recent and remote memory grossly intact   Consciousness alert and awake   Attention/Concentration attention span and concentration are age appropriate   Insight partial   Judgement fair   Muscle Strength and Gait normal muscle strength and normal muscle tone, normal gait/station and normal balance   Motor Activity no abnormal movements   Language no difficulty naming common objects, no difficulty repeating a phrase, no difficulty writing a sentence   Fund of Knowledge adequate knowledge of current events  adequate fund of knowledge regarding past history  adequate fund of knowledge regarding vocabulary      Past Psychiatric History Update:     Inpatient Psychiatric Admission Since Last Encounter:   no  Suicide Attempt Or Self Mutilation Since Last Encounter:   no  Incidence of Violent Behavior Since Last Encounter:   no    Traumatic History Update:     New Onset of Abuse Since Last Encounter:   no  Traumatic Events Since Last Encounter:   no    Past Medical History:    Past Medical History:   Diagnosis Date   • Bipolar 1 disorder ([facility] )    • Cancer ([facility] )     thyroid   • Depression    • Schizoaffective disorder, bipolar type ([facility] )    • Suicidal ideations    • Thyroid disease         Past Surgical History:   Procedure Laterality Date   • NECK SURGERY     • US GUIDED THYROID BIOPSY      normal  Onset: 2012     No Known Allergies  Substance Abuse History:    Social History     Substance and Sexual Activity   Alcohol Use Yes    Comment: occ     Social History     Substance and Sexual Activity   Drug Use No     Social History:    Social History     Socioeconomic History   • Marital status: Single     Spouse name: Not on file   • Number of children: Not on file   • Years of education: Not on file   • Highest education level: Not on file   Occupational History   • Not on file   Tobacco Use   • Smoking status: Former     Packs/day: 1 00     Types: Cigarettes   • Smokeless tobacco: Never   Substance and Sexual Activity   • Alcohol use: Yes     Comment: occ   • Drug use: No   • Sexual activity: Not on file   Other Topics Concern   • Not on file   Social History Narrative   • Not on file     Social Determinants of Health     Financial Resource Strain: Not on file   Food Insecurity: Not on file   Transportation Needs: Not on file   Physical Activity: Not on file   Stress: Not on file   Social Connections: Not on file   Intimate Partner Violence: Not on file   Housing Stability: Not on file     Family Psychiatric History:     No family history on file  History Review: The following portions of the patient's history were reviewed and updated as appropriate: allergies, current medications, past family history, past medical history, past social history, past surgical history and problem list     OBJECTIVE:     Vital signs in last 24 hours: There were no vitals filed for this visit  Laboratory Results: I have personally reviewed all pertinent laboratory/tests results  Suicide/Homicide Risk Assessment:    Risk of Harm to Self:  The following ratings are based on assessment at the time of the interview  Recent Specific Risk Factors include: none    Risk of Harm to Others: The following ratings are based on assessment at the time of the interview  Recent Specific Risk Factors include: none  The following interventions are recommended: no intervention changes needed    Medications Risks/Benefits:      Risks, Benefits And Possible Side Effects Of Medications:    Discussed risks and benefits of treatment with patient including risk of suicidality, serotonin syndrome, increased QTc interval and SIADH related to treatment with antidepressants;  Risk of induction of manic symptoms in certain patient populations, risk of parkinsonian symptoms, metabolic syndrome, tardive dyskinesia and neuroleptic malignant syndrome related to treatment with antipsychotic medications and risk of impaired next-day mental alertness, complex sleep-related behavior and dependence related to treatment with hypnotic medications     Controlled Medication Discussion:     Paige Fish has been filling controlled prescriptions on time as prescribed according to [facility]    Treatment Plan:    Due for update/Updated:   no    GENESIS Art 12/23/22

## 2023-01-04 ENCOUNTER — TELEMEDICINE (OUTPATIENT)
Dept: BEHAVIORAL/MENTAL HEALTH CLINIC | Facility: CLINIC | Age: 33
End: 2023-01-04

## 2023-01-04 DIAGNOSIS — F25.0 SCHIZOAFFECTIVE DISORDER, BIPOLAR TYPE (HCC): Primary | ICD-10-CM

## 2023-01-04 DIAGNOSIS — F41.1 GAD (GENERALIZED ANXIETY DISORDER): ICD-10-CM

## 2023-01-04 DIAGNOSIS — F43.10 POST TRAUMATIC STRESS DISORDER (PTSD): ICD-10-CM

## 2023-01-04 NOTE — PSYCH
Psychotherapy Provided: Individual Psychotherapy 45 minutes     Length of time in session: 45 minutes, follow up in 2 week    Encounter Diagnosis     ICD-10-CM    1  Schizoaffective disorder, bipolar type ([facility] )  F25 0       2  GOOD (generalized anxiety disorder)  F41 1       3  Post traumatic stress disorder (PTSD)  F43 10           Goals addressed in session: Goal 1     Pain:      none    0    Current suicide risk : Low     D:  Met with Janes for session  She had a vacation from work for a week  She started out not taking her meds as she should then, she stopped taking her night time meds  She did this because she wanted to stay up late and play video games while on vacation  In doing this she didn't sleep, start to become paranoid and then, became irritable and anger  Discussed the consequences of not taking her meds and how vaibhav she is that she is very high functioning if she takes her meds  Not everyone is that vaibhav  She is back on all her meds now  A:   Mild progress on tx plan goals  P:  To continue with tx plan goals  Behavioral Health Treatment Plan [facility]: Diagnosis and Treatment Plan explained to Romie Chambers relates understanding diagnosis and is agreeable to Treatment Plan  Yes     Visit start and stop times:    01/04/23  Start Time: 1710  Stop Time: 1800  Total Visit Time: 50 minutes    Virtual Regular Visit    Verification of patient location:    Patient is located in the following state in which I hold an active license [place]      Assessment/Plan:    Problem List Items Addressed This Visit    None      Goals addressed in session: Goal 1          Reason for visit is   No chief complaint on file  Encounter provider Smith Laser    Provider located at [address] [place] [id number] [phone]      Recent Visits  No visits were found meeting these conditions    Showing recent visits within past 7 days and meeting all other requirements  Today's Visits  Date Type Provider Dept   01/04/23 [facility] visits and meeting all other requirements  Future Appointments  No visits were found meeting these conditions  Showing future appointments within next 150 days and meeting all other requirements       The patient was identified by name and date of birth  Veronica Burns was informed that this is a telemedicine visit and that the visit is being conducted throughthe [employer] platform  She agrees to proceed     My office door was closed  No one else was in the room  She acknowledged consent and understanding of privacy and security of the video platform  The patient has agreed to participate and understands they can discontinue the visit at any time  Patient is aware this is a billable service  Subjective  Veronica Burns is a 34 y o  female          HPI     Past Medical History:   Diagnosis Date   • Bipolar 1 disorder ([facility] )    • Cancer ([facility] )     thyroid   • Depression    • Schizoaffective disorder, bipolar type ([facility] )    • Suicidal ideations    • Thyroid disease        Past Surgical History:   Procedure Laterality Date   • NECK SURGERY     • US GUIDED THYROID BIOPSY      normal  Onset: 2012       Current Outpatient Medications   Medication Sig Dispense Refill   • clonazePAM (KlonoPIN) 0 5 mg tablet Take 1 tablet (0 5 mg total) by mouth 2 (two) times a day 60 tablet 2   • FLUoxetine (PROzac) 20 mg capsule Take 1 capsule (20 mg total) by mouth daily 90 capsule 1   • levothyroxine 175 mcg tablet Take 200 mcg by mouth daily     • Melatonin 10 MG TABS Take 1 tablet (10 mg total) by mouth daily at bedtime     • OLANZapine (ZyPREXA) 20 MG tablet Take 1 tablet (20 mg total) by mouth daily at bedtime 90 tablet 1   • OLANZapine (ZyPREXA) 5 mg tablet Take 1 tablet (5 mg total) by mouth daily at bedtime 90 tablet 1 • prazosin (MINIPRESS) 2 mg capsule Take 1 capsule (2 mg total) by mouth daily at bedtime 90 capsule 1   • zaleplon (SONATA) 10 MG capsule Take 1 capsule (10 mg total) by mouth daily at bedtime 30 capsule 2     No current facility-administered medications for this visit  No Known Allergies    Review of Systems    Video Exam    There were no vitals filed for this visit      Physical Exam

## 2023-01-05 ENCOUNTER — TELEPHONE (OUTPATIENT)
Dept: PSYCHIATRY | Facility: CLINIC | Age: 33
End: 2023-01-05

## 2023-01-05 NOTE — TELEPHONE ENCOUNTER
Wali Brannon and/or Patient requested a call back to discuss: med mgmt provider is not her psychiatrist anymore  Pt stated that is urgent provider call her back  They can be reached at P# [phone]  Thank you

## 2023-01-12 ENCOUNTER — TELEPHONE (OUTPATIENT)
Dept: BEHAVIORAL/MENTAL HEALTH CLINIC | Facility: CLINIC | Age: 33
End: 2023-01-12

## 2023-01-13 ENCOUNTER — TELEMEDICINE (OUTPATIENT)
Dept: BEHAVIORAL/MENTAL HEALTH CLINIC | Facility: CLINIC | Age: 33
End: 2023-01-13

## 2023-01-13 DIAGNOSIS — F41.1 GAD (GENERALIZED ANXIETY DISORDER): ICD-10-CM

## 2023-01-13 DIAGNOSIS — F25.0 SCHIZOAFFECTIVE DISORDER, BIPOLAR TYPE (HCC): Primary | ICD-10-CM

## 2023-01-13 DIAGNOSIS — F43.10 POST TRAUMATIC STRESS DISORDER (PTSD): ICD-10-CM

## 2023-01-13 NOTE — PSYCH
Psychotherapy Provided: Individual Psychotherapy 50 minutes     Length of time in session: 50 minutes, follow up in 2 week    Encounter Diagnosis     ICD-10-CM    1  Schizoaffective disorder, bipolar type ([facility] )  F25 0       2  Post traumatic stress disorder (PTSD)  F43 10       3  GOOD (generalized anxiety disorder)  F41 1           Goals addressed in session: Goal 1     Pain:      none    0    Current suicide risk : Low     D:  Met with Janes for session  "The past two weeks have not been good at work "   Discussed issues she has had at work  She "feels like she is being tested the past couple weeks  She is very upset that  Her PA for psychiatrist left and she now has a new one  She really liked her previous  PA  Over the past two weeks, "she is not a [employer] person but, there must be a God "   Gave a couple of examples of why she said this  One is, "She has been thinking of using drugs again  She had an accident at work and was drug tested  Discussed her looking at the positives and not focusing on the negatives  Regardless of all of it she is still doing ok  A:   Mod progress on tx plan goals  P:  To continue with tx plan goals  Behavioral Health Treatment Plan [facility]: Diagnosis and Treatment Plan explained to Mary Jacob relates understanding diagnosis and is agreeable to Treatment Plan  Yes     Visit start and stop times:    01/13/23  Start Time: 1710  Stop Time: 1800  Total Visit Time: 50 minutes       Virtual Regular Visit    Verification of patient location:    Patient is located in the following state in which I hold an active license [place]      Assessment/Plan:    Problem List Items Addressed This Visit    None      Goals addressed in session: Goal 1          Reason for visit is   No chief complaint on file         Encounter provider Marti Castellanos    Provider located at [address] [place] [address] GO  [place] [id number] [phone]      Recent Visits  Date Type Provider Dept   01/12/23 Telephone Eliud [facility] Therapist Rick   Showing recent visits within past 7 days and meeting all other requirements  Future Appointments  No visits were found meeting these conditions  Showing future appointments within next 150 days and meeting all other requirements       The patient was identified by name and date of birth  Radha Liang was informed that this is a telemedicine visit and that the visit is being conducted throughthe revoPT platform  She agrees to proceed     My office door was closed  No one else was in the room  She acknowledged consent and understanding of privacy and security of the video platform  The patient has agreed to participate and understands they can discontinue the visit at any time  Patient is aware this is a billable service  Subjective  Radha Liang is a 34 y o  female          HPI     Past Medical History:   Diagnosis Date   • Bipolar 1 disorder ([facility] )    • Cancer ([facility] )     thyroid   • Depression    • Schizoaffective disorder, bipolar type ([facility] )    • Suicidal ideations    • Thyroid disease        Past Surgical History:   Procedure Laterality Date   • NECK SURGERY     • US GUIDED THYROID BIOPSY      normal  Onset: 2012       Current Outpatient Medications   Medication Sig Dispense Refill   • clonazePAM (KlonoPIN) 0 5 mg tablet Take 1 tablet (0 5 mg total) by mouth 2 (two) times a day 60 tablet 2   • FLUoxetine (PROzac) 20 mg capsule Take 1 capsule (20 mg total) by mouth daily 90 capsule 1   • levothyroxine 175 mcg tablet Take 200 mcg by mouth daily     • Melatonin 10 MG TABS Take 1 tablet (10 mg total) by mouth daily at bedtime     • OLANZapine (ZyPREXA) 20 MG tablet Take 1 tablet (20 mg total) by mouth daily at bedtime 90 tablet 1   • OLANZapine (ZyPREXA) 5 mg tablet Take 1 tablet (5 mg total) by mouth daily at bedtime 90 tablet 1   • prazosin (MINIPRESS) 2 mg capsule Take 1 capsule (2 mg total) by mouth daily at bedtime 90 capsule 1   • zaleplon (SONATA) 10 MG capsule Take 1 capsule (10 mg total) by mouth daily at bedtime 30 capsule 2     No current facility-administered medications for this visit  No Known Allergies    Review of Systems    Video Exam    There were no vitals filed for this visit      Physical Exam

## 2023-01-18 NOTE — PSYCH
[address] [facility]    Name and Date of Birth:  Von Sandhoff 34 y o  1990 MRN: [id number]    Date of Visit: January 20, 2023    Reason for visit: Full psychiatric intake assessment for medication management        Chief Complaint   Patient presents with   • Establish Care       HPI:     Von Sandhoff is a 34 y o  female, domiciled with roommate (and parents who split time between [place] and [place]), currently employed as a  FT for 5 years, w/ PMH of thyroid cancer 2017 and PPH of schizoaffective d/o, PTSD, and GOOD, multiple prior psychiatric admissions (over 10 hospitalizations, multiple 56's, first time at age 24, most recent in 2019), 6 prior SAs (OD on medications), h/o self-injurious behavior of cutting in high school, who presented to the mental health clinic for the initial intake and psychiatric evaluation on January 20, 2023  Janes was a former patient of Sam BALDERAS (last visit on 12/23/22) and is currently prescribed Prozac, Klonopin, Zyprexa, Minipress, and Sonata  Tolerating medication well with no medication side effects observed or reported  Actively involved in individual psychotherapy with Wilner Velasquez bi-weekly  Von Sandhoff was visited in the clinic; chart reviewed  Met with patient individually  Reports first meeting with psychiatrist around age 24 due to symptoms of anger, depression, behavioral problems  History of numerous inpatient psychiatric admissions  Most recently in treatment with [facility] for approximately 3 years  Has been in psychotherapy with Wilner Velasquez several years as well  On evaluation today, Janes endorses a history of depression, manic symptoms, and mood instability beginning around age 24 with fluctuating course since that time   She has had numerous inpatient psychiatric admissions due to SAs by OD on medications, manic symptoms, and depressive symptoms  She has a history of delusional thoughts and AH/VH  Mikeaneesh endorses overall stability in terms of her mood  She denies any recent manic symptoms, with most recent manic episode occurring approximately 2-3 years ago, with symptoms of grandiosity, increased energy, increased talkativeness, AH/VH, and decreased need for sleep  This episode resulted in hospitalization  She feels she has been doing well on Zyprexa with no recent mood fluctuations or psychotic symptoms  She has historically experienced symptoms of psychosis after resolution of manic or depressive symptoms  Mikeaneesh endorses a history of multiple depressive episodes, with symptoms including depressed mood, sleep disruption, anhedonia, decreased appetite, decreased concentration, low energy, and poor motivation  She reports worsening depressive symptoms over the past several months  Stressor of parents being in [place] for 6 months, feels they are her main support and it is hard when they are living there  She endorses anhedonia, low energy and motivation, difficulty initiating sleep, and poor concentration  No suicidal ideation, plan, or intent  Rebecca Peter has not been helping with sleep  She stopped taking this about one week ago  Currently taking otc sleep aid with limited benefit  She describes her mood as "doom and gloom" lately  Patient endorses acute and chronic anxiety, pathologic in nature, and suggestive of GOOD (generalized anxiety disorder)  Reports excessive nervousness, irrational worry, and overt anxiousness  Pervasively restless, tense, keyed-up, and chronically on-edge  Experiences disruption in energy and concentration secondary to anxiety  Experiences irritability, inability to relax, and disruption in sleep secondary to pathologic anxiety  At times, overwhelmed/consumed by irrational fear  She has been more anxious recently with feelings of impending doom  She reports ongoing behavior of compulsive skin-picking most days   Has struggled with this for many years  No suicidal ideation, plan, or intent upon direct inquiry  SIB: none recent   HI/hx violence: no HI or concerns for violence     Janes reports a history of trauma, unable to elaborate  Has experienced nightmares for many years  Prazosin helps  Denies any additional intrusive, avoidance, negative alterations, or hyperarousal symptoms of PTSD  No disordered eating patterns, including restricting intake, binging, or purging  No perceptual disturbances  No paranoid ideations or fixed delusions were elicited  Does not appear internally preoccupied at time of encounter  Vapes nicotine daily  Hx of THC use in past-stopped 4 years ago  Social, rare etoh use  No other history of substance use, including cigarettes or other illicit drug use  Review Of Systems:    Constitutional negative   ENT negative   Cardiovascular negative   Respiratory negative   Gastrointestinal negative   Genitourinary negative   Musculoskeletal negative   Integumentary negative   Neurological negative   Endocrine negative   Other Symptoms none, all other systems are negative         PHQ-2/9 Depression Screening    Little interest or pleasure in doing things: 3 - nearly every day  Feeling down, depressed, or hopeless: 2 - more than half the days  Trouble falling or staying asleep, or sleeping too much: 3 - nearly every day  Feeling tired or having little energy: 3 - nearly every day  Poor appetite or overeating: 3 - nearly every day  Feeling bad about yourself - or that you are a failure or have let yourself or your family down: 1 - several days  Trouble concentrating on things, such as reading the newspaper or watching television: 3 - nearly every day  Moving or speaking so slowly that other people could have noticed   Or the opposite - being so fidgety or restless that you have been moving around a lot more than usual: 2 - more than half the days  Thoughts that you would be better off dead, or of hurting yourself in some way: 0 - not at all  PHQ-9 Score: 20   PHQ-9 Interpretation: Severe depression          GOOD-7 Flowsheet Screening    Flowsheet Row Most Recent Value   Over the last 2 weeks, how often have you been bothered by any of the following problems? Feeling nervous, anxious, or on edge 3   Not being able to stop or control worrying 3   Worrying too much about different things 2   Trouble relaxing 2   Being so restless that it is hard to sit still 3   Becoming easily annoyed or irritable 0   Feeling afraid as if something awful might happen 3   GOOD-7 Total Score 16        Past Psychiatric History:    Past Inpatient Psychiatric Treatment:   Multiple past inpatient psychiatric admissions    1st hospitalization age 24, most recent 2019  Multiple 302 admissions  Hospitalized for SAs by OD on medications, manic symptoms, mood instability   Past Outpatient Psychiatric Treatment:    Was in outpatient psychiatric treatment in the past with a psychiatrist  Past Suicide Attempts: yes, by overdose on medications  Past Violent Behavior: no  Past Psychiatric Medication Trials: Prozac, Celexa, Lexapro, Tegretol, Lamictal, Topamax, Neurontin, Risperdal, Abilify, Seroquel, Invega, Zyprexa, Latuda, Atarax, Ativan, Klonopin, Prazosin, Ambien, Lunesta, Melatonin and Sonata    Traumatic History:    Abuse: not willing to provide details  Other Traumatic Events: nightmares     Family Psychiatric History:   Brother-depression  Paternal aunt-schizophrenia  Paternal uncle-alcoholism  Maternal aunts-mental health issues, unsure dx    FH of suicide-unknown     Substance Abuse History:   Tobacco/alcohol/caffeine: Vapes nicotine daily  Rare, social etoh use  Illicit drugs: Hx of THC use, stopped 4 years ago  Denies other illicit drug use  Social History:    Developmental: Denies a history of milestone/developmental delay  Denies a history of in-utero exposure to toxins/illicit substances   There is no documented history of IEP or need for special education  Born 3 months premature   Education: technical college some classes  Employed FT  Lives with roommate  Parents split time between [address] and   Living arrangement, social support: parents and roommate  Access to firearms: Denies direct access to weapons/firearms  Past Medical History:    Past Medical History:   Diagnosis Date   • Bipolar 1 disorder ([facility] )    • Cancer ([facility] )     thyroid   • Depression    • Schizoaffective disorder, bipolar type ([facility] )    • Suicidal ideations    • Thyroid disease         Past Surgical History:   Procedure Laterality Date   • NECK SURGERY     • US GUIDED THYROID BIOPSY      normal  Onset: 2012     No Known Allergies    History Review: The following portions of the patient's history were reviewed and updated as appropriate: allergies, current medications, past family history, past medical history, past social history, past surgical history and problem list     OBJECTIVE:    Vital signs in last 24 hours: There were no vitals filed for this visit      Mental Status Evaluation:  Appearance and attitude: appeared as stated age, cooperative and attentive, casually dressed, overweight, wearing a hoody, with good hygiene  Eye contact: good  Motor Function: within normal limits, intact gait, No PMA/PMR  Gait/station: normal gait/station and normal balance  Speech: normal for rate, rhythm, volume, latency, amount  Language: No overt abnormality  Mood/affect: depressed / Affect was constricted but reactive, mood congruent  Thought Processes: sequential and goal-directed  Thought content: denies suicidal ideation or homicidal ideation; no delusions or first rank symptoms  Associations: intact associations  Perceptual disturbances: denies Auditory/Visual/Tactile Hallucinations  Orientation: oriented to time, person, place and to the situational context  Cognitive Function: intact  Memory: recent and remote memory grossly intact  Intellect: average  Fund of knowledge: aware of current events, aware of past history and vocabulary average  Impulse control: good  Insight/judgment: good/good    Pain: denied    Lab Results: I have personally reviewed all pertinent laboratory/tests results  Recent Labs (last 2 months):   No visits with results within 2 Month(s) from this visit     Latest known visit with results is:   Appointment on 09/03/2022   Component Date Value   • Sodium 09/03/2022 139    • Potassium 09/03/2022 3 8    • Chloride 09/03/2022 105    • CO2 09/03/2022 22    • ANION GAP 09/03/2022 12    • BUN 09/03/2022 11    • Creatinine 09/03/2022 1 01    • Glucose, Fasting 09/03/2022 95    • Calcium 09/03/2022 7 4 (L)    • Corrected Calcium 09/03/2022 8 0 (L)    • AST 09/03/2022 40    • ALT 09/03/2022 75    • Alkaline Phosphatase 09/03/2022 51    • Total Protein 09/03/2022 8 1    • Albumin 09/03/2022 3 2 (L)    • Total Bilirubin 09/03/2022 0 44    • eGFR 09/03/2022 73    • WBC 09/03/2022 7 09    • RBC 09/03/2022 4 30    • Hemoglobin 09/03/2022 12 2    • Hematocrit 09/03/2022 37 5    • MCV 09/03/2022 87    • MCH 09/03/2022 28 4    • MCHC 09/03/2022 32 5    • RDW 09/03/2022 15 3 (H)    • MPV 09/03/2022 9 7    • Platelets [birth year]1936 330    • nRBC 09/03/2022 0    • Neutrophils Relative 09/03/2022 71    • Immat GRANS % 09/03/2022 0    • Lymphocytes Relative 09/03/2022 22    • Monocytes Relative 09/03/2022 5    • Eosinophils Relative 09/03/2022 2    • Basophils Relative 09/03/2022 0    • Neutrophils Absolute 09/03/2022 5 00    • Immature Grans Absolute 09/03/2022 0 03    • Lymphocytes Absolute 09/03/2022 1 53    • Monocytes Absolute 09/03/2022 0 33    • Eosinophils Absolute 09/03/2022 0 17    • Basophils Absolute 09/03/2022 0 03    • Cholesterol 09/03/2022 204 (H)    • Triglycerides 09/03/2022 83    • HDL, Direct 09/03/2022 63    • LDL Calculated 09/03/2022 124 (H)    • Phosphorus 09/03/2022 3 4    • PTH 09/03/2022 37 0    • T4 TOTAL 09/03/2022 19 4 (H)    • TSH [facility] 09/03/2022 0 532    • Vit D, 25-Hydroxy 09/03/2022 68 1          Suicide/Homicide Risk Assessment:    Risk of Harm to Self:  The following ratings are based on assessment at the time of the interview  Demographic risk factors include: never   Historical Risk Factors include: chronic psychiatric problems, chronic mood disorder  Recent Specific Risk Factors include: diagnosis of mood disorder, mental illness diagnosis, current depressive symptoms, current anxiety symptoms  Protective Factors: no current suicidal ideation, access to mental health treatment, compliant with medications, compliant with mental health treatment, having a desire to live, no substance use problems, stable living environment, stable job, supportive family, supportive friends  Weapons: none  The following steps have been taken to ensure weapons are properly secured: not applicable  Based on today's assessment, Iveliss presents the following risk of harm to self: minimal    Risk of Harm to Others: The following ratings are based on assessment at the time of the interview  Demographic Risk Factors include: under age 36  Historical Risk Factors include: none  Recent Specific Risk Factors include: none  Protective Factors: no current homicidal ideation  Weapons: none  The following steps have been taken to ensure weapons are properly secured: not applicable  Based on today's assessment, Iveliss presents the following risk of harm to others: none    The following interventions are recommended: no intervention changes needed  Although patient's acute lethality risk is LOW, long-term/chronic lethality risk is mildly elevated given chronic mental health symptoms  Mihir  is future-oriented, forward-thinking, and demonstrates ability to act in a self-preserving manner as evidenced by volitionally presenting to the clinic today, seeking treatment    At this time, inpatient hospitalization is not currently warranted  To mitigate future risk, patient should adhere to treatment recommendations, avoid alcohol/illicit substance use, utilize community-based resources and familiar support, and prioritize mental health treatment  Based on today's assessment and clinical criteria, Madison Gaucher contracts for safety and is not an imminent risk of harm to self or others  Outpatient level of care is deemed appropriate at this present time  Janes understands that if they are no longer able to contract for safety, they need to call/contact the outpatient office including this writer, call/contact crisis and/or attend to the nearest Emergency Department for immediate evaluation  Assessment/Plan:   Diagnosis: 1  Schizoaffective d/o, bipolar type 2  GOOD 3  PTSD    In summary,   Madison Gaucher is a 34 y o  female, domiciled with roommate (and parents who split time between here and [place]), currently employed as a  FT for 5 years, w/ PMH of thyroid cancer 2017 and PPH of schizoaffective d/o, PTSD, and GOOD, multiple prior psychiatric admissions (over 10 hospitalizations, multiple 56's, first time at age 24, most recent in 2019), 6 prior SAs (OD on medications), h/o self-injurious behavior of cutting in high school, who presented to the mental health clinic for the initial intake and psychiatric evaluation on January 20, 2023  Janes was a former patient of Carla BALDERAS (last visit on 12/23/22) and is currently prescribed Prozac, Klonopin, Zyprexa, Minipress, and Sonata  Tolerating medication well with no medication side effects observed or reported  Actively involved in individual psychotherapy with Kelly Arreola bi-weekly        On assessment today, Janes endorses depressed mood, anhedonia, low energy and poor motivation and difficulty initiating sleep, frequent worry and feelings of impending doom, with overall stability in terms of mood and psychotic symptoms, in the context of chronic mental health symptoms, psychosocial stressors, and family history of mental illness  Her current presentation meets criteria for Schizoaffective d/o, bipolar type, GOOD, PTSD  Currently she is not at risk for suicide, homicide, self-injury, aggressive behaviors, self-neglect, or neglect of dependents or children  Given this presentation, the patient will benefit from further outpatient follow up for management of her symptoms  At conclusion of evaluation, Asiya Matthews is amenable and gave informed consent to the recommendations of this writer  Psycho-education regarding Prozac, trazodone, Klonopin, Zyprexa, and Minipress medication class, and the importance of compliance with psychiatric treatment reiterated  Educated on the [address] [place] [address] and Environmental Approach to mental health  Patient was receptive to education  Plan:  1  Admit to [address] [place] [address] outpatient services for ongoing treatment of Schizoaffective d/o, GOOD, PTSD  2  Continue Klonopin 0 5 mg bid for anxiety  3  Increase Prozac to 40 mg daily to help with depression and anxiety symptoms  Monitor for manic symptoms  4  Continue Zyprexa 25 mg at bedtime for mood and psychotic symptoms  5  Continue Minipress 2 mg at bedtime for PTSD symptoms  6  Discontinue Sonata due to ineffectiveness  Initiate trazodone 50 mg at bedtime as needed for insomnia  7  Continue individual psychotherapy sessions  8  Follow up with primary care provider for ongoing medical care  9  Follow up with this provider in 4 weeks          Diagnoses and all orders for this visit:    Schizoaffective disorder, bipolar type ([facility] )  -     FLUoxetine (PROzac) 40 MG capsule; Take 1 capsule (40 mg total) by mouth daily    Post traumatic stress disorder (PTSD)    GOOD (generalized anxiety disorder)    Other insomnia  -     traZODone (DESYREL) 50 mg tablet;  Take 1 tablet (50 mg total) by mouth daily at bedtime          - Psychoeducation provided regarding the importance of exercise and health dietary choices and their impact on mood, energy, and motivation   - Counseled to avoid ETOH, illicit substances, and nicotine secondary to the detrimental effects of these substances on mental and physical health  - Encouraged to engage in non-verbal forms of therapy such as art therapy, music therapy, and mindfulness  - Psychoeducation regarding medication benefits and risks, side effects, indications and alternatives provided to the patient and the importance of compliance with psychiatric medication reiterated  The [employer] verbalized understanding and agreed with the plan  - The patient was educated about 24 hour and weekend coverage for urgent situations accessed by calling [facility] main practice number  - Patient was educated to call [address] [facility] ([phone] [[phone]]) for behavioral crisis at any time, 911 for any safety concerns, or go to nearest ER if her symptoms become overwhelming or unmanageable  Medications Risks/Benefits:      Risks, Benefits And Possible Side Effects Of Medications:    Risks, benefits, and possible side effects of medications explained to Janes and she verbalizes understanding and agreement for treatment      Controlled Medication Discussion:     Susie Mills has been filling controlled prescriptions on time as prescribed according to [place] Prescription Drug Monitoring Program    Treatment Plan:    Completed and signed during the session: Yes - Treatment Plan done but not signed at time of office visit due to:  Plan reviewed in person and verbal consent given due to [address] distancing    This note was not shared with the patient due to reasonable likelihood of causing patient harm    Visit Time    Visit Start Time: 12:24 PM  Visit Stop Time: 1:14 PM  Total Visit Duration: 50 minutes      GENESIS Perdomo 01/20/23

## 2023-01-20 ENCOUNTER — OFFICE VISIT (OUTPATIENT)
Dept: PSYCHIATRY | Facility: CLINIC | Age: 33
End: 2023-01-20

## 2023-01-20 DIAGNOSIS — F43.10 POST TRAUMATIC STRESS DISORDER (PTSD): ICD-10-CM

## 2023-01-20 DIAGNOSIS — G47.09 OTHER INSOMNIA: ICD-10-CM

## 2023-01-20 DIAGNOSIS — F25.0 SCHIZOAFFECTIVE DISORDER, BIPOLAR TYPE (HCC): Primary | ICD-10-CM

## 2023-01-20 DIAGNOSIS — F41.1 GAD (GENERALIZED ANXIETY DISORDER): ICD-10-CM

## 2023-01-20 RX ORDER — TRAZODONE HYDROCHLORIDE 50 MG/1
50 TABLET ORAL
Qty: 30 TABLET | Refills: 1 | Status: SHIPPED | OUTPATIENT
Start: 2023-01-20

## 2023-01-20 RX ORDER — FLUOXETINE HYDROCHLORIDE 40 MG/1
40 CAPSULE ORAL DAILY
Start: 2023-01-20

## 2023-01-20 NOTE — BH TREATMENT PLAN
TREATMENT PLAN (Medication Management Only)        [facility]    Name and Date of Birth:  Arjun Layton 34 y o  1990  Date of Treatment Plan: January 20, 2023  Diagnosis/Diagnoses:    1  Schizoaffective disorder, bipolar type ([address] )    2  Post traumatic stress disorder (PTSD)    3  GOOD (generalized anxiety disorder)    4  Other insomnia      Strengths/Personal Resources for Self-Care: supportive family, supportive friends, taking medications as prescribed, ability to communicate well, ability to understand psychiatric illness, average or above intelligence, financial means, motivation for treatment, stable employment, willingness to work on problems  Area/Areas of need (in own words): anxiety symptoms, depressive symptoms, mood instability, lack of sleep  1  Long Term Goal: continue improvement in depression  Target Date:4 weeks - 2/17/2023  Person/Persons responsible for completion of goal: Iveliss  2  Short Term Objective (s) - How will we reach this goal?:   A  Provider new recommended medication/dosage changes and/or continue medication(s): increase Prozac to help with depression and anxiety symptoms, start trazodone to help with sleep  B  N/A   C  N/A  Target Date:4 weeks - 2/17/2023  Person/Persons Responsible for Completion of Goal: Iveliss  Progress Towards Goals: continuing treatment  Treatment Modality: medication management every 4 weeks  Review due 180 days from date of this plan: 6 months - 7/20/2023  Expected length of service: ongoing treatment  My Physician/PA/NP and I have developed this plan together and I agree to work on the goals and objectives  I understand the treatment goals that were developed for my treatment

## 2023-01-20 NOTE — Clinical Note
Sharee Solo is calling her employer to have [facility] paperwork faxed to use  Would you be able to assist with completing this? Thank you!

## 2023-01-23 ENCOUNTER — TELEPHONE (OUTPATIENT)
Dept: PSYCHIATRY | Facility: CLINIC | Age: 33
End: 2023-01-23

## 2023-01-23 NOTE — TELEPHONE ENCOUNTER
Patient called to confirm that we have [facility] paper work  Writer reassured patient that paperwork is in [employer] ready to be signed and faxed

## 2023-01-23 NOTE — TELEPHONE ENCOUNTER
Writer faxed FMLA to [employer] at [phone]  Forms Scanned into Chart  Writer contacted Janes to let her know that FMLA was sent  Message was sent  Expiration Date (Month Year): 02/2024

## 2023-01-23 NOTE — TELEPHONE ENCOUNTER
Case Management received a referral from [address] [place] [address] to assist with FMLA paperwork  Paperwork was completed and placed in [employer]  Please sign and return to

## 2023-02-03 ENCOUNTER — TELEMEDICINE (OUTPATIENT)
Dept: BEHAVIORAL/MENTAL HEALTH CLINIC | Facility: CLINIC | Age: 33
End: 2023-02-03

## 2023-02-03 DIAGNOSIS — F41.1 GAD (GENERALIZED ANXIETY DISORDER): ICD-10-CM

## 2023-02-03 DIAGNOSIS — F25.0 SCHIZOAFFECTIVE DISORDER, BIPOLAR TYPE (HCC): Primary | ICD-10-CM

## 2023-02-03 DIAGNOSIS — F43.10 POST TRAUMATIC STRESS DISORDER (PTSD): ICD-10-CM

## 2023-02-03 NOTE — PSYCH
Behavioral Health Psychotherapy Progress Note    Psychotherapy Provided: Individual Psychotherapy     1  Schizoaffective disorder, bipolar type ([address] )        2  Post traumatic stress disorder (PTSD)        3  GOOD (generalized anxiety disorder)            Goals addressed in session: Goal 1     DATA: JUDY met with Janes  for session  Her girlfriend got a new job for more money  Their plan is to save up money and move out and get their own place  If she moves out though her parents will still be staying with her when they come [place] from [place]  [place] the daughter takes care of her parents  Her parents are coming back to the [address] which she is not looking forward to because her mom has not been taking her meds  "Her mom not medicated is not good "   She stated, "She has been taking her meds" but, when discussed more in depth she is not taking them on the weekends "  Discussed the need for her to take them also on the weekends  During this session, this clinician used the following therapeutic modalities: Client-centered Therapy, Cognitive Behavioral Therapy, Solution-Focused Therapy and Supportive Psychotherapy    Substance Abuse was not addressed during this session  If the client is diagnosed with a co-occurring substance use disorder, please indicate any changes in the frequency or amount of use: na  Stage of change for addressing substance use diagnoses: No substance use/Not applicable    ASSESSMENT:  Maritza Mccray presents with a Euthymic/ normal mood  her affect is Normal range and intensity, which is congruent, with her mood and the content of the session  The client has made progress on their goals  Maritza Mccray presents with a none risk of suicide, none risk of self-harm, and none risk of harm to others  For any risk assessment that surpasses a "low" rating, a safety plan must be developed      A safety plan was indicated: no  If yes, describe in detail     PLAN: Between sessions, Asiya Matthews will address tx plan goals    At the next session, the therapist will use Client-centered Therapy, Cognitive Behavioral Therapy, Solution-Focused Therapy and Supportive Psychotherapy to address tx plan goals and issues that arise in between sessions       Behavioral Health Treatment Plan and Discharge Planning: Asiya Matthews is aware of and agrees to continue to work on their treatment plan  They have identified and are working toward their discharge goals  yes    Visit start and stop times:    02/03/23  Start Time: 1615  Stop Time: 1700  Total Visit Time: 45 minutes    Virtual Regular Visit    Verification of patient location:    Patient is located in the following state in which I hold an active license [place]      Assessment/Plan:    Problem List Items Addressed This Visit    None      Goals addressed in session: Goal 1          Reason for visit is   No chief complaint on file  Encounter provider Alli Winkler    Provider located at [address] [place] [id number] [phone]      Recent Visits  No visits were found meeting these conditions  Showing recent visits within past 7 days and meeting all other requirements  Today's Visits  Date Type Provider Dept   02/03/23 [address] [place] [address] today's visits and meeting all other requirements  Future Appointments  No visits were found meeting these conditions  Showing future appointments within next 150 days and meeting all other requirements       The patient was identified by name and date of birth  Asiya Matthews was informed that this is a telemedicine visit and that the visit is being conducted throughthe [employer] platform  She agrees to proceed     My office door was closed  No one else was in the room    She acknowledged consent and understanding of privacy and security of the video platform  The patient has agreed to participate and understands they can discontinue the visit at any time  Patient is aware this is a billable service  Subjective  Lauren Perla is a [address] thomas osorio  female    HPI     Past Medical History:   Diagnosis Date   • Bipolar 1 disorder ([facility] )    • Cancer ([facility] )     thyroid   • Depression    • Schizoaffective disorder, bipolar type ([facility] )    • Suicidal ideations    • Thyroid disease        Past Surgical History:   Procedure Laterality Date   • NECK SURGERY     • US GUIDED THYROID BIOPSY      normal  Onset: 2012       Current Outpatient Medications   Medication Sig Dispense Refill   • clonazePAM (KlonoPIN) 0 5 mg tablet Take 1 tablet (0 5 mg total) by mouth 2 (two) times a day 60 tablet 2   • FLUoxetine (PROzac) 40 MG capsule Take 1 capsule (40 mg total) by mouth daily     • levothyroxine 175 mcg tablet Take 200 mcg by mouth daily     • OLANZapine (ZyPREXA) 20 MG tablet Take 1 tablet (20 mg total) by mouth daily at bedtime 90 tablet 1   • OLANZapine (ZyPREXA) 5 mg tablet Take 1 tablet (5 mg total) by mouth daily at bedtime 90 tablet 1   • prazosin (MINIPRESS) 2 mg capsule Take 1 capsule (2 mg total) by mouth daily at bedtime 90 capsule 1   • traZODone (DESYREL) 50 mg tablet Take 1 tablet (50 mg total) by mouth daily at bedtime 30 tablet 1     No current facility-administered medications for this visit  No Known Allergies    Review of Systems    Video Exam    There were no vitals filed for this visit      Physical Exam

## 2023-02-09 ENCOUNTER — TELEPHONE (OUTPATIENT)
Dept: PSYCHIATRY | Facility: CLINIC | Age: 33
End: 2023-02-09

## 2023-02-09 NOTE — TELEPHONE ENCOUNTER
Call from [facility] stating today  Is the first time in over a year that she has had auditory hallucinations  States she was coming down the stairs this morning and heard something go "boom"  She turned on the light and there was nothing there  Then while walking to her car she heard "there she is, there she is", but there was nobody around  Confirmed she feels safe and is not hearing voices telling her to hurt herself or anybody else  She is just wondering if it could be from the Trazodone since that is the newest medication change  States "it scared the shit out of me"  Will refer to [facility] for review

## 2023-02-22 NOTE — PSYCH
Virtual Regular Visit    Problem List Items Addressed This Visit        Other    Post traumatic stress disorder (PTSD)    Relevant Medications    traZODone (DESYREL) 100 mg tablet    FLUoxetine (PROzac) 40 MG capsule    Schizoaffective disorder, bipolar type ([facility] ) - Primary    Relevant Medications    traZODone (DESYREL) 100 mg tablet    FLUoxetine (PROzac) 40 MG capsule    GOOD (generalized anxiety disorder)    Relevant Medications    traZODone (DESYREL) 100 mg tablet    FLUoxetine (PROzac) 40 MG capsule   Other Visit Diagnoses     Other insomnia        Relevant Medications    traZODone (DESYREL) 100 mg tablet        Reason for visit is   Chief Complaint   Patient presents with   • Medication Management     Encounter provider Latanya Bello, [facility]    Provider located at [address]  [facility]   [address] [facility] [address]  [facility] [address] [phone]    Recent Visits  No visits were found meeting these conditions  Showing recent visits within past 7 days and meeting all other requirements  Today's Visits  Date Type Provider Dept   02/23/23 Telemedicine [facility]   Showing today's visits and meeting all other requirements  Future Appointments  No visits were found meeting these conditions  Showing future appointments within next 150 days and meeting all other requirements       After connecting through [employer], the patient was identified by name and date of birth  Lanehenri Mensah was informed that this is a telemedicine visit and that the visit is being conducted through the [employer] platform  She agrees to proceed  which may not be secure and therefore, might not be HIPAA-compliant  My office door was closed  No one else was in the room  She acknowledged consent and understanding of privacy and security of the video platform   The patient has agreed to participate and understands they can discontinue the visit at any time  MEDICATION MANAGEMENT NOTE        [facility]      Name and Date of Birth:  Fanny Cannon 34 y o  1990 MRN: [id number]    Date of Visit: February 23, 2023    Reason for Visit:   Chief Complaint   Patient presents with   • Medication Management          SUBJECTIVE:    Fanny Cannon is a 34 y o  female with past psychiatric history significant for Schizoaffective Disorder, Generalized Anxiety Disorder and PTSD who was personally seen and evaluated today at the [address] [place] [address] E outpatient clinic for follow-up and medication management  She presents as less depressed, pleasant, cooperative, calm  Her thoughts are organized, goal directed and completes psychiatric assessment without difficulty  Janes endorses compliance with psychotropic medication regimen that consists of Prozac, Trazodone, Zyprexa, Klonopin and Prazosin  She denies any current adverse medication side effects  At previous outpatient psychiatric appointment with this writer, Prozac dose was increased  Trazodone was initiated  Overall, Janes continues to endorse overall stability in terms of mood  She denies any recent manic symptoms or mood swings  Had a recent argument with mom that left her feeling emotionally drained  She called off work and slept the majority of the day  She has had 3 episodes that have occurred upon awakening in the AM of  that were very brief in duration  This has made her worried due to stability in psychotic symptoms for many months  She has noticed these episodes have occurred during periods of increased stress and difficulty sleeping at night  Depressive symptoms have improved overall with recent medication adjustments  She denies any SI  Has some ongoing difficulty initiating sleep  Typically tosses and turns for over an hour before getting to sleep  She is able to maintain sleep overnight  No recent nightmares  Anxiety symptoms have been overall stable  She denies frequent worry or trouble relaxing  No irritability or recent panic episodes  She continues with compulsive skin-picking behavior  She denies any medication side effects  Iveliss denies any further concerns today  Current Rating Scores:     None completed today  Review Of Systems:      Constitutional negative   ENT negative   Cardiovascular negative   Respiratory negative   Gastrointestinal negative   Genitourinary negative   Musculoskeletal negative   Integumentary negative   Neurological negative   Endocrine negative   Other Symptoms none, all other systems are negative       Historical information: (unchanged information from previous note copied and italicized) - Information that is bolded has been updated  Past Psychiatric History:    Past Inpatient Psychiatric Treatment:   Multiple past inpatient psychiatric admissions    1st hospitalization age 24, most recent 2019  Multiple 302 admissions  Hospitalized for SAs by OD on medications, manic symptoms, mood instability   Past Outpatient Psychiatric Treatment:    Was in outpatient psychiatric treatment in the past with a psychiatrist  Past Suicide Attempts: yes, by overdose on medications  Past Violent Behavior: no  Past Psychiatric Medication Trials: Prozac, Celexa, Lexapro, Tegretol, Lamictal, Topamax, Neurontin, Risperdal, Abilify, Seroquel, Invega, Zyprexa, Latuda, Atarax, Ativan, Klonopin, Prazosin, Ambien, Lunesta, Melatonin and Sonata     Traumatic History:    Abuse: not willing to provide details  Other Traumatic Events: nightmares      Family Psychiatric History:   Brother-depression  Paternal aunt-schizophrenia  Paternal uncle-alcoholism  Maternal aunts-mental health issues, unsure dx     FH of suicide-unknown      Substance Abuse History:   Tobacco/alcohol/caffeine: Vapes nicotine daily  Rare, social etoh use  Illicit drugs: Hx of THC use, stopped 4 years ago  Denies other illicit drug use       Social History:    Developmental: Denies a history of milestone/developmental delay  Denies a history of in-utero exposure to toxins/illicit substances  There is no documented history of IEP or need for special education  Born 3 months premature   Education: technical college some classes  Employed FT  Lives with roommate  Parents split time between [address] and   Living arrangement, social support: parents and roommate  Access to firearms: Denies direct access to weapons/firearms       Past Medical History:    Past Medical History:   Diagnosis Date   • Bipolar 1 disorder ([facility] )    • Cancer ([facility] )     thyroid   • Depression    • Schizoaffective disorder, bipolar type ([place] [address] )    • Suicidal ideations    • Thyroid disease         Past Surgical History:   Procedure Laterality Date   • NECK SURGERY     • US GUIDED THYROID BIOPSY      normal  Onset: 2012     No Known Allergies    Substance Abuse History:    Social History     Substance and Sexual Activity   Alcohol Use Yes    Comment: occ     Social History     Substance and Sexual Activity   Drug Use No       Social History:    Social History     Socioeconomic History   • Marital status: Single     Spouse name: Not on file   • Number of children: Not on file   • Years of education: Not on file   • Highest education level: Not on file   Occupational History   • Not on file   Tobacco Use   • Smoking status: Former     Packs/day: 1 00     Types: Cigarettes   • Smokeless tobacco: Never   Substance and Sexual Activity   • Alcohol use: Yes     Comment: occ   • Drug use: No   • Sexual activity: Not on file   Other Topics Concern   • Not on file   Social History Narrative   • Not on file     Social Determinants of Health     Financial Resource Strain: Not on file   Food Insecurity: Not on file   Transportation Needs: Not on file   Physical Activity: Not on file   Stress: Not on file   Social Connections: Not on file   Intimate Partner Violence: Not on file   Housing Stability: Not on file       Family Psychiatric History:     History reviewed  No pertinent family history  History Review: The following portions of the patient's history were reviewed and updated as appropriate: allergies, current medications, past family history, past medical history, past social history, past surgical history and problem list          OBJECTIVE:     Vital signs in last 24 hours: There were no vitals filed for this visit  Mental Status Evaluation:    Appearance age appropriate, casually dressed   Behavior pleasant, cooperative, calm   Speech normal rate, normal volume, normal pitch   Mood less depressed   Affect constricted   Thought Processes organized, goal directed   Associations intact associations   Thought Content no overt delusions   Perceptual Disturbances: no auditory hallucinations, no visual hallucinations   Abnormal Thoughts  Risk Potential Suicidal ideation - None  Homicidal ideation - None  Potential for aggression - No   Orientation oriented to person, place, time/date and situation   Memory recent and remote memory grossly intact   Consciousness alert and awake   Attention Span Concentration Span attention span and concentration are age appropriate   Intellect appears to be of average intelligence   Insight intact   Judgement intact   Muscle Strength and  Gait unable to assess today due to virtual visit   Motor activity unable to assess today due to virtual visit   Language no difficulty naming common objects, no difficulty repeating a phrase, no difficulty writing a sentence   Fund of Knowledge adequate knowledge of current events  adequate fund of knowledge regarding past history  adequate fund of knowledge regarding vocabulary    Pain none   Pain Scale 0       Laboratory Results: I have personally reviewed all pertinent laboratory/tests results    Recent Labs (last 2 months):   No visits with results within 2 Month(s) from this visit     Latest known visit with results is:   Appointment on 09/03/2022   Component Date Value   • Sodium 09/03/2022 139    • Potassium 09/03/2022 3 8    • Chloride 09/03/2022 105    • CO2 09/03/2022 22    • ANION GAP 09/03/2022 12    • BUN 09/03/2022 11    • Creatinine 09/03/2022 1 01    • Glucose, Fasting 09/03/2022 95    • Calcium 09/03/2022 7 4 (L)    • Corrected Calcium 09/03/2022 8 0 (L)    • AST 09/03/2022 40    • ALT 09/03/2022 75    • Alkaline Phosphatase 09/03/2022 51    • Total Protein 09/03/2022 8 1    • Albumin 09/03/2022 3 2 (L)    • Total Bilirubin 09/03/2022 0 44    • eGFR 09/03/2022 73    • WBC 09/03/2022 7 09    • RBC 09/03/2022 4 30    • Hemoglobin 09/03/2022 12 2    • Hematocrit 09/03/2022 37 5    • MCV 09/03/2022 87    • MCH 09/03/2022 28 4    • MCHC 09/03/2022 32 5    • RDW 09/03/2022 15 3 (H)    • MPV 09/03/2022 9 7    • Platelets 56/48/9905 330    • nRBC 09/03/2022 0    • Neutrophils Relative 09/03/2022 71    • Immat GRANS % 09/03/2022 0    • Lymphocytes Relative 09/03/2022 22    • Monocytes Relative 09/03/2022 5    • Eosinophils Relative 09/03/2022 2    • Basophils Relative 09/03/2022 0    • Neutrophils Absolute 09/03/2022 5 00    • Immature Grans Absolute 09/03/2022 0 03    • Lymphocytes Absolute 09/03/2022 1 53    • Monocytes Absolute 09/03/2022 0 33    • Eosinophils Absolute 09/03/2022 0 17    • Basophils Absolute 09/03/2022 0 03    • Cholesterol 09/03/2022 204 (H)    • Triglycerides 09/03/2022 83    • HDL, Direct 09/03/2022 63    • LDL Calculated 09/03/2022 124 (H)    • Phosphorus 09/03/2022 3 4    • PTH 09/03/2022 37 0    • T4 TOTAL 09/03/2022 19 4 (H)    • TSH 3RD GENERATON 09/03/2022 0 532    • Vit D, 25-Hydroxy 09/03/2022 68 1        Suicide/Homicide Risk Assessment:    The following interventions are recommended: no intervention changes needed      Lethality Statement:    Based on today's assessment and clinical criteria, [employer] for safety and is not an imminent risk of harm to self or others  Outpatient level of care is deemed appropriate at this current time  Janes understands that if they can no longer contract for safety, they need to call the office or report to their nearest Emergency Room for immediate evaluation  Assessment/Plan:     Olga Kidd is a 34 y o  female, domiciled with roommate (and parents who split time between here and ), currently employed as a  FT for 5 years, w/ PMH of thyroid cancer 2017 and PPH of schizoaffective d/o, PTSD, and GOOD, multiple prior psychiatric admissions (over 10 hospitalizations, multiple 56's, first time at age 24, most recent in 2019), 6 prior SAs (OD on medications), h/o self-injurious behavior of cutting in high school, who presented to the mental health clinic for the initial intake and psychiatric evaluation on January 20, 2023  Janes was a former patient of Jose BALDERAS (last visit on 12/23/22) and is currently prescribed Prozac, Klonopin, Zyprexa, Minipress, and Sonata  Tolerating medication well with no medication side effects observed or reported  Actively involved in individual psychotherapy with Chanelle Churchill bi-weekly  Psychopharmacologically, Janes continues to tolerate current medications with no adverse effects  She reports improvement in depression and anxiety symptoms  Ongoing difficulty initiating sleep  She is agreeable to further dose titration of trazodone to help with sleep  Risks/benefits/alternativies to treatment discussed, including a myriad of potential adverse medication side effects, to which Janes voiced understanding and consented fully to treatment  Also, patient is amenable to calling/contacting the outpatient office including this writer if any acute adverse effects of their medication regimen arise in addition to any comments or concerns pertaining to their psychiatric management         Diagnoses and all orders for this visit:    Schizoaffective disorder, bipolar type (HCC)  -     FLUoxetine (PROzac) 40 MG capsule; Take 1 capsule (40 mg total) by mouth daily    Post traumatic stress disorder (PTSD)    GOOD (generalized anxiety disorder)    Other insomnia  -     traZODone (DESYREL) 100 mg tablet; Take 1 tablet (100 mg total) by mouth daily at bedtime       Diagnosis/Treatment Recommendations  - Psychoeducation provided regarding the importance of exercise and health dietary choices and their impact on mood, energy, and motivation   - Counseled to avoid ETOH, illicit substances, and nicotine secondary to the detrimental effects of these substances on mental and physical health  - Encouraged to engage in non-verbal forms of therapy such as art therapy, music therapy, and mindfulness  Aware of 24 hour and weekend coverage for urgent situations accessed by calling [facility] main practice number    Plan:  1  Continue Klonopin 0 5 mg bid for anxiety symptoms   2  Continue Prozac 40 mg daily for depression and anxiety  3  Continue Zyprexa 25 mg at bedtime for psychotic symptoms and mood  Consider further dose titration if psychotic symptoms worsen  4  Continue Minipress 2 mg at bedtime for PTSD symptoms  5  Increase trazodone to 100 mg at bedtime for sleep  6  Continue individual psychotherapy sessions  7  Follow up with primary care provider for ongoing medical care  8  Follow up with this provider in 3 months     Medications Risks/Benefits      Risks, Benefits And Possible Side Effects Of Medications:    Risks, benefits, and possible side effects of medications explained to Janes and she verbalizes understanding and agreement for treatment      Controlled Medication Discussion:     Amarjit Sierra has been filling controlled prescriptions on time as prescribed according to [place] Prescription Drug Monitoring Program    Psychotherapy Provided:     Individual psychotherapy provided: Yes  Counseling was provided during the session today for 16 minutes  Medication education provided to [employer]  Goals discussed during session  Importance of medication and treatment compliance reviewed with Janes  Cognitive therapy was utilized during the session  Reassurance and supportive therapy provided  Crisis/safety plan discussed with Angeles Warner     Treatment Plan:    Completed and signed during the session: Not applicable - Treatment Plan not due at this session    Note Share Disclaimer:      This note was not shared with the patient due to reasonable likelihood of causing patient harm    Visit Time    Visit Start Time: 3:25 PM  Visit Stop Time: 3:43 PM  Total Visit Duration: 18 minutes    GENESIS Guardado 02/23/23

## 2023-02-23 ENCOUNTER — TELEMEDICINE (OUTPATIENT)
Dept: PSYCHIATRY | Facility: CLINIC | Age: 33
End: 2023-02-23

## 2023-02-23 DIAGNOSIS — F41.1 GAD (GENERALIZED ANXIETY DISORDER): ICD-10-CM

## 2023-02-23 DIAGNOSIS — G47.09 OTHER INSOMNIA: ICD-10-CM

## 2023-02-23 DIAGNOSIS — F43.10 POST TRAUMATIC STRESS DISORDER (PTSD): ICD-10-CM

## 2023-02-23 DIAGNOSIS — F25.0 SCHIZOAFFECTIVE DISORDER, BIPOLAR TYPE (HCC): Primary | ICD-10-CM

## 2023-02-23 RX ORDER — FLUOXETINE HYDROCHLORIDE 40 MG/1
40 CAPSULE ORAL DAILY
Qty: 90 CAPSULE | Refills: 1 | Status: SHIPPED | OUTPATIENT
Start: 2023-02-23

## 2023-02-23 RX ORDER — TRAZODONE HYDROCHLORIDE 100 MG/1
100 TABLET ORAL
Qty: 30 TABLET | Refills: 2 | Status: SHIPPED | OUTPATIENT
Start: 2023-02-23

## 2023-02-24 ENCOUNTER — TELEMEDICINE (OUTPATIENT)
Dept: BEHAVIORAL/MENTAL HEALTH CLINIC | Facility: CLINIC | Age: 33
End: 2023-02-24

## 2023-02-24 DIAGNOSIS — F25.0 SCHIZOAFFECTIVE DISORDER, BIPOLAR TYPE (HCC): Primary | ICD-10-CM

## 2023-02-24 DIAGNOSIS — F41.1 GAD (GENERALIZED ANXIETY DISORDER): ICD-10-CM

## 2023-02-24 DIAGNOSIS — F43.10 POST TRAUMATIC STRESS DISORDER (PTSD): ICD-10-CM

## 2023-02-24 NOTE — PSYCH
Behavioral Health Psychotherapy Progress Note    Psychotherapy Provided: Individual Psychotherapy     1  Schizoaffective disorder, bipolar type ([facility] [address] )        2  Post traumatic stress disorder (PTSD)        3  GOOD (generalized anxiety disorder)            Goals addressed in session: Goal 1, I am in "rrr" mode because I am stressed out  DATA: MSW met with Janes  for session  She had break through symptoms of hearing voices for 4 days but, it was a time when she was really stressed out  The last time she has heard it was on Monday  Discussed more coping skills for her stress  She had some paranoia but, appears to not have lost complete touch with reality  She was able to rationalize it out that they were voices and not real    She had stopped taking her meds during the month of Jan   She is and has been taking them now  Discussed what could of happened if she wasn't taking her meds and she became this stressed out  She did call her PA who told her also it was the stress  No change in meds  Also, discussed a coping skill to help her with remember to always take her meds  During this session, this clinician used the following therapeutic modalities: Client-centered Therapy, Cognitive Behavioral Therapy, Solution-Focused Therapy and Supportive Psychotherapy    Substance Abuse was not addressed during this session  If the client is diagnosed with a co-occurring substance use disorder, please indicate any changes in the frequency or amount of use: na  Stage of change for addressing substance use diagnoses: No substance use/Not applicable    ASSESSMENT:  Manny Beard presents with a Euthymic/ normal mood  her affect is Normal range and intensity, which is congruent, with her mood and the content of the session  The client has made progress on their goals  Manny Beard presents with a none risk of suicide, none risk of self-harm, and none risk of harm to others      For any risk assessment that surpasses a "low" rating, a safety plan must be developed  A safety plan was indicated: no  If yes, describe in detail     PLAN: Between sessions, Vesna Hancock will address tx plan goals    At the next session, the therapist will use Client-centered Therapy, Cognitive Behavioral Therapy, Solution-Focused Therapy and Supportive Psychotherapy to address tx plan goals and issues that arise in between sessions       Behavioral Health Treatment Plan and Discharge Planning: Vesna Hancock is aware of and agrees to continue to work on their treatment plan  They have identified and are working toward their discharge goals  yes    Visit start and stop times:    02/24/23  Start Time: 1712  Stop Time: 1800  Total Visit Time: 48 minutes    Virtual Regular Visit    Verification of patient location:    Patient is located in the following state in which I hold an active license [place]      Assessment/Plan:    Problem List Items Addressed This Visit        Other    Post traumatic stress disorder (PTSD)    Schizoaffective disorder, bipolar type ([facility] ) - Primary    GOOD (generalized anxiety disorder)       Goals addressed in session: Goal 1          Reason for visit is   No chief complaint on file  Encounter provider Marti Castellanos    Provider located at [address] [place] [id number] [phone]      Recent Visits  No visits were found meeting these conditions  Showing recent visits within past 7 days and meeting all other requirements  Today's Visits  Date Type Provider Dept   02/24/23 [address] [place] [address]  today's visits and meeting all other requirements  Future Appointments  No visits were found meeting these conditions    Showing future appointments within next 150 days and meeting all other requirements       The patient was identified by name and date of birth  Ibis Lora was informed that this is a telemedicine visit and that the visit is being conducted throughthe [facility] platform  She agrees to proceed     My office door was closed  No one else was in the room  She acknowledged consent and understanding of privacy and security of the video platform  The patient has agreed to participate and understands they can discontinue the visit at any time  Patient is aware this is a billable service  Subjective  Ibis Lora is a 34 y o  female    HPI     Past Medical History:   Diagnosis Date   • Bipolar 1 disorder ([facility] [address] )    • Cancer ([facility] [address] )     thyroid   • Depression    • Schizoaffective disorder, [facility] type ([facility] )    • Suicidal ideations    • Thyroid disease        Past Surgical History:   Procedure Laterality Date   • NECK SURGERY     • US GUIDED THYROID BIOPSY      normal  Onset: 2012       Current Outpatient Medications   Medication Sig Dispense Refill   • clonazePAM (KlonoPIN) 0 5 mg tablet Take 1 tablet (0 5 mg total) by mouth 2 (two) times a day 60 tablet 2   • FLUoxetine (PROzac) 40 MG capsule Take 1 capsule (40 mg total) by mouth daily 90 capsule 1   • levothyroxine 175 mcg tablet Take 200 mcg by mouth daily     • OLANZapine (ZyPREXA) 20 MG tablet Take 1 tablet (20 mg total) by mouth daily at bedtime 90 tablet 1   • OLANZapine (ZyPREXA) 5 mg tablet Take 1 tablet (5 mg total) by mouth daily at bedtime 90 tablet 1   • prazosin (MINIPRESS) 2 mg capsule Take 1 capsule (2 mg total) by mouth daily at bedtime 90 capsule 1   • traZODone (DESYREL) 100 mg tablet Take 1 tablet (100 mg total) by mouth daily at bedtime 30 tablet 2     No current facility-administered medications for this visit  No Known Allergies    Review of Systems    Video Exam    There were no vitals filed for this visit      Physical Exam

## 2023-03-07 ENCOUNTER — TELEMEDICINE (OUTPATIENT)
Dept: BEHAVIORAL/MENTAL HEALTH CLINIC | Facility: CLINIC | Age: 33
End: 2023-03-07

## 2023-03-07 DIAGNOSIS — F41.1 GAD (GENERALIZED ANXIETY DISORDER): ICD-10-CM

## 2023-03-07 DIAGNOSIS — F43.10 POST TRAUMATIC STRESS DISORDER (PTSD): ICD-10-CM

## 2023-03-07 DIAGNOSIS — F25.0 SCHIZOAFFECTIVE DISORDER, BIPOLAR TYPE (HCC): Primary | ICD-10-CM

## 2023-03-07 NOTE — PSYCH
Behavioral Health Psychotherapy Progress Note    Psychotherapy Provided: Individual Psychotherapy     1  Schizoaffective disorder, bipolar type ([facility] [address] )        2  GOOD (generalized anxiety disorder)        3  Post traumatic stress disorder (PTSD)            Goals addressed in session: Goal 1, I am in "rrr" mode because I am stressed out  DATA: JUDY met with Janes  for session  "Her parents are driving her up the wall "  Had to put her cat down last week  For 3 days at the end of last she forgot to take her meds when her cat got sick to when he was put to sleep  She did not go  She said, "good bye to him and had her parents take him "   After they took him she "got drunk "    There has been changes at work regarding coworkers  She is back on track with her meds but, today took her night time meds which she usually takes at 6:00 pm at 4:00 pm today which was an hr prior our session because "she just wanted to sleep "    She was a little late getting on the call  Discussed her not doing that with her meds  Discussed grief and loss  Her girlfriend was in the room during our session confirming how she was and is  Denies SI  During this session, this clinician used the following therapeutic modalities: Client-centered Therapy, Cognitive Behavioral Therapy, Solution-Focused Therapy and Supportive Psychotherapy    Substance Abuse was not addressed during this session  If the client is diagnosed with a co-occurring substance use disorder, please indicate any changes in the frequency or amount of use: na  Stage of change for addressing substance use diagnoses: No substance use/Not applicable    ASSESSMENT:  Nanette Hutchins presents with a Euthymic/ normal mood  her affect is Normal range and intensity, which is congruent, with her mood and the content of the session  The client has made progress on their goals       Nanette Hutchins presents with a none risk of suicide, none risk of self-harm, and none risk of harm to others  For any risk assessment that surpasses a "low" rating, a safety plan must be developed  A safety plan was indicated: no  If yes, describe in detail     PLAN: Between sessions, Sofya Beard will address tx plan goals    At the next session, the therapist will use Client-centered Therapy, Cognitive Behavioral Therapy, Solution-Focused Therapy and Supportive Psychotherapy to address tx plan goals and issues that arise in between sessions       Behavioral Health Treatment Plan and Discharge Planning: Sofya Beard is aware of and agrees to continue to work on their treatment plan  They have identified and are working toward their discharge goals  yes    Visit start and stop times:    03/07/23  Start Time: 3480  Stop Time: 1800  Total Visit Time: 45 minutes    Virtual Regular Visit    Verification of patient location:    Patient is located in the following state in which I hold an active license [place]      Assessment/Plan:    Problem List Items Addressed This Visit    None      Goals addressed in session: Goal 1          Reason for visit is   No chief complaint on file  Encounter provider Nga Rg    Provider located at [address] [place] [id number] [phone]      Recent Visits  No visits were found meeting these conditions  Showing recent visits within past 7 days and meeting all other requirements  Future Appointments  No visits were found meeting these conditions  Showing future appointments within next 150 days and meeting all other requirements       The patient was identified by name and date of birth  Sofya Beard was informed that this is a telemedicine visit and that the visit is being conducted throughthe [email or web address] platform  She agrees to proceed     My office door was closed  No one else was in the room    She acknowledged consent and understanding of privacy and security of the video platform  The patient has agreed to participate and understands they can discontinue the visit at any time  Patient is aware this is a billable service  Nick Huerta is a 34 y o  female    HPI     Past Medical History:   Diagnosis Date   • B[facility] )    • Cancer ([facility] )     thyroid   • Depression    • Schizoaffective disorder, bipolar type ([facility] )    • Suicidal ideations    • Thyroid disease        Past Surgical History:   Procedure Laterality Date   • NECK SURGERY     • US GUIDED THYROID BIOPSY      normal  Onset: 2012       Current Outpatient Medications   Medication Sig Dispense Refill   • clonazePAM (KlonoPIN) 0 5 mg tablet Take 1 tablet (0 5 mg total) by mouth 2 (two) times a day 60 tablet 2   • FLUoxetine (PROzac) 40 MG capsule Take 1 capsule (40 mg total) by mouth daily 90 capsule 1   • levothyroxine 175 mcg tablet Take 200 mcg by mouth daily     • OLANZapine (ZyPREXA) 20 MG tablet Take 1 tablet (20 mg total) by mouth daily at bedtime 90 tablet 1   • OLANZapine (ZyPREXA) 5 mg tablet Take 1 tablet (5 mg total) by mouth daily at bedtime 90 tablet 1   • prazosin (MINIPRESS) 2 mg capsule Take 1 capsule (2 mg total) by mouth daily at bedtime 90 capsule 1   • traZODone (DESYREL) 100 mg tablet Take 1 tablet (100 mg total) by mouth daily at bedtime 30 tablet 2     No current facility-administered medications for this visit  No Known Allergies    Review of Systems    Video Exam    There were no vitals filed for this visit      Physical Exam

## 2023-03-08 ENCOUNTER — APPOINTMENT (OUTPATIENT)
Dept: MRI IMAGING | Facility: HOSPITAL | Age: 33
End: 2023-03-08

## 2023-03-08 ENCOUNTER — HOSPITAL ENCOUNTER (OUTPATIENT)
Facility: HOSPITAL | Age: 33
Setting detail: OBSERVATION
Discharge: HOME | End: 2023-03-10
Attending: EMERGENCY MEDICINE

## 2023-03-08 ENCOUNTER — APPOINTMENT (OUTPATIENT)
Dept: CT IMAGING | Facility: HOSPITAL | Age: 33
End: 2023-03-08

## 2023-03-08 DIAGNOSIS — H47.10 PAPILLEDEMA: Primary | ICD-10-CM

## 2023-03-08 PROBLEM — E87.6 HYPOKALEMIA: Status: ACTIVE | Noted: 2023-03-08

## 2023-03-08 LAB
ANION GAP SERPL CALCULATED.3IONS-SCNC: 9 MMOL/L (ref 4–13)
B-HCG SERPL-ACNC: <1 MIU/ML (ref 0–11.6)
BASOPHILS # BLD AUTO: 0.03 THOUSANDS/ÂΜL (ref 0–0.1)
BASOPHILS NFR BLD AUTO: 0 % (ref 0–1)
BUN SERPL-MCNC: 9 MG/DL (ref 5–25)
CALCIUM SERPL-MCNC: 7.8 MG/DL (ref 8.4–10.2)
CHLORIDE SERPL-SCNC: 102 MMOL/L (ref 96–108)
CO2 SERPL-SCNC: 26 MMOL/L (ref 21–32)
CREAT SERPL-MCNC: 1.11 MG/DL (ref 0.6–1.3)
EOSINOPHIL # BLD AUTO: 0.2 THOUSAND/ÂΜL (ref 0–0.61)
EOSINOPHIL NFR BLD AUTO: 2 % (ref 0–6)
ERYTHROCYTE [DISTWIDTH] IN BLOOD BY AUTOMATED COUNT: 14.4 % (ref 11.6–15.1)
GFR SERPL CREATININE-BSD FRML MDRD: 65 ML/MIN/1.73SQ M
GLUCOSE SERPL-MCNC: 140 MG/DL (ref 65–140)
HCT VFR BLD AUTO: 39.1 % (ref 34.8–46.1)
HGB BLD-MCNC: 11.8 G/DL (ref 11.5–15.4)
IMM GRANULOCYTES # BLD AUTO: 0.02 THOUSAND/UL (ref 0–0.2)
IMM GRANULOCYTES NFR BLD AUTO: 0 % (ref 0–2)
LYMPHOCYTES # BLD AUTO: 2.21 THOUSANDS/ÂΜL (ref 0.6–4.47)
LYMPHOCYTES NFR BLD AUTO: 24 % (ref 14–44)
MCH RBC QN AUTO: 26.9 PG (ref 26.8–34.3)
MCHC RBC AUTO-ENTMCNC: 30.2 G/DL (ref 31.4–37.4)
MCV RBC AUTO: 89 FL (ref 82–98)
MONOCYTES # BLD AUTO: 0.3 THOUSAND/ÂΜL (ref 0.17–1.22)
MONOCYTES NFR BLD AUTO: 3 % (ref 4–12)
NEUTROPHILS # BLD AUTO: 6.45 THOUSANDS/ÂΜL (ref 1.85–7.62)
NEUTS SEG NFR BLD AUTO: 71 % (ref 43–75)
NRBC BLD AUTO-RTO: 0 /100 WBCS
PLATELET # BLD AUTO: 313 THOUSANDS/UL (ref 149–390)
PMV BLD AUTO: 9.4 FL (ref 8.9–12.7)
POTASSIUM SERPL-SCNC: 3.4 MMOL/L (ref 3.5–5.3)
RBC # BLD AUTO: 4.39 MILLION/UL (ref 3.81–5.12)
SODIUM SERPL-SCNC: 137 MMOL/L (ref 135–147)
TSH SERPL DL<=0.05 MIU/L-ACNC: 0.93 UIU/ML (ref 0.45–4.5)
WBC # BLD AUTO: 9.21 THOUSAND/UL (ref 4.31–10.16)

## 2023-03-08 RX ORDER — LEVOTHYROXINE SODIUM 0.1 MG/1
300 TABLET ORAL DAILY
Status: DISCONTINUED | OUTPATIENT
Start: 2023-03-09 | End: 2023-03-09

## 2023-03-08 RX ORDER — TRAZODONE HYDROCHLORIDE 100 MG/1
100 TABLET ORAL
Status: DISCONTINUED | OUTPATIENT
Start: 2023-03-08 | End: 2023-03-10 | Stop reason: HOSPADM

## 2023-03-08 RX ORDER — ERGOCALCIFEROL 1.25 MG/1
50000 CAPSULE ORAL WEEKLY
COMMUNITY
Start: 2023-02-23

## 2023-03-08 RX ORDER — GABAPENTIN 300 MG/1
300 CAPSULE ORAL 3 TIMES DAILY
COMMUNITY
Start: 2023-02-09

## 2023-03-08 RX ORDER — LEVOTHYROXINE SODIUM 300 UG/1
300 TABLET ORAL DAILY
COMMUNITY
Start: 2023-02-08

## 2023-03-08 RX ORDER — PRAZOSIN HYDROCHLORIDE 2 MG/1
2 CAPSULE ORAL
Status: DISCONTINUED | OUTPATIENT
Start: 2023-03-08 | End: 2023-03-10 | Stop reason: HOSPADM

## 2023-03-08 RX ORDER — ONDANSETRON 2 MG/ML
4 INJECTION INTRAMUSCULAR; INTRAVENOUS EVERY 6 HOURS PRN
Status: DISCONTINUED | OUTPATIENT
Start: 2023-03-08 | End: 2023-03-10 | Stop reason: HOSPADM

## 2023-03-08 RX ORDER — POTASSIUM CHLORIDE 20 MEQ/1
40 TABLET, EXTENDED RELEASE ORAL ONCE
Status: COMPLETED | OUTPATIENT
Start: 2023-03-08 | End: 2023-03-08

## 2023-03-08 RX ORDER — CLONAZEPAM 0.5 MG/1
0.5 TABLET ORAL 2 TIMES DAILY
Status: DISCONTINUED | OUTPATIENT
Start: 2023-03-08 | End: 2023-03-10 | Stop reason: HOSPADM

## 2023-03-08 RX ORDER — GABAPENTIN 300 MG/1
300 CAPSULE ORAL 3 TIMES DAILY
Status: DISCONTINUED | OUTPATIENT
Start: 2023-03-08 | End: 2023-03-10 | Stop reason: HOSPADM

## 2023-03-08 RX ORDER — FLUOXETINE HYDROCHLORIDE 20 MG/1
40 CAPSULE ORAL DAILY
Status: DISCONTINUED | OUTPATIENT
Start: 2023-03-09 | End: 2023-03-10 | Stop reason: HOSPADM

## 2023-03-08 RX ORDER — NORGESTIMATE AND ETHINYL ESTRADIOL 0.25-0.035
1 KIT ORAL DAILY
COMMUNITY
Start: 2023-02-21

## 2023-03-08 RX ORDER — MAGNESIUM HYDROXIDE/ALUMINUM HYDROXICE/SIMETHICONE 120; 1200; 1200 MG/30ML; MG/30ML; MG/30ML
30 SUSPENSION ORAL EVERY 6 HOURS PRN
Status: DISCONTINUED | OUTPATIENT
Start: 2023-03-08 | End: 2023-03-10 | Stop reason: HOSPADM

## 2023-03-08 RX ORDER — ACETAMINOPHEN 325 MG/1
650 TABLET ORAL EVERY 6 HOURS PRN
Status: DISCONTINUED | OUTPATIENT
Start: 2023-03-08 | End: 2023-03-10 | Stop reason: HOSPADM

## 2023-03-08 RX ADMIN — TRAZODONE HYDROCHLORIDE 100 MG: 100 TABLET ORAL at 21:10

## 2023-03-08 RX ADMIN — PRAZOSIN HYDROCHLORIDE 2 MG: 2 CAPSULE ORAL at 21:21

## 2023-03-08 RX ADMIN — GADOBUTROL 15 ML: 604.72 INJECTION INTRAVENOUS at 22:39

## 2023-03-08 RX ADMIN — POTASSIUM CHLORIDE 40 MEQ: 1500 TABLET, EXTENDED RELEASE ORAL at 20:39

## 2023-03-08 RX ADMIN — OLANZAPINE 25 MG: 10 TABLET, FILM COATED ORAL at 21:10

## 2023-03-08 RX ADMIN — GABAPENTIN 300 MG: 300 CAPSULE ORAL at 20:39

## 2023-03-08 RX ADMIN — CLONAZEPAM 0.5 MG: 0.5 TABLET ORAL at 20:39

## 2023-03-08 NOTE — ED ATTENDING ATTESTATION
3/8/2023  Dalton PHIPPS Roles, DO, saw and evaluated the patient  I have discussed the patient with the resident/non-physician practitioner and agree with the resident's/non-physician practitioner's findings, Plan of Care, and MDM as documented in the resident's/non-physician practitioner's note, except where noted  All available labs and Radiology studies were reviewed  I was present for key portions of any procedure(s) performed by the resident/non-physician practitioner and I was immediately available to provide assistance  At this point I agree with the current assessment done in the Emergency Department  I have conducted an independent evaluation of this patient a history and physical is as follows:    ED Course         Critical Care Time  Procedures    76-year-old female presents to the ED from outpatient optometrist after being found to have possible papilledema on eye exam   Patient was there for routine eye exam   She denies any visual complaints, headaches, nausea or vomiting  She was sent in with a prescription for MRI with contrast as well as LP  On exam she is awake and alert no acute distress  Pupils are equal and reactive to light  Conjunctive are normal   Extraocular muscles are intact  Heart is regular without murmur  Lungs are clear  Abdomen is soft, obese and nontender  Neurologically she has no focal motor deficits  Skin is warm and dry without rash  We will contact neurology for recommendations on work-up as it would not be feasible to get the MRI with contrast here in the ED

## 2023-03-08 NOTE — LETTER
[id number]  [address]  Dept: [phone]    March 10, 2023     Patient: Israel Lorenzana   YOB: 1990   Date of Visit: 3/8/2023       To Whom it May Concern:    Alicia Austin is under my professional care  She was seen in the hospital from 3/8/2023 to 03/10/23  She may return to work on 3/11/23 without limitations  If you have any questions or concerns, please don't hesitate to call           Sincerely,          Giacomo Singleton PA-C

## 2023-03-09 ENCOUNTER — APPOINTMENT (OUTPATIENT)
Dept: RADIOLOGY | Facility: HOSPITAL | Age: 33
End: 2023-03-09

## 2023-03-09 PROBLEM — Z86.69 HISTORY OF MIGRAINE: Status: ACTIVE | Noted: 2023-03-09

## 2023-03-09 PROBLEM — E87.6 HYPOKALEMIA: Status: RESOLVED | Noted: 2023-03-08 | Resolved: 2023-03-09

## 2023-03-09 LAB
25(OH)D3 SERPL-MCNC: 52.6 NG/ML (ref 30–100)
ALBUMIN SERPL BCP-MCNC: 3.3 G/DL (ref 3.5–5)
ALP SERPL-CCNC: 52 U/L (ref 34–104)
ALT SERPL W P-5'-P-CCNC: 26 U/L (ref 7–52)
AMPHETAMINES SERPL QL SCN: NEGATIVE
ANION GAP SERPL CALCULATED.3IONS-SCNC: 9 MMOL/L (ref 4–13)
APPEARANCE CSF: CLEAR
APTT PPP: 27 SECONDS (ref 23–37)
AST SERPL W P-5'-P-CCNC: 25 U/L (ref 13–39)
B BURGDOR IGG+IGM SER-ACNC: <0.2 AI
BARBITURATES UR QL: NEGATIVE
BENZODIAZ UR QL: NEGATIVE
BILIRUB SERPL-MCNC: 0.57 MG/DL (ref 0.2–1)
BUN SERPL-MCNC: 9 MG/DL (ref 5–25)
C GATTII+NEOFOR DNA CSF QL NAA+NON-PROBE: NOT DETECTED
CALCIUM ALBUM COR SERPL-MCNC: 7.8 MG/DL (ref 8.3–10.1)
CALCIUM SERPL-MCNC: 7.2 MG/DL (ref 8.4–10.2)
CHLORIDE SERPL-SCNC: 107 MMOL/L (ref 96–108)
CMV DNA CSF QL NAA+NON-PROBE: NOT DETECTED
CO2 SERPL-SCNC: 23 MMOL/L (ref 21–32)
COCAINE UR QL: NEGATIVE
CREAT SERPL-MCNC: 0.91 MG/DL (ref 0.6–1.3)
E COLI K1 DNA CSF QL NAA+NON-PROBE: NOT DETECTED
ERYTHROCYTE [DISTWIDTH] IN BLOOD BY AUTOMATED COUNT: 14.4 % (ref 11.6–15.1)
EV RNA CSF QL NAA+NON-PROBE: NOT DETECTED
FLUAV RNA RESP QL NAA+PROBE: NEGATIVE
FLUBV RNA RESP QL NAA+PROBE: NEGATIVE
GFR SERPL CREATININE-BSD FRML MDRD: 83 ML/MIN/1.73SQ M
GLUCOSE CSF-MCNC: 71 MG/DL (ref 40–70)
GLUCOSE SERPL-MCNC: 100 MG/DL (ref 65–140)
GP B STREP DNA CSF QL NAA+NON-PROBE: NOT DETECTED
GRAM STN SPEC: NORMAL
HAEM INFLU DNA CSF QL NAA+NON-PROBE: NOT DETECTED
HCT VFR BLD AUTO: 36.5 % (ref 34.8–46.1)
HGB BLD-MCNC: 11.1 G/DL (ref 11.5–15.4)
HHV6 DNA CSF QL NAA+NON-PROBE: NOT DETECTED
HIV 1+2 AB+HIV1 P24 AG SERPL QL IA: NORMAL
HIV 2 AB SERPL QL IA: NORMAL
HIV1 AB SERPL QL IA: NORMAL
HIV1 P24 AG SERPL QL IA: NORMAL
HSV1 DNA CSF QL NAA+NON-PROBE: NOT DETECTED
HSV2 DNA CSF QL NAA+NON-PROBE: NOT DETECTED
INR PPP: 1.17 (ref 0.84–1.19)
L MONOCYTOG DNA CSF QL NAA+NON-PROBE: NOT DETECTED
LYMPHOCYTES NFR CSF MANUAL: 100 %
MAGNESIUM SERPL-MCNC: 1.8 MG/DL (ref 1.9–2.7)
MCH RBC QN AUTO: 27.1 PG (ref 26.8–34.3)
MCHC RBC AUTO-ENTMCNC: 30.4 G/DL (ref 31.4–37.4)
MCV RBC AUTO: 89 FL (ref 82–98)
METHADONE UR QL: NEGATIVE
N MEN DNA CSF QL NAA+NON-PROBE: NOT DETECTED
OPIATES UR QL SCN: NEGATIVE
OXYCODONE+OXYMORPHONE UR QL SCN: NEGATIVE
PARECHOVIRUS A RNA CSF QL NAA+NON-PROBE: NOT DETECTED
PCP UR QL: NEGATIVE
PLATELET # BLD AUTO: 292 THOUSANDS/UL (ref 149–390)
PMV BLD AUTO: 9.4 FL (ref 8.9–12.7)
POTASSIUM SERPL-SCNC: 4.3 MMOL/L (ref 3.5–5.3)
PROT CSF-MCNC: 40 MG/DL (ref 15–45)
PROT SERPL-MCNC: 6.8 G/DL (ref 6.4–8.4)
PROTHROMBIN TIME: 15 SECONDS (ref 11.6–14.5)
RBC # BLD AUTO: 4.1 MILLION/UL (ref 3.81–5.12)
RBC # CSF MANUAL: 0 UL (ref 0–10)
RSV RNA RESP QL NAA+PROBE: NEGATIVE
S PNEUM DNA CSF QL NAA+NON-PROBE: NOT DETECTED
SARS-COV-2 RNA RESP QL NAA+PROBE: NEGATIVE
SODIUM SERPL-SCNC: 139 MMOL/L (ref 135–147)
THC UR QL: POSITIVE
TOTAL CELLS COUNTED BLD: NO
TOTAL CELLS COUNTED SPEC: 4
TREPONEMA PALLIDUM IGG+IGM AB [PRESENCE] IN SERUM OR PLASMA BY IMMUNOASSAY: NORMAL
TUBE # CSF: 4
VZV DNA CSF QL NAA+NON-PROBE: NOT DETECTED
WBC # BLD AUTO: 5.28 THOUSAND/UL (ref 4.31–10.16)
WBC # CSF AUTO: 1 /UL (ref 0–5)

## 2023-03-09 RX ORDER — TROPICAMIDE 10 MG/ML
1 SOLUTION/ DROPS OPHTHALMIC ONCE
Status: COMPLETED | OUTPATIENT
Start: 2023-03-09 | End: 2023-03-09

## 2023-03-09 RX ORDER — LEVOTHYROXINE SODIUM 0.1 MG/1
300 TABLET ORAL
Status: DISCONTINUED | OUTPATIENT
Start: 2023-03-09 | End: 2023-03-10 | Stop reason: HOSPADM

## 2023-03-09 RX ORDER — LIDOCAINE HYDROCHLORIDE 10 MG/ML
INJECTION, SOLUTION EPIDURAL; INFILTRATION; INTRACAUDAL; PERINEURAL AS NEEDED
Status: COMPLETED | OUTPATIENT
Start: 2023-03-09 | End: 2023-03-09

## 2023-03-09 RX ORDER — LORAZEPAM 2 MG/ML
0.5 INJECTION INTRAMUSCULAR ONCE AS NEEDED
Status: DISCONTINUED | OUTPATIENT
Start: 2023-03-09 | End: 2023-03-10 | Stop reason: HOSPADM

## 2023-03-09 RX ORDER — MIDAZOLAM HYDROCHLORIDE 2 MG/2ML
INJECTION, SOLUTION INTRAMUSCULAR; INTRAVENOUS AS NEEDED
Status: COMPLETED | OUTPATIENT
Start: 2023-03-09 | End: 2023-03-09

## 2023-03-09 RX ADMIN — MIDAZOLAM 0.5 MG: 1 INJECTION INTRAMUSCULAR; INTRAVENOUS at 13:54

## 2023-03-09 RX ADMIN — CLONAZEPAM 0.5 MG: 0.5 TABLET ORAL at 10:30

## 2023-03-09 RX ADMIN — TROPICAMIDE 1 DROP: 10 SOLUTION/ DROPS OPHTHALMIC at 15:17

## 2023-03-09 RX ADMIN — CLONAZEPAM 0.5 MG: 0.5 TABLET ORAL at 20:37

## 2023-03-09 RX ADMIN — PRAZOSIN HYDROCHLORIDE 2 MG: 2 CAPSULE ORAL at 21:56

## 2023-03-09 RX ADMIN — TROPICAMIDE 1 DROP: 10 SOLUTION/ DROPS OPHTHALMIC at 16:12

## 2023-03-09 RX ADMIN — TRAZODONE HYDROCHLORIDE 100 MG: 100 TABLET ORAL at 21:56

## 2023-03-09 RX ADMIN — LEVOTHYROXINE SODIUM 300 MCG: 100 TABLET ORAL at 07:26

## 2023-03-09 RX ADMIN — GABAPENTIN 300 MG: 300 CAPSULE ORAL at 10:30

## 2023-03-09 RX ADMIN — FLUOXETINE 40 MG: 20 CAPSULE ORAL at 10:30

## 2023-03-09 RX ADMIN — OLANZAPINE 25 MG: 10 TABLET, FILM COATED ORAL at 21:56

## 2023-03-09 RX ADMIN — LIDOCAINE HYDROCHLORIDE 5 ML: 10 INJECTION, SOLUTION EPIDURAL; INFILTRATION; INTRACAUDAL; PERINEURAL at 13:57

## 2023-03-09 RX ADMIN — GABAPENTIN 300 MG: 300 CAPSULE ORAL at 20:37

## 2023-03-09 RX ADMIN — GABAPENTIN 300 MG: 300 CAPSULE ORAL at 16:12

## 2023-03-09 NOTE — MALNUTRITION/BMI
This medical record reflects one or more clinical indicators suggestive of malnutrition and/or morbid obesity  BMI Findings:  Adult BMI Classifications: Morbid Obesity 40-44 9        Body mass index is 44 85 kg/m²  See Nutrition note dated 3/9/2023 for additional details  Completed nutrition assessment is viewable in the nutrition documentation

## 2023-03-09 NOTE — H&P
[address] 1990, 34 y o  female MRN: [id number]  Unit/Bed#: ED-01 Encounter: [id number]  Primary Care Provider: Anna Lynn MD   Date and time admitted to hospital: 3/8/2023  5:32 PM    * Papilledema  Assessment & Plan  · 59-year-old female with past medical history of schizoaffective bipolar disorder, anxiety, obesity presented from optometrist office with recommendations for MRI imaging and neuro evaluation  · Multifactorial etiology: Rule out obstructive etiology, patient obese on OCPs concern for possible ICH/pseudotumor  · Reportedly has been having vision changes especially with colors among her peripheral vision  · Denies any pain, headaches, dizziness or neurodeficits  · MRI brain ordered  · CT head pending  · Neuro consulted, evaluate for LP    Hypokalemia  Assessment & Plan  Replete, check magnesium in a m     GOOD (generalized anxiety disorder)  Assessment & Plan  Continue Prozac    Schizoaffective disorder, bipolar type (HCC)  Assessment & Plan  Continue Zyprexa 25 mg bedtime    VTE Prophylaxis: low risk  / sequential compression device   Code Status: FC  POLST: There is no POLST form on file for this patient (pre-hospital)  Discussion with family: patient    Anticipated Length of Stay:  Patient will be admitted on an Observation basis with an anticipated length of stay of 2 midnights  Justification for Hospital Stay: pending MRI and neurology evaluation    Total Time for Visit, including Counseling / Coordination of Care: 60 minutes  Greater than 50% of this total time spent on direct patient counseling and coordination of care  Chief Complaint:   Vision Changes    History of Present Illness:    Nanette Hutchins is a 34 y o  female who presents with optometry recommendations for neuro evaluation and MRI imaging  Past medical history of schizoaffective disorder, obesity and anxiety    Patient reports having vision changes ongoing for the past several weeks, went to see optometrist and noted papilledema on eye exam   She was referred here to the ED where she was admitted for further work-up  Reports her vision especially with colors have been distorted among her peripheral vision  Denies any headaches, neurodeficits, lightheadedness, dizziness or fever/chills  Review of Systems:    Review of Systems   Eyes: Positive for visual disturbance  All other systems reviewed and are negative  Past Medical and Surgical History:     Past Medical History:   Diagnosis Date   • Bipolar 1 disorder ([facility] )    • Cancer ([facility] )     thyroid   • Depression    • Schizoaffective disorder, bipolar type ([facility] )    • Suicidal ideations    • Thyroid disease        Past Surgical History:   Procedure Laterality Date   • NECK SURGERY     • US GUIDED THYROID BIOPSY      normal  Onset: 2012       Meds/Allergies:    Prior to Admission medications    Medication Sig Start Date End Date Taking?  Authorizing Provider   clonazePAM (KlonoPIN) 0 5 mg tablet Take 1 tablet (0 5 mg total) by mouth 2 (two) times a day 12/23/22  Yes GENESIS Khan   FLUoxetine (PROzac) 40 MG capsule Take 1 capsule (40 mg total) by mouth daily 2/23/23  Yes GENESIS Bass   OLANZapine (ZyPREXA) 20 MG tablet Take 1 tablet (20 mg total) by mouth daily at bedtime 12/23/22  Yes GENESIS Khan   OLANZapine (ZyPREXA) 5 mg tablet Take 1 tablet (5 mg total) by mouth daily at bedtime 12/23/22  Yes GENESIS Khan   prazosin (MINIPRESS) 2 mg capsule Take 1 capsule (2 mg total) by mouth daily at bedtime 12/23/22  Yes GENESIS Khan   traZODone (DESYREL) 100 mg tablet Take 1 tablet (100 mg total) by mouth daily at bedtime 2/23/23  Yes GENESIS Bass   ergocalciferol (VITAMIN D2) 50,000 units Take 50,000 Units by mouth once a week 2/23/23   Historical Provider, MD   gabapentin (NEURONTIN) 300 mg capsule Take 300 mg by mouth 3 (three) times a day 2/9/23   Historical Provider, MD   levothyroxine 300 MCG tablet Take 300 mcg by mouth daily 2/8/23   Historical Provider, MD   Tess 0 25-35 MG-MCG per tablet Take 1 tablet by mouth daily 2/21/23   Historical Provider, MD   levothyroxine 175 mcg tablet Take 200 mcg by mouth daily  3/8/23  Historical Provider, MD     I have reviewed home medications with patient personally  Allergies: No Known Allergies    Social History:     Marital Status: Single   Occupation: employed at [employer]  Patient Pre-hospital Living Situation: home  Patient Pre-hospital Level of Mobility: ambulatory  Patient Pre-hospital Diet Restrictions: none  Substance Use History:   Social History     Substance and Sexual Activity   Alcohol Use Yes    Comment: occ     Social History     Tobacco Use   Smoking Status Former   • Packs/day: 1 00   • Types: Cigarettes   Smokeless Tobacco Never     Social History     Substance and Sexual Activity   Drug Use No       Family History:    History reviewed  No pertinent family history  Physical Exam:     Vitals:   Blood Pressure: 164/86 (03/08/23 1729)  Pulse: 91 (03/08/23 1729)  Temperature: 97 7 °F (36 5 °C) (03/08/23 1729)  Temp Source: Oral (03/08/23 1729)  Respirations: 18 (03/08/23 1729)  Weight - Scale: 135 kg (296 lb 15 4 oz) (03/08/23 1729)  SpO2: 98 % (03/08/23 1729)    Physical Exam  Vitals and nursing note reviewed  Constitutional:       Appearance: Normal appearance  She is obese  HENT:      Head: Normocephalic and atraumatic  Eyes:      General: No scleral icterus  Conjunctiva/sclera: Conjunctivae normal    Cardiovascular:      Rate and Rhythm: Normal rate and regular rhythm  Pulmonary:      Effort: Pulmonary effort is normal  No respiratory distress  Breath sounds: No wheezing or rhonchi  Abdominal:      General: Bowel sounds are normal  There is no distension  Palpations: Abdomen is soft  Tenderness: There is no abdominal tenderness     Musculoskeletal:         General: No swelling  Normal range of motion  Skin:     General: Skin is warm and dry  Neurological:      General: No focal deficit present  Mental Status: She is alert  Mental status is at baseline  Additional Data:     Lab Results: I have personally reviewed pertinent reports  Results from last 7 days   Lab Units 03/08/23  1803   WBC Thousand/uL 9 21   HEMOGLOBIN g/dL 11 8   HEMATOCRIT % 39 1   PLATELETS Thousands/uL 313   NEUTROS PCT % 71   LYMPHS PCT % 24   MONOS PCT % 3*   EOS PCT % 2     Results from last 7 days   Lab Units 03/08/23  1803   SODIUM mmol/L 137   POTASSIUM mmol/L 3 4*   CHLORIDE mmol/L 102   CO2 mmol/L 26   BUN mg/dL 9   CREATININE mg/dL 1 11   ANION GAP mmol/L 9   CALCIUM mg/dL 7 8*   GLUCOSE RANDOM mg/dL 140                       Imaging: I have personally reviewed pertinent reports  CT head without contrast    (Results Pending)   MRI inpatient order    (Results Pending)       EKG, Pathology, and Other Studies Reviewed on Admission:   · EKG: none    Allscripts / Epic Records Reviewed: Yes     ** Please Note: This note has been constructed using a voice recognition system   **

## 2023-03-09 NOTE — PLAN OF CARE
Problem: Potential for Falls  Goal: Patient will remain free of falls  Description: INTERVENTIONS:  - Educate patient/family on patient safety including physical limitations  - Instruct patient to call for assistance with activity   - Consult OT/PT to assist with strengthening/mobility   - Keep Call bell within reach  - Keep bed low and locked with side rails adjusted as appropriate  - Keep care items and personal belongings within reach  - Initiate and maintain comfort rounds  - Make Fall Risk Sign visible to staff  - Offer Toileting every  2 hour  - Initiate/Maintain  bed alarm  - Obtain necessary fall risk management equipment  - Apply yellow socks and bracelet for high fall risk patients  - Consider moving patient to room near nurses station  Outcome: Progressing     Problem: PAIN - ADULT  Goal: Verbalizes/displays adequate comfort level or baseline comfort level  Description: Interventions:  - Encourage patient to monitor pain and request assistance  - Assess pain using appropriate pain scale  - Administer analgesics based on type and severity of pain and evaluate response  - Implement non-pharmacological measures as appropriate and evaluate response  - Consider cultural and social influences on pain and pain management  - Notify physician/advanced practitioner if interventions unsuccessful or patient reports new pain  Outcome: Progressing     Problem: INFECTION - ADULT  Goal: Absence or prevention of progression during hospitalization  Description: INTERVENTIONS:  - Assess and monitor for signs and symptoms of infection  - Monitor lab/diagnostic results  - Monitor all insertion sites, i e  indwelling lines, tubes, and drains  - Monitor endotracheal if appropriate and nasal secretions for changes in amount and color  - [place] appropriate cooling/warming therapies per order  - Administer medications as ordered  - Instruct and encourage patient and family to use good hand hygiene technique  - Identify and instruct in appropriate isolation precautions for identified infection/condition  Outcome: Progressing     Problem: DISCHARGE PLANNING  Goal: Discharge to home or other facility with appropriate resources  Description: INTERVENTIONS:  - Identify barriers to discharge w/patient and caregiver  - Arrange for needed discharge resources and transportation as appropriate  - Identify discharge learning needs (meds, wound care, etc )  - Arrange for interpretive services to assist at discharge as needed  - Refer to Case Management Department for coordinating discharge planning if the patient needs post-hospital services based on physician/advanced practitioner order or complex needs related to functional status, cognitive ability, or social support system  Outcome: Progressing     Problem: Knowledge Deficit  Goal: Patient/family/caregiver demonstrates understanding of disease process, treatment plan, medications, and discharge instructions  Description: Complete learning assessment and assess knowledge base    Interventions:  - Provide teaching at level of understanding  - Provide teaching via preferred learning methods  Outcome: Progressing

## 2023-03-09 NOTE — BRIEF OP NOTE (RAD/CATH)
INTERVENTIONAL RADIOLOGY PROCEDURE NOTE    Date: 3/9/2023    Procedure:   Procedure Summary     Date:  Room / Location:     Anesthesia Start:  Anesthesia Stop:     Procedure:  Diagnosis:     Scheduled Providers:  Responsible Provider:     Anesthesia Type: Not recorded ASA Status: Not recorded          Preoperative diagnosis:   1  Papilledema         Postoperative diagnosis: Same  Surgeon: Kameron Herring MD     Assistant: None  No qualified resident was available  Blood loss: None    Specimens: As ordered    Findings: Clear CSF  L2-3 access with fluoroscopic guidance  20-gauge spinal needle  Opening pressure mildly elevated at 31 cm H2O   25 cc CSF drained  Closing pressure 15 cm H2O  Complications: None immediate      Anesthesia: IV versed only and local

## 2023-03-09 NOTE — PROGRESS NOTES
[address] [place] [address]  Progress Note - Arjun Layton 1990, 34 y o  female MRN: [id number]  Unit/Bed#: [facility] [place] [address] [id number] Encounter: [id number]  Primary Care Provider: Louise Schaefer MD   Date and time admitted to hospital: 3/8/2023  5:32 PM    * Papilledema  Assessment & Plan  · 26-year-old female with past medical history of schizoaffective bipolar disorder, anxiety, obesity presented from optometrist office due to papilledema seen on exam  · Rule out pseudotumor cerebri/indopathic intrcranial hypertensioon  · Reportedly has been having vision changes especially with colors among her peripheral vision  · MRI brain negative  · CT head negative  · LP with mildly elevated opening pressure at 31 cm H2O  · Neuro consulted appreciate recommendations  · ophthalmology consulted     History of migraine  Assessment & Plan  · Patient reports history of migraines   · States this improved after starting gabapentin    Schizoaffective disorder, bipolar type ([address] )  Assessment & Plan  · Continue Zyprexa 25 mg bedtime    GOOD (generalized anxiety disorder)  Assessment & Plan  · Continue Prozac        VTE Pharmacologic Prophylaxis:   Low Risk (Score 0-2) - Encourage Ambulation  Patient Centered Rounds: I performed bedside rounds with nursing staff today  Discussions with Specialists or Other Care Team Provider: none    Education and Discussions with Family / Patient: Updated  (mother) at bedside  Total Time Spent on Date of Encounter in care of patient: 35 minutes This time was spent on one or more of the following: performing physical exam; counseling and coordination of care; obtaining or reviewing history; documenting in the medical record; reviewing/ordering tests, medications or procedures; communicating with other healthcare professionals and discussing with patient's family/caregivers      Current Length of Stay: 0 day(s)  Current Patient Status: Observation   Certification Statement: The patient will continue to require additional inpatient hospital stay due to papilledema   Discharge Plan: Anticipate discharge in 24-48 hrs to home  Code Status: Level 1 - Full Code    Subjective:   Patient was seen laying in bed today  She states she has continued changes in her vision  Reports a mild headache this AM  She denies any chest pain, sob, fever, chills, recent colds, stiff neck, nausea, vomiting  Objective:     Vitals:   Temp (24hrs), Av 7 °F (36 5 °C), Min:97 3 °F (36 3 °C), Max:98 3 °F (36 8 °C)    Temp:  [97 3 °F (36 3 °C)-98 3 °F (36 8 °C)] 97 3 °F (36 3 °C)  HR:  [77-91] 79  Resp:  [14-22] 14  BP: ()/(56-86) 105/56  SpO2:  [90 %-98 %] 95 %  Body mass index is 44 85 kg/m²  Input and Output Summary (last 24 hours): Intake/Output Summary (Last 24 hours) at 3/9/2023 1600  Last data filed at 3/9/2023 1407  Gross per 24 hour   Intake --   Output 25 ml   Net -25 ml       Physical Exam:   Physical Exam  Vitals and nursing note reviewed  Constitutional:       Appearance: Normal appearance  HENT:      Head: Normocephalic and atraumatic  Eyes:      General: No scleral icterus  Cardiovascular:      Rate and Rhythm: Normal rate and regular rhythm  Pulmonary:      Effort: Pulmonary effort is normal       Breath sounds: Normal breath sounds  Abdominal:      General: Abdomen is flat  Bowel sounds are normal       Palpations: Abdomen is soft  Tenderness: There is no abdominal tenderness  Musculoskeletal:      Right lower leg: No edema  Left lower leg: No edema  Skin:     General: Skin is warm and dry  Neurological:      Mental Status: She is alert        Comments: horizontal nystagmus   Psychiatric:         Mood and Affect: Mood normal          Behavior: Behavior normal           Additional Data:     Labs:  Results from last 7 days   Lab Units 23  0552 23  1803   WBC Thousand/uL 5 28 9 21   HEMOGLOBIN g/dL 11 1* 11 8   HEMATOCRIT % 36 5 39 1   PLATELETS Thousands/uL 292 313   NEUTROS PCT %  --  71   LYMPHS PCT %  --  24   MONOS PCT %  --  3*   EOS PCT %  --  2     Results from last 7 days   Lab Units 03/09/23  0552   SODIUM mmol/L 139   POTASSIUM mmol/L 4 3   CHLORIDE mmol/L 107   CO2 mmol/L 23   BUN mg/dL 9   CREATININE mg/dL 0 91   ANION GAP mmol/L 9   CALCIUM mg/dL 7 2*   ALBUMIN g/dL 3 3*   TOTAL BILIRUBIN mg/dL 0 57   ALK PHOS U/L 52   ALT U/L 26   AST U/L 25   GLUCOSE RANDOM mg/dL 100     Results from last 7 days   Lab Units 03/09/23  0552   INR  1 17                   Lines/Drains:  Invasive Devices     Peripheral Intravenous Line  Duration           Peripheral IV 03/08/23 Right Antecubital <1 day                      Imaging: No pertinent imaging reviewed  Recent Cultures (last 7 days):         Last 24 Hours Medication List:   Current Facility-Administered Medications   Medication Dose Route Frequency Provider Last Rate   • acetaminophen  650 mg Oral Q6H PRN Eleonora Snyder MD     • aluminum-magnesium hydroxide-simethicone  30 mL Oral Q6H PRN Eleonora Snyder MD     • clonazePAM  0 5 mg Oral BID Eleonora Snyder MD     • FLUoxetine  40 mg Oral Daily Eleonora Snyder MD     • gabapentin  300 mg Oral TID Eleonora Snyder MD     • levothyroxine  300 mcg Oral Early Morning Eleonora Snyder MD     • LORazepam  0 5 mg Intravenous Once PRN Sedrick Buck PA-C     • OLANZapine  25 mg Oral HS Eleonora Snyder MD     • ondansetron  4 mg Intravenous Q6H PRN Eleonora Snyder MD     • prazosin  2 mg Oral HS Eleonora Snyder MD     • traZODone  100 mg Oral HS Eleonora Snyder MD     • tropicamide  1 drop Both Eyes Once Sedrick Buck PA-C          Today, Patient Was Seen By: Sedrick Buck PA-C    **Please Note: This note may have been constructed using a voice recognition system  **

## 2023-03-09 NOTE — UTILIZATION REVIEW
Initial Clinical Review    Admission: Date/Time/Statement:   Admission Orders (From admission, onward)     Ordered        03/08/23 1906  Place in Observation  Once                      Orders Placed This Encounter   Procedures   • Place in Observation     Standing Status:   Standing     Number of Occurrences:   1     Order Specific Question:   Level of Care     Answer:   Med Surg [16]     ED Arrival Information     Expected   -    Arrival   3/8/2023 17:22    Acuity   Urgent            Means of arrival   Walk-In    Escorted by   Self    Service   Hospitalist    Admission type   Emergency            Arrival complaint   medical problem           Chief Complaint   Patient presents with   • Eye Problem     States that she was seen at eye doctor and they are concerned for papilledema  Recommend MRA and LP       Initial Presentation: 34 y o  female presents to the ED at the direction of eye dr for evaluation of vision changes  x several weeks - distorted peripheral vision w/ colors  No other complaints  They are recommending neuro consult and MRI Brain  PMH: schizoaffective disorder, obesity and anxiety  In the ED Labs + THC on UDS, low K, calcium  MRI is normal   On exam she has no deficits  She is admitted to OBSERVATION status with Papilledema - r/o obstructive etiology, poss ICH, pseudotumor, Neuro consult  Hypokalemia - replete and trend  Date: 3/9:  VS stable  K WNL, low Mag  Continue home psych meds  R/O pseudotumor cerebri/indopathic intrcranial HTN  LP with mildly elevated opening pressure  On exam she notes changes in vision with mild headache in AM   No N/V   + horizontal nystagmus  3/9  Neuro Consult - Papilledema w/ + UDS THC, MRI unremarkable, neuro exam is nonfocal   No headache/neck pain  May be atypical migraine  Will get LP and wait for Ophthalmology consult, hole chem DVT ppx  Started Gabapentin this year    Continue neuro checks, get last eye exam report, Ophthalmology consult, 3/9 IR Spinal Tap - Findings: Clear CSF  L2-3 access with fluoroscopic guidance  20-gauge spinal needle    Opening pressure mildly elevated at 31 cm H2O   25 cc CSF drained  Closing pressure 15 cm H2O  Date:  3/10:  HIV, syphilis, Lyme negative  ED Triage Vitals [03/08/23 1729]   Temperature Pulse Respirations Blood Pressure SpO2   97 7 °F (36 5 °C) 91 18 164/86 98 %      Temp Source Heart Rate Source Patient Position - Orthostatic VS BP Location FiO2 (%)   Oral Monitor Sitting Right arm --      Pain Score       No Pain          Wt Readings from Last 1 Encounters:   03/08/23 134 kg (294 lb 15 6 oz)     Additional Vital Signs:   03/10/23 0737 -- -- -- -- -- 94 % None (Room air) --   03/10/23 07:17:57 97 7 °F (36 5 °C) 82 16 113/61 78 96 % -- --   03/10/23 06:10:51 97 9 °F (36 6 °C) 79 -- 124/75 91 95 % -- --   03/09/23 21:56:03 -- 89 -- 127/71 90 95 % -- --   03/09/23 2156 -- -- -- 127/71 -- -- -- --   03/09/23 20:36:38 97 9 °F (36 6 °C) 82 17 136/76 96 97 % -- --   03/09/23 14:33:35 97 3 °F (36 3 °C) Abnormal  79 -- 105/56 72 95 % -- --   03/09/23 14:33:03 97 3 °F (36 3 °C) Abnormal  84 14 105/56 72 95 % -- --   03/09/23 1358 -- 83 -- 123/77 -- 96 % -- --   03/09/23 1355 -- 86 -- 125/82 -- 96 % -- --   03/09/23 1350 -- 82 -- -- -- 97 % None (Room air) --   03/09/23 1349 -- 80 -- 134/75 -- 96 % -- --     03/09/23 07:25:05 98 1 °F (36 7 °C) 82 -- 100/56 71 90 % -- --   03/09/23 05:17:25 98 3 °F (36 8 °C) 83 -- 99/56 70 92 % -- --   03/08/23 21:10:03 -- -- -- 117/70 86 -- -- --   03/08/23 21:08:42 -- 82 -- 99/58 72 98 % -- --   03/08/23 20:15:10 97 6 °F (36 4 °C) 77 22 109/59 76 96 % None (Room air) Lying   03/08/23 1957 -- 90 18 120/75 -- 97 % None (Room air) Sitting     Pertinent Labs/Diagnostic Test Results:   IR lumbar puncture   Final Result by Maddi Richardson MD (03/10 9147)   Impression:   1  Successful image-guided diagnostic and therapeutic lumbar puncture     2  Therapeutic drainage due to mildly elevated opening pressure of 31 cm H2O reduced to 15 cm H2O after drainage of 25 cc clear CSF            Workstation performed: [id number]         MRI brain and orbits wo and w contrast   Final Result by Nely Franco MD (03/09 1905)   Addendum (preliminary) 1 of 1 by Nely Franco MD (03/09 1905)   ADDENDUM:      Patent dural venous sinuses  Final         1  Unremarkable exam of the orbits  2   No acute intracranial process  Workstation performed: [id number]         CT head without contrast   Final Result by Levi Diez MD (03/08 1956)      No acute intracranial abnormality                    Workstation performed: [id number]           Results from last 7 days   Lab Units 03/08/23  2317   SARS-COV-2  Negative     Results from last 7 days   Lab Units 03/10/23  0512 03/09/23  0552 03/08/23  1803   WBC Thousand/uL 5 84 5 28 9 21   HEMOGLOBIN g/dL 11 5 11 1* 11 8   HEMATOCRIT % 38 3 36 5 39 1   PLATELETS Thousands/uL 294 292 313   NEUTROS ABS Thousands/µL  --   --  6 45         Results from last 7 days   Lab Units 03/10/23  0512 03/09/23  0552 03/08/23  1803   SODIUM mmol/L 139 139 137   POTASSIUM mmol/L 4 3 4 3 3 4*   CHLORIDE mmol/L 107 107 102   CO2 mmol/L 22 23 26   ANION GAP mmol/L 10 9 9   BUN mg/dL 12 9 9   CREATININE mg/dL 0 89 0 91 1 11   EGFR ml/min/1 73sq m 85 83 65   CALCIUM mg/dL 7 0* 7 2* 7 8*   MAGNESIUM mg/dL  --  1 8*  --      Results from last 7 days   Lab Units 03/09/23  0552   AST U/L 25   ALT U/L 26   ALK PHOS U/L 52   TOTAL PROTEIN g/dL 6 8   ALBUMIN g/dL 3 3*   TOTAL BILIRUBIN mg/dL 0 57         Results from last 7 days   Lab Units 03/10/23  0512 03/09/23  0552 03/08/23  1803   GLUCOSE RANDOM mg/dL 105 100 140       Results from last 7 days   Lab Units 03/09/23  0552   PROTIME seconds 15 0*   INR  1 17   PTT seconds 27     Results from last 7 days   Lab Units 03/08/23  1803   TSH 3RD GENERATON uIU/mL 0 930     Results from last 7 days   Lab Units 03/08/23  2317   INFLUENZA A PCR  Negative   INFLUENZA B PCR  Negative   RSV PCR  Negative         Results from last 7 days   Lab Units 03/08/23 2317   AMPH/METH  Negative   BARBITURATE UR  Negative   BENZODIAZEPINE UR  Negative   COCAINE UR  Negative   METHADONE URINE  Negative   OPIATE UR  Negative   PCP UR  Negative   THC UR  Positive*     ED Treatment:   Medication Administration from 03/08/2023 1722 to 03/08/2023 2004     None        Past Medical History:   Diagnosis Date   • Bipolar 1 disorder ([place] [address] )    • Cancer ([place] [address] )     thyroid   • Depression    • Schizoaffective disorder, bipolar type ([place] [address] )    • Suicidal ideations    • Thyroid disease      Present on Admission:  • Schizoaffective disorder, bipolar type ([place] [address] )  • GOOD (generalized anxiety disorder)  • Papilledema      Admitting Diagnosis: Papilledema [H47 10]  Eye problem [H57 9]  Age/Sex: 34 y o  female  Admission Orders:  Scheduled Medications:  clonazePAM, 0 5 mg, Oral, BID  FLUoxetine, 40 mg, Oral, Daily  gabapentin, 300 mg, Oral, TID  levothyroxine, 300 mcg, Oral, Early Morning  OLANZapine, 25 mg, Oral, HS  prazosin, 2 mg, Oral, HS  traZODone, 100 mg, Oral, HS      Continuous IV Infusions:     PRN Meds:  acetaminophen, 650 mg, Oral, Q6H PRN  aluminum-magnesium hydroxide-simethicone, 30 mL, Oral, Q6H PRN  ondansetron, 4 mg, Intravenous, Q6H PRN    Vit D 25  RPR, syphllis  HIV  Lyme  Angiotension converting enzyme  OOB   IP CONSULT TO NEUROLOGY  IP CONSULT TO OPHTHALMOLOGY    Network Utilization Review Department  ATTENTION: Please call with any questions or concerns to [phone] and carefully listen to the prompts so that you are directed to the right person  All voicemails are confidential   Jennyfer Slaughter all requests for admission clinical reviews, approved or denied determinations and any other requests to dedicated fax number below belonging to the campus where the patient is receiving treatment   List of dedicated fax numbers for the Facilities:  Triston Queen NAME UR FAX NUMBER   ADMISSION DENIALS (Administrative/Medical Necessity) [phone]   PARENT CHILD HEALTH (Maternity/NICU/Pediatrics) Janette Gustafson [address] [place]  [phone]   [address] [place] [address] Medical [address] [place] [address] Edmund Barroso [facility] [address] [facility] 310 [facility] 134 085 [facility] [phone]

## 2023-03-09 NOTE — PLAN OF CARE
Problem: Potential for Falls  Goal: Patient will remain free of falls  Description: INTERVENTIONS:  - Educate patient/family on patient safety including physical limitations  - Instruct patient to call for assistance with activity   - Consult OT/PT to assist with strengthening/mobility   - Keep Call bell within reach  - Keep bed low and locked with side rails adjusted as appropriate  - Keep care items and personal belongings within reach  - Initiate and maintain comfort rounds  - Make Fall Risk Sign visible to staff  - Apply yellow socks and bracelet for high fall risk patients  - Consider moving patient to room near nurses station  Outcome: Progressing     Problem: PAIN - ADULT  Goal: Verbalizes/displays adequate comfort level or baseline comfort level  Description: Interventions:  - Encourage patient to monitor pain and request assistance  - Assess pain using appropriate pain scale  - Administer analgesics based on type and severity of pain and evaluate response  - Implement non-pharmacological measures as appropriate and evaluate response  - Consider cultural and social influences on pain and pain management  - Notify physician/advanced practitioner if interventions unsuccessful or patient reports new pain  Outcome: Progressing     Problem: INFECTION - ADULT  Goal: Absence or prevention of progression during hospitalization  Description: INTERVENTIONS:  - Assess and monitor for signs and symptoms of infection  - Monitor lab/diagnostic results  - Monitor all insertion sites, i e  indwelling lines, tubes, and drains  - Monitor endotracheal if appropriate and nasal secretions for changes in amount and color  - [place] appropriate cooling/warming therapies per order  - Administer medications as ordered  - Instruct and encourage patient and family to use good hand hygiene technique  - Identify and instruct in appropriate isolation precautions for identified infection/condition  Outcome: Progressing     Problem: DISCHARGE PLANNING  Goal: Discharge to home or other facility with appropriate resources  Description: INTERVENTIONS:  - Identify barriers to discharge w/patient and caregiver  - Arrange for needed discharge resources and transportation as appropriate  - Identify discharge learning needs (meds, wound care, etc )  - Arrange for interpretive services to assist at discharge as needed  - Refer to Case Management Department for coordinating discharge planning if the patient needs post-hospital services based on physician/advanced practitioner order or complex needs related to functional status, cognitive ability, or social support system  Outcome: Progressing     Problem: Knowledge Deficit  Goal: Patient/family/caregiver demonstrates understanding of disease process, treatment plan, medications, and discharge instructions  Description: Complete learning assessment and assess knowledge base    Interventions:  - Provide teaching at level of understanding  - Provide teaching via preferred learning methods  Outcome: Progressing

## 2023-03-09 NOTE — ASSESSMENT & PLAN NOTE
· Began at 16yo and continued until last year, occurring 1-2x/mo  · Described as unilateral frontal, retro-orbital and posterior pain with associated photophobia and occasional visual disturbances, often with an aura of tunnel vision  No nausea  · This past year, since she started Gabapentin (currently 300mg tid), no headaches, though she notes that some of her visual symptoms remind her of her migraines without the headache

## 2023-03-09 NOTE — ASSESSMENT & PLAN NOTE
34 y o  female with schizoaffective disorder, obesity and history of thyroid cancer who presented to the ED after being referred from optometrist for work-up of possible papilledema in the setting of patient's reports of 2-3 month history of visual disturbance especially with altered color perception in her peripheral vision and R eye blurred and double vision  Imaging negative as detailed below  Imaging/Work-up:  · UDS positive for THC  · TSH WNL  · Respiratory panel negative  · CTH negative for acute abnormality  · MRI brain and orbits w wo which was unremarkable  Reviewed with radiology, no evidence of venous sinus thrombosis as well  · Optometrist at [facility], Dr Jeannie Perez [phone], patient note/prescription reviewed:  · "Consult Neurology stat secondary to papilledema OU  Recommend MRI of brain and orbits with and without contrast to rule out space-occupying lesion  If MRI negative, recommend lumbar puncture with opening pressure "    Patient's neurologic exam is nonfocal  No reports of current headache or neck pain  Conjugate gaze with PERRL  No APD  R eye 20/40, L eye 20/25  EOMs and VFs intact  Bilateral fatigueable end gaze horizontal nystagmus  MRI brain and orbits w wo is negative  If patient indeed has papilledema as reported, then concern for idiopathic intracranial hypertension/pseudotumor cerebri  Additionally, especially if CSF pressure is not elevated, patient may also be experiencing atypical migraine presentation with associated symptoms but without the actual headache  Will await ophthalmology evaluation and LP for further evaluation       Plan:  · Spoke with [facility] who will fax over office note with exam   · Ophthalmology consult to confirm the presence of papilledema   · Proceed with IR dagnostic +/- therapeutic LP and obtain opening and closing pressure  · WBCs, Protein, Glucose, RBCs, Gram stain and culture, ME Panel, Cytology, Flow cytometry, ACE, MS panel, VDRL  · Hold chemical DVT prophylaxis due to pending LP

## 2023-03-09 NOTE — ASSESSMENT & PLAN NOTE
· 25-year-old female with past medical history of schizoaffective bipolar disorder, anxiety, obesity presented from optometrist office with recommendations for MRI imaging and neuro evaluation  · Multifactorial etiology: Rule out obstructive etiology, patient obese on OCPs concern for possible ICH/pseudotumor  · Reportedly has been having vision changes especially with colors among her peripheral vision  · Denies any pain, headaches, dizziness or neurodeficits  · MRI brain ordered  · CT head pending  · Neuro consulted, evaluate for LP

## 2023-03-09 NOTE — ASSESSMENT & PLAN NOTE
· 40-year-old female with past medical history of schizoaffective bipolar disorder, anxiety, obesity presented from optometrist office due to papilledema seen on exam  · Rule out pseudotumor cerebri/indopathic intrcranial hypertensioon  · Reportedly has been having vision changes especially with colors among her peripheral vision  · MRI brain negative  · CT head negative  · LP with mildly elevated opening pressure at 31 cm H2O  · Neuro consulted appreciate recommendations  · ophthalmology consulted

## 2023-03-09 NOTE — CONSULTS
[place] [address] [place] [address]  Patient name Nanette Hutchins  Age: 34 y o   : 1990  MRN: [id number]    Hospital:     [facility]   Date of Consultation:3/9/2023  Referred by:Sonu Hugo DO  Reason for Consult: Possible Papilledema  Chief complaint: 11-year-old female presents to the ED from outpatient optometrist after being found to have possible papilledema on eye exam   Patient was there for routine eye exam   She denies any visual complaints, headaches, nausea or vomiting  History of illness: see above  Relevant past ocular history/ Eye  meds:none  Relevant chart review findings from below:   PMH/Chart reviewed in Medical record:   Past Medical History:   Diagnosis Date   • Bipolar 1 disorder ([facility] )    • Cancer ([facility] [address] )     thyroid   • Depression    • [facility]ive [facility]    • Suicidal ideations    • Thyroid disease      Past Surgical History:   Procedure Laterality Date   • NECK SURGERY     • US GUIDED THYROID BIOPSY      normal  Onset:       Social History    Social History     Substance and Sexual Activity   Alcohol Use Yes    Comment: social drinker     Social History     Tobacco Use   Smoking Status Former   • Packs/day: 1 00   • Types: Cigarettes   Smokeless Tobacco Never     Social History     Substance and Sexual Activity   Drug Use No     [email or web address]  Allergies:No Known Allergies  Ros: Reviewed in Medical Record  S  H shoulder  F H  History reviewed  No pertinent family history  Dilated: 1% Mydriacyl  Mental status: ____AAO  MR Brain/orbits  MRI OF THE BRAIN AND ORBITS - WITH AND WITHOUT CONTRAST     INDICATION: Papilledema      COMPARISON:  3/8/2020        TECHNIQUE:  Multiplanar, multisequence imaging of the brain and orbits was performed before and after gadolinium administration         IV Contrast:  15 mL of Gadobutrol injection (SINGLE-DOSE)      IMAGE QUALITY:  Diagnostic      FINDINGS:     BRAIN PARENCHYMA:  There is no discrete mass, mass effect or midline shift  Brainstem and cerebellum demonstrate no white matter lesions  No restricted diffusion      No intracranial hemorrhage, mass or mass effect  No abnormal parenchymal or extra-axial enhancement     ORBITS:  Intact globes  No extraocular muscle thickening or enhancement  No retro-orbital inflammation or mass      Normal signal and pathology of the optic nerves and chiasm  No abnormal enhancement      VENTRICLES:  No hydrocephalus or extra-axial collection      SELLA AND PITUITARY GLAND:  No sellar enlargement     PARANASAL SINUSES:  Clear      VASCULATURE:  Evaluation of the major intracranial vasculature demonstrates appropriate flow voids      CALVARIUM AND SKULL BASE:  No osseous lesion      EXTRACRANIAL SOFT TISSUES:  No soft tissue mass      IMPRESSION:        1  Unremarkable exam of the orbits  2   No acute intracranial process  CT head  CT BRAIN - WITHOUT CONTRAST     INDICATION:   Headache, papilledema  Papilledema      COMPARISON:  None      TECHNIQUE:  CT examination of the brain was performed  In addition to axial images, sagittal and coronal 2D reformatted images were created and submitted for interpretation      Radiation dose length product (DLP) for this visit:  887 mGy-cm   This examination, like all CT scans performed in the [facility], was performed utilizing techniques to minimize radiation dose exposure, including the use of iterative   reconstruction and automated exposure control        IMAGE QUALITY:  Diagnostic      FINDINGS:     PARENCHYMA:  No intracranial mass, mass effect or midline shift  No CT signs of acute infarction    No acute parenchymal hemorrhage      VENTRICLES AND EXTRA-AXIAL SPACES:  Normal for the patient's age      VISUALIZED ORBITS: Normal visualized orbits      PARANASAL SINUSES: Normal visualized paranasal sinuses      CALVARIUM AND EXTRACRANIAL SOFT TISSUES:  Normal      IMPRESSION:     No acute intracranial abnormality           Va    Right 20/30     Cc near dil (high myopia)      Left    20/30  Ttp 15         /15  Confront   Visual Fields Normal  External Normal   Pupils Round symmetric   Motility Versions Full   Anterior Segment: Conjunctiva Normal   Sclera Intact   Cornea (epithel,stroma,endothel) Clear   Anterior chamber Deep and Clear  lens clear  Posterior Segment:  Vitreous Clear   Optic nerve:  Disc sharp, 0 3 cup  ou  Macula Normal ou  Vessels Normal ou  Periphery Normal ou  Dx: R/O Pseudotumor   No definite papilledema noted    Rx: Management per neurology/Psychiatry       [facility]

## 2023-03-09 NOTE — CONSULTS
Consultation - Neurology   Maritza Mccray 34 y o  female MRN: [id number]  Unit/Bed#: [place] [address] [id number] Encounter: [id number]      Assessment/Plan     * Papilledema  Assessment & Plan  34 y o  female with schizoaffective disorder, obesity and history of thyroid cancer who presented to the ED after being referred from optometrist for work-up of possible papilledema in the setting of patient's reports of 2-3 month history of visual disturbance especially with altered color perception in her peripheral vision and R eye blurred and double vision  Imaging negative as detailed below  Imaging/Work-up:  · UDS positive for THC  · TSH WNL  · Respiratory panel negative  · CTH negative for acute abnormality  · MRI brain and orbits w wo which was unremarkable  Reviewed with radiology, no evidence of venous sinus thrombosis as well  · [place] [facility] Masha Wilson [phone], patient note/prescription reviewed:  · "Consult Neurology stat secondary to papilledema OU  Recommend MRI of brain and orbits with and without contrast to rule out space-occupying lesion  If MRI negative, recommend lumbar puncture with opening pressure "    Patient's neurologic exam is nonfocal  No reports of current headache or neck pain  Conjugate gaze with PERRL  No APD  R eye 20/40, L eye 20/25  EOMs and VFs intact  Bilateral fatigueable end gaze horizontal nystagmus  MRI brain and orbits w wo is negative  If patient indeed has papilledema as reported, then concern for idiopathic intracranial hypertension/pseudotumor cerebri  Additionally, especially if CSF pressure is not elevated, patient may also be experiencing atypical migraine presentation with associated symptoms but without the actual headache  Will await ophthalmology evaluation and LP for further evaluation       Plan:  · [facility] [place] will [place] over office note with exam   · Ophthalmology consult to confirm the presence of papilledema   · Proceed with IR dagnostic +/- therapeutic LP and obtain opening and closing pressure  · WBCs, Protein, Glucose, RBCs, Gram stain and culture, ME Panel, Cytology, Flow cytometry, ACE, MS panel, VDRL  · Hold chemical DVT prophylaxis due to pending LP    History of migraine  Assessment & Plan  · Began at 16yo and continued until last year, occurring 1-2x/mo  · Described as unilateral frontal, retro-orbital and posterior pain with associated photophobia and occasional visual disturbances, often with an aura of tunnel vision  No nausea  · This past year, since she started Gabapentin (currently 300mg tid), no headaches, though she notes that some of her visual symptoms remind her of her migraines without the headache  Recommendations for outpatient neurological follow up have yet to be determined  History of Present Illness     Reason for Consult / Principal Problem: Papilledema  Hx and PE limited by: NA  HPI: Richard Brown is a 34 y o  right handed female with schizoaffective disorder, obesity and history of thyroid cancer who presented to the ED after being referred from optometrist for work-up of possible papilledema noted on routine eye exam in the setting of patient's recent complaints of visual disturbance especially with altered color perception in her peripheral vision  Patient reports that the optometrist expressed concern about these findings prior to her mentioning any recent visual issues  CTH negative for acute abnormality  MRI brain and orbits w wo which was unremarkable  Today, patient's girlfriend Manolo Tejada was at bedside  At the present time, patient denies headache, but does admit to R eye blurry vision, muted colors and blurred vision       In reviewing symptoms, patient reports 2 to 3 months (since January 2023) of visual disturbances which were described as:   · Intermittent episodes of brighter/more colorful peripheral vision similar to prior migraines but without the headache typically occurring while standing or after transitioning from sit to stand   · Right eye blurriness   · Intermittent right eye vertical diplopia, only noted when left eye closed  · Halos around stoplights when driving    Patient did also mention right eye pain on 2 separate occasions with blinking, not currently; but never eye pain with EOMs  Patient reports a long standing history of migraines from the age of 11 until last year, occurring 1-2x/mo, described as unilateral frontal, retro-orbital and posterior pain with associated photophobia and occasional visual disturbances, often with an aura of tunnel vision  No nausea  Interestingly; however, patient reports that this past year, since she started Gabapentin (currently 300mg tid), she has not had any headaches, though she notes that some of her visual symptoms remind her of her migraines without the headache  Patient reports that she does vape, smoke THC and drink alcohol with occasional binge drinking  She denies any other drugs  She denies new medication but does note that her Trazadone was increased several months ago but she believes it was after her visual symptoms began  She notes that her cat just  last week so she's been more depressed and this has made things difficult  Inpatient consult to Neurology  Consult performed by: Annie Willoughby PA-C  Consult ordered by: Tonia Smith DO          Review of Systems   Constitutional: Negative for chills and fever  HENT: Negative for trouble swallowing  Eyes: Positive for photophobia and visual disturbance  Respiratory: Negative for shortness of breath  [place]   Cardiovascular: Negative for chest pain  Gastrointestinal: Negative for abdominal pain  Musculoskeletal: Positive for myalgias  Neurological: Negative for speech difficulty, weakness, numbness and headaches          Admits to chronic insomnia    A 12 point ROS was completed and other than the above mentioned symptoms and those noted in the HPI, all remaining systems were negative  Historical Information   Past Medical History:   Diagnosis Date   • Bipolar 1 disorder ([facility] )    • Cancer ([facility] )     thyroid   • Depression    • Schizoaffective disorder, bipolar type ([facility] )    • Suicidal ideations    • Thyroid disease      Past Surgical History:   Procedure Laterality Date   • NECK SURGERY     • US GUIDED THYROID BIOPSY      normal  Onset: 2012     Social History   Social History     Substance and Sexual Activity   Alcohol Use Yes    Comment: social drinker     Social History     Substance and Sexual Activity   Drug Use No     E-Cigarette/Vaping   • E-Cigarette Use Current Some Day User    • Comments occasional      E-Cigarette/Vaping Substances   • Nicotine No    • THC No    • CBD No    • Flavoring Yes    • Other No    • Unknown No      Social History     Tobacco Use   Smoking Status Former   • Packs/day: 1 00   • Types: Cigarettes   Smokeless Tobacco Never     Family History: History reviewed  No pertinent family history  Review of previous medical records was completed  Meds/Allergies   all current active meds have been reviewed    No Known Allergies    Objective   Vitals:Blood pressure 100/56, pulse 82, temperature 98 1 °F (36 7 °C), resp  rate 22, height 5' 8" (1 727 m), weight 134 kg (294 lb 15 6 oz), last menstrual period 03/01/2023, SpO2 90 %  ,Body mass index is 44 85 kg/m²  No intake or output data in the 24 hours ending 03/09/23 1126    Invasive Devices: Invasive Devices     Peripheral Intravenous Line  Duration           Peripheral IV 03/08/23 Right Antecubital <1 day                Physical Exam  Vitals reviewed  Constitutional:       General: She is not in acute distress  Appearance: Normal appearance  She is well-developed  She is obese  She is not ill-appearing or diaphoretic  HENT:      Head: Normocephalic and atraumatic  Eyes:      General: No scleral icterus          Right eye: No discharge  Left eye: No discharge  Extraocular Movements: Extraocular movements intact and EOM normal       Conjunctiva/sclera: Conjunctivae normal       Pupils: Pupils are equal, round, and reactive to light  Cardiovascular:      Rate and Rhythm: Normal rate and regular rhythm  Pulmonary:      Effort: Pulmonary effort is normal  No respiratory distress  Breath sounds: No stridor  Musculoskeletal:         General: No tenderness or deformity  Normal range of motion  Cervical back: Normal range of motion and neck supple  Skin:     General: Skin is warm and dry  Findings: No erythema or rash  Neurological:      Mental Status: She is alert and oriented to person, place, and time  Coordination: Finger-Nose-Finger Test, Heel to [employer] and Romberg Test normal    Psychiatric:         Speech: Speech normal          Behavior: Behavior normal          Thought Content: Thought content normal          Judgment: Judgment normal        Neurologic Exam     Mental Status   Oriented to person, place, and time  Follows 2 step commands  Attention: normal  Concentration: normal    Speech: speech is normal (No dysarthria or aphasia)  Level of consciousness: alert  Knowledge: good  Normal comprehension  Cranial Nerves     CN II   Visual acuity: (With glasses: Bilateral 20/30, R eye 20/40, L eye 20/25)  Right visual field deficit: none  Left visual field deficit: none     CN III, IV, VI   Pupils are equal, round, and reactive to light  Extraocular motions are normal    Right pupil: Consensual response: intact  Left pupil: Consensual response: intact  Nystagmus location: trace endgaze fatigueable horizontal nystagmus bilaterally  Conjugate gaze: present    CN V   Facial sensation intact  CN VII   Facial expression full, symmetric       CN VIII   Hearing: intact    CN XII   Tongue deviation: none    Motor Exam   Muscle bulk: normal  Overall muscle tone: normal  Right arm pronator drift: absent  Left arm pronator drift: absent    Strength   Strength 5/5 except as noted  Bilateral deltoids 5-     Sensory Exam   Light touch normal    Vibration normal    Temperature: Patient reporting RUE/% and LUE/LLE 70%     Gait, Coordination, and Reflexes     Coordination   Romberg: negative  Finger to nose coordination: normal  Heel to shin coordination: normal  DTRs: BUEs 1, BLEs trace to absent        Lab Results: I have personally reviewed pertinent reports  Imaging Studies: I have personally reviewed pertinent films in PACS [facility] and MRI brain w wo  EKG, Pathology, and Other Studies: I have personally reviewed pertinent reports      VTE Prophylaxis: Sequential compression device (Venodyne)     Code Status: Level 1 - Full Code

## 2023-03-10 VITALS
RESPIRATION RATE: 16 BRPM | HEIGHT: 68 IN | SYSTOLIC BLOOD PRESSURE: 113 MMHG | BODY MASS INDEX: 44.41 KG/M2 | TEMPERATURE: 97.7 F | DIASTOLIC BLOOD PRESSURE: 61 MMHG | HEART RATE: 82 BPM | OXYGEN SATURATION: 94 % | WEIGHT: 293 LBS

## 2023-03-10 LAB
ACE SERPL-CCNC: 26 U/L (ref 14–82)
ANION GAP SERPL CALCULATED.3IONS-SCNC: 10 MMOL/L (ref 4–13)
BUN SERPL-MCNC: 12 MG/DL (ref 5–25)
CALCIUM SERPL-MCNC: 7 MG/DL (ref 8.4–10.2)
CHLORIDE SERPL-SCNC: 107 MMOL/L (ref 96–108)
CO2 SERPL-SCNC: 22 MMOL/L (ref 21–32)
CREAT SERPL-MCNC: 0.89 MG/DL (ref 0.6–1.3)
ERYTHROCYTE [DISTWIDTH] IN BLOOD BY AUTOMATED COUNT: 14.6 % (ref 11.6–15.1)
GFR SERPL CREATININE-BSD FRML MDRD: 85 ML/MIN/1.73SQ M
GLUCOSE P FAST SERPL-MCNC: 105 MG/DL (ref 65–99)
GLUCOSE SERPL-MCNC: 105 MG/DL (ref 65–140)
HCT VFR BLD AUTO: 38.3 % (ref 34.8–46.1)
HGB BLD-MCNC: 11.5 G/DL (ref 11.5–15.4)
MCH RBC QN AUTO: 27.1 PG (ref 26.8–34.3)
MCHC RBC AUTO-ENTMCNC: 30 G/DL (ref 31.4–37.4)
MCV RBC AUTO: 90 FL (ref 82–98)
PLATELET # BLD AUTO: 294 THOUSANDS/UL (ref 149–390)
PMV BLD AUTO: 10 FL (ref 8.9–12.7)
POTASSIUM SERPL-SCNC: 4.3 MMOL/L (ref 3.5–5.3)
RBC # BLD AUTO: 4.24 MILLION/UL (ref 3.81–5.12)
SODIUM SERPL-SCNC: 139 MMOL/L (ref 135–147)
WBC # BLD AUTO: 5.84 THOUSAND/UL (ref 4.31–10.16)

## 2023-03-10 RX ORDER — ACETAZOLAMIDE 250 MG/1
500 TABLET ORAL EVERY 12 HOURS SCHEDULED
Qty: 120 TABLET | Refills: 0 | Status: SHIPPED | OUTPATIENT
Start: 2023-03-10 | End: 2023-04-09

## 2023-03-10 RX ORDER — ACETAZOLAMIDE 250 MG/1
500 TABLET ORAL EVERY 12 HOURS SCHEDULED
Status: DISCONTINUED | OUTPATIENT
Start: 2023-03-10 | End: 2023-03-10 | Stop reason: HOSPADM

## 2023-03-10 RX ADMIN — ACETAZOLAMIDE 500 MG: 250 TABLET ORAL at 11:44

## 2023-03-10 RX ADMIN — LEVOTHYROXINE SODIUM 300 MCG: 100 TABLET ORAL at 06:08

## 2023-03-10 RX ADMIN — GABAPENTIN 300 MG: 300 CAPSULE ORAL at 09:37

## 2023-03-10 RX ADMIN — FLUOXETINE 40 MG: 20 CAPSULE ORAL at 09:37

## 2023-03-10 RX ADMIN — CLONAZEPAM 0.5 MG: 0.5 TABLET ORAL at 09:37

## 2023-03-10 NOTE — PLAN OF CARE
Problem: Potential for Falls  Goal: Patient will remain free of falls  Description: INTERVENTIONS:  - Educate patient/family on patient safety including physical limitations  - Instruct patient to call for assistance with activity   - Consult OT/PT to assist with strengthening/mobility   - Keep Call bell within reach  - Keep bed low and locked with side rails adjusted as appropriate  - Keep care items and personal belongings within reach  - Initiate and maintain comfort rounds  - Apply yellow socks and bracelet for high fall risk patients  - Consider moving patient to room near nurses station  Outcome: Progressing     Problem: PAIN - ADULT  Goal: Verbalizes/displays adequate comfort level or baseline comfort level  Description: Interventions:  - Encourage patient to monitor pain and request assistance  - Assess pain using appropriate pain scale  - Administer analgesics based on type and severity of pain and evaluate response  - Implement non-pharmacological measures as appropriate and evaluate response  - Consider cultural and social influences on pain and pain management  - Notify physician/advanced practitioner if interventions unsuccessful or patient reports new pain  Outcome: Progressing     Problem: INFECTION - ADULT  Goal: Absence or prevention of progression during hospitalization  Description: INTERVENTIONS:  - Assess and monitor for signs and symptoms of infection  - Monitor lab/diagnostic results  - Monitor all insertion sites, i e  indwelling lines, tubes, and drains  - Monitor endotracheal if appropriate and nasal secretions for changes in amount and color  - [place] appropriate cooling/warming therapies per order  - Administer medications as ordered  - Instruct and encourage patient and family to use good hand hygiene technique  - Identify and instruct in appropriate isolation precautions for identified infection/condition  Outcome: Progressing     Problem: DISCHARGE PLANNING  Goal: Discharge to home or other facility with appropriate resources  Description: INTERVENTIONS:  - Identify barriers to discharge w/patient and caregiver  - Arrange for needed discharge resources and transportation as appropriate  - Identify discharge learning needs (meds, wound care, etc )  - Arrange for interpretive services to assist at discharge as needed  - Refer to Case Management Department for coordinating discharge planning if the patient needs post-hospital services based on physician/advanced practitioner order or complex needs related to functional status, cognitive ability, or social support system  Outcome: Progressing     Problem: Knowledge Deficit  Goal: Patient/family/caregiver demonstrates understanding of disease process, treatment plan, medications, and discharge instructions  Description: Complete learning assessment and assess knowledge base    Interventions:  - Provide teaching at level of understanding  - Provide teaching via preferred learning methods  Outcome: Progressing

## 2023-03-10 NOTE — DISCHARGE INSTR - AVS FIRST PAGE
Internal medicine discharge instructions  -follow up with neurology  -Referral to neuro-ophthamology  -start diamox 500 mg twice a day  -follow up with family docotor

## 2023-03-10 NOTE — QUICK NOTE
Follow up on neurodiagnostics/ophtho exam last night, see neurology consult note yesterday:    LP with opening pressure 31, closing pressure 15  Otherwise benign CSF results thus-far (few studies are pending)    Formal ophthalmology exam last night without definitive evidence of papilledema  Unclear if true IIH; would treat with diamox 500 mg twice daily at least until outpatient neurology follow up  Recommend close neurology (will arrange) and neuro-ophthalmology team follow ups for further visual workup, follow up of pending CSF labs and treatment  No further inpatient neurologic workup; ok from neurology standpoint for discharge  Discussed with patient this morning and primary team    For neurology scheduling team, will require 6 week follow up with general neurology attending; no need for outpatient neurology testing prior to follow up (besides following up pending CSF labs)

## 2023-03-10 NOTE — NURSING NOTE
Reviewed discharge instructions with patient and family  Verbalized understanding  Asked for return to work letter  Contacted PA  Letter given to patient  Patient's personal property (vape device) was returned to patient

## 2023-03-10 NOTE — ASSESSMENT & PLAN NOTE
· 65-year-old female with past medical history of schizoaffective bipolar disorder, anxiety, obesity presented from optometrist office due to papilledema seen on exam  · Rule out pseudotumor cerebri/indopathic intrcranial hypertensioon  · Reportedly has been having vision changes especially with colors among her peripheral vision  · MRI brain negative  · CT head negative  · LP with mildly elevated opening pressure at 31 cm H2O  · Neuro consulted recommending starting diamox 500 mg BID  · Follow up with neurology and neuro-ophthamology  · ophthalmology consulted no papilledema seen on exam

## 2023-03-10 NOTE — PLAN OF CARE
Problem: Potential for Falls  Goal: Patient will remain free of falls  Description: INTERVENTIONS:  - Educate patient/family on patient safety including physical limitations  - Instruct patient to call for assistance with activity   - Consult OT/PT to assist with strengthening/mobility   - Keep Call bell within reach  - Keep bed low and locked with side rails adjusted as appropriate  - Keep care items and personal belongings within reach  - Initiate and maintain comfort rounds  - Make Fall Risk Sign visible to staff  - Apply yellow socks and bracelet for high fall risk patients  - Consider moving patient to room near nurses station  Outcome: Progressing     Problem: PAIN - ADULT  Goal: Verbalizes/displays adequate comfort level or baseline comfort level  Description: Interventions:  - Encourage patient to monitor pain and request assistance  - Assess pain using appropriate pain scale  - Administer analgesics based on type and severity of pain and evaluate response  - Implement non-pharmacological measures as appropriate and evaluate response  - Consider cultural and social influences on pain and pain management  - Notify physician/advanced practitioner if interventions unsuccessful or patient reports new pain  Outcome: Progressing     Problem: INFECTION - ADULT  Goal: Absence or prevention of progression during hospitalization  Description: INTERVENTIONS:  - Assess and monitor for signs and symptoms of infection  - Monitor lab/diagnostic results  - Monitor all insertion sites, i e  indwelling lines, tubes, and drains  - Monitor endotracheal if appropriate and nasal secretions for changes in amount and color  - [place] appropriate cooling/warming therapies per order  - Administer medications as ordered  - Instruct and encourage patient and family to use good hand hygiene technique  - Identify and instruct in appropriate isolation precautions for identified infection/condition  Outcome: Progressing     Problem: DISCHARGE PLANNING  Goal: Discharge to home or other facility with appropriate resources  Description: INTERVENTIONS:  - Identify barriers to discharge w/patient and caregiver  - Arrange for needed discharge resources and transportation as appropriate  - Identify discharge learning needs (meds, wound care, etc )  - Arrange for interpretive services to assist at discharge as needed  - Refer to Case Management Department for coordinating discharge planning if the patient needs post-hospital services based on physician/advanced practitioner order or complex needs related to functional status, cognitive ability, or social support system  Outcome: Progressing     Problem: Knowledge Deficit  Goal: Patient/family/caregiver demonstrates understanding of disease process, treatment plan, medications, and discharge instructions  Description: Complete learning assessment and assess knowledge base    Interventions:  - Provide teaching at level of understanding  - Provide teaching via preferred learning methods  Outcome: Progressing

## 2023-03-10 NOTE — DISCHARGE SUMMARY
[facility]  Discharge- A[place] 1990, 34 y o  female MRN: [id number]  Unit/Bed#: [facility] [address] [place] [address] [id number] Encounter: [id number]  Primary Care Provider: Brittany Polk MD   Date and time admitted to hospital: 3/8/2023  5:32 PM    * Papilledema  Assessment & Plan  · 27-year-old female with past medical history of schizoaffective bipolar disorder, anxiety, obesity presented from optometrist office due to papilledema seen on exam  · Rule out pseudotumor cerebri/indopathic intrcranial hypertensioon  · Reportedly has been having vision changes especially with colors among her peripheral vision  · MRI brain negative  · CT head negative  · LP with mildly elevated opening pressure at 31 cm H2O  · Neuro consulted recommending starting diamox 500 mg BID  · Follow up with neurology and neuro-ophthamology  · ophthalmology consulted no papilledema seen on exam    History of migraine  Assessment & Plan  · Patient reports history of migraines   · States this improved after starting gabapentin    Schizoaffective disorder, bipolar type ([facility] [address] )  Assessment & Plan  · Continue Zyprexa 25 mg bedtime    GOOD (generalized anxiety disorder)  Assessment & Plan  · Continue Prozac      Medical Problems     Resolved Problems  Date Reviewed: 3/10/2023          Resolved    Hypokalemia 3/9/2023     Resolved by  Wandy Hancock PA-C        Discharging Physician / Practitioner: Wandy Hancock PA-C  PCP: Brittany Polk MD  Admission Date:   Admission Orders (From admission, onward)     Ordered        03/08/23 1906  Place in Observation  Once                      Discharge Date: 03/10/23    Consultations During Hospital Stay:  · neurology    Procedures Performed:   · Lumbar puncture- Findings: Clear CSF  L2-3 access with fluoroscopic guidance  20-gauge spinal needle  Opening pressure mildly elevated at 31 cm H2O   25 cc CSF drained  Closing pressure 15 cm H2O      Significant Findings / Test Results: · CT head impression no acute intracranial abnormality  · MRI brain and orbits unremarkable exam of the orbits  No acute intracranial process  Incidental Findings:   · none    Test Results Pending at Discharge (will require follow up):   · MS panel, leukemia/lyphoma cytometry, angiotensin converting enzyme, non-gynecologic cytology, angiotensin converting enzyme     Outpatient Tests Requested:  · none    Complications:  none    Reason for Admission: papilledema     Hospital Course:   Delmy Sharif is a 34 y o  female patient who originally presented to the hospital on 3/8/2023 due to papilledema seen by an outpatient optometrist   MRI brain was unremarkable, CT head was unremarkable  Neurology was consulted  Lumbar puncture was performed which showed elevated opening pressure at 31 and closing pressure at 15  Formal ophthalmology exam was performed inpatient which showed no definitive evidence of papilledema  Unclear if patient has true idiopathic intracranial hypertension neurology recommending starting on Diamox 500 mg twice a day  Patient will follow-up with neurology outpatient  And referral was placed to neuro ophthalmologist         Please see above list of diagnoses and related plan for additional information  Condition at Discharge: stable    Discharge Day Visit / Exam:   Subjective: Patient was seen sitting up in bed today  She reports feeling well today  She denies any headaches today  She reports continued double vision in the right eye as well as flashes of light and colors    She denies any other acute complaints at this time  Vitals: Blood Pressure: 113/61 (03/10/23 0717)  Pulse: 82 (03/10/23 0717)  Temperature: 97 7 °F (36 5 °C) (03/10/23 0717)  Temp Source: Oral (03/08/23 2015)  Respirations: 16 (03/10/23 0717)  Height: 5' 8" (172 7 cm) (03/08/23 2015)  Weight - Scale: 134 kg (294 lb 15 6 oz) (03/08/23 2015)  SpO2: 94 % (03/10/23 0737)  Exam:   Physical Exam  Vitals and nursing note reviewed  Constitutional:       Appearance: Normal appearance  She is obese  HENT:      Head: Normocephalic and atraumatic  Eyes:      General: No scleral icterus  Cardiovascular:      Rate and Rhythm: Normal rate and regular rhythm  Pulmonary:      Effort: Pulmonary effort is normal       Breath sounds: Normal breath sounds  No wheezing  Abdominal:      General: Abdomen is flat  Bowel sounds are normal       Palpations: Abdomen is soft  Tenderness: There is no abdominal tenderness  Musculoskeletal:      Right lower leg: No edema  Left lower leg: No edema  Skin:     General: Skin is warm and dry  Neurological:      Mental Status: She is alert  Mental status is at baseline  Psychiatric:         Mood and Affect: Mood normal          Behavior: Behavior normal           Discussion with Family: Updated  (friend) at bedside  Discharge instructions/Information to patient and family:   See after visit summary for information provided to patient and family  Provisions for Follow-Up Care:  See after visit summary for information related to follow-up care and any pertinent home health orders  Disposition:   Home    Planned Readmission: none     Discharge Statement:  I spent 45 minutes discharging the patient  This time was spent on the day of discharge  I had direct contact with the patient on the day of discharge  Greater than 50% of the total time was spent examining patient, answering all patient questions, arranging and discussing plan of care with patient as well as directly providing post-discharge instructions  Additional time then spent on discharge activities  Discharge Medications:  See after visit summary for reconciled discharge medications provided to patient and/or family        **Please Note: This note may have been constructed using a voice recognition system**

## 2023-03-10 NOTE — PLAN OF CARE
Problem: Potential for Falls  Goal: Patient will remain free of falls  Description: INTERVENTIONS:  - Educate patient/family on patient safety including physical limitations  - Instruct patient to call for assistance with activity   - Consult OT/PT to assist with strengthening/mobility   - Keep Call bell within reach  - Keep bed low and locked with side rails adjusted as appropriate  - Keep care items and personal belongings within reach  - Initiate and maintain comfort rounds  - Make Fall Risk Sign visible to staff  - Apply yellow socks and bracelet for high fall risk patients  - Consider moving patient to room near nurses station  3/10/2023 1333 by Ronda Tavares RN  Outcome: Adequate for Discharge  3/10/2023 1112 by Ronda Tavares RN  Outcome: Progressing     Problem: PAIN - ADULT  Goal: Verbalizes/displays adequate comfort level or baseline comfort level  Description: Interventions:  - Encourage patient to monitor pain and request assistance  - Assess pain using appropriate pain scale  - Administer analgesics based on type and severity of pain and evaluate response  - Implement non-pharmacological measures as appropriate and evaluate response  - Consider cultural and social influences on pain and pain management  - Notify physician/advanced practitioner if interventions unsuccessful or patient reports new pain  3/10/2023 1333 by Ronda Tavares RN  Outcome: Adequate for Discharge  3/10/2023 1112 by Ronda Tavares RN  Outcome: Progressing     Problem: INFECTION - ADULT  Goal: Absence or prevention of progression during hospitalization  Description: INTERVENTIONS:  - Assess and monitor for signs and symptoms of infection  - Monitor lab/diagnostic results  - Monitor all insertion sites, i e  indwelling lines, tubes, and drains  - Monitor endotracheal if appropriate and nasal secretions for changes in amount and color  - [place] appropriate cooling/warming therapies per order  - Administer medications as ordered  - Instruct and encourage patient and family to use good hand hygiene technique  - Identify and instruct in appropriate isolation precautions for identified infection/condition  3/10/2023 1333 by Charlotte Mccormick RN  Outcome: Adequate for Discharge  3/10/2023 1112 by Charlotte Mccormick RN  Outcome: Progressing     Problem: DISCHARGE PLANNING  Goal: Discharge to home or other facility with appropriate resources  Description: INTERVENTIONS:  - Identify barriers to discharge w/patient and caregiver  - Arrange for needed discharge resources and transportation as appropriate  - Identify discharge learning needs (meds, wound care, etc )  - Arrange for interpretive services to assist at discharge as needed  - Refer to Case Management Department for coordinating discharge planning if the patient needs post-hospital services based on physician/advanced practitioner order or complex needs related to functional status, cognitive ability, or social support system  3/10/2023 1333 by Charlotte Mccormick RN  Outcome: Adequate for Discharge  3/10/2023 1112 by Charlotte Mccormick RN  Outcome: Progressing     Problem: Knowledge Deficit  Goal: Patient/family/caregiver demonstrates understanding of disease process, treatment plan, medications, and discharge instructions  Description: Complete learning assessment and assess knowledge base    Interventions:  - Provide teaching at level of understanding  - Provide teaching via preferred learning methods  3/10/2023 1333 by Charlotte Mccormick RN  Outcome: Adequate for Discharge  3/10/2023 1112 by Charlotte Mccormick RN  Outcome: Progressing

## 2023-03-11 NOTE — ED PROVIDER NOTES
History  Chief Complaint   Patient presents with   • Eye Problem     States that she was seen at eye doctor and they are concerned for papilledema  Recommend MRA and LP     HPI  28-year-old female presents with complaints of papilledema  Patient was being seen by her optometrist on a routine visit and was noted to have signs of papilledema on physical exam   Patient is asymptomatic with no fevers, chills, nausea, vomiting, acute visual change, lateralizing weakness, headache  Prior to Admission Medications   Prescriptions Last Dose Informant Patient Reported? Taking?    FLUoxetine (PROzac) 40 MG capsule 3/8/2023  No Yes   Sig: Take 1 capsule (40 mg total) by mouth daily   Tess 0 25-35 MG-MCG per tablet 3/7/2023  Yes Yes   Sig: Take 1 tablet by mouth daily   OLANZapine (ZyPREXA) 20 MG tablet 3/7/2023  No Yes   Sig: Take 1 tablet (20 mg total) by mouth daily at bedtime   OLANZapine (ZyPREXA) 5 mg tablet 3/7/2023  No Yes   Sig: Take 1 tablet (5 mg total) by mouth daily at bedtime   clonazePAM (KlonoPIN) 0 5 mg tablet 3/8/2023  No Yes   Sig: Take 1 tablet (0 5 mg total) by mouth 2 (two) times a day   ergocalciferol (VITAMIN D2) 50,000 units Past Week  Yes Yes   Sig: Take 50,000 Units by mouth once a week   gabapentin (NEURONTIN) 300 mg capsule 3/8/2023  Yes Yes   Sig: Take 300 mg by mouth 3 (three) times a day   levothyroxine 300 MCG tablet 3/8/2023  Yes Yes   Sig: Take 300 mcg by mouth daily   prazosin (MINIPRESS) 2 mg capsule   No Yes   Sig: Take 1 capsule (2 mg total) by mouth daily at bedtime   traZODone (DESYREL) 100 mg tablet 3/7/2023  No Yes   Sig: Take 1 tablet (100 mg total) by mouth daily at bedtime      Facility-Administered Medications: None       Past Medical History:   Diagnosis Date   • Bipolar 1 disorder ([facility] )    • Cancer ([facility] )     thyroid   • Depression    • Schizoaffective disorder, bipolar type ([facility] )    • Suicidal ideations    • Thyroid disease        Past Surgical History:   Procedure Laterality Date   • IR LUMBAR PUNCTURE  3/9/2023   • NECK SURGERY     • US GUIDED THYROID BIOPSY      normal  Onset: 2012       History reviewed  No pertinent family history  I have reviewed and agree with the history as documented  E-Cigarette/Vaping   • E-Cigarette Use Current Some Day User    • Comments occasional      E-Cigarette/Vaping Substances   • Nicotine No    • THC No    • CBD No    • Flavoring Yes    • Other No    • Unknown No      Social History     Tobacco Use   • Smoking status: Former     Packs/day: 1 00     Types: Cigarettes   • Smokeless tobacco: Never   Vaping Use   • Vaping Use: Some days   • Substances: Flavoring   Substance Use Topics   • Alcohol use: Yes     Comment: social drinker   • Drug use: No        Review of Systems   Constitutional: Negative for chills and fever  HENT: Negative for congestion, ear pain, rhinorrhea and sore throat  Eyes: Negative for pain  Respiratory: Negative for apnea, cough, choking, chest tightness, shortness of breath, wheezing and stridor  Cardiovascular: Negative for chest pain, palpitations and leg swelling  Gastrointestinal: Negative for abdominal pain, constipation, diarrhea, nausea and vomiting  Genitourinary: Negative for hematuria  Musculoskeletal: Negative for arthralgias and back pain  Skin: Negative for rash and wound  Neurological: Negative for dizziness  Psychiatric/Behavioral: Negative for agitation and hallucinations  All other systems reviewed and are negative        Physical Exam  ED Triage Vitals [03/08/23 1729]   Temperature Pulse Respirations Blood Pressure SpO2   97 7 °F (36 5 °C) 91 18 164/86 98 %      Temp Source Heart Rate Source Patient Position - Orthostatic VS BP Location FiO2 (%)   Oral Monitor Sitting Right arm --      Pain Score       No Pain             Orthostatic Vital Signs  Vitals:    03/09/23 2156 03/09/23 2156 03/10/23 0610 03/10/23 0717   BP: 127/71 127/71 124/75 113/61   Pulse:  89 79 82   Patient Position - Orthostatic VS:           Physical Exam  Vitals reviewed  Constitutional:       General: She is not in acute distress  Appearance: She is well-developed  HENT:      Head: Normocephalic and atraumatic  Right Ear: External ear normal       Left Ear: External ear normal       Nose: Nose normal  No congestion or rhinorrhea  Mouth/Throat:      Mouth: Mucous membranes are moist       Pharynx: No oropharyngeal exudate or posterior oropharyngeal erythema  Eyes:      General:         Right eye: No discharge  Left eye: No discharge  Extraocular Movements: Extraocular movements intact  Conjunctiva/sclera: Conjunctivae normal       Pupils: Pupils are equal, round, and reactive to light  Cardiovascular:      Rate and Rhythm: Normal rate and regular rhythm  Pulses: Normal pulses  Heart sounds: Normal heart sounds  Pulmonary:      Effort: Pulmonary effort is normal  No respiratory distress  Breath sounds: Normal breath sounds  No wheezing or rales  Abdominal:      Palpations: Abdomen is soft  Tenderness: There is no abdominal tenderness  Musculoskeletal:         General: No tenderness  Normal range of motion  Cervical back: Normal range of motion and neck supple  No rigidity  Skin:     General: Skin is warm  Findings: No erythema or rash  Neurological:      General: No focal deficit present  Mental Status: She is alert and oriented to person, place, and time  Cranial Nerves: No cranial nerve deficit  Sensory: No sensory deficit  Motor: No weakness        Coordination: Coordination normal    Psychiatric:         Mood and Affect: Mood normal          ED Medications  Medications   potassium chloride (K-DUR,KLOR-CON) CR tablet 40 mEq (40 mEq Oral Given 3/8/23 2039)   Gadobutrol injection (SINGLE-DOSE) SOLN 15 mL (15 mL Intravenous Given 3/8/23 2239)   midazolam (VERSED) injection (0 5 mg Intravenous Given 3/9/23 1354)   lidocaine (PF) (XYLOCAINE-MPF) 1 % injection (5 mL Infiltration Given 3/9/23 1357)   tropicamide (MYDRIACYL) 1 % ophthalmic solution 1 drop (1 drop Both Eyes Given 3/9/23 1517)   tropicamide (MYDRIACYL) 1 % ophthalmic solution 1 drop (1 drop Both Eyes Given 3/9/23 1612)       Diagnostic Studies  Results Reviewed     Procedure Component Value Units Date/Time    Comprehensive metabolic panel [[id number]]  (Abnormal) Collected: 03/09/23 0552    Lab Status: Final result Specimen: Blood from Hand, Right Updated: 03/09/23 0738     Sodium 139 mmol/L      Potassium 4 3 mmol/L      Chloride 107 mmol/L      CO2 23 mmol/L      ANION GAP 9 mmol/L      BUN 9 mg/dL      Creatinine 0 91 mg/dL      Glucose 100 mg/dL      Calcium 7 2 mg/dL      Corrected Calcium 7 8 mg/dL      AST 25 U/L      ALT 26 U/L      Alkaline Phosphatase 52 U/L      Total Protein 6 8 g/dL      Albumin 3 3 g/dL      Total Bilirubin 0 57 mg/dL      eGFR 83 ml/min/1 73sq m     Narrative:      [place] guidelines for Chronic Kidney Disease (CKD):   •  Stage 1 with normal or high GFR (GFR > 90 mL/min/1 73 square meters)  •  Stage 2 Mild CKD (GFR = 60-89 mL/min/1 73 square meters)  •  Stage 3A Moderate CKD (GFR = 45-59 mL/min/1 73 square meters)  •  Stage 3B Moderate CKD (GFR = 30-44 mL/min/1 73 square meters)  •  Stage 4 Severe CKD (GFR = 15-29 mL/min/1 73 square meters)  •  Stage 5 End Stage CKD (GFR <15 mL/min/1 73 square meters)  Note: GFR calculation is accurate only with a steady state creatinine    Magnesium [[id number]]  (Abnormal) Collected: 03/09/23 0552    Lab Status: Final result Specimen: Blood from Hand, Right Updated: 03/09/23 0738     Magnesium 1 8 mg/dL     Protime-INR [[id number]]  (Abnormal) Collected: 03/09/23 0552    Lab Status: Final result Specimen: Blood from Hand, Right Updated: 03/09/23 0645     Protime 15 0 seconds      INR 1 17    CBC (With Platelets) [[id number]]  (Abnormal) Collected: 03/09/23 0552    Lab Status: Final result Specimen: Blood from Hand, Right Updated: 03/09/23 0609     WBC 5 28 Thousand/uL      RBC 4 10 Million/uL      Hemoglobin 11 1 g/dL      Hematocrit 36 5 %      MCV 89 fL      MCH 27 1 pg      MCHC 30 4 g/dL      RDW 14 4 %      Platelets 509 Thousands/uL      MPV 9 4 fL     TSH, 3rd generation with Free T4 reflex [[id number]]  (Normal) Collected: 03/08/23 1803    Lab Status: Final result Specimen: Blood from Arm, Right Updated: 03/08/23 2218     TSH 3RD GENERATON 0 930 uIU/mL     Narrative:      Patients undergoing fluorescein dye angiography may retain small amounts of fluorescein in the body for 48-72 hours post procedure  Samples containing fluorescein can produce falsely depressed TSH values  If the patient had this procedure,a specimen should be resubmitted post fluorescein clearance        hCG, quantitative [[id number]]  (Normal) Collected: 03/08/23 1803    Lab Status: Final result Specimen: Blood from Arm, Right Updated: 03/08/23 1831     HCG, Quant <1 mIU/mL     Narrative:       Expected Ranges:     Approximate               Approximate HCG  Gestation age          Concentration ( mIU/mL)  _____________          ______________________   Marino Dust                      HCG values  0 2-1                       5-50  1-2                           2-3                         100-5000  3-4                         500-18069  4-5                         1000-65634  5-6                         97253-101522  6-8                         58601-812916  8-12                        75112-834300      Basic metabolic panel [[id number]]  (Abnormal) Collected: 03/08/23 1803    Lab Status: Final result Specimen: Blood from Arm, Right Updated: 03/08/23 1821     Sodium 137 mmol/L      Potassium 3 4 mmol/L      Chloride 102 mmol/L      CO2 26 mmol/L      ANION GAP 9 mmol/L      BUN 9 mg/dL      Creatinine 1 11 mg/dL      Glucose 140 mg/dL      Calcium 7 8 mg/dL      eGFR 65 ml/min/1 73sq m     Narrative:      National Kidney Disease Foundation guidelines for Chronic Kidney Disease (CKD):   •  Stage 1 with normal or high GFR (GFR > 90 mL/min/1 73 square meters)  •  Stage 2 Mild CKD (GFR = 60-89 mL/min/1 73 square meters)  •  Stage 3A Moderate CKD (GFR = 45-59 mL/min/1 73 square meters)  •  Stage 3B Moderate CKD (GFR = 30-44 mL/min/1 73 square meters)  •  Stage 4 Severe CKD (GFR = 15-29 mL/min/1 73 square meters)  •  Stage 5 End Stage CKD (GFR <15 mL/min/1 73 square meters)  Note: GFR calculation is accurate only with a steady state creatinine    CBC and differential [[id number]]  (Abnormal) Collected: 03/08/23 1803    Lab Status: Final result Specimen: Blood from Arm, Right Updated: 03/08/23 1807     WBC 9 21 Thousand/uL      RBC 4 39 Million/uL      Hemoglobin 11 8 g/dL      Hematocrit 39 1 %      MCV 89 fL      MCH 26 9 pg      MCHC 30 2 g/dL      RDW 14 4 %      MPV 9 4 fL      Platelets 592 Thousands/uL      nRBC 0 /100 WBCs      Neutrophils Relative 71 %      Immat GRANS % 0 %      Lymphocytes Relative 24 %      Monocytes Relative 3 %      Eosinophils Relative 2 %      Basophils Relative 0 %      Neutrophils Absolute 6 45 Thousands/µL      Immature Grans Absolute 0 02 Thousand/uL      Lymphocytes Absolute 2 21 Thousands/µL      Monocytes Absolute 0 30 Thousand/µL      Eosinophils Absolute 0 20 Thousand/µL      Basophils Absolute 0 03 Thousands/µL                  IR lumbar puncture   Final Result by [place] Lanes, MD (03/10 1442)   Impression:   1  Successful image-guided diagnostic and therapeutic lumbar puncture  2  Therapeutic drainage due to mildly elevated opening pressure of 31 cm H2O reduced to 15 cm H2O after drainage of 25 cc clear CSF            Workstation performed: [id number]         MRI brain and orbits wo and w contrast   Final Result by Yong Mendez MD (03/09 1905)   Addendum (preliminary) 1 of 1 by Yong Mendez MD (03/09 1905)   ADDENDUM:      Patent dural venous sinuses  Final         1  Unremarkable exam of the orbits  2   No acute intracranial process  Workstation performed: [id number]         CT head without contrast   Final Result by Melida Gutierrez MD (03/08 1956)      No acute intracranial abnormality  Workstation performed: [id number]               Procedures  Procedures      ED Course                             SBIRT 22yo+    Flowsheet Row Most Recent Value   SBIRT (25 yo +)    In order to provide better care to our patients, we are screening all of our patients for alcohol and drug use  Would it be okay to ask you these screening questions? No Filed at: 03/08/2023 1735                Medical Decision Making  79-year-old female presents with complaints of papilledema  Papilledema  Asymptomatic   Patient is admitted with neurology consult plan for MRI and potential lumbar puncture inpatient  Amount and/or Complexity of Data Reviewed  Labs: ordered  Radiology: ordered  Risk  Decision regarding hospitalization  Disposition  Final diagnoses:   Papilledema     Time reflects when diagnosis was documented in both MDM as applicable and the Disposition within this note     Time User Action Codes Description Comment    3/8/2023  6:15 PM Terrial Andrew Add [L05 48] Papilledema       ED Disposition     ED Disposition   Admit    Condition   Stable    Date/Time   Wed Mar 8, 2023 1905    Comment   --         Follow-up Information     Follow up With Specialties Details Why Contact Info Additional Jessica Sommer [facility] Follow up Office will call patient to arrange follow up  If you do not hear from office within 7-10 days following discharge, please call [phone] to inquire   [address] [facility] [id number]  [address] [place] [address], [address], [address]          Discharge Medication List as of 3/10/2023  1:04 PM      START taking these medications    Details   acetaZOLAMIDE (DIAMOX) 250 mg tablet Take 2 tablets (500 mg total) by mouth every 12 (twelve) hours, Starting Fri 3/10/2023, Until Sun 4/9/2023, Normal         CONTINUE these medications which have NOT CHANGED    Details   clonazePAM (KlonoPIN) 0 5 mg tablet Take 1 tablet (0 5 mg total) by mouth 2 (two) times a day, Starting Fri 12/23/2022, Normal      ergocalciferol (VITAMIN D2) 50,000 units Take 50,000 Units by mouth once a week, Starting Thu 2/23/2023, Historical Med      FLUoxetine (PROzac) 40 MG capsule Take 1 capsule (40 mg total) by mouth daily, Starting Thu 2/23/2023, Normal      gabapentin (NEURONTIN) 300 mg capsule Take 300 mg by mouth 3 (three) times a day, Starting Thu 2/9/2023, Historical Med      levothyroxine 300 MCG tablet Take 300 mcg by mouth daily, Starting Wed 2/8/2023, Historical Med      Tess 0 25-35 MG-MCG per tablet Take 1 tablet by mouth daily, Starting Tue 2/21/2023, Historical Med      !! OLANZapine (ZyPREXA) 20 MG tablet Take 1 tablet (20 mg total) by mouth daily at bedtime, Starting Fri 12/23/2022, Normal      !! OLANZapine (ZyPREXA) 5 mg tablet Take 1 tablet (5 mg total) by mouth daily at bedtime, Starting Fri 12/23/2022, Normal      prazosin (MINIPRESS) 2 mg capsule Take 1 capsule (2 mg total) by mouth daily at bedtime, Starting Fri 12/23/2022, Normal      traZODone (DESYREL) 100 mg tablet Take 1 tablet (100 mg total) by mouth daily at bedtime, Starting Thu 2/23/2023, Normal       !! - Potential duplicate medications found  Please discuss with provider  PDMP Review       Value Time User    PDMP Reviewed  Yes 2/23/2023  9:51 AM [address] [place] [address]           ED Provider  Attending physically available and evaluated Madison Gaucher  DESIRE managed the patient along with the ED Attending      Electronically Signed by         Keshawn Ha DO  03/11/23 8297

## 2023-03-13 LAB — ACE CSF-CCNC: <1.5 U/L (ref 0–2.5)

## 2023-03-14 LAB — REAGIN AB CSF QL: NON REACTIVE

## 2023-03-15 LAB
ALB CSF/SERPL: 5 {RATIO} (ref 0–8)
ALBUMIN CSF-MCNC: 20 MG/DL (ref 7–29)
ALBUMIN SERPL-MCNC: 3.7 G/DL (ref 3.8–4.8)
IGG CSF-MCNC: 4.3 MG/DL (ref 0–6.7)
IGG SERPL-MCNC: 1442 MG/DL (ref 586–1602)
IGG SYNTH RATE SER+CSF CALC-MRATE: -1.3 MG/DAY
IGG/ALB CLEAR SER+CSF-RTO: 0.6 (ref 0–0.7)
IGG/ALB CSF: 0.22 {RATIO} (ref 0–0.25)
MBP CSF-MCNC: 2.7 NG/ML (ref 0–3.7)
OLIGOCLONAL BANDS.IT SER+CSF QL: ABNORMAL

## 2023-03-20 LAB — SCAN RESULT: NORMAL

## 2023-03-28 ENCOUNTER — TELEPHONE (OUTPATIENT)
Dept: NEUROLOGY | Facility: CLINIC | Age: 33
End: 2023-03-28

## 2023-03-28 ENCOUNTER — TELEMEDICINE (OUTPATIENT)
Dept: BEHAVIORAL/MENTAL HEALTH CLINIC | Facility: CLINIC | Age: 33
End: 2023-03-28

## 2023-03-28 DIAGNOSIS — F43.10 POST TRAUMATIC STRESS DISORDER (PTSD): ICD-10-CM

## 2023-03-28 DIAGNOSIS — F41.1 GAD (GENERALIZED ANXIETY DISORDER): ICD-10-CM

## 2023-03-28 DIAGNOSIS — F25.0 SCHIZOAFFECTIVE DISORDER, BIPOLAR TYPE (HCC): Primary | ICD-10-CM

## 2023-03-28 NOTE — TELEPHONE ENCOUNTER
1ST Attempt,     Called pt no answer, LMOM with our call back number  HFU/SL ALL/ Papilledema, hx migraines  Discharged on 03/09/2023    For neurology scheduling team, will require 6 week follow up with general neurology attending; no need for outpatient neurology testing prior to follow up (besides following up pending CSF labs)        Thank you,     Diana Evangelista

## 2023-03-28 NOTE — PSYCH
"Behavioral Health Psychotherapy Progress Note    Psychotherapy Provided: Individual Psychotherapy     1  Schizoaffective disorder, bipolar type ([address] )        2  GOOD (generalized anxiety disorder)        3  Post traumatic stress disorder (PTSD)            Goals addressed in session: Goal 1, I am in \"rrr\" mode because I am stressed out  DATA: JUDY met with Janes  for session  She went to the eye dr to get her eye checked for her glasses  The eye Dr sent her to the ER and she had a CT scan, MRI and a spinal tap  She now has to follow up with a neurologist and an optomologist and is on meds  With that she smoked \"pot  \"   She states, she only smoked \"that one time  \"   Her parents are \"driving her crazy  \"  They came in Feb and are leaving in June  Her hair was down to thighs and she got it cut to her shoulders  She doesn't like it  \"It has been a rough year  \"  JUDY pointed out the positives which are huge such as she doesn't have brain cancer,  She doesn't have \"grippy socks,\" she is not in FDC over her mouther, and her hair isn't giving her headaches  During this session, this clinician used the following therapeutic modalities: Client-centered Therapy, Cognitive Behavioral Therapy, Solution-Focused Therapy and Supportive Psychotherapy    Substance Abuse was not addressed during this session  If the client is diagnosed with a co-occurring substance use disorder, please indicate any changes in the frequency or amount of use: na  Stage of change for addressing substance use diagnoses: No substance use/Not applicable    ASSESSMENT:  Rosendo Orona presents with a Euthymic/ normal mood  her affect is Normal range and intensity, which is congruent, with her mood and the content of the session  The client has made progress on their goals  Rosendo Orona presents with a none risk of suicide, none risk of self-harm, and none risk of harm to others      For any risk assessment that surpasses a " "\"low\" rating, a safety plan must be developed  A safety plan was indicated: no  If yes, describe in detail     PLAN: Between sessions, Mariella Bowden will address tx plan goals    At the next session, the therapist will use Client-centered Therapy, Cognitive Behavioral Therapy, Solution-Focused Therapy and Supportive Psychotherapy to address tx plan goals and issues that arise in between sessions       Behavioral Health Treatment Plan and Discharge Planning: Mariella Bowden is aware of and agrees to continue to work on their treatment plan  They have identified and are working toward their discharge goals  yes    Visit start and stop times:    03/28/23  Start Time: 3057  Stop Time: 1800  Total Visit Time: 45 minutes    Virtual Regular Visit    Verification of patient location:    Patient is located in the following state in which I hold an active license [place]      Assessment/Plan:    Problem List Items Addressed This Visit    None      Goals addressed in session: Goal 1          Reason for visit is   No chief complaint on file  Encounter provider Rosalia Martin    Provider located at [address] [place] [id number] [phone]      Recent Visits  No visits were found meeting these conditions  Showing recent visits within past 7 days and meeting all other requirements  Today's Visits  Date Type Provider Dept   03/28/23 [address] [place] [address] today's visits and meeting all other requirements  Future Appointments  No visits were found meeting these conditions  Showing future appointments within next 150 days and meeting all other requirements       The patient was identified by name and date of birth  Mariella Bowden was informed that this is a telemedicine visit and that the visit is being conducted throughthe [employer] platform   She agrees to " proceed     My office door was closed  No one else was in the room  She acknowledged consent and understanding of privacy and security of the video platform  The patient has agreed to participate and understands they can discontinue the visit at any time  Patient is aware this is a billable service  Subjective  Lamine Bills is a 34 y o  female    HPI     Past Medical History:   Diagnosis Date   • Bipolar 1 disorder ([facility] )    • Cancer ([facility] )     thyroid   • Depression    • Schizoaffective disorder, bipolar type ([facility] )    • Suicidal ideations    • Thyroid disease        Past Surgical History:   Procedure Laterality Date   • IR LUMBAR PUNCTURE  3/9/2023   • NECK SURGERY     • US GUIDED THYROID BIOPSY      normal  Onset: 2012       Current Outpatient Medications   Medication Sig Dispense Refill   • acetaZOLAMIDE (DIAMOX) 250 mg tablet Take 2 tablets (500 mg total) by mouth every 12 (twelve) hours 120 tablet 0   • clonazePAM (KlonoPIN) 0 5 mg tablet Take 1 tablet (0 5 mg total) by mouth 2 (two) times a day 60 tablet 2   • ergocalciferol (VITAMIN D2) 50,000 units Take 50,000 Units by mouth once a week     • FLUoxetine (PROzac) 40 MG capsule Take 1 capsule (40 mg total) by mouth daily 90 capsule 1   • gabapentin (NEURONTIN) 300 mg capsule Take 300 mg by mouth 3 (three) times a day     • levothyroxine 300 MCG tablet Take 300 mcg by mouth daily     • Tess 0 25-35 MG-MCG per tablet Take 1 tablet by mouth daily     • OLANZapine (ZyPREXA) 20 MG tablet Take 1 tablet (20 mg total) by mouth daily at bedtime 90 tablet 1   • OLANZapine (ZyPREXA) 5 mg tablet Take 1 tablet (5 mg total) by mouth daily at bedtime 90 tablet 1   • prazosin (MINIPRESS) 2 mg capsule Take 1 capsule (2 mg total) by mouth daily at bedtime 90 capsule 1   • traZODone (DESYREL) 100 mg tablet Take 1 tablet (100 mg total) by mouth daily at bedtime 30 tablet 2     No current facility-administered medications for this visit          No Known Allergies    Review of Systems    Video Exam    There were no vitals filed for this visit      Physical Exam

## 2023-03-29 NOTE — TELEPHONE ENCOUNTER
Patient is now scheduled with Seymour Estrada [place] [facility] office per pt's request on 6/8/23, 2 pm and placed on the waiting list list     Jaqueline Vora per Dr Gillette's Below recommendations ok to be seen by a [facility] NP        Thank you,     Chantel Rodriguez

## 2023-03-29 NOTE — TELEPHONE ENCOUNTER
MD Isaac Glass  Cc: Anton Saucedo; Mar Rich; Stephon Cerrato patient is to return to Ophtho, and this is not Neurologic emergency considering patient was seeing and evaluation provided to the patient already  Patient can safely follow with any AP who sees Headache patients       Thanks

## 2023-04-28 ENCOUNTER — TELEMEDICINE (OUTPATIENT)
Dept: BEHAVIORAL/MENTAL HEALTH CLINIC | Facility: CLINIC | Age: 33
End: 2023-04-28

## 2023-04-28 DIAGNOSIS — F43.10 POST TRAUMATIC STRESS DISORDER (PTSD): ICD-10-CM

## 2023-04-28 DIAGNOSIS — F41.1 GAD (GENERALIZED ANXIETY DISORDER): ICD-10-CM

## 2023-04-28 DIAGNOSIS — F25.0 SCHIZOAFFECTIVE DISORDER, BIPOLAR TYPE (HCC): Primary | ICD-10-CM

## 2023-04-28 NOTE — PSYCH
"Behavioral Health Psychotherapy Progress Note    Psychotherapy Provided: Individual Psychotherapy     1  Schizoaffective disorder, bipolar type ([facility] [address] [address] )        2  Post traumatic stress disorder (PTSD)        3  GOOD (generalized anxiety disorder)            Goals addressed in session: Goal 1, I am in \"rrr\" mode because I am stressed out  DATA: JUDY met with [employer]  for session  \"She was waiting for the other shoe to drop and it did  \"   A tree fell on her house while she was going to the bathroom  It did not damage the house but, she was the one to have to herman all the phone calls to the ins company, electric co, etc   She described this as a major diaster  MSW explained to her how it could of been a major diaster and much worse  She handled everything even though she was overwhelmed  \"She didn't end up in the hospital and she didn't flip out  \"    She doesn't like her new supervisor  MSW asked is it she doesn't like him or that it is a change  \"The answer was, \"both  \"  She is getting a new kitten in about 4 weeks  The kitten is being shipped up from a [address] [place] [address] Avenue  She is taking her meds as prescribed \"even on weekends  \"      During this session, this clinician used the following therapeutic modalities: Client-centered Therapy, Cognitive Behavioral Therapy, Solution-Focused Therapy and Supportive Psychotherapy    Substance Abuse was not addressed during this session  If the client is diagnosed with a co-occurring substance use disorder, please indicate any changes in the frequency or amount of use: na  Stage of change for addressing substance use diagnoses: No substance use/Not applicable    ASSESSMENT:  Shivam Barakat presents with a Euthymic/ normal mood  her affect is Normal range and intensity, which is congruent, with her mood and the content of the session  The client has made progress on their goals       Shivam Barakat presents with a none risk of suicide, none risk of self-harm, and " "none risk of harm to others  For any risk assessment that surpasses a \"low\" rating, a safety plan must be developed  A safety plan was indicated: no  If yes, describe in detail     PLAN: Between sessions, Doc Alex will address tx plan goals    At the next session, the therapist will use Client-centered Therapy, Cognitive Behavioral Therapy, Solution-Focused Therapy and Supportive Psychotherapy to address tx plan goals and issues that arise in between sessions       Behavioral Health Treatment Plan and Discharge Planning: Doc Alex is aware of and agrees to continue to work on their treatment plan  They have identified and are working toward their discharge goals  yes    Visit start and stop times:    04/28/23  Start Time: 1713  Stop Time: 1800  Total Visit Time: 47 minutes    Virtual Regular Visit    Verification of patient location:    Patient is located at Home in the following state in which I hold an active license [place]      Assessment/Plan:    Problem List Items Addressed This Visit    None      Goals addressed in session: Goal 1          Reason for visit is   No chief complaint on file  Encounter provider Benjamin Dent    Provider located at [address] [place] [id number] [phone]      Recent Visits  No visits were found meeting these conditions  Showing recent visits within past 7 days and meeting all other requirements  Today's Visits  Date Type Provider Dept   04/28/23 [address] [place] [address]  today's visits and meeting all other requirements  Future Appointments  No visits were found meeting these conditions  Showing future appointments within next 150 days and meeting all other requirements       The patient was identified by name and date of birth   Doc Alex was informed that this is a telemedicine visit and that " the visit is being conducted throughthe [employer] platform  She agrees to proceed     My office door was closed  No one else was in the room  She acknowledged consent and understanding of privacy and security of the video platform  The patient has agreed to participate and understands they can discontinue the visit at any time  Patient is aware this is a billable service  Subjective  Ronni Hartmann is a 34 y o  female          HPI     Past Medical History:   Diagnosis Date   • Bipolar 1 disorder ([facility] )    • Cancer ([facility] )     thyroid   • Depression    • Schizoaffective disorder, bipolar type ([facility] )    • Suicidal ideations    • Thyroid disease        Past Surgical History:   Procedure Laterality Date   • IR LUMBAR PUNCTURE  3/9/2023   • NECK SURGERY     • US GUIDED THYROID BIOPSY      normal  Onset: 2012       Current Outpatient Medications   Medication Sig Dispense Refill   • acetaZOLAMIDE (DIAMOX) 250 mg tablet Take 2 tablets (500 mg total) by mouth every 12 (twelve) hours 120 tablet 0   • clonazePAM (KlonoPIN) 0 5 mg tablet Take 1 tablet (0 5 mg total) by mouth 2 (two) times a day 60 tablet 2   • ergocalciferol (VITAMIN D2) 50,000 units Take 50,000 Units by mouth once a week     • FLUoxetine (PROzac) 40 MG capsule Take 1 capsule (40 mg total) by mouth daily 90 capsule 1   • gabapentin (NEURONTIN) 300 mg capsule Take 300 mg by mouth 3 (three) times a day     • levothyroxine 300 MCG tablet Take 300 mcg by mouth daily     • Tess 0 25-35 MG-MCG per tablet Take 1 tablet by mouth daily     • OLANZapine (ZyPREXA) 20 MG tablet Take 1 tablet (20 mg total) by mouth daily at bedtime 90 tablet 1   • OLANZapine (ZyPREXA) 5 mg tablet Take 1 tablet (5 mg total) by mouth daily at bedtime 90 tablet 1   • prazosin (MINIPRESS) 2 mg capsule Take 1 capsule (2 mg total) by mouth daily at bedtime 90 capsule 1   • traZODone (DESYREL) 100 mg tablet Take 1 tablet (100 mg total) by mouth daily at bedtime 30 tablet 2     No current facility-administered medications for this visit  No Known Allergies    Review of Systems    Video Exam    There were no vitals filed for this visit      Physical Exam

## 2023-05-08 DIAGNOSIS — F41.1 GAD (GENERALIZED ANXIETY DISORDER): ICD-10-CM

## 2023-05-08 DIAGNOSIS — F42.4 SKIN PICKING HABIT: ICD-10-CM

## 2023-05-08 RX ORDER — CLONAZEPAM 0.5 MG/1
0.5 TABLET ORAL 2 TIMES DAILY
Qty: 60 TABLET | Refills: 2 | Status: SHIPPED | OUTPATIENT
Start: 2023-05-08

## 2023-05-10 ENCOUNTER — TELEMEDICINE (OUTPATIENT)
Dept: BEHAVIORAL/MENTAL HEALTH CLINIC | Facility: CLINIC | Age: 33
End: 2023-05-10

## 2023-05-10 DIAGNOSIS — F43.10 POST TRAUMATIC STRESS DISORDER (PTSD): ICD-10-CM

## 2023-05-10 DIAGNOSIS — F25.0 SCHIZOAFFECTIVE DISORDER, BIPOLAR TYPE (HCC): Primary | ICD-10-CM

## 2023-05-10 DIAGNOSIS — F41.1 GAD (GENERALIZED ANXIETY DISORDER): ICD-10-CM

## 2023-05-10 NOTE — PSYCH
"Behavioral Health Psychotherapy Progress Note    Psychotherapy Provided: Individual Psychotherapy     1  Schizoaffective disorder, bipolar type ([address] )        2  GOOD (generalized anxiety disorder)        3  Post traumatic stress disorder (PTSD)            Goals addressed in session: Goal 1, I am in \"rrr\" mode because I am stressed out  DATA: JUDY met with Janes  for session  Everything for her house was taking care of by the [employer]  Discussed her worrying over the house and it was for nothing  She has a sty on her eye  Discussed her going to the Dr to have it looked it  She is picking again  She used to have a figit spinner which helped her out a great deal but, then \"it broke  \"  JUDY recommended she get another figit spinner since it helped  Her \"salt levels are too high  \" Discussed this  Her new supervisor will be starting near the end of the month  She is not looking forward to this and she dreads going to work  She is taking her meds  \"No other shoe dropped since last session  \"      During this session, this clinician used the following therapeutic modalities: Client-centered Therapy, Cognitive Behavioral Therapy, Solution-Focused Therapy and Supportive Psychotherapy    Substance Abuse was not addressed during this session  If the client is diagnosed with a co-occurring substance use disorder, please indicate any changes in the frequency or amount of use: na  Stage of change for addressing substance use diagnoses: No substance use/Not applicable    ASSESSMENT:  Jalyn Lopes presents with a Euthymic/ normal mood  her affect is Normal range and intensity, which is congruent, with her mood and the content of the session  The client has made progress on their goals  Jalyn Lopes presents with a none risk of suicide, none risk of self-harm, and none risk of harm to others  For any risk assessment that surpasses a \"low\" rating, a safety plan must be developed      A safety plan " was indicated: no  If yes, describe in detail     PLAN: Between sessions, Evan Cross will address tx plan goals    At the next session, the therapist will use Client-centered Therapy, Cognitive Behavioral Therapy, Solution-Focused Therapy and Supportive Psychotherapy to address tx plan goals and issues that arise in between sessions       Behavioral Health Treatment Plan and Discharge Planning: Evan Cross is aware of and agrees to continue to work on their treatment plan  They have identified and are working toward their discharge goals  yes    Visit start and stop times:    05/10/23  Start Time: 3034  Stop Time: 1800  Total Visit Time: 45 minutes    Virtual Regular Visit    Verification of patient location:    Patient is located at Home in the following state in which I hold an active license [place]      Assessment/Plan:    Problem List Items Addressed This Visit    None      Goals addressed in session: Goal 1          Reason for visit is   No chief complaint on file  Encounter provider Blanca Bullard    Provider located at [address] [place] [id number] [phone]      Recent Visits  No visits were found meeting these conditions  Showing recent visits within past 7 days and meeting all other requirements  Future Appointments  No visits were found meeting these conditions  Showing future appointments within next 150 days and meeting all other requirements       The patient was identified by name and date of birth  Evan Corss was informed that this is a telemedicine visit and that the visit is being conducted throughthe [employer] platform  She agrees to proceed     My office door was closed  No one else was in the room  She acknowledged consent and understanding of privacy and security of the video platform   The patient has agreed to participate and understands they can discontinue the visit at any time  Patient is aware this is a billable service  Subjective  Renny Bone is a 34 y o  female    HPI     Past Medical History:   Diagnosis Date   • Bipolar 1 disorder ([facility] )    • Cancer ([facility] )     thyroid   • Depression    • Schizoaffective disorder, bipolar type ([facility] )    • Suicidal ideations    • Thyroid disease        Past Surgical History:   Procedure Laterality Date   • IR LUMBAR PUNCTURE  3/9/2023   • NECK SURGERY     • US GUIDED THYROID BIOPSY      normal  Onset: 2012       Current Outpatient Medications   Medication Sig Dispense Refill   • acetaZOLAMIDE (DIAMOX) 250 mg tablet Take 2 tablets (500 mg total) by mouth every 12 (twelve) hours 120 tablet 0   • clonazePAM (KlonoPIN) 0 5 mg tablet Take 1 tablet (0 5 mg total) by mouth 2 (two) times a day 60 tablet 2   • ergocalciferol (VITAMIN D2) 50,000 units Take 50,000 Units by mouth once a week     • FLUoxetine (PROzac) 40 MG capsule Take 1 capsule (40 mg total) by mouth daily 90 capsule 1   • gabapentin (NEURONTIN) 300 mg capsule Take 300 mg by mouth 3 (three) times a day     • levothyroxine 300 MCG tablet Take 300 mcg by mouth daily     • Tess 0 25-35 MG-MCG per tablet Take 1 tablet by mouth daily     • OLANZapine (ZyPREXA) 20 MG tablet Take 1 tablet (20 mg total) by mouth daily at bedtime 90 tablet 1   • OLANZapine (ZyPREXA) 5 mg tablet Take 1 tablet (5 mg total) by mouth daily at bedtime 90 tablet 1   • prazosin (MINIPRESS) 2 mg capsule Take 1 capsule (2 mg total) by mouth daily at bedtime 90 capsule 1   • traZODone (DESYREL) 100 mg tablet Take 1 tablet (100 mg total) by mouth daily at bedtime 30 tablet 2     No current facility-administered medications for this visit  No Known Allergies    Review of Systems    Video Exam    There were no vitals filed for this visit      Physical Exam

## 2023-05-18 NOTE — PSYCH
Virtual Regular Visit    Problem List Items Addressed This Visit        Other    Post traumatic stress disorder (PTSD)    Relevant Medications    traZODone (DESYREL) 150 mg tablet    Schizoaffective disorder, bipolar type ([address] ) - Primary    Relevant Medications    traZODone (DESYREL) 150 mg tablet    GOOD (generalized anxiety disorder)    Relevant Medications    traZODone (DESYREL) 150 mg tablet    Insomnia due to other mental disorder    Relevant Medications    traZODone (DESYREL) 150 mg tablet   Other Visit Diagnoses     Excoriation (skin-picking) disorder        Relevant Medications    naltrexone (REVIA) 50 mg tablet    traZODone (DESYREL) 150 mg tablet        Reason for visit is   Chief Complaint   Patient presents with   • Medication Management     Encounter provider [facility], CRNP    Provider located at [address]  [facility]    [address] [address] [address]  [facility] [id number] [phone]    Recent Visits  No visits were found meeting these conditions  Showing recent visits within past 7 days and meeting all other requirements  Today's Visits  Date Type Provider Dept   05/25/23 Telemedicine [facility]   Showing today's visits and meeting all other requirements  Future Appointments  No visits were found meeting these conditions  Showing future appointments within next 150 days and meeting all other requirements       After connecting through [employer], the patient was identified by name and date of birth  Jet Chavez was informed that this is a telemedicine visit and that the visit is being conducted through the [email or web address] platform  She agrees to proceed  which may not be secure and therefore, might not be HIPAA-compliant  My office door was closed  No one else was in the room  She acknowledged consent and understanding of privacy and security of the video platform   The patient has agreed to participate and understands they can discontinue the visit at any time  MEDICATION MANAGEMENT NOTE        [facility]      Name and Date of Birth:  Zeferino Rust 34 y o  1990 MRN: [id number]    Date of Visit: May 25, 2023    Reason for Visit:   Chief Complaint   Patient presents with   • Medication Management         SUBJECTIVE:    Zeferino Rust is a 34 y o  female with past psychiatric history significant for Schizoaffective Disorder, Generalized Anxiety Disorder and PTSD who was personally seen and evaluated today at the [address] [place] [address] outpatient clinic for follow-up and medication management  She presents as depressed, cooperative, calm  Her thoughts are organized, goal directed and completes psychiatric assessment without difficulty  Janes endorses compliance with psychotropic medication regimen that consists of Prozac, Trazodone, Zyprexa, Klonopin and Prazosin  She denies any current adverse medication side effects  At previous outpatient psychiatric appointment with this writer, medications were continued at current doses  On evaluation today, Janes reports multiple stressors over the past several months, including her cat passing away, a tree falling on her house, having swelling of her eye and needing to f/u with Neurology, and having a new supervisor at work  She reports smoking marijuana daily at nighttime over the past several months  Her family and friends have been concerned with this  She reports regular THC use in the past had caused her to be unmotivated with low energy  She denies missing any work due to use  She feels groggy in AM from trazodone and says she has been using this instead  We discussed dose adjustment to help minimize side effects  She is otherwise compliant with medications as scheduled  She is concerned about ongoing skin-picking and has visible skin lesions on face   She reports many lesions on back as well  She reports long history of excoriation disorder  This typically worsens during periods of stress and has been worse lately  She denies any recent mood swings, irritability, or depressive episodes  Was feeling down after cat passed away and missed 2 days of work but otherwise depressive symptoms have been stable  She has had approximately 3 episodes of AH over the past few months, hearing chatter or someone calling her name  No overt delusions  No SI/HI  No recent nightmares  She reports internalizing her anxiety and feels that this often affects her ability to sleep  She denies any recent panic episodes  No further concerns today  Current Rating Scores:     None completed today  Review Of Systems:      Constitutional negative   ENT negative   Cardiovascular negative   Respiratory negative   Gastrointestinal negative   Genitourinary negative   Musculoskeletal negative   Integumentary skin lesions due to excoriation disorder    Neurological negative   Endocrine negative   Other Symptoms none, all other systems are negative       Historical information: (unchanged information from previous note copied and italicized) - Information that is bolded has been updated       Past Psychiatric History:    Past Inpatient Psychiatric Treatment:   Multiple past inpatient psychiatric admissions    1st hospitalization age 24, most recent 2019  Multiple 302 admissions  Hospitalized for SAs by OD on medications, manic symptoms, mood instability   Past Outpatient Psychiatric Treatment:    Was in outpatient psychiatric treatment in the past with a psychiatrist  Past Suicide Attempts: yes, by overdose on medications  Past Violent Behavior: no  Past Psychiatric Medication Trials: Prozac, Celexa, Lexapro, Tegretol, Lamictal, Topamax, Neurontin, Risperdal, Abilify, Seroquel, Invega, Zyprexa, Latuda, Atarax, Ativan, Klonopin, Prazosin, Ambien, Lunesta, Melatonin and Sonata     Traumatic Kwaku Archibaldse willing to provide details  Other Traumatic Events: nightmares      Family Psychiatric History:   Brother-depression  Paternal aunt-schizophrenia  Paternal uncle-alcoholism  Maternal aunts-mental health issues, unsure dx     FH of suicide-unknown      Substance Abuse History:   Tobacco/alcohol/caffeine: Vapes nicotine daily  Rare, social etoh use    Illicit drugs: Hx of THC use, stopped 4 years ago  Denies other illicit drug use       Social History:    Developmental: Denies a history of milestone/developmental delay  Denies a history of in-utero exposure to toxins/illicit substances  There is no documented history of IEP or need for special education  Born 3 months premature   Education: technical college some classes  Employed FT  Lives with roommate  Parents split time between  and   Living arrangement, social support: parents and roommate  Access to firearms: Denies direct access to weapons/firearms       Past Medical History:    Past Medical History:   Diagnosis Date   • B[facility] 1 disorder ([facility] )    • Cancer ([facility] )     thyroid   • Depression    • Schizoaffective disorder, bipolar type ([facility] )    • Suicidal ideations    • Thyroid disease         Past Surgical History:   Procedure Laterality Date   • IR LUMBAR PUNCTURE  3/9/2023   • NECK SURGERY     • US GUIDED THYROID BIOPSY      normal  Onset: 2012     No Known Allergies    Substance Abuse History:    Social History     Substance and Sexual Activity   Alcohol Use Yes    Comment: social drinker     Social History     Substance and Sexual Activity   Drug Use No       Social History:    Social History     Socioeconomic History   • Marital status: Single     Spouse name: Not on file   • Number of children: Not on file   • Years of education: Not on file   • Highest education level: Not on file   Occupational History   • Not on file   Tobacco Use   • Smoking status: Former     Packs/day: 1 00     Types: Cigarettes   • Smokeless tobacco: Never Vaping Use   • Vaping Use: Some days   • Substances: Flavoring   Substance and Sexual Activity   • Alcohol use: Yes     Comment: social drinker   • Drug use: No   • Sexual activity: Not Currently   Other Topics Concern   • Not on file   Social History Narrative   • Not on file     Social Determinants of Health     Financial Resource Strain: Not on file   Food Insecurity: Not on file   Transportation Needs: Not on file   Physical Activity: Not on file   Stress: Not on file   Social Connections: Not on file   Intimate Partner Violence: Not on file   Housing Stability: Not on file       Family Psychiatric History:     History reviewed  No pertinent family history  History Review: The following portions of the patient's history were reviewed and updated as appropriate: allergies, current medications, past family history, past medical history, past social history, past surgical history and problem list          OBJECTIVE:     Vital signs in last 24 hours: There were no vitals filed for this visit      Mental Status Evaluation:    Appearance age appropriate, casually dressed   Behavior cooperative, calm   Speech normal rate, normal volume, normal pitch   Mood depressed   Affect normal range and intensity, appropriate   Thought Processes organized, goal directed   Associations intact associations   Thought Content no overt delusions   Perceptual Disturbances: no auditory hallucinations, no visual hallucinations   Abnormal Thoughts  Risk Potential Suicidal ideation - None  Homicidal ideation - None  Potential for aggression - No   Orientation oriented to person, place, time/date and situation   Memory recent and remote memory grossly intact   Consciousness alert and awake   Attention Span Concentration Span attention span and concentration are age appropriate   Intellect appears to be of average intelligence   Insight intact   Judgement intact   Muscle Strength and  Gait unable to assess today due to virtual visit Motor activity unable to assess today due to virtual visit   Language no difficulty naming common objects, no difficulty repeating a phrase, no difficulty writing a sentence   Fund of Knowledge adequate knowledge of current events  adequate fund of knowledge regarding past history  adequate fund of knowledge regarding vocabulary    Pain none   Pain Scale 0       Laboratory Results: I have personally reviewed all pertinent laboratory/tests results    Recent Labs (last 2 months):   No visits with results within 2 Month(s) from this visit     Latest known visit with results is:   Admission on 03/08/2023, Discharged on 03/10/2023   Component Date Value   • Sodium 03/08/2023 137    • Potassium 03/08/2023 3 4 (L)    • Chloride 03/08/2023 102    • CO2 03/08/2023 26    • ANION GAP 03/08/2023 9    • BUN 03/08/2023 9    • Creatinine 03/08/2023 1 11    • Glucose 03/08/2023 140    • Calcium 03/08/2023 7 8 (L)    • eGFR 03/08/2023 65    • HCG, Quant 03/08/2023 <1    • WBC 03/08/2023 9 21    • RBC 03/08/2023 4 39    • Hemoglobin 03/08/2023 11 8    • Hematocrit 03/08/2023 39 1    • MCV 03/08/2023 89    • MCH 03/08/2023 26 9    • MCHC 03/08/2023 30 2 (L)    • RDW 03/08/2023 14 4    • MPV 03/08/2023 9 4    • Platelets 46/68/6252 313    • nRBC 03/08/2023 0    • Neutrophils Relative 03/08/2023 71    • Immat GRANS % 03/08/2023 0    • Lymphocytes Relative 03/08/2023 24    • Monocytes Relative 03/08/2023 3 (L)    • Eosinophils Relative 03/08/2023 2    • Basophils Relative 03/08/2023 0    • Neutrophils Absolute 03/08/2023 6 45    • Immature Grans Absolute 03/08/2023 0 02    • Lymphocytes Absolute 03/08/2023 2 21    • Monocytes Absolute 03/08/2023 0 30    • Eosinophils Absolute 03/08/2023 0 20    • Basophils Absolute 03/08/2023 0 03    • TSH 3RD GENERATON 03/08/2023 0 930    • Amph/Meth UR 03/08/2023 Negative    • Barbiturate Ur 03/08/2023 Negative    • Benzodiazepine Urine 03/08/2023 Negative    • Cocaine Urine 03/08/2023 Negative    • Methadone Urine 03/08/2023 Negative    • Opiate Urine 03/08/2023 Negative    • PCP Ur 03/08/2023 Negative    • THC Urine 03/08/2023 Positive (A)    • Oxycodone Urine 03/08/2023 Negative    • SARS-CoV-2 03/08/2023 Negative    • INFLUENZA A PCR 03/08/2023 Negative    • INFLUENZA B PCR 03/08/2023 Negative    • RSV PCR 03/08/2023 Negative    • Sodium 03/09/2023 139    • Potassium 03/09/2023 4 3    • Chloride 03/09/2023 107    • CO2 03/09/2023 23    • ANION GAP 03/09/2023 9    • BUN 03/09/2023 9    • Creatinine 03/09/2023 0 91    • Glucose 03/09/2023 100    • Calcium 03/09/2023 7 2 (L)    • Corrected Calcium 03/09/2023 7 8 (L)    • AST 03/09/2023 25    • ALT 03/09/2023 26    • Alkaline Phosphatase 03/09/2023 52    • Total Protein 03/09/2023 6 8    • Albumin 03/09/2023 3 3 (L)    • Total Bilirubin 03/09/2023 0 57    • eGFR 03/09/2023 83    • Protime 03/09/2023 15 0 (H)    • INR 03/09/2023 1 17    • WBC 03/09/2023 5 28    • RBC 03/09/2023 4 10    • Hemoglobin 03/09/2023 11 1 (L)    • Hematocrit 03/09/2023 36 5    • MCV 03/09/2023 89    • MCH 03/09/2023 27 1    • MCHC 03/09/2023 30 4 (L)    • RDW 03/09/2023 14 4    • Platelets 45/82/0016 292    • MPV 03/09/2023 9 4    • Magnesium 03/09/2023 1 8 (L)    • PTT 03/09/2023 27    • Vit D, 25-Hydroxy 03/09/2023 52 6    • SYPHILIS TOTAL ANTIBODY 03/09/2023 Non-reactive    • HIV-1 p24 Antigen 03/09/2023 Non-Reactive    • HIV-1 Antibody 03/09/2023 Non-Reactive    • HIV-2 Antibody 03/09/2023 Non-Reactive    • HIV Ag-Ab 5th Gen 03/09/2023 Non-Reactive    • Lyme Total Antibodies 03/09/2023 <0 2    • Angio Convert Enzyme 03/09/2023 26    • VDRL, CSF 03/09/2023 Non Reactive    • Glucose, CSF 03/09/2023 71 (H)    • Protein, CSF 03/09/2023 40    • RBC, CSF 03/09/2023 0    • Appearance, CSF 03/09/2023 Clear    • Tube Number, CSF 03/09/2023 4    • WBC, CSF 03/09/2023 1    • Xanthochromia 03/09/2023 No    • Gram Stain Result 03/09/2023 No Polys or Bacteria seen    • C NEOFORMANS/GATTII 03/09/2023 Not Detected    • CYTOMEGALOVIRUS 03/09/2023 Not Detected    • ENTEROVIRUS 03/09/2023 Not Detected    • E COLI K1 03/09/2023 Not Detected    • H  INFLUENZAE 03/09/2023 Not Detected    • H SIMPLEX 1 03/09/2023 Not Detected    • H SIMPLEX 2 03/09/2023 Not Detected    • HERPES VIRUS 6 03/09/2023 Not Detected    • PARECHOVIRUS 03/09/2023 Not Detected    • L  MONOCYTOGENES 03/09/2023 Not Detected    • N MENINGITIDIS 03/09/2023 Not Detected    • S  AGALACTIAE 03/09/2023 Not Detected    • S  PNEUMONIAE 03/09/2023 Not Detected    • V ZOSTER 03/09/2023 Not Detected    • Case Report 03/09/2023                      Value:Non-gynecologic Cytology                          Case: [id number]                                  Authorizing Provider:  Raeford Brittle, PA-C        Collected:           03/09/2023 1445              Ordering Location:     [facility]        Received:            03/09/2023 [address]                                                            Pathologist:           Young Moyer DO                                                          Specimen:    Lumbar Puncture, CSF                                                                      • Final Diagnosis 03/09/2023                      Value: This result contains rich text formatting which cannot be displayed here  • Gross Description 03/09/2023                      Value: This result contains rich text formatting which cannot be displayed here     • Clinical Information 03/09/2023                      Value:· 76-year-old female with past medical history of schizoaffective bipolar disorder, anxiety, obesity presented from optometrist office due to papilledema seen on exam  · Rule out pseudotumor cerebri/indopathic intrcranial hypertensioon  · Reportedly has been having vision changes especially with colors among her peripheral vision   • Additional Information 03/09/2023 Value:This result contains rich text formatting which cannot be displayed here  • Angio Convert Enzyme, CSF 03/09/2023 <1 5    • Scan Result 03/09/2023 SEE WRITTEN REPORT    • IgG, CSF 03/09/2023 4 3    • CSF ALBUMIN 03/09/2023 20    • Albumin 03/09/2023 3 7 (L)    • CSF/SERUM ALBUMIN ALB  IN* 03/09/2023 5    • IGG/ALBUMIN RATIO 03/09/2023 0 22    • IgG Index, CSF 03/09/2023 0 6    • IgG Synthetic Rate 03/09/2023   -1 3    • Myelin Basic Prot, CSF 03/09/2023 2 7    • Oligo Bands 03/09/2023 Comment    • IgG 03/09/2023 1442    • Total Counted 03/09/2023 4    • Lymphs % CSF 03/09/2023 100    • WBC 03/10/2023 5 84    • RBC 03/10/2023 4 24    • Hemoglobin 03/10/2023 11 5    • Hematocrit 03/10/2023 38 3    • MCV 03/10/2023 90    • MCH 03/10/2023 27 1    • MCHC 03/10/2023 30 0 (L)    • RDW 03/10/2023 14 6    • Platelets 94/45/6878 294    • MPV 03/10/2023 10 0    • Sodium 03/10/2023 139    • Potassium 03/10/2023 4 3    • Chloride 03/10/2023 107    • CO2 03/10/2023 22    • ANION GAP 03/10/2023 10    • BUN 03/10/2023 12    • Creatinine 03/10/2023 0 89    • Glucose 03/10/2023 105    • Glucose, Fasting 03/10/2023 105 (H)    • Calcium 03/10/2023 7 0 (L)    • eGFR 03/10/2023 85        Suicide/Homicide Risk Assessment:    The following interventions are recommended: no intervention changes needed      Lethality Statement:    Based on today's assessment and clinical criteria, [employer] for safety and is not an imminent risk of harm to self or others  Outpatient level of care is deemed appropriate at this current time  Janes understands that if they can no longer contract for safety, they need to call the office or report to their nearest Emergency Room for immediate evaluation  Assessment/Plan:     Psychopharmacologically, Janes continues to tolerate current medications with no adverse effects   She reports some grogginess in AM with increased trazodone dose and would like to decrease dose to 75 mg for sleep  She is concerned with worsening skin-picking behavior and is agreeable to initiate naltrexone for off-label use for excoriation disorder  She reports overall stability in terms of mood symptoms and is agreeable to continue all other medications as scheduled  Risks/benefits/alternativies to treatment discussed, including a myriad of potential adverse medication side effects, to which Janes voiced understanding and consented fully to treatment  Also, patient is amenable to calling/contacting the outpatient office including this writer if any acute adverse effects of their medication regimen arise in addition to any comments or concerns pertaining to their psychiatric management  Diagnoses and all orders for this visit:    Schizoaffective disorder, bipolar type ([facility] )    Excoriation (skin-picking) disorder  -     naltrexone (REVIA) 50 mg tablet; Take 0 5 tablets (25 mg total) by mouth daily    Insomnia due to other mental disorder  -     traZODone (DESYREL) 150 mg tablet; Take 0 5 tablets (75 mg total) by mouth daily at bedtime    GOOD (generalized anxiety disorder)    Post traumatic stress disorder (PTSD)       Diagnosis/Treatment Recommendations  - Psychoeducation provided regarding the importance of exercise and health dietary choices and their impact on mood, energy, and motivation   - Counseled to avoid ETOH, illicit substances, and nicotine secondary to the detrimental effects of these substances on mental and physical health  - Encouraged to engage in non-verbal forms of therapy such as art therapy, music therapy, and mindfulness  Aware of 24 hour and weekend coverage for urgent situations accessed by calling [facility] main practice number    Plan:  1  Continue Klonopin 0 5 mg bid for anxiety   2  Continue Prozac 40 mg daily for depression and anxiety  3  Continue Zyprexa 25 mg at bedtime for mood and psychotic symptoms  4   Continue Minipress 2 mg at bedtime for PTSD symptoms  5  Decrease trazodone to 75 mg at bedtime  6  Start naltrexone 25 mg daily for excoriation disorder   7  Continue individual psychotherapy sessions  8  Follow up with primary care provider for ongoing medical care  9  Follow up with this provider in 2 months     Medications Risks/Benefits      Risks, Benefits And Possible Side Effects Of Medications:    Risks, benefits, and possible side effects of medications explained to Janes and she verbalizes understanding and agreement for treatment  Controlled Medication Discussion:     Kennedy Hancock has been filling controlled prescriptions on time as prescribed according to [place] Prescription Drug Monitoring Program    Psychotherapy Provided:     Individual psychotherapy provided: Yes  Counseling was provided during the session today for 16 minutes  Medication education provided to Kennedy Hancock  Goals discussed during session  Importance of medication and treatment compliance reviewed with Janes  Cognitive therapy was utilized during the session  Reassurance and supportive therapy provided  Crisis/safety plan discussed with Morse Halsted     Treatment Plan:    Completed and signed during the session: Yes - Treatment Plan done but not signed at time of office visit due to:  Plan reviewed by video and verbal consent given due to Enio social mitchancing    Note Share Disclaimer:      This note was not shared with the patient due to reasonable likelihood of causing patient harm    Visit Time    Visit Start Time: 2:40 PM  Visit Stop Time: 3:02 PM  Total Visit Duration: 22 minutes    GENESIS Wright 05/25/23

## 2023-05-23 ENCOUNTER — TELEMEDICINE (OUTPATIENT)
Dept: BEHAVIORAL/MENTAL HEALTH CLINIC | Facility: CLINIC | Age: 33
End: 2023-05-23

## 2023-05-23 DIAGNOSIS — F12.90 MARIJUANA USE: ICD-10-CM

## 2023-05-23 DIAGNOSIS — F25.0 SCHIZOAFFECTIVE DISORDER, BIPOLAR TYPE (HCC): Primary | ICD-10-CM

## 2023-05-23 DIAGNOSIS — F43.10 POST TRAUMATIC STRESS DISORDER (PTSD): ICD-10-CM

## 2023-05-23 DIAGNOSIS — F41.1 GAD (GENERALIZED ANXIETY DISORDER): ICD-10-CM

## 2023-05-23 NOTE — BH CRISIS PLAN
Client Name: Edmar Salcido       Client YOB: 1990  : 1990    Treatment Team (include name and contact information):     Psychotherapist: Candace Sharp LCSW    Psychiatrist: Ilda Marrufo   Release of information completed: [address] Provider  Greg Wilkinson MD  [address] [place] [address]    Type of Plan   * Child plans (children 15 yo and younger) must be completed and signed by the child's legal guardian   * Plans for all individuals 15 yo and above must be signed by the client       Plan Type: adolescent/adult (14 and over) Initial      My Personal Strengths are (in the client's own words):  I'm jovial, I hold full time job    The stressors and triggers that may put me at risk are:  people (describe - names, etc) mom    Coping skills I can use to keep myself calm and safe:  Call a friend or family member and Other (describe) video games    Coping skills/supports I can use to maintain abstinence from substance use:   Talking to Jack and [employer]    The people that provide me with help and support: (Include name, contact, and how they can help)   Support person #1: Jack    * Phone number: has #    * How can they help me? talking   Support person #2:brother    * Phone number: has #    * How can they help me? talking     Support person #3: parents    * Phone number: has #    * How can they help me? talking    In the past, the following has helped me in times of crisis:    Calling a friend and Other: video games      If it is an emergency and you need immediate help, call     If there is a possibility of danger to yourself or others, call the following crisis hotline resources:     Adult Crisis Numbers  Suicide Prevention Hotline - Dial   [facility]: [address]: [address]: [address]: [address] [place] [address] Avenue: [phone]  [facility]: [address]: [address] [place] [address] St: [phone] (daytime)  [phone] (after hours, weekends, holidays)     Child/Adolescent Crisis Numbers   [facility]: [address]: [phone]   [facility]: [phone]   [facility]: [phone]    Please note: Some [facility] do not have a separate number for Child/Adolescent specific crisis  If your county is not listed under Child/Adolescent, please call the adult number for your county     National Talk to Text Line   All Ages - [phone]    In the event your feelings become unmanageable, and you cannot reach your support system, you will call 911 immediately or go to the nearest hospital emergency room

## 2023-05-23 NOTE — BH TREATMENT PLAN
[address] [facility] [address]  1990     Date of Initial Psychotherapy Assessment: 10/14/20  Date of Current Treatment Plan: 05/23/23  Treatment Plan Target Date: 11/23  Treatment Plan Expiration Date: 11/23    Diagnosis:   1  Schizoaffective disorder, bipolar type ([facility] [address] )        2  Post traumatic stress disorder (PTSD)        3  GOOD (generalized anxiety disorder)            Area(s) of Need:  My stresses me out  Long Term Goal 1 (in the client's own words): I want to deal with the stress of mom  Stage of Change: Preparation    Target Date for completion: 11/23     Anticipated therapeutic modalities: CBT     People identified to complete this goal: Georgina      Objective 1:   I will take my meds  I will remember mom is leaving soon  I will talk to my parents about the house itself  I will talk to my parents about how it makes me feel with them going back and forth all the time  I will remember my cats are coming and look forward to that  Long Term Goal 2 (in the client's own words):     Stage of Change: Preparation    Target Date for completion:      Anticipated therapeutic modalities:      People identified to complete this goal:       Objective 1: (identify the means of measuring success in meeting the objective):       Objective 2: (identify the means of measuring success in meeting the objective): Long Term Goal 3 (in the client's own words):     Stage of Change: Preparation    Target Date for completion:      Anticipated therapeutic modalities:      People identified to complete this goal:       Objective 1: (identify the means of measuring success in meeting the objective):       Objective 2: (identify the means of measuring success in meeting the objective):       I am currently under the care of a [facility] psychiatric provider: yes    My [facility] psychiatric provider is: Horace Cid am currently taking psychiatric medications: Yes, as prescribed    I feel that I will be ready for discharge from mental health care when I reach the following (measurable goal/objective): when I reach my goals and nothing left to address on a tx plan  For children and adults who have a legal guardian:   Has there been any change to custody orders and/or guardianship status? No  If yes, attach updated documentation  I have created my Crisis Plan and have been offered a copy of this plan    [address]: Diagnosis and Treatment Plan explained to [facility] acknowledges an understanding of their diagnosis  Jackeline Samaniego agrees to this treatment plan      I have been offered a copy of this Treatment Plan  yes

## 2023-05-23 NOTE — PSYCH
"Behavioral Health Psychotherapy Progress Note    Psychotherapy Provided: Individual Psychotherapy     1  Schizoaffective disorder, bipolar type ([facility] [address] )        2  Post traumatic stress disorder (PTSD)        3  GOOD (generalized anxiety disorder)            Goals addressed in session: Goal 1, I am in \"rrr\" mode because I am stressed out  DATA: MSW met with Janes  for session  She admitted she got her medical marijuana card in March and has been smoking marijuana because she was having trouble sleeping  The Trazodone makes her \"too tired in the morning  \"   Also, her mom is stressing her out  MSW confronted her why didn't she call the Dr Tea Almaraz was waiting to talk to her at her appt in two days,\" which MSW addressed  Discussed her smoking, her meds and her mental illness  Developed tx plan and crisis plan which she signed while in session  She did not want to address the smoking on her tx plan  MSW asked her if she wanted to tell the Dr about her smoking or does she want MSW to  She wants to  During this session, this clinician used the following therapeutic modalities: Client-centered Therapy, Cognitive Behavioral Therapy, Solution-Focused Therapy and Supportive Psychotherapy    Substance Abuse was addressed during this session  If the client is diagnosed with a co-occurring substance use disorder, please indicate any changes in the frequency or amount of use: daily Stage of change for addressing substance use diagnoses: Contemplation    ASSESSMENT:  Jannette Hernandez presents with a Euthymic/ normal mood  her affect is Normal range and intensity, which is congruent, with her mood and the content of the session  The client has made progress on their goals  Jannette Hernandez presents with a none risk of suicide, none risk of self-harm, and none risk of harm to others  For any risk assessment that surpasses a \"low\" rating, a safety plan must be developed      A safety plan was indicated: " no  If yes, describe in detail     PLAN: Between sessions, Raghavendra Oh will address tx plan goals    At the next session, the therapist will use Client-centered Therapy, Cognitive Behavioral Therapy, Solution-Focused Therapy and Supportive Psychotherapy to address tx plan goals and issues that arise in between sessions       Behavioral Health Treatment Plan and Discharge Planning: Raghavendra Oh is aware of and agrees to continue to work on their treatment plan  They have identified and are working toward their discharge goals  yes    Visit start and stop times:    05/23/23  Start Time: 0785  Stop Time: 1810  Total Visit Time: 55 minutes    Virtual Regular Visit    Verification of patient location:    Patient is located at Home in the following state in which I hold an active license [place]      Assessment/Plan:    Problem List Items Addressed This Visit    None      Goals addressed in session: Goal 1          Reason for visit is   No chief complaint on file  Encounter provider Yulisa Perry    Provider located at [address] [place] [id number] [phone]      Recent Visits  No visits were found meeting these conditions  Showing recent visits within past 7 days and meeting all other requirements  Future Appointments  No visits were found meeting these conditions  Showing future appointments within next 150 days and meeting all other requirements       The patient was identified by name and date of birth  Raghavendra Oh was informed that this is a telemedicine visit and that the visit is being conducted throughthe [employer] platform  She agrees to proceed     My office door was closed  No one else was in the room  She acknowledged consent and understanding of privacy and security of the video platform   The patient has agreed to participate and understands they can discontinue the visit at any time     Patient is aware this is a billable service  Subjective  Crystal Nation is a 34 y o  female    HPI     Past Medical History:   Diagnosis Date   • Bipolar 1 disorder ([facility] [address] )    • Cancer ([facility] )     thyroid   • Depression    • Schizoaffective disorder, bipolar type ([facility] )    • Suicidal ideations    • Thyroid disease        Past Surgical History:   Procedure Laterality Date   • IR LUMBAR PUNCTURE  3/9/2023   • NECK SURGERY     • US GUIDED THYROID BIOPSY      normal  Onset: 2012       Current Outpatient Medications   Medication Sig Dispense Refill   • acetaZOLAMIDE (DIAMOX) 250 mg tablet Take 2 tablets (500 mg total) by mouth every 12 (twelve) hours 120 tablet 0   • clonazePAM (KlonoPIN) 0 5 mg tablet Take 1 tablet (0 5 mg total) by mouth 2 (two) times a day 60 tablet 2   • ergocalciferol (VITAMIN D2) 50,000 units Take 50,000 Units by mouth once a week     • FLUoxetine (PROzac) 40 MG capsule Take 1 capsule (40 mg total) by mouth daily 90 capsule 1   • gabapentin (NEURONTIN) 300 mg capsule Take 300 mg by mouth 3 (three) times a day     • levothyroxine 300 MCG tablet Take 300 mcg by mouth daily     • Tess 0 25-35 MG-MCG per tablet Take 1 tablet by mouth daily     • OLANZapine (ZyPREXA) 20 MG tablet Take 1 tablet (20 mg total) by mouth daily at bedtime 90 tablet 1   • OLANZapine (ZyPREXA) 5 mg tablet Take 1 tablet (5 mg total) by mouth daily at bedtime 90 tablet 1   • prazosin (MINIPRESS) 2 mg capsule Take 1 capsule (2 mg total) by mouth daily at bedtime 90 capsule 1   • traZODone (DESYREL) 100 mg tablet Take 1 tablet (100 mg total) by mouth daily at bedtime 30 tablet 2     No current facility-administered medications for this visit  No Known Allergies    Review of Systems    Video Exam    There were no vitals filed for this visit      Physical Exam

## 2023-05-25 ENCOUNTER — TELEMEDICINE (OUTPATIENT)
Dept: PSYCHIATRY | Facility: CLINIC | Age: 33
End: 2023-05-25

## 2023-05-25 DIAGNOSIS — G47.09 OTHER INSOMNIA: ICD-10-CM

## 2023-05-25 DIAGNOSIS — F41.1 GAD (GENERALIZED ANXIETY DISORDER): ICD-10-CM

## 2023-05-25 DIAGNOSIS — F43.10 POST TRAUMATIC STRESS DISORDER (PTSD): ICD-10-CM

## 2023-05-25 DIAGNOSIS — F25.0 SCHIZOAFFECTIVE DISORDER, BIPOLAR TYPE (HCC): Primary | ICD-10-CM

## 2023-05-25 DIAGNOSIS — F42.4 EXCORIATION (SKIN-PICKING) DISORDER: ICD-10-CM

## 2023-05-25 DIAGNOSIS — F99 INSOMNIA DUE TO OTHER MENTAL DISORDER: ICD-10-CM

## 2023-05-25 DIAGNOSIS — F51.05 INSOMNIA DUE TO OTHER MENTAL DISORDER: ICD-10-CM

## 2023-05-25 RX ORDER — TRAZODONE HYDROCHLORIDE 100 MG/1
100 TABLET ORAL
Qty: 90 TABLET | Refills: 0 | Status: SHIPPED | OUTPATIENT
Start: 2023-05-25 | End: 2023-05-25 | Stop reason: DRUGHIGH

## 2023-05-25 RX ORDER — NALTREXONE HYDROCHLORIDE 50 MG/1
25 TABLET, FILM COATED ORAL DAILY
Qty: 30 TABLET | Refills: 1 | Status: SHIPPED | OUTPATIENT
Start: 2023-05-25

## 2023-05-25 RX ORDER — TRAZODONE HYDROCHLORIDE 150 MG/1
75 TABLET ORAL
Qty: 30 TABLET | Refills: 0 | Status: SHIPPED | OUTPATIENT
Start: 2023-05-25

## 2023-06-08 ENCOUNTER — OFFICE VISIT (OUTPATIENT)
Dept: NEUROLOGY | Facility: CLINIC | Age: 33
End: 2023-06-08
Payer: COMMERCIAL

## 2023-06-08 VITALS
TEMPERATURE: 98 F | SYSTOLIC BLOOD PRESSURE: 110 MMHG | HEART RATE: 81 BPM | BODY MASS INDEX: 44.67 KG/M2 | WEIGHT: 293 LBS | RESPIRATION RATE: 16 BRPM | DIASTOLIC BLOOD PRESSURE: 64 MMHG | OXYGEN SATURATION: 95 %

## 2023-06-08 DIAGNOSIS — H47.10 PAPILLEDEMA: ICD-10-CM

## 2023-06-08 DIAGNOSIS — Z86.69 HISTORY OF MIGRAINE: Primary | ICD-10-CM

## 2023-06-08 PROCEDURE — 99215 OFFICE O/P EST HI 40 MIN: CPT

## 2023-06-08 RX ORDER — PHENTERMINE HYDROCHLORIDE 15 MG/1
1 CAPSULE ORAL DAILY
COMMUNITY
Start: 2023-06-06

## 2023-06-08 RX ORDER — ACETAZOLAMIDE 250 MG/1
500 TABLET ORAL 2 TIMES DAILY
Qty: 120 TABLET | Refills: 3 | Status: SHIPPED | OUTPATIENT
Start: 2023-06-08

## 2023-06-08 NOTE — PROGRESS NOTES
[place] [address] [facility]  PATIENT:  Rm Pereira  MRN:  [id number]  :  1990  DATE OF SERVICE:  2023      Assessment/Plan:     Migraines/Pseudotumor cerebri:    I had the pleasure of seeing Janes in the office today at [place] [address] [facility] in [place]  She is presenting today as a hospital follow-up appointment in regards to her migraine headaches and also the papilledema that she experienced  Patient had initially presented from the hospital due to the fact her optometrist had seen papilledema on eye examination  Patient did have an MRI brain with orbits with and without contrast at the hospital, no abnormalities with the patient's orbits at the time  Patient was evaluated by ophthalmology in the hospital which did not was not able to know any papilledema at this time  Patient also had a lumbar puncture while in the hospital to diagnose idiopathic intracranial hypertension  Did have an opening pressure on her lumbar puncture of 31 cm H2O  The rest of the patient's CSF labs had come back at this time, all were within normal range besides a slightly elevated glucose reading  Since the hospitalization, the patient was started on Diamox 250 mg, take 2 tablets by mouth once every 12 hours  Patient had noticed some improvement with the visual changes she was receiving  Then recently, the patient did run out of the Diamox medication and had noted that her vision had become significantly worse  She had noted the change since she stopped taking the Diamox  She states that there are times that the vision would seem to completely fade out and become blurry, not completely losing her entire vision at this time however  She states that this occurs multiple times per day and can last anywhere up to an hour long     -For the time being, it seems that these visual changes are directly correlated to the patient stopping her Diamox medication    Believe that it is best that we "restart the Diamox at the 1000 mg/day dosage  Would like for the patient to take Diamox 250 mg, take 2 tablets by mouth once twice daily  This is the same dose that she was on previously and seem to help her significantly  We will reevaluate in a few months and determine if the patient needs to continue with this medication moving forward   -Would like for the patient to continue to follow up on the referral for [facility] in [place]  She is to see a neuro-ophthalmologist in regards to the papilledema she was experiencing previously  She does need a dilated eye exam to evaluate for any swelling or increased pressure or any damage to the optic nerve  -Did encourage the patient to continue to work on weight loss as well  The more weight she loses will also help with decreasing the pressure as well if this is related to the pressure by any means   -Would encourage the patient to continue to take her current gabapentin dosage as this has seemed to help with her regular migraine headaches that she has at this time  Follow-up in about 3 to 4 months with [facility]  If the patient has any questions or concerns regarding the medication or what we discussed today, then she can either call the office or she can message me directly through [employer]  History of Present Illness: For Review/Hospital Course:    \"33 y o  female with schizoaffective disorder, obesity and history of thyroid cancer who presented to the ED after being referred from optometrist for work-up of possible papilledema in the setting of patient's reports of 2-3 month history of visual disturbance especially with altered color perception in her peripheral vision and R eye blurred and double vision  Imaging negative as detailed below      Imaging/Work-up:  • UDS positive for THC  • TSH WNL  • Respiratory panel negative  • CTH negative for acute abnormality  • MRI brain and orbits w wo which was unremarkable   Reviewed with " "radiology, no evidence of venous sinus thrombosis as well  • Optometrist at [facility], Dr Heber Barragan [phone], patient note/prescription reviewed:  ? \"Consult Neurology stat secondary to papilledema OU  Recommend MRI of brain and orbits with and without contrast to rule out space-occupying lesion  If MRI negative, recommend lumbar puncture with opening pressure  \"     Patient's neurologic exam is nonfocal  No reports of current headache or neck pain  Conjugate gaze with PERRL  No APD  R eye 20/40, L eye 20/25  EOMs and VFs intact  Bilateral fatigueable end gaze horizontal nystagmus  MRI brain and orbits w wo is negative  If patient indeed has papilledema as reported, then concern for idiopathic intracranial hypertension/pseudotumor cerebri  Additionally, especially if CSF pressure is not elevated, patient may also be experiencing atypical migraine presentation with associated symptoms but without the actual headache  Will await ophthalmology evaluation and LP for further evaluation  \" -per Ricki Agudelo PA-C, 03/09/2023    While in the hospital, the patient did have an LP performed which showed an opening pressure of 31 and a closing pressure of 15  Benign CSF studies thus far, however if you were still pending  Patient had a formal ophthalmology evaluation in the hospital which showed no definitive evidence of papilledema  Due to the fact it was unclear if there was true idiopathic intracranial hypertension patient was to be treated with Diamox for 500 mg twice daily at least until the patient's outpatient neurology appointment  Further CSF testing was to be discussed in the outpatient setting as well  Current medical illnesses: Depression, bipolar 1 disorder, PTSD, schizoaffective disorder, insomnia, history of migraine, history of marijuana use, papilledema      What medications do you take or have you taken for your headaches?      Current Preventive:   Gabapentin, " "fluoxetine    Current Abortive: Interval updates as of 6/8/2023:    Headaches had started to go away after she started taking gabapentin  Did have migraines prior but are much better now with gabapentin therapy  More concern is vision for her at this time  Patient's vision seems to become dim and not completely black  Does see colors in her vision with both her eyes open and close  [place] Her left eye peripheral vision had seemed to become worse  Visual deficits happen a few times per day  Sometimes, lasting about a whole hour  However, patient did note that when she was taking the Diamox as prescribed to her from the hospital she did not have any of these visual deficits or complaints  Since stopping the Diamox, this is when the vision has seemed to become much worse  Has a headache and earache that comes on after the visual deficits  The headache she has does feel like a migraine headache she states  She notes the vision \"feels like a migraine without the headache\"  Right eye sometimes have double vision and the rest of vision is just blurry she states  [facility] is where she goes  Has referral at [facility] for neuro-ophthalmology  Still waiting for an appointment at this time  How often do the headaches occur? Been awhile since migraine headaches    Are you ever headache free? yes    What time of the day do the headaches start? Mornings    How long do the headaches last?   Usually last the whole entire day she states    Describe your usual headache ? Squeezing or vice like    Where is your headache located? Usually on the right side forehead all the way to the back of the head    What is the intensity of pain?    8/10    Associated symptoms:   - Nausea  - Photophobia  - Problem with concentration  - Blurred vision (still having blurry vision now)  - prefer to be alone and in a dark and cold room, unable to work    Headache history with updates:  Headache are worse if the patient: " positional change    Any positional change headaches? Yes, headaches started due to positional change    Headache triggers: light, movement    Aura/warning and how long does it last ? No     What time of the year do headaches occur more frequently? do not seem to be related to any time of the year    Have you seen someone else for headaches or pain? No    Are you current pregnant or planning on getting pregnant? No, oral contraception     Have you ever had any Brain imaging? yes MRI brain and orbits wo and w contrast    I personally reviewed these images  Reviewed old notes from physician seen in the past- see above HPI for summary of previous encounters         Past Medical History:   Diagnosis Date   • Bipolar 1 disorder ([facility] )    • Cancer ([facility] )     thyroid   • Depression    • Schizoaffective disorder, bipolar type ([place] [address] )    • Suicidal ideations    • Thyroid disease        Patient Active Problem List   Diagnosis   • Post traumatic stress disorder (PTSD)   • Schizoaffective disorder, bipolar type ([place] [address] )   • GOOD (generalized anxiety disorder)   • Insomnia due to other mental disorder   • Skin picking habit   • Papilledema   • History of migraine   • Marijuana use       Medications:      Current Outpatient Medications   Medication Sig Dispense Refill   • clonazePAM (KlonoPIN) 0 5 mg tablet Take 1 tablet (0 5 mg total) by mouth 2 (two) times a day 60 tablet 2   • ergocalciferol (VITAMIN D2) 50,000 units Take 50,000 Units by mouth once a week     • FLUoxetine (PROzac) 40 MG capsule Take 1 capsule (40 mg total) by mouth daily 90 capsule 1   • gabapentin (NEURONTIN) 300 mg capsule Take 300 mg by mouth 3 (three) times a day     • levothyroxine 300 MCG tablet Take 300 mcg by mouth daily     • Tess 0 25-35 MG-MCG per tablet Take 1 tablet by mouth daily     • naltrexone (REVIA) 50 mg tablet Take 0 5 tablets (25 mg total) by mouth daily 30 tablet 1   • OLANZapine (ZyPREXA) 20 MG tablet Take 1 tablet (20 mg total) by mouth daily at bedtime 90 tablet 1   • OLANZapine (ZyPREXA) 5 mg tablet Take 1 tablet (5 mg total) by mouth daily at bedtime 90 tablet 1   • phentermine 15 MG capsule Take 1 capsule by mouth Daily     • prazosin (MINIPRESS) 2 mg capsule Take 1 capsule (2 mg total) by mouth daily at bedtime 90 capsule 1   • traZODone (DESYREL) 150 mg tablet Take 0 5 tablets (75 mg total) by mouth daily at bedtime 30 tablet 0   • acetaZOLAMIDE (DIAMOX) 250 mg tablet Take 2 tablets (500 mg total) by mouth every 12 (twelve) hours 120 tablet 0     No current facility-administered medications for this visit  Allergies:    No Known Allergies    Family History:     History reviewed  No pertinent family history      Social History:     Social History     Socioeconomic History   • Marital status: Single     Spouse name: Not on file   • Number of children: Not on file   • Years of education: Not on file   • Highest education level: Not on file   Occupational History   • Not on file   Tobacco Use   • Smoking status: Former     Packs/day: 1 00     Types: Cigarettes   • Smokeless tobacco: Never   Vaping Use   • Vaping Use: Some days   • Substances: Flavoring   Substance and Sexual Activity   • Alcohol use: Yes     Comment: social drinker   • Drug use: No   • Sexual activity: Not Currently   Other Topics Concern   • Not on file   Social History Narrative   • Not on file     Social Determinants of Health     Financial Resource Strain: Not on file   Food Insecurity: Not on file   Transportation Needs: Not on file   Physical Activity: Not on file   Stress: Not on file   Social Connections: Not on file   Intimate Partner Violence: Not on file   Housing Stability: Not on file         Objective:     Physical Exam:                                                                 Vitals:            Constitutional:    /64 (BP Location: Right arm, Patient Position: Sitting, Cuff Size: Large)   Pulse 81   Temp 98 °F (36 7 °C) (Temporal)   Resp 16 Wt 133 kg (293 lb 12 8 oz)   SpO2 95%   BMI 44 67 kg/m²   BP Readings from Last 3 Encounters:   06/08/23 110/64   03/10/23 113/61   06/05/21 143/69     Pulse Readings from Last 3 Encounters:   06/08/23 81   03/10/23 82   06/05/21 87         Well developed, well nourished, well groomed  No dysmorphic features  Psychiatric:  Normal behavior and appropriate affect        Neurological Examination:     Mental status/cognitive function:   Orientated to time, place and person  Recent and remote memory intact  Attention span and concentration as well as fund of knowledge are appropriate for age  Normal language and spontaneous speech  Cranial Nerves:  II-visual fields full  Fundi poorly visualized due to pupillary constriction  III, IV, VI-Pupils were equal, round, and reactive to light and accomodation  Extraocular movements were full and conjugate without nystagmus  Conjugate gaze, normal smooth pursuits, normal saccades   V-facial sensation symmetric  VII-facial expression symmetric, intact forehead wrinkle, strong eye closure, symmetric smile    VIII-hearing grossly intact bilaterally   IX, X-palate elevation symmetric, no dysarthria  XI-shoulder shrug strength intact    XII-tongue protrusion midline  Motor Exam: symmetric bulk and tone throughout, no pronator drift  Power/strength 5/5 bilateral upper and lower extremities, no atrophy, fasciculations or abnormal movements noted  Sensory: grossly intact light touch in all extremities  Reflexes: brachioradialis 2+, biceps 2+, knee 2+ bilaterally  Coordination: Finger nose finger intact bilaterally, no apparent dysmetria, ataxia or tremor noted  Gait: steady casual and tandem gait  Review of Systems:     Review of Systems   Constitutional: Negative  Negative for appetite change and fever  HENT: Negative  Negative for hearing loss, tinnitus, trouble swallowing and voice change      Eyes: Positive for photophobia and visual disturbance (blurriness, auras)  Negative for pain  Respiratory: Negative  Negative for shortness of breath  Cardiovascular: Negative  Negative for palpitations  Gastrointestinal: Negative  Negative for nausea and vomiting  Endocrine: Negative  Negative for cold intolerance  Genitourinary: Negative  Negative for dysuria, frequency and urgency  Musculoskeletal: Negative  Negative for gait problem, myalgias and neck pain  Skin: Negative  Negative for rash  Allergic/Immunologic: Negative  Neurological: Positive for light-headedness  Negative for dizziness, tremors, seizures, syncope, facial asymmetry, speech difficulty, weakness, numbness and headaches  Hematological: Negative  Does not bruise/bleed easily  Psychiatric/Behavioral: Negative  Negative for confusion, hallucinations and sleep disturbance  I personally reviewed the ROS entered by the MA    I spent 45 minutes in total time for this visit      Author:  John Erwin PA-C 6/8/2023 1:56 PM

## 2023-06-08 NOTE — PROGRESS NOTES
Review of Systems   Constitutional: Negative  Negative for appetite change and fever  HENT: Negative  Negative for hearing loss, tinnitus, trouble swallowing and voice change  Eyes: Positive for photophobia and visual disturbance (blurriness, auras)  Negative for pain  Respiratory: Negative  Negative for shortness of breath  Cardiovascular: Negative  Negative for palpitations  Gastrointestinal: Negative  Negative for nausea and vomiting  Endocrine: Negative  Negative for cold intolerance  Genitourinary: Negative  Negative for dysuria, frequency and urgency  Musculoskeletal: Negative  Negative for gait problem, myalgias and neck pain  Skin: Negative  Negative for rash  Allergic/Immunologic: Negative  Neurological: Positive for light-headedness  Negative for dizziness, tremors, seizures, syncope, facial asymmetry, speech difficulty, weakness, numbness and headaches  Hematological: Negative  Does not bruise/bleed easily  Psychiatric/Behavioral: Negative  Negative for confusion, hallucinations and sleep disturbance

## 2023-06-08 NOTE — PATIENT INSTRUCTIONS
Migraines/Pseudotumor cerebri:    I had the pleasure of seeing Janes in the office today at [place] [address] [facility] in [facility]  She is presenting today as a hospital follow-up appointment in regards to her migraine headaches and also the papilledema that she experienced  Patient had initially presented from the hospital due to the fact her optometrist had seen papilledema on eye examination  Patient did have an MRI brain with orbits with and without contrast at the hospital, no abnormalities with the patient's orbits at the time  Patient was evaluated by ophthalmology in the hospital which did not was not able to know any papilledema at this time  Patient also had a lumbar puncture while in the hospital to diagnose idiopathic intracranial hypertension  Did have an opening pressure on her lumbar puncture of 31 cm H2O  The rest of the patient's CSF labs had come back at this time, all were within normal range besides a slightly elevated glucose reading  Since the hospitalization, the patient was started on Diamox 250 mg, take 2 tablets by mouth once every 12 hours  Patient had noticed some improvement with the visual changes she was receiving  Then recently, the patient did run out of the Diamox medication and had noted that her vision had become significantly worse  She had noted the change since she stopped taking the Diamox  She states that there are times that the vision would seem to completely fade out and become blurry, not completely losing her entire vision at this time however  She states that this occurs multiple times per day and can last anywhere up to an hour long     -For the time being, it seems that these visual changes are directly correlated to the patient stopping her Diamox medication  Believe that it is best that we restart the Diamox at the 1000 mg/day dosage  Would like for the patient to take Diamox 250 mg, take 2 tablets by mouth once twice daily    This is the same dose that she was on previously and seem to help her significantly  We will reevaluate in a few months and determine if the patient needs to continue with this medication moving forward   -Would like for the patient to continue to follow up on the referral for [facility]  She is to see a neuro-ophthalmologist in regards to the papilledema she was experiencing previously  She does need a dilated eye exam to evaluate for any swelling or increased pressure or any damage to the optic nerve  -Did encourage the patient to continue to work on weight loss as well  The more weight she loses will also help with decreasing the pressure as well if this is related to the pressure by any means   -Would encourage the patient to continue to take her current gabapentin dosage as this has seemed to help with her regular migraine headaches that she has at this time  Follow-up in about 3 to 4 months with [facility]  If the patient has any questions or concerns regarding the medication or what we discussed today, then she can either call the office or she can message me directly through [employer]

## 2023-06-09 ENCOUNTER — TELEMEDICINE (OUTPATIENT)
Dept: BEHAVIORAL/MENTAL HEALTH CLINIC | Facility: CLINIC | Age: 33
End: 2023-06-09
Payer: COMMERCIAL

## 2023-06-09 DIAGNOSIS — F25.0 SCHIZOAFFECTIVE DISORDER, BIPOLAR TYPE (HCC): Primary | ICD-10-CM

## 2023-06-09 DIAGNOSIS — F43.10 POST TRAUMATIC STRESS DISORDER (PTSD): ICD-10-CM

## 2023-06-09 DIAGNOSIS — F41.1 GAD (GENERALIZED ANXIETY DISORDER): ICD-10-CM

## 2023-06-09 PROCEDURE — 90834 PSYTX W PT 45 MINUTES: CPT | Performed by: SOCIAL WORKER

## 2023-06-09 NOTE — PSYCH
"Behavioral Health Psychotherapy Progress Note    Psychotherapy Provided: Individual Psychotherapy     1  Schizoaffective disorder, bipolar type ([address] )        2  Post traumatic stress disorder (PTSD)        3  GOOD (generalized anxiety disorder)            Goals addressed in session: Goal 1, I am in \"rrr\" mode because I am stressed out  DATA: JUDY met with Janes  for session  She went to the neurologist and the opthalmologist   They are doing tests to figure out what is wrong and she is taking lasiks to try to get ride of the fluid behind her eye  She has not taken the increased dose by the opthalmologist due to the fear of the end result  Discussed starting it over the weekend and getting a note for work if she needs one regarding bathroom breaks  Her parents are leaving to go back to [place] in a couple weeks  She is torn about this  \"They drive her crazy but, she misses them when they are gone  \"  She hasn't smoked marijuana since last week when she ran out of it  Also discussed her smoking of nicotine in a vape pen  During this session, this clinician used the following therapeutic modalities: Client-centered Therapy, Cognitive Behavioral Therapy, Solution-Focused Therapy and Supportive Psychotherapy is diagnosed with a co-occurring substance use disorder, please indicate any changes in the frequency or amount of use: none  Stage of change for addressing substance use diagnoses: Action    ASSESSMENT:  Mir Hernadez presents with a Euthymic/ normal mood  her affect is Normal range and intensity, which is congruent, with her mood and the content of the session  The client has made progress on their goals  Mir Hernadez presents with a none risk of suicide, none risk of self-harm, and none risk of harm to others  For any risk assessment that surpasses a \"low\" rating, a safety plan must be developed      A safety plan was indicated: no  If yes, describe in detail     PLAN: Between " sessions, El Duval will address tx plan goals    At the next session, the therapist will use Client-centered Therapy, Cognitive Behavioral Therapy, Solution-Focused Therapy and Supportive Psychotherapy to address tx plan goals and issues that arise in between sessions       Behavioral Health Treatment Plan and Discharge Planning: El Duval is aware of and agrees to continue to work on their treatment plan  They have identified and are working toward their discharge goals  yes    Visit start and stop times:    06/09/23  Start Time: 7504  Stop Time: 1800  Total Visit Time: 46 minutes    Virtual Regular Visit    Verification of patient location:    Patient is located at Home in the following state in which I hold an active license [place]      Assessment/Plan:    Problem List Items Addressed This Visit    None      Goals addressed in session: Goal 1          Reason for visit is   No chief complaint on file  Encounter provider Marian Mooney    Provider located at [address] [place] [id number] [phone]      Recent Visits  No visits were found meeting these conditions  Showing recent visits within past 7 days and meeting all other requirements  Today's Visits  Date Type Provider Dept   06/09/23 [address] [place] [address]  today's visits and meeting all other requirements  Future Appointments  No visits were found meeting these conditions  Showing future appointments within next 150 days and meeting all other requirements       The patient was identified by name and date of birth  El Duval was informed that this is a telemedicine visit and that the visit is being conducted throughthe [employer] platform  She agrees to proceed     My office door was closed  No one else was in the room    She acknowledged consent and understanding of privacy and security of the video platform  The patient has agreed to participate and understands they can discontinue the visit at any time  Patient is aware this is a billable service  Subjective  Kiah Mendoza is a 34 y o  female          HPI     Past Medical History:   Diagnosis Date   • Bipolar 1 disorder ([facility] )    • Cancer ([facility] )     thyroid   • Depression    • Schizoaffective disorder, bipolar type ([facility] )    • Suicidal ideations    • Thyroid disease        Past Surgical History:   Procedure Laterality Date   • IR LUMBAR PUNCTURE  3/9/2023   • NECK SURGERY     • US GUIDED THYROID BIOPSY      normal  Onset: 2012       Current Outpatient Medications   Medication Sig Dispense Refill   • acetaZOLAMIDE (DIAMOX) 250 mg tablet Take 2 tablets (500 mg total) by mouth 2 (two) times a day 120 tablet 3   • clonazePAM (KlonoPIN) 0 5 mg tablet Take 1 tablet (0 5 mg total) by mouth 2 (two) times a day 60 tablet 2   • ergocalciferol (VITAMIN D2) 50,000 units Take 50,000 Units by mouth once a week     • FLUoxetine (PROzac) 40 MG capsule Take 1 capsule (40 mg total) by mouth daily 90 capsule 1   • gabapentin (NEURONTIN) 300 mg capsule Take 300 mg by mouth 3 (three) times a day     • levothyroxine 300 MCG tablet Take 300 mcg by mouth daily     • Tess 0 25-35 MG-MCG per tablet Take 1 tablet by mouth daily     • naltrexone (REVIA) 50 mg tablet Take 0 5 tablets (25 mg total) by mouth daily 30 tablet 1   • OLANZapine (ZyPREXA) 20 MG tablet Take 1 tablet (20 mg total) by mouth daily at bedtime 90 tablet 1   • OLANZapine (ZyPREXA) 5 mg tablet Take 1 tablet (5 mg total) by mouth daily at bedtime 90 tablet 1   • phentermine 15 MG capsule Take 1 capsule by mouth Daily     • prazosin (MINIPRESS) 2 mg capsule Take 1 capsule (2 mg total) by mouth daily at bedtime 90 capsule 1   • traZODone (DESYREL) 150 mg tablet Take 0 5 tablets (75 mg total) by mouth daily at bedtime 30 tablet 0     No current facility-administered medications for this visit  No Known Allergies    Review of Systems    Video Exam    There were no vitals filed for this visit      Physical Exam

## 2023-06-12 DIAGNOSIS — H47.10 PAPILLEDEMA: Primary | ICD-10-CM

## 2023-06-12 DIAGNOSIS — E87.20 ACIDEMIA: ICD-10-CM

## 2023-06-13 ENCOUNTER — TELEPHONE (OUTPATIENT)
Dept: NEUROLOGY | Facility: CLINIC | Age: 33
End: 2023-06-13

## 2023-06-13 NOTE — TELEPHONE ENCOUNTER
----- Message from Jackie Acosta PA-C sent at 6/12/2023  9:20 AM EDT -----  Hello,    Please notify patient I have ordered BMP to check her electrolyte status for when she starts back on Diamox at this time  She can have this lab drawn a few weeks after starting but want to make sure she does not have any electrolyte deficiencies moving forward with therapy   Thanks!    -César Marquez

## 2023-06-21 ENCOUNTER — TELEMEDICINE (OUTPATIENT)
Dept: BEHAVIORAL/MENTAL HEALTH CLINIC | Facility: CLINIC | Age: 33
End: 2023-06-21
Payer: COMMERCIAL

## 2023-06-21 DIAGNOSIS — F43.10 POST TRAUMATIC STRESS DISORDER (PTSD): ICD-10-CM

## 2023-06-21 DIAGNOSIS — F41.1 GAD (GENERALIZED ANXIETY DISORDER): ICD-10-CM

## 2023-06-21 DIAGNOSIS — F25.0 SCHIZOAFFECTIVE DISORDER, BIPOLAR TYPE (HCC): Primary | ICD-10-CM

## 2023-06-21 PROCEDURE — 90834 PSYTX W PT 45 MINUTES: CPT | Performed by: SOCIAL WORKER

## 2023-06-21 NOTE — PSYCH
"Behavioral Health Psychotherapy Progress Note    Psychotherapy Provided: Individual Psychotherapy     1  Schizoaffective disorder, bipolar type ([address] )        2  Post traumatic stress disorder (PTSD)        3  GOOD (generalized anxiety disorder)            Goals addressed in session: Goal 1, I am in \"rrr\" mode because I am stressed out  DATA: JUDY met with Janes  for session  She stopped smoking again  She started when her parents came months ago and stopped when they left a week ago  The way she jaun with her mom is \"by smoking  \"  She has been doing this since she was in her teen  She quit due to her tolerance is increasing and \"what the use is smoking then?  \"  Her parents are scheduled to come back in Sept and Oct   Discussed we talked about her parents being an issue an the need to address this problem  \"It's easier just to spoke then to deal with it  \"  JUDY listed off the things she is risking by continuing to smoke  During this session, this clinician used the following therapeutic modalities: Client-centered Therapy, Cognitive Behavioral Therapy, Solution-Focused Therapy and Supportive Psychotherapy is diagnosed with a co-occurring substance use disorder, please indicate any changes in the frequency or amount of use: none  Stage of change for addressing substance use diagnoses: Action    ASSESSMENT:  Palomo Stark presents with a Euthymic/ normal mood  her affect is Normal range and intensity, which is congruent, with her mood and the content of the session  The client has made progress on their goals  Palomo Stark presents with a none risk of suicide, none risk of self-harm, and none risk of harm to others  For any risk assessment that surpasses a \"low\" rating, a safety plan must be developed  A safety plan was indicated: no  If yes, describe in detail     PLAN: Between sessions, Palomo Stark will address tx plan goals    At the next session, the therapist will " use Client-centered Therapy, Cognitive Behavioral Therapy, Solution-Focused Therapy and Supportive Psychotherapy to address tx plan goals and issues that arise in between sessions       Behavioral Health Treatment Plan and Discharge Planning: Prince Smith is aware of and agrees to continue to work on their treatment plan  They have identified and are working toward their discharge goals  yes    Visit start and stop times:    06/21/23       Virtual Regular Visit    Verification of patient location:    Patient is located at Home in the following state in which I hold an active license [place]      Assessment/Plan:    Problem List Items Addressed This Visit    None      Goals addressed in session: Goal 1          Reason for visit is   No chief complaint on file  Encounter provider Anastasia Lee    Provider located at [address] [place] [id number] [phone]      Recent Visits  No visits were found meeting these conditions  Showing recent visits within past 7 days and meeting all other requirements  Future Appointments  No visits were found meeting these conditions  Showing future appointments within next 150 days and meeting all other requirements       The patient was identified by name and date of birth  Prince Smith was informed that this is a telemedicine visit and that the visit is being conducted throughthe [employer] platform  She agrees to proceed     My office door was closed  No one else was in the room  She acknowledged consent and understanding of privacy and security of the video platform  The patient has agreed to participate and understands they can discontinue the visit at any time  Patient is aware this is a billable service  Subjective  Pricne Smith is a 28 y o  female          HPI     Past Medical History:   Diagnosis Date   • Bipolar 1 disorder ([facility] )    • Cancer ([facility] ) thyroid   • Depression    • Schizoaffective disorder, bipolar type ([facility] )    • Suicidal ideations    • Thyroid disease        Past Surgical History:   Procedure Laterality Date   • IR LUMBAR PUNCTURE  3/9/2023   • NECK SURGERY     • US GUIDED THYROID BIOPSY      normal  Onset: 2012       Current Outpatient Medications   Medication Sig Dispense Refill   • acetaZOLAMIDE (DIAMOX) 250 mg tablet Take 2 tablets (500 mg total) by mouth 2 (two) times a day 120 tablet 3   • clonazePAM (KlonoPIN) 0 5 mg tablet Take 1 tablet (0 5 mg total) by mouth 2 (two) times a day 60 tablet 2   • ergocalciferol (VITAMIN D2) 50,000 units Take 50,000 Units by mouth once a week     • FLUoxetine (PROzac) 40 MG capsule Take 1 capsule (40 mg total) by mouth daily 90 capsule 1   • gabapentin (NEURONTIN) 300 mg capsule Take 300 mg by mouth 3 (three) times a day     • levothyroxine 300 MCG tablet Take 300 mcg by mouth daily     • Tess 0 25-35 MG-MCG per tablet Take 1 tablet by mouth daily     • naltrexone (REVIA) 50 mg tablet Take 0 5 tablets (25 mg total) by mouth daily 30 tablet 1   • OLANZapine (ZyPREXA) 20 MG tablet Take 1 tablet (20 mg total) by mouth daily at bedtime 90 tablet 1   • OLANZapine (ZyPREXA) 5 mg tablet Take 1 tablet (5 mg total) by mouth daily at bedtime 90 tablet 1   • phentermine 15 MG capsule Take 1 capsule by mouth Daily     • prazosin (MINIPRESS) 2 mg capsule Take 1 capsule (2 mg total) by mouth daily at bedtime 90 capsule 1   • traZODone (DESYREL) 150 mg tablet Take 0 5 tablets (75 mg total) by mouth daily at bedtime 30 tablet 0     No current facility-administered medications for this visit  No Known Allergies    Review of Systems    Video Exam    There were no vitals filed for this visit      Physical Exam

## 2023-07-06 DIAGNOSIS — F99 INSOMNIA DUE TO OTHER MENTAL DISORDER: ICD-10-CM

## 2023-07-06 DIAGNOSIS — Z86.69 HISTORY OF MIGRAINE: ICD-10-CM

## 2023-07-06 DIAGNOSIS — F51.05 INSOMNIA DUE TO OTHER MENTAL DISORDER: ICD-10-CM

## 2023-07-06 DIAGNOSIS — H47.10 PAPILLEDEMA: ICD-10-CM

## 2023-07-06 RX ORDER — ACETAZOLAMIDE 250 MG/1
TABLET ORAL
Qty: 360 TABLET | Refills: 2 | Status: SHIPPED | OUTPATIENT
Start: 2023-07-06

## 2023-07-06 RX ORDER — TRAZODONE HYDROCHLORIDE 150 MG/1
75 TABLET ORAL
Qty: 30 TABLET | Refills: 0 | Status: SHIPPED | OUTPATIENT
Start: 2023-07-06

## 2023-07-06 NOTE — TELEPHONE ENCOUNTER
Fax from [facility] requesting a script for a 90 day supply of Trazodone.

## 2023-07-12 ENCOUNTER — TELEMEDICINE (OUTPATIENT)
Dept: BEHAVIORAL/MENTAL HEALTH CLINIC | Facility: CLINIC | Age: 33
End: 2023-07-12
Payer: COMMERCIAL

## 2023-07-12 DIAGNOSIS — F41.1 GAD (GENERALIZED ANXIETY DISORDER): ICD-10-CM

## 2023-07-12 DIAGNOSIS — F43.10 POST TRAUMATIC STRESS DISORDER (PTSD): ICD-10-CM

## 2023-07-12 DIAGNOSIS — F25.0 SCHIZOAFFECTIVE DISORDER, BIPOLAR TYPE (HCC): Primary | ICD-10-CM

## 2023-07-12 PROCEDURE — 90834 PSYTX W PT 45 MINUTES: CPT | Performed by: SOCIAL WORKER

## 2023-07-12 NOTE — PSYCH
Behavioral Health Psychotherapy Progress Note    Psychotherapy Provided: Individual Psychotherapy     No diagnosis found. Goals addressed in session: Goal 1, I am in "rrr" mode because I am stressed out. DATA: JUDY met with Janes  for session. She has been "mildly depressed" lack of motivation, etc.  She also has been hearing voices, voices that are trying to get her attention. Denies SI. She hasn't been picking. She has come up with the coping skill of rubbing her hands together. Discussed coping skills for the depression and the voices. JUDY left a message for her Dr.  JUDY is scheduled to see her next Fri. No major or different stressors. During this session, this clinician used the following therapeutic modalities: Client-centered Therapy, Cognitive Behavioral Therapy, Solution-Focused Therapy and Supportive Psychotherapy is diagnosed with a co-occurring substance use disorder, please indicate any changes in the frequency or amount of use: none  Stage of change for addressing substance use diagnoses: Action    ASSESSMENT:  El Retana presents with a Euthymic/ normal mood. her affect is Normal range and intensity, which is congruent, with her mood and the content of the session. The client has made progress on their goals. El Retana presents with a none risk of suicide, none risk of self-harm, and none risk of harm to others. For any risk assessment that surpasses a "low" rating, a safety plan must be developed. A safety plan was indicated: no  If yes, describe in detail     PLAN: Between sessions, El Retana will address tx plan goals. . At the next session, the therapist will use Client-centered Therapy, Cognitive Behavioral Therapy, Solution-Focused Therapy and Supportive Psychotherapy to address tx plan goals and issues that arise in between sessions. .    Behavioral Health Treatment Plan and Discharge Planning: El Retana is aware of and agrees to continue to work on their treatment plan. They have identified and are working toward their discharge goals. yes    Visit start and stop times:    07/12/23  Start Time: 1713  Stop Time: 1800  Total Visit Time: 47 minutes    Virtual Regular Visit    Verification of patient location:    Patient is located at Home in the following state in which I hold an active license [place]      Assessment/Plan:    Problem List Items Addressed This Visit    None      Goals addressed in session: Goal 1          Reason for visit is   No chief complaint on file. Encounter provider Anita Ndiaye    Provider located at [address] [place] [id number] [phone]      Recent Visits  No visits were found meeting these conditions. Showing recent visits within past 7 days and meeting all other requirements  Future Appointments  No visits were found meeting these conditions. Showing future appointments within next 150 days and meeting all other requirements       The patient was identified by name and date of birth. Dl Timmons was informed that this is a telemedicine visit and that the visit is being conducted throughthe [employer] platform. She agrees to proceed. .  My office door was closed. No one else was in the room. She acknowledged consent and understanding of privacy and security of the video platform. The patient has agreed to participate and understands they can discontinue the visit at any time. Patient is aware this is a billable service. Subjective  Dl Timmons is a 28 y.o. female  .       HPI     Past Medical History:   Diagnosis Date   • Bipolar 1 disorder ([address])    • Cancer ([address])     thyroid   • Depression    • Schizoaffective disorder, bipolar type ([address])    • Suicidal ideations    • Thyroid disease        Past Surgical History:   Procedure Laterality Date   • IR LUMBAR PUNCTURE  3/9/2023   • NECK SURGERY     • US GUIDED THYROID BIOPSY      normal. Onset: 2012       Current Outpatient Medications   Medication Sig Dispense Refill   • traZODone (DESYREL) 150 mg tablet Take 0.5 tablets (75 mg total) by mouth daily at bedtime 30 tablet 0   • acetaZOLAMIDE (DIAMOX) 250 mg tablet TAKE 2 TABLETS BY MOUTH 2 TIMES A DAY. 360 tablet 2   • clonazePAM (KlonoPIN) 0.5 mg tablet Take 1 tablet (0.5 mg total) by mouth 2 (two) times a day 60 tablet 2   • ergocalciferol (VITAMIN D2) 50,000 units Take 50,000 Units by mouth once a week     • FLUoxetine (PROzac) 40 MG capsule Take 1 capsule (40 mg total) by mouth daily 90 capsule 1   • gabapentin (NEURONTIN) 300 mg capsule Take 300 mg by mouth 3 (three) times a day     • levothyroxine 300 MCG tablet Take 300 mcg by mouth daily     • Tess 0.25-35 MG-MCG per tablet Take 1 tablet by mouth daily     • naltrexone (REVIA) 50 mg tablet Take 0.5 tablets (25 mg total) by mouth daily 30 tablet 1   • OLANZapine (ZyPREXA) 20 MG tablet Take 1 tablet (20 mg total) by mouth daily at bedtime 90 tablet 1   • OLANZapine (ZyPREXA) 5 mg tablet Take 1 tablet (5 mg total) by mouth daily at bedtime 90 tablet 1   • phentermine 15 MG capsule Take 1 capsule by mouth Daily     • prazosin (MINIPRESS) 2 mg capsule Take 1 capsule (2 mg total) by mouth daily at bedtime 90 capsule 1     No current facility-administered medications for this visit. No Known Allergies    Review of Systems    Video Exam    There were no vitals filed for this visit.     Physical Exam

## 2023-07-21 ENCOUNTER — TELEPHONE (OUTPATIENT)
Dept: PSYCHIATRY | Facility: CLINIC | Age: 33
End: 2023-07-21

## 2023-07-21 NOTE — TELEPHONE ENCOUNTER
Writer GEORGES to inform patient that their appointment today, 7/21, has been cancelled due to provider being out of office.

## 2023-07-21 NOTE — TELEPHONE ENCOUNTER
Patient called in regards to the vm she received about her appt being cancelled.  Patient stated she will see provider at next appt on 8/11/2023

## 2023-07-27 NOTE — PSYCH
MEDICATION MANAGEMENT NOTE        [facility]      Name and Date of Birth:  Yoanna Balderrama 28 y.o. 1990 MRN: [id number]    Date of Visit: August 1, 2023    Reason for Visit:   Chief Complaint   Patient presents with   • Medication Management         SUBJECTIVE:    Yoanna Balderrama is a 28 y.o. female with past psychiatric history significant for Schizoaffective Disorder, Generalized Anxiety Disorder and PTSD who was personally seen and evaluated today at the [address] [place] [address] outpatient clinic for follow-up and medication management. She presents as euthymic, cooperative, calm. Her thoughts are organized, goal directed and completes psychiatric assessment without difficulty. Janes endorses compliance with psychotropic medication regimen that consists of Prozac, Trazodone, Zyprexa, Klonopin, Prazosin and Naltrexone. She denies any current adverse medication side effects. At previous outpatient psychiatric appointment with this writer, Naltrexone was started for excoriation disorder. Trazodone dose was decreased. On evaluation today, Janes endorses overall stability in terms of depression, anxiety, and mood symptoms since last session. She currently has 2 weeks off from work and has been spending time with her roommate. She is getting new kittens next week because her cat has cancer. She stopped using THC about 5 weeks ago and has been taking her medications regularly. She denies depressed mood, anhedonia, or SI. She has been getting adequate sleep but does not feel it is a restful sleep. She does have enough energy during the day to get things done. Anxiety symptoms have been manageable. No recent panic attacks. Her parents are in [place] and not at home with her so anxiety has been low. She has had some stress at work due to having a new supervisor. Skin-picking has significantly decreased with addition of Naltrexone.  She has less of an urge to pick at skin and does not feel as satisfied when she does pick. She reports some intermittent AH of chattering, usually occurring at work. She feels this is due to stress. Janes denies any further concerns today. Current Rating Scores:     Current PHQ-9   PHQ-2/9 Depression Screening           Review Of Systems:      Constitutional negative   ENT negative   Cardiovascular negative   Respiratory negative   Gastrointestinal negative   Genitourinary negative   Musculoskeletal negative   Integumentary negative   Neurological negative   Endocrine negative   Other Symptoms none, all other systems are negative       Historical information: (unchanged information from previous note copied and italicized) - Information that is bolded has been updated. Past Psychiatric History:    Past Inpatient Psychiatric Treatment:   Multiple past inpatient psychiatric admissions    1st hospitalization age 24, most recent 2019  Multiple 302 admissions  Hospitalized for SAs by OD on medications, manic symptoms, mood instability   Past Outpatient Psychiatric Treatment:    Was in outpatient psychiatric treatment in the past with a psychiatrist  Past Suicide Attempts: yes, by overdose on medications  Past Violent Behavior: no  Past Psychiatric Medication Trials: Prozac, Celexa, Lexapro, Tegretol, Lamictal, Topamax, Neurontin, Risperdal, Abilify, Seroquel, Invega, Zyprexa, Latuda, Atarax, Ativan, Klonopin, Prazosin, Ambien, Lunesta, Melatonin and Sonata     Traumatic History:    Abuse: not willing to provide details  Other Traumatic Events: nightmares      Family Psychiatric History:   Brother-depression  Paternal aunt-schizophrenia  Paternal uncle-alcoholism  Maternal aunts-mental health issues, unsure dx     FH of suicide-unknown      Substance Abuse History:   Tobacco/alcohol/caffeine: Vapes nicotine daily. Rare, social etoh use.   Illicit drugs: Hx of THC use, stopped 4 years ago.  Denies other illicit drug use.      Social History:    Developmental: Denies a history of milestone/developmental delay. Denies a history of in-utero exposure to toxins/illicit substances. There is no documented history of IEP or need for special education.   Born 3 months premature   Education: technical college some classes  Employed FT  Lives with roommate  Parents split time between [address] and [place]  Living arrangement, social support: parents and roommate  Access to firearms: Denies direct access to weapons/firearms.       Past Medical History:    Past Medical History:   Diagnosis Date   • Bipolar 1 disorder ([address])    • Cancer ([address])     thyroid   • Depression    • Schizoaffective disorder, bipolar type ([address])    • Suicidal ideations    • Thyroid disease         Past Surgical History:   Procedure Laterality Date   • IR LUMBAR PUNCTURE  3/9/2023   • NECK SURGERY     • US GUIDED THYROID BIOPSY      normal. Onset: 2012     No Known Allergies    Substance Abuse History:    Social History     Substance and Sexual Activity   Alcohol Use Yes    Comment: social drinker     Social History     Substance and Sexual Activity   Drug Use No       Social History:    Social History     Socioeconomic History   • Marital status: Single     Spouse name: Not on file   • Number of children: Not on file   • Years of education: Not on file   • Highest education level: Not on file   Occupational History   • Not on file   Tobacco Use   • Smoking status: Former     Packs/day: 1.00     Types: Cigarettes   • Smokeless tobacco: Never   Vaping Use   • Vaping Use: Some days   • Substances: Flavoring   Substance and Sexual Activity   • Alcohol use: Yes     Comment: social drinker   • Drug use: No   • Sexual activity: Not Currently   Other Topics Concern   • Not on file   Social History Narrative   • Not on file     Social Determinants of Health     Financial Resource Strain: Not on file   Food Insecurity: Not on file   Transportation Needs: Not on file   Physical Activity: Not on file   Stress: Not on file   Social Connections: Not on file   Intimate Partner Violence: Not on file   Housing Stability: Not on file       Family Psychiatric History:     History reviewed. No pertinent family history. History Review: The following portions of the patient's history were reviewed and updated as appropriate: allergies, current medications, past family history, past medical history, past social history, past surgical history and problem list.         OBJECTIVE:     Vital signs in last 24 hours: There were no vitals filed for this visit.     Mental Status Evaluation:    Appearance age appropriate, casually dressed   Behavior cooperative, calm   Speech normal rate, normal volume, normal pitch   Mood euthymic   Affect normal range and intensity, appropriate   Thought Processes organized, goal directed   Associations intact associations   Thought Content no overt delusions   Perceptual Disturbances: no auditory hallucinations, no visual hallucinations   Abnormal Thoughts  Risk Potential Suicidal ideation - None  Homicidal ideation - None  Potential for aggression - No   Orientation oriented to person, place, time/date and situation   Memory recent and remote memory grossly intact   Consciousness alert and awake   Attention Span Concentration Span attention span and concentration are age appropriate   Intellect appears to be of average intelligence   Insight intact   Judgement intact   Muscle Strength and  Gait normal muscle strength and normal muscle tone, normal gait and normal balance   Motor activity no abnormal movements   Language no difficulty naming common objects, no difficulty repeating a phrase, no difficulty writing a sentence   Fund of Knowledge adequate knowledge of current events  adequate fund of knowledge regarding past history  adequate fund of knowledge regarding vocabulary    Pain none   Pain Scale 0       Laboratory Results: I have personally reviewed all pertinent laboratory/tests results    Recent Labs (last 2 months):   No visits with results within 2 Month(s) from this visit.    Latest known visit with results is:   Admission on 03/08/2023, Discharged on 03/10/2023   Component Date Value   • Sodium 03/08/2023 137    • Potassium 03/08/2023 3.4 (L)    • Chloride 03/08/2023 102    • CO2 03/08/2023 26    • ANION GAP 03/08/2023 9    • BUN 03/08/2023 9    • Creatinine 03/08/2023 1.11    • Glucose 03/08/2023 140    • Calcium 03/08/2023 7.8 (L)    • eGFR 03/08/2023 65    • HCG, Quant 03/08/2023 <1    • WBC 03/08/2023 9.21    • RBC 03/08/2023 4.39    • Hemoglobin 03/08/2023 11.8    • Hematocrit 03/08/2023 39.1    • MCV 03/08/2023 89    • MCH 03/08/2023 26.9    • MCHC 03/08/2023 30.2 (L)    • RDW 03/08/2023 14.4    • MPV 03/08/2023 9.4    • Platelets 94/42/8396 313    • nRBC 03/08/2023 0    • Neutrophils Relative 03/08/2023 71    • Immat GRANS % 03/08/2023 0    • Lymphocytes Relative 03/08/2023 24    • Monocytes Relative 03/08/2023 3 (L)    • Eosinophils Relative 03/08/2023 2    • Basophils Relative 03/08/2023 0    • Neutrophils Absolute 03/08/2023 6.45    • Immature Grans Absolute 03/08/2023 0.02    • Lymphocytes Absolute 03/08/2023 2.21    • Monocytes Absolute 03/08/2023 0.30    • Eosinophils Absolute 03/08/2023 0.20    • Basophils Absolute 03/08/2023 0.03    • TSH 3RD GENERATON 03/08/2023 0.930    • Amph/Meth UR 03/08/2023 Negative    • Barbiturate Ur 03/08/2023 Negative    • Benzodiazepine Urine 03/08/2023 Negative    • Cocaine Urine 03/08/2023 Negative    • Methadone Urine 03/08/2023 Negative    • Opiate Urine 03/08/2023 Negative    • PCP Ur 03/08/2023 Negative    • THC Urine 03/08/2023 Positive (A)    • Oxycodone Urine 03/08/2023 Negative    • SARS-CoV-2 03/08/2023 Negative    • INFLUENZA A PCR 03/08/2023 Negative    • INFLUENZA B PCR 03/08/2023 Negative    • RSV PCR 03/08/2023 Negative    • Sodium 03/09/2023 139    • Potassium 03/09/2023 4.3    • Chloride 03/09/2023 107    • CO2 03/09/2023 23    • ANION GAP 03/09/2023 9    • BUN 03/09/2023 9    • Creatinine 03/09/2023 0.91    • Glucose 03/09/2023 100    • Calcium 03/09/2023 7.2 (L)    • Corrected Calcium 03/09/2023 7.8 (L)    • AST 03/09/2023 25    • ALT 03/09/2023 26    • Alkaline Phosphatase 03/09/2023 52    • Total Protein 03/09/2023 6.8    • Albumin 03/09/2023 3.3 (L)    • Total Bilirubin 03/09/2023 0.57    • eGFR 03/09/2023 83    • Protime 03/09/2023 15.0 (H)    • INR 03/09/2023 1.17    • WBC 03/09/2023 5.28    • RBC 03/09/2023 4.10    • Hemoglobin 03/09/2023 11.1 (L)    • Hematocrit 03/09/2023 36.5    • MCV 03/09/2023 89    • MCH 03/09/2023 27.1    • MCHC 03/09/2023 30.4 (L)    • RDW 03/09/2023 14.4    • Platelets 88/54/2435 292    • MPV 03/09/2023 9.4    • Magnesium 03/09/2023 1.8 (L)    • PTT 03/09/2023 27    • Vit D, 25-Hydroxy 03/09/2023 52.6    • Syphilis Total Antibody 03/09/2023 Non-reactive    • HIV-1 p24 Antigen 03/09/2023 Non-Reactive    • HIV-1 Antibody 03/09/2023 Non-Reactive    • HIV-2 Antibody 03/09/2023 Non-Reactive    • HIV Ag-Ab 5th Gen 03/09/2023 Non-Reactive    • Lyme Total Antibodies 03/09/2023 <0.2    • Angio Convert Enzyme 03/09/2023 26    • VDRL, CSF 03/09/2023 Non Reactive    • Glucose, CSF 03/09/2023 71 (H)    • Protein, CSF 03/09/2023 40    • RBC, CSF 03/09/2023 0    • Appearance, CSF 03/09/2023 Clear    • Tube Number, CSF 03/09/2023 4    • WBC, CSF 03/09/2023 1    • Xanthochromia 03/09/2023 No    • Gram Stain Result 03/09/2023 No Polys or Bacteria seen    • C.NEOFORMANS/GATTII 03/09/2023 Not Detected    • CYTOMEGALOVIRUS 03/09/2023 Not Detected    • ENTEROVIRUS 03/09/2023 Not Detected    • E.COLI K1 03/09/2023 Not Detected    • H. INFLUENZAE 03/09/2023 Not Detected    • H.SIMPLEX 1 03/09/2023 Not Detected    • H.SIMPLEX 2 03/09/2023 Not Detected    • HERPES VIRUS 6 03/09/2023 Not Detected    • PARECHOVIRUS 03/09/2023 Not Detected    • L. MONOCYTOGENES 03/09/2023 Not Detected    • N.MENINGITIDIS 03/09/2023 Not Detected    • S. AGALACTIAE 03/09/2023 Not Detected    • S. PNEUMONIAE 03/09/2023 Not Detected    • V.ZOSTER 03/09/2023 Not Detected    • Case Report 03/09/2023                      Value:Non-gynecologic Cytology                          Case: [id number]                                  Authorizing Provider:  Josh Hayes PA-C        Collected:           03/09/2023 1445              Ordering Location:     [facility]        Received:            03/09/2023 [address]                                                            Pathologist:           Kay Dumont DO                                                          Specimen:    Lumbar Puncture, CSF                                                                      • Final Diagnosis 03/09/2023                      Value: This result contains rich text formatting which cannot be displayed here. • Gross Description 03/09/2023                      Value: This result contains rich text formatting which cannot be displayed here. • Clinical Information 03/09/2023                      Value:· 71-year-old female with past medical history of schizoaffective bipolar disorder, anxiety, obesity presented from optometrist office due to papilledema seen on exam  · Rule out pseudotumor cerebri/indopathic intrcranial hypertensioon  · Reportedly has been having vision changes especially with colors among her peripheral vision   • Additional Information 03/09/2023                      Value: This result contains rich text formatting which cannot be displayed here. • Angio Convert Enzyme, CSF 03/09/2023 <1.5    • Scan Result 03/09/2023 SEE WRITTEN REPORT    • IgG, CSF 03/09/2023 4.3    • CSF ALBUMIN 03/09/2023 20    • Albumin 03/09/2023 3.7 (L)    • CSF/SERUM ALBUMIN ALB. IN* 03/09/2023 5    • IGG/ALBUMIN RATIO 03/09/2023 0.22    • IgG Index, CSF 03/09/2023 0.6    • IgG Synthetic Rate 03/09/2023   -1.3    • Myelin Basic Prot, CSF 03/09/2023 2.7    • Oligo Bands 03/09/2023 Comment    • IgG 03/09/2023 1442    • Total Counted 03/09/2023 4    • Lymphs % CSF 03/09/2023 100    • WBC 03/10/2023 5.84    • RBC 03/10/2023 4.24    • Hemoglobin 03/10/2023 11.5    • Hematocrit 03/10/2023 38.3    • MCV 03/10/2023 90    • MCH 03/10/2023 27.1    • MCHC 03/10/2023 30.0 (L)    • RDW 03/10/2023 14.6    • Platelets 20/56/9849 294    • MPV 03/10/2023 10.0    • Sodium 03/10/2023 139    • Potassium 03/10/2023 4.3    • Chloride 03/10/2023 107    • CO2 03/10/2023 22    • ANION GAP 03/10/2023 10    • BUN 03/10/2023 12    • Creatinine 03/10/2023 0.89    • Glucose 03/10/2023 105    • Glucose, Fasting 03/10/2023 105 (H)    • Calcium 03/10/2023 7.0 (L)    • eGFR 03/10/2023 85        Suicide/Homicide Risk Assessment:    The following interventions are recommended: no intervention changes needed      Lethality Statement:    Based on today's assessment and clinical criteria, [employer] for safety and is not an imminent risk of harm to self or others. Outpatient level of care is deemed appropriate at this current time. Janes understands that if they can no longer contract for safety, they need to call the office or report to their nearest Emergency Room for immediate evaluation. Assessment/Plan:     Psychopharmacologically, Janes continues to tolerate current medications with no adverse effects. She reports overall stability in terms of depression, anxiety, and mood symptoms. AH are intermittent and she declines further dose titration of Zyprexa. She is agreeable to further dose titration of Naltrexone for excoriation disorder. Risks/benefits/alternativies to treatment discussed, including a myriad of potential adverse medication side effects, to which Janes voiced understanding and consented fully to treatment.   Also, patient is amenable to calling/contacting the outpatient office including this writer if any acute adverse effects of their medication regimen arise in addition to any comments or concerns pertaining to their psychiatric management. Diagnoses and all orders for this visit:    Schizoaffective disorder, bipolar type ([address])  -     FLUoxetine (PROzac) 40 MG capsule; Take 1 capsule (40 mg total) by mouth daily  -     OLANZapine (ZyPREXA) 20 MG tablet; Take 1 tablet (20 mg total) by mouth daily at bedtime  -     OLANZapine (ZyPREXA) 5 mg tablet; Take 1 tablet (5 mg total) by mouth daily at bedtime    Excoriation (skin-picking) disorder  -     naltrexone (REVIA) 50 mg tablet; Take 1 tablet (50 mg total) by mouth daily    GOOD (generalized anxiety disorder)  -     clonazePAM (KlonoPIN) 0.5 mg tablet; Take 1 tablet (0.5 mg total) by mouth 2 (two) times a day    Post traumatic stress disorder (PTSD)    Encounter for therapeutic drug monitoring  -     HEMOGLOBIN A1C W/ EAG ESTIMATION; Future  -     Lipid Panel with Direct LDL reflex; Future    Skin picking habit  -     clonazePAM (KlonoPIN) 0.5 mg tablet; Take 1 tablet (0.5 mg total) by mouth 2 (two) times a day    PTSD (post-traumatic stress disorder)  -     prazosin (MINIPRESS) 2 mg capsule; Take 1 capsule (2 mg total) by mouth daily at bedtime    Insomnia due to other mental disorder  -     traZODone (DESYREL) 150 mg tablet; Take 0.5 tablets (75 mg total) by mouth daily at bedtime       Diagnosis/Treatment Recommendations  - Psychoeducation provided regarding the importance of exercise and health dietary choices and their impact on mood, energy, and motivation.  - Counseled to avoid ETOH, illicit substances, and nicotine secondary to the detrimental effects of these substances on mental and physical health. - Encouraged to engage in non-verbal forms of therapy such as art therapy, music therapy, and mindfulness. Aware of 24 hour and weekend coverage for urgent situations accessed by calling [facility] main practice number    Plan:  1.  Continue Prozac 40 mg daily for depression and anxiety  2. Continue Klonopin 0.5 mg bid for anxiety  3. Continue trazodone 75 mg at bedtime for sleep  4. Increase Naltrexone to 50 mg daily for excoriation disorder  5. Continue Zyprexa 25 mg at bedtime for mood stabilization and psychotic symptoms  6. Hemoglobin A1C and Lipid panel for metabolic monitoring with Zyprexa   7. Continue Minipress 2 mg at bedtime for PTSD symptoms   8. Continue individual psychotherapy sessions  9. Follow up with primary care provider for ongoing medical care  10. Follow up with this provider in 4 weeks     Medications Risks/Benefits      Risks, Benefits And Possible Side Effects Of Medications:    Risks, benefits, and possible side effects of medications explained to Janes and she verbalizes understanding and agreement for treatment. Controlled Medication Discussion:     Bia Torres has been filling controlled prescriptions on time as prescribed according to [place] Prescription Drug Monitoring Program    Psychotherapy Provided:     Individual psychotherapy provided: Yes  Counseling was provided during the session today for 16 minutes  Medication education provided to Bia Torres  Goals discussed during session  Importance of medication and treatment compliance reviewed with Janes  Cognitive therapy was utilized during the session  Reassurance and supportive therapy provided  Crisis/safety plan discussed with Dl Timmons     Treatment Plan:    Completed and signed during the session: Not applicable - Treatment Plan not due at this session    Note Share Disclaimer:      This note was not shared with the patient due to reasonable likelihood of causing patient harm    Visit Time    Visit Start Time: 1:42 PM  Visit Stop Time: 2:10 PM  Total Visit Duration: 28 minutes    GENESIS Balderas 08/01/23

## 2023-08-01 ENCOUNTER — OFFICE VISIT (OUTPATIENT)
Dept: PSYCHIATRY | Facility: CLINIC | Age: 33
End: 2023-08-01
Payer: COMMERCIAL

## 2023-08-01 DIAGNOSIS — F43.10 PTSD (POST-TRAUMATIC STRESS DISORDER): ICD-10-CM

## 2023-08-01 DIAGNOSIS — F42.4 EXCORIATION (SKIN-PICKING) DISORDER: ICD-10-CM

## 2023-08-01 DIAGNOSIS — F42.4 SKIN PICKING HABIT: ICD-10-CM

## 2023-08-01 DIAGNOSIS — F25.0 SCHIZOAFFECTIVE DISORDER, BIPOLAR TYPE (HCC): Primary | ICD-10-CM

## 2023-08-01 DIAGNOSIS — Z51.81 ENCOUNTER FOR THERAPEUTIC DRUG MONITORING: ICD-10-CM

## 2023-08-01 DIAGNOSIS — F51.05 INSOMNIA DUE TO OTHER MENTAL DISORDER: ICD-10-CM

## 2023-08-01 DIAGNOSIS — F41.1 GAD (GENERALIZED ANXIETY DISORDER): ICD-10-CM

## 2023-08-01 DIAGNOSIS — F99 INSOMNIA DUE TO OTHER MENTAL DISORDER: ICD-10-CM

## 2023-08-01 DIAGNOSIS — F43.10 POST TRAUMATIC STRESS DISORDER (PTSD): ICD-10-CM

## 2023-08-01 PROCEDURE — 99214 OFFICE O/P EST MOD 30 MIN: CPT

## 2023-08-01 RX ORDER — OLANZAPINE 20 MG/1
20 TABLET ORAL
Qty: 90 TABLET | Refills: 1 | Status: SHIPPED | OUTPATIENT
Start: 2023-08-01

## 2023-08-01 RX ORDER — PRAZOSIN HYDROCHLORIDE 2 MG/1
2 CAPSULE ORAL
Qty: 90 CAPSULE | Refills: 1 | Status: SHIPPED | OUTPATIENT
Start: 2023-08-01

## 2023-08-01 RX ORDER — TRAZODONE HYDROCHLORIDE 150 MG/1
75 TABLET ORAL
Qty: 45 TABLET | Refills: 1 | Status: SHIPPED | OUTPATIENT
Start: 2023-08-01

## 2023-08-01 RX ORDER — FLUOXETINE HYDROCHLORIDE 40 MG/1
40 CAPSULE ORAL DAILY
Qty: 90 CAPSULE | Refills: 1 | Status: SHIPPED | OUTPATIENT
Start: 2023-08-01

## 2023-08-01 RX ORDER — CLONAZEPAM 0.5 MG/1
0.5 TABLET ORAL 2 TIMES DAILY
Qty: 60 TABLET | Refills: 0 | Status: SHIPPED | OUTPATIENT
Start: 2023-08-01

## 2023-08-01 RX ORDER — OLANZAPINE 5 MG/1
5 TABLET ORAL
Qty: 90 TABLET | Refills: 1 | Status: SHIPPED | OUTPATIENT
Start: 2023-08-01

## 2023-08-01 RX ORDER — NALTREXONE HYDROCHLORIDE 50 MG/1
50 TABLET, FILM COATED ORAL DAILY
Qty: 30 TABLET | Refills: 1 | Status: SHIPPED | OUTPATIENT
Start: 2023-08-01

## 2023-08-03 ENCOUNTER — TELEPHONE (OUTPATIENT)
Dept: PSYCHIATRY | Facility: CLINIC | Age: 33
End: 2023-08-03

## 2023-08-03 NOTE — TELEPHONE ENCOUNTER
Case Management received paperwork from [address] [place] [address] to assist in completion. Intermittent [facility] paperwork was completed. Placed in [employer] for review and signature.

## 2023-08-07 ENCOUNTER — LAB (OUTPATIENT)
Dept: LAB | Facility: HOSPITAL | Age: 33
End: 2023-08-07
Payer: COMMERCIAL

## 2023-08-07 DIAGNOSIS — E87.20 ACIDEMIA: ICD-10-CM

## 2023-08-07 DIAGNOSIS — E55.9 VITAMIN D DEFICIENCY DISEASE: ICD-10-CM

## 2023-08-07 DIAGNOSIS — Z51.81 ENCOUNTER FOR THERAPEUTIC DRUG MONITORING: ICD-10-CM

## 2023-08-07 DIAGNOSIS — H47.10 PAPILLEDEMA: ICD-10-CM

## 2023-08-07 DIAGNOSIS — E03.9 ACQUIRED HYPOTHYROIDISM: ICD-10-CM

## 2023-08-07 DIAGNOSIS — E83.51 LOW CALCIUM LEVELS: ICD-10-CM

## 2023-08-07 DIAGNOSIS — E83.42 MAGNESIUM DEFICIENCY SYNDROME: ICD-10-CM

## 2023-08-07 LAB
25(OH)D3 SERPL-MCNC: 29.9 NG/ML (ref 30–100)
ALBUMIN SERPL BCP-MCNC: 3.6 G/DL (ref 3.5–5)
ALP SERPL-CCNC: 50 U/L (ref 34–104)
ALT SERPL W P-5'-P-CCNC: 17 U/L (ref 7–52)
ANION GAP SERPL CALCULATED.3IONS-SCNC: 8 MMOL/L
AST SERPL W P-5'-P-CCNC: 19 U/L (ref 13–39)
BASOPHILS # BLD AUTO: 0.03 THOUSANDS/ÂΜL (ref 0–0.1)
BASOPHILS NFR BLD AUTO: 1 % (ref 0–1)
BILIRUB SERPL-MCNC: 0.87 MG/DL (ref 0.2–1)
BUN SERPL-MCNC: 7 MG/DL (ref 5–25)
CALCIUM SERPL-MCNC: 8 MG/DL (ref 8.4–10.2)
CHLORIDE SERPL-SCNC: 105 MMOL/L (ref 96–108)
CHOLEST SERPL-MCNC: 225 MG/DL
CO2 SERPL-SCNC: 24 MMOL/L (ref 21–32)
CREAT SERPL-MCNC: 0.83 MG/DL (ref 0.6–1.3)
EOSINOPHIL # BLD AUTO: 0.23 THOUSAND/ÂΜL (ref 0–0.61)
EOSINOPHIL NFR BLD AUTO: 5 % (ref 0–6)
ERYTHROCYTE [DISTWIDTH] IN BLOOD BY AUTOMATED COUNT: 15.8 % (ref 11.6–15.1)
EST. AVERAGE GLUCOSE BLD GHB EST-MCNC: 128 MG/DL
GFR SERPL CREATININE-BSD FRML MDRD: 92 ML/MIN/1.73SQ M
GLUCOSE P FAST SERPL-MCNC: 99 MG/DL (ref 65–99)
HBA1C MFR BLD: 6.1 %
HCT VFR BLD AUTO: 40.8 % (ref 34.8–46.1)
HDLC SERPL-MCNC: 58 MG/DL
HGB BLD-MCNC: 12.3 G/DL (ref 11.5–15.4)
IMM GRANULOCYTES # BLD AUTO: 0.02 THOUSAND/UL (ref 0–0.2)
IMM GRANULOCYTES NFR BLD AUTO: 0 % (ref 0–2)
LDLC SERPL CALC-MCNC: 145 MG/DL (ref 0–100)
LYMPHOCYTES # BLD AUTO: 1.7 THOUSANDS/ÂΜL (ref 0.6–4.47)
LYMPHOCYTES NFR BLD AUTO: 33 % (ref 14–44)
MAGNESIUM SERPL-MCNC: 1.8 MG/DL (ref 1.9–2.7)
MCH RBC QN AUTO: 25.5 PG (ref 26.8–34.3)
MCHC RBC AUTO-ENTMCNC: 30.1 G/DL (ref 31.4–37.4)
MCV RBC AUTO: 85 FL (ref 82–98)
MONOCYTES # BLD AUTO: 0.27 THOUSAND/ÂΜL (ref 0.17–1.22)
MONOCYTES NFR BLD AUTO: 5 % (ref 4–12)
NEUTROPHILS # BLD AUTO: 2.86 THOUSANDS/ÂΜL (ref 1.85–7.62)
NEUTS SEG NFR BLD AUTO: 56 % (ref 43–75)
NRBC BLD AUTO-RTO: 0 /100 WBCS
PLATELET # BLD AUTO: 349 THOUSANDS/UL (ref 149–390)
PMV BLD AUTO: 9.2 FL (ref 8.9–12.7)
POTASSIUM SERPL-SCNC: 3.5 MMOL/L (ref 3.5–5.3)
PROT SERPL-MCNC: 7.6 G/DL (ref 6.4–8.4)
PTH-INTACT SERPL-MCNC: 31.5 PG/ML (ref 12–88)
RBC # BLD AUTO: 4.83 MILLION/UL (ref 3.81–5.12)
SODIUM SERPL-SCNC: 137 MMOL/L (ref 135–147)
T4 SERPL-MCNC: 7.17 UG/DL (ref 6.09–12.23)
TRIGL SERPL-MCNC: 109 MG/DL
TSH SERPL DL<=0.05 MIU/L-ACNC: 12.36 UIU/ML (ref 0.45–4.5)
WBC # BLD AUTO: 5.11 THOUSAND/UL (ref 4.31–10.16)

## 2023-08-07 PROCEDURE — 80053 COMPREHEN METABOLIC PANEL: CPT

## 2023-08-07 PROCEDURE — 82306 VITAMIN D 25 HYDROXY: CPT

## 2023-08-07 PROCEDURE — 84443 ASSAY THYROID STIM HORMONE: CPT

## 2023-08-07 PROCEDURE — 83970 ASSAY OF PARATHORMONE: CPT

## 2023-08-07 PROCEDURE — 84436 ASSAY OF TOTAL THYROXINE: CPT

## 2023-08-07 PROCEDURE — 36415 COLL VENOUS BLD VENIPUNCTURE: CPT

## 2023-08-07 PROCEDURE — 80061 LIPID PANEL: CPT

## 2023-08-07 PROCEDURE — 85025 COMPLETE CBC W/AUTO DIFF WBC: CPT

## 2023-08-07 PROCEDURE — 83735 ASSAY OF MAGNESIUM: CPT

## 2023-08-07 PROCEDURE — 83036 HEMOGLOBIN GLYCOSYLATED A1C: CPT

## 2023-08-10 NOTE — TELEPHONE ENCOUNTER
[place] paperwork faxed to [employer]. Outreached to Janes to notify her fmla was sent. [facility]. Forms placed in [facility] Mailbox at [facility] for patient to .

## 2023-08-14 ENCOUNTER — TELEPHONE (OUTPATIENT)
Dept: PSYCHIATRY | Facility: CLINIC | Age: 33
End: 2023-08-14

## 2023-08-21 ENCOUNTER — TELEPHONE (OUTPATIENT)
Dept: PSYCHIATRY | Facility: CLINIC | Age: 33
End: 2023-08-21

## 2023-08-21 NOTE — TELEPHONE ENCOUNTER
Patient called and stated she wansted to make sure she still had her scheduled appts because she received a no show letter, writer confirmed patient still has 3 appts scheduled and to make sure she attends her scheduled therapy sessions so as not to be discharged

## 2023-08-21 NOTE — TELEPHONE ENCOUNTER
Patient called and lvm stating she needs to schedule f/u appts with provider, writer called patient back and lvm for her to call back st her earliest convenience

## 2023-08-23 ENCOUNTER — TELEMEDICINE (OUTPATIENT)
Dept: BEHAVIORAL/MENTAL HEALTH CLINIC | Facility: CLINIC | Age: 33
End: 2023-08-23
Payer: COMMERCIAL

## 2023-08-23 DIAGNOSIS — F43.10 POST TRAUMATIC STRESS DISORDER (PTSD): ICD-10-CM

## 2023-08-23 DIAGNOSIS — F25.0 SCHIZOAFFECTIVE DISORDER, BIPOLAR TYPE (HCC): Primary | ICD-10-CM

## 2023-08-23 DIAGNOSIS — F41.1 GAD (GENERALIZED ANXIETY DISORDER): ICD-10-CM

## 2023-08-23 PROCEDURE — 90834 PSYTX W PT 45 MINUTES: CPT | Performed by: SOCIAL WORKER

## 2023-08-23 NOTE — PSYCH
Behavioral Health Psychotherapy Progress Note    Psychotherapy Provided: Individual Psychotherapy     1. Schizoaffective disorder, bipolar type ([address])        2. GOOD (generalized anxiety disorder)        3. Post traumatic stress disorder (PTSD)            Goals addressed in session: Goal 1, I am in "rrr" mode because I am stressed out. DATA: JUDY met with Janes  for session. Has been working mandatory overtime of 10 hrs per day this week and found out that she has mandatory OT next week also. Yesterday she "blew up on Man [employer]. It was all her fault. It is because she is tired."  Her parents come back soon. Her mom is coming back on  9/9 and her dad on 9/23. She missed her last appt with JUDY because she got 2 new kittens and forgot about her appt. The kittens help "keep her going."  She found out since last session that the cat she had has cancer. During this session, this clinician used the following therapeutic modalities: Client-centered Therapy, Cognitive Behavioral Therapy, Solution-Focused Therapy and Supportive Psychotherapy is diagnosed with a co-occurring substance use disorder, please indicate any changes in the frequency or amount of use: none  Stage of change for addressing substance use diagnoses: Action    ASSESSMENT:  Aleksandr Pinto presents with a Euthymic/ normal mood. her affect is Normal range and intensity, which is congruent, with her mood and the content of the session. The client has made progress on their goals. Aleksandr Pinto presents with a none risk of suicide, none risk of self-harm, and none risk of harm to others. For any risk assessment that surpasses a "low" rating, a safety plan must be developed. A safety plan was indicated: no  If yes, describe in detail     PLAN: Between sessions, Aleksandr Pinto will address tx plan goals. . At the next session, the therapist will use Client-centered Therapy, Cognitive Behavioral Therapy, Solution-Focused Therapy and Supportive Psychotherapy to address tx plan goals and issues that arise in between sessions. .    Behavioral Health Treatment Plan and Discharge Planning: Rosario Vance is aware of and agrees to continue to work on their treatment plan. They have identified and are working toward their discharge goals. yes    Visit start and stop times:    08/23/23  Start Time: 1712  Stop Time: 1800  Total Visit Time: 48 minutes    Virtual Regular Visit    Verification of patient location:    Patient is located at Home in the following state in which I hold an active license [place]      Assessment/Plan:    Problem List Items Addressed This Visit    None      Goals addressed in session: Goal 1          Reason for visit is   No chief complaint on file. Encounter provider Deepa Colón    Provider located at [address] [place] [id number] [phone]      Recent Visits  Date Type Provider Dept   08/21/23 Telephone [facility] recent visits within past 7 days and meeting all other requirements  Future Appointments  No visits were found meeting these conditions. Showing future appointments within next 150 days and meeting all other requirements       The patient was identified by name and date of birth. Rosario Vance was informed that this is a telemedicine visit and that the visit is being conducted throughthe Evolva platform. She agrees to proceed. .  My office door was closed. No one else was in the room. She acknowledged consent and understanding of privacy and security of the video platform. The patient has agreed to participate and understands they can discontinue the visit at any time. Patient is aware this is a billable service. Subjective  Rosario Vance is a 28 y.o. female  .       HPI     Past Medical History:   Diagnosis Date   • Bipolar 1 disorder [facility])    • Cancer ([address])     thyroid   • Depression    • Schizoaffective disorder, bipolar type ([address])    • Suicidal ideations    • Thyroid disease        Past Surgical History:   Procedure Laterality Date   • IR LUMBAR PUNCTURE  3/9/2023   • NECK SURGERY     • US GUIDED THYROID BIOPSY      normal. Onset: 2012       Current Outpatient Medications   Medication Sig Dispense Refill   • acetaZOLAMIDE (DIAMOX) 250 mg tablet TAKE 2 TABLETS BY MOUTH 2 TIMES A DAY. 360 tablet 2   • clonazePAM (KlonoPIN) 0.5 mg tablet Take 1 tablet (0.5 mg total) by mouth 2 (two) times a day 60 tablet 0   • ergocalciferol (VITAMIN D2) 50,000 units Take 50,000 Units by mouth once a week     • FLUoxetine (PROzac) 40 MG capsule Take 1 capsule (40 mg total) by mouth daily 90 capsule 1   • gabapentin (NEURONTIN) 300 mg capsule Take 300 mg by mouth 3 (three) times a day     • levothyroxine 300 MCG tablet Take 300 mcg by mouth daily     • Tess 0.25-35 MG-MCG per tablet Take 1 tablet by mouth daily     • naltrexone (REVIA) 50 mg tablet Take 1 tablet (50 mg total) by mouth daily 30 tablet 1   • OLANZapine (ZyPREXA) 20 MG tablet Take 1 tablet (20 mg total) by mouth daily at bedtime 90 tablet 1   • OLANZapine (ZyPREXA) 5 mg tablet Take 1 tablet (5 mg total) by mouth daily at bedtime 90 tablet 1   • phentermine 15 MG capsule Take 1 capsule by mouth Daily     • prazosin (MINIPRESS) 2 mg capsule Take 1 capsule (2 mg total) by mouth daily at bedtime 90 capsule 1   • traZODone (DESYREL) 150 mg tablet Take 0.5 tablets (75 mg total) by mouth daily at bedtime 45 tablet 1     No current facility-administered medications for this visit. No Known Allergies    Review of Systems    Video Exam    There were no vitals filed for this visit.     Physical Exam

## 2023-09-06 NOTE — PSYCH
Virtual Regular Visit    Problem List Items Addressed This Visit        Other    Post traumatic stress disorder (PTSD)    Schizoaffective disorder, bipolar type ([address]) - Primary    GOOD (generalized anxiety disorder)    Relevant Medications    clonazePAM (KlonoPIN) 0.5 mg tablet    Insomnia due to other mental disorder    Skin picking habit    Relevant Medications    clonazePAM (KlonoPIN) 0.5 mg tablet   Other Visit Diagnoses     Excoriation (skin-picking) disorder            Reason for visit is   Chief Complaint   Patient presents with   • Medication Management     Encounter provider GENESIS Olmstead    Provider located at [address] [place] [id number] [phone]    Recent Visits  No visits were found meeting these conditions. Showing recent visits within past 7 days and meeting all other requirements  Today's Visits  Date Type Provider Dept   09/12/23 Telemedicine GENESIS Gregg  [facility]   Showing today's visits and meeting all other requirements  Future Appointments  No visits were found meeting these conditions. Showing future appointments within next 150 days and meeting all other requirements       After connecting through nfon, the patient was identified by name and date of birth. Elizabeth Franco was informed that this is a telemedicine visit and that the visit is being conducted through the Ondango platform. She agrees to proceed. which may not be secure and therefore, might not be HIPAA-compliant. My office door was closed. No one else was in the room. She acknowledged consent and understanding of privacy and security of the video platform. The patient has agreed to participate and understands they can discontinue the visit at any time.     MEDICATION MANAGEMENT NOTE        [facility]      Name and Date of Birth:  Remigio Araya y.o. 1990 MRN: [id number]    Date of Visit: September 12, 2023    Reason for Visit:   Chief Complaint   Patient presents with   • Medication Management         SUBJECTIVE:    Yris Miguel is a 28 y.o. female with past psychiatric history significant for Schizoaffective Disorder, Generalized Anxiety Disorder, PTSD and Excoriation disorder  who was personally seen and evaluated today at the [address] [place] [address] outpatient clinic for follow-up and medication management. She presents as dysphoric, cooperative, calm. Her thoughts are organized, goal directed and completes psychiatric assessment without difficulty. Janes endorses compliance with psychotropic medication regimen that consists of Prozac, Trazodone, Zyprexa, Klonopin, Prazosin and Naltrexone. She denies any current adverse medication side effects. At previous outpatient psychiatric appointment with this writer, Naltrexone dose was increased. On evaluation today, Janes endorses feeling "doom and gloom" lately. She questions if this can be due to Naltrexone. She has also recently been prescribed phentermine and feels her mood has worsened. She denies any manic/hypomanic symptoms. She reports low energy, anhedonia, poor motivation. She denies feeling depressed, hopeless, or having any SI. She feels easily annoyed by others but not irritable. She wants to lay in bed all day and not do anything. She stayed in bed this past weekend. She feels she may be stressed due to possible strike at work soon. Mom is currently living with her as she splits her time between here and [place]. She has had more trouble sleeping lately. She sometimes hears someone saying hello overnight when no one else is home. She denies AH/VH during the daytime. She continues with skin-picking after initially feeling that this was improving after starting Naltexone. Janes denies any further concerns today.      Current Rating Scores:     Current PHQ-9   PHQ-2/9 Depression Screening           Review Of Systems:      Constitutional negative   ENT negative   Cardiovascular negative   Respiratory negative   Gastrointestinal negative   Genitourinary negative   Musculoskeletal negative   Integumentary negative   Neurological negative   Endocrine negative   Other Symptoms none, all other systems are negative       Historical information: (unchanged information from previous note copied and italicized) - Information that is bolded has been updated. Past Psychiatric History:    Past Inpatient Psychiatric Treatment:   Multiple past inpatient psychiatric admissions    1st hospitalization age 24, most recent 2019  Multiple 302 admissions  Hospitalized for SAs by OD on medications, manic symptoms, mood instability   Past Outpatient Psychiatric Treatment:    Was in outpatient psychiatric treatment in the past with a psychiatrist  Past Suicide Attempts: yes, by overdose on medications  Past Violent Behavior: no  Past Psychiatric Medication Trials: Prozac, Celexa, Lexapro, Tegretol, Lamictal, Topamax, Neurontin, Risperdal, Abilify, Seroquel, Invega, Zyprexa, Latuda, Atarax, Ativan, Klonopin, Prazosin, Ambien, Lunesta, Melatonin and Sonata     Traumatic History:    Abuse: not willing to provide details  Other Traumatic Events: nightmares      Family Psychiatric History:   Brother-depression  Paternal aunt-schizophrenia  Paternal uncle-alcoholism  Maternal aunts-mental health issues, unsure dx     FH of suicide-unknown      Substance Abuse History:   Tobacco/alcohol/caffeine: Vapes nicotine daily. Rare, social etoh use.   Illicit drugs: Hx of THC use, stopped 4 years ago. Denies other illicit drug use.      Social History:    Developmental: Denies a history of milestone/developmental delay. Denies a history of in-utero exposure to toxins/illicit substances. There is no documented history of IEP or need for special education.   Born 3 months premature   Education: technical college some classes  Employed FT  Lives with roommate  Parents split time between [address] and [address]  Living arrangement, social support: parents and roommate  Access to firearms: Denies direct access to weapons/firearms.       Past Medical History:    Past Medical History:   Diagnosis Date   • Bipolar 1 disorder ([address])    • Cancer ([address])     thyroid   • Depression    • Schizoaffective disorder, bipolar type ([address])    • Suicidal ideations    • Thyroid disease         Past Surgical History:   Procedure Laterality Date   • IR LUMBAR PUNCTURE  3/9/2023   • NECK SURGERY     • US GUIDED THYROID BIOPSY      normal. Onset: 2012     No Known Allergies    Substance Abuse History:    Social History     Substance and Sexual Activity   Alcohol Use Yes    Comment: social drinker     Social History     Substance and Sexual Activity   Drug Use No       Social History:    Social History     Socioeconomic History   • Marital status: Single     Spouse name: Not on file   • Number of children: Not on file   • Years of education: Not on file   • Highest education level: Not on file   Occupational History   • Not on file   Tobacco Use   • Smoking status: Former     Packs/day: 1.00     Types: Cigarettes   • Smokeless tobacco: Never   Vaping Use   • Vaping Use: Some days   • Substances: Flavoring   Substance and Sexual Activity   • Alcohol use: Yes     Comment: social drinker   • Drug use: No   • Sexual activity: Not Currently   Other Topics Concern   • Not on file   Social History Narrative   • Not on file     Social Determinants of Health     Financial Resource Strain: Not on file   Food Insecurity: Not on file   Transportation Needs: Not on file   Physical Activity: Not on file   Stress: Not on file   Social Connections: Not on file   Intimate Partner Violence: Not on file   Housing Stability: Not on file       Family Psychiatric History:     History reviewed. No pertinent family history. History Review:  The following portions of the patient's history were reviewed and updated as appropriate: allergies, current medications, past family history, past medical history, past social history, past surgical history and problem list.         OBJECTIVE:     Vital signs in last 24 hours: There were no vitals filed for this visit.     Mental Status Evaluation:    Appearance age appropriate, casually dressed, overweight   Behavior cooperative, calm   Speech normal rate, normal volume, normal pitch   Mood dysphoric   Affect constricted   Thought Processes organized, goal directed   Associations intact associations   Thought Content no overt delusions   Perceptual Disturbances: no auditory hallucinations, no visual hallucinations   Abnormal Thoughts  Risk Potential Suicidal ideation - None  Homicidal ideation - None  Potential for aggression - No   Orientation oriented to person, place, time/date and situation   Memory recent and remote memory grossly intact   Consciousness alert and awake   Attention Span Concentration Span attention span and concentration are age appropriate   Intellect appears to be of average intelligence   Insight intact   Judgement intact   Muscle Strength and  Gait unable to assess today due to virtual visit   Motor activity unable to assess today due to virtual visit   Language no difficulty naming common objects, no difficulty repeating a phrase, no difficulty writing a sentence   Fund of Knowledge adequate knowledge of current events  adequate fund of knowledge regarding past history  adequate fund of knowledge regarding vocabulary    Pain none   Pain Scale 0       Laboratory Results: I have personally reviewed all pertinent laboratory/tests results    Recent Labs (last 2 months):   Lab on 08/07/2023   Component Date Value   • Hemoglobin A1C 08/07/2023 6.1 (H)    • EAG 08/07/2023 128    • Cholesterol 08/07/2023 225 (H)    • Triglycerides 08/07/2023 109    • HDL, Direct 08/07/2023 58    • LDL Calculated 08/07/2023 145 (H)    • WBC 08/07/2023 5. 11    • RBC 08/07/2023 4.83    • Hemoglobin 08/07/2023 12.3    • Hematocrit 08/07/2023 40.8    • MCV 08/07/2023 85    • MCH 08/07/2023 25.5 (L)    • MCHC 08/07/2023 30.1 (L)    • RDW 08/07/2023 15.8 (H)    • MPV 08/07/2023 9.2    • Platelets 49/49/0122 349    • nRBC 08/07/2023 0    • Neutrophils Relative 08/07/2023 56    • Immat GRANS % 08/07/2023 0    • Lymphocytes Relative 08/07/2023 33    • Monocytes Relative 08/07/2023 5    • Eosinophils Relative 08/07/2023 5    • Basophils Relative 08/07/2023 1    • Neutrophils Absolute 08/07/2023 2.86    • Immature Grans Absolute 08/07/2023 0.02    • Lymphocytes Absolute 08/07/2023 1.70    • Monocytes Absolute 08/07/2023 0.27    • Eosinophils Absolute 08/07/2023 0.23    • Basophils Absolute 08/07/2023 0.03    • Sodium 08/07/2023 137    • Potassium 08/07/2023 3.5    • Chloride 08/07/2023 105    • CO2 08/07/2023 24    • ANION GAP 08/07/2023 8    • BUN 08/07/2023 7    • Creatinine 08/07/2023 0.83    • Glucose, Fasting 08/07/2023 99    • Calcium 08/07/2023 8.0 (L)    • AST 08/07/2023 19    • ALT 08/07/2023 17    • Alkaline Phosphatase 08/07/2023 50    • Total Protein 08/07/2023 7.6    • Albumin 08/07/2023 3.6    • Total Bilirubin 08/07/2023 0.87    • eGFR 08/07/2023 92    • Magnesium 08/07/2023 1.8 (L)    • PTH 08/07/2023 31.5    • T4 TOTAL 08/07/2023 7.17    • TSH 3RD GENERATON 08/07/2023 12.360 (H)    • Vit D, 25-Hydroxy 08/07/2023 29.9 (L)        Suicide/Homicide Risk Assessment:    Risk of Harm to Self:    The following interventions are recommended: no intervention changes needed      Lethality Statement:    Based on today's assessment and clinical criteria, [employer] for safety and is not an imminent risk of harm to self or others. Outpatient level of care is deemed appropriate at this current time.  Janes understands that if they can no longer contract for safety, they need to call the office or report to their nearest Emergency Room for immediate evaluation. Assessment/Plan:     Psychopharmacologically, Janes continues to tolerate current medications with no adverse effects. She feels less motivated with poor energy and loss of interest lately. She is easily annoyed by others. She has not been sleeping as well since starting phentermine. She plans to discuss alternate treatment options with weight management provider. She would like to discontinue Naltexone as she no longer feels this is helping with skin-picking. She is agreeable to continue all other medications. Risks/benefits/alternativies to treatment discussed, including a myriad of potential adverse medication side effects, to which Janes voiced understanding and consented fully to treatment. Also, patient is amenable to calling/contacting the outpatient office including this writer if any acute adverse effects of their medication regimen arise in addition to any comments or concerns pertaining to their psychiatric management. Diagnoses and all orders for this visit:    Schizoaffective disorder, bipolar type ([address])    Excoriation (skin-picking) disorder    GOOD (generalized anxiety disorder)  -     clonazePAM (KlonoPIN) 0.5 mg tablet; Take 1 tablet (0.5 mg total) by mouth 2 (two) times a day    Post traumatic stress disorder (PTSD)    Skin picking habit  -     clonazePAM (KlonoPIN) 0.5 mg tablet; Take 1 tablet (0.5 mg total) by mouth 2 (two) times a day    Insomnia due to other mental disorder       Diagnosis/Treatment Recommendations  - Psychoeducation provided regarding the importance of exercise and health dietary choices and their impact on mood, energy, and motivation.  - Counseled to avoid ETOH, illicit substances, and nicotine secondary to the detrimental effects of these substances on mental and physical health. - Encouraged to engage in non-verbal forms of therapy such as art therapy, music therapy, and mindfulness.    Aware of 24 hour and weekend coverage for urgent situations accessed by calling [facility] main practice number    Plan:  1. Continue Zyprexa 25 mg at bedtime for psychotic symptoms and mood stabilization  2. Reviewed Lipid panel/Hemoglobin A1C. Patient has upcoming appointment with weight management provider and plans to discuss alternate weight management treatment due to phentermine side effects. 3. Continue Klonopin 0.5 mg bid for anxiety  4. Continue Prozac 40 mg daily for depression, anxiety, and PTSD symptoms  5. Discontinue Naltrexone due to ineffectiveness  6. Continue Minipress 2 mg at bedtime for PTSD symptoms  7. Continue trazodone 75 mg at bedtime for sleep   8. Continue individual psychotherapy sessions  9. Follow up with primary care provider for ongoing medical care  10. MITUL    Medications Risks/Benefits      Risks, Benefits And Possible Side Effects Of Medications:    Risks, benefits, and possible side effects of medications explained to Janes and she verbalizes understanding and agreement for treatment. Controlled Medication Discussion:     Robina Hayward has been filling controlled prescriptions on time as prescribed according to [place] Prescription Drug Monitoring Program    Psychotherapy Provided:     Individual psychotherapy provided: Yes  Counseling was provided during the session today for 16 minutes  Medication education provided to Robina Hayward  Goals discussed during session  Importance of medication and treatment compliance reviewed with Janes  Cognitive therapy was utilized during the session  Reassurance and supportive therapy provided  Crisis/safety plan discussed with Mackenzie Salas     Treatment Plan:    Completed and signed during the session: Not applicable - Treatment Plan not due at this session    Note Share Disclaimer:      This note was not shared with the patient due to reasonable likelihood of causing patient harm    Visit Time    Visit Start Time: 2:17 PM  Visit Stop Time: 2:37 PM  Total Visit Duration: 20 minutes    GENESIS Longo 09/12/23

## 2023-09-12 ENCOUNTER — TELEPHONE (OUTPATIENT)
Dept: PSYCHIATRY | Facility: CLINIC | Age: 33
End: 2023-09-12

## 2023-09-12 ENCOUNTER — TELEMEDICINE (OUTPATIENT)
Dept: PSYCHIATRY | Facility: CLINIC | Age: 33
End: 2023-09-12
Payer: COMMERCIAL

## 2023-09-12 DIAGNOSIS — F43.10 POST TRAUMATIC STRESS DISORDER (PTSD): ICD-10-CM

## 2023-09-12 DIAGNOSIS — F42.4 SKIN PICKING HABIT: ICD-10-CM

## 2023-09-12 DIAGNOSIS — F51.05 INSOMNIA DUE TO OTHER MENTAL DISORDER: ICD-10-CM

## 2023-09-12 DIAGNOSIS — F41.1 GAD (GENERALIZED ANXIETY DISORDER): ICD-10-CM

## 2023-09-12 DIAGNOSIS — F99 INSOMNIA DUE TO OTHER MENTAL DISORDER: ICD-10-CM

## 2023-09-12 DIAGNOSIS — F42.4 EXCORIATION (SKIN-PICKING) DISORDER: ICD-10-CM

## 2023-09-12 DIAGNOSIS — F25.0 SCHIZOAFFECTIVE DISORDER, BIPOLAR TYPE (HCC): Primary | ICD-10-CM

## 2023-09-12 PROCEDURE — 99214 OFFICE O/P EST MOD 30 MIN: CPT

## 2023-09-12 RX ORDER — CLONAZEPAM 0.5 MG/1
0.5 TABLET ORAL 2 TIMES DAILY
Qty: 60 TABLET | Refills: 3 | Status: SHIPPED | OUTPATIENT
Start: 2023-09-12

## 2023-09-12 NOTE — TELEPHONE ENCOUNTER
Patient contacted the office seeking to schedule an appt. Patient expressed to the writer that she has an appointment scheduled for 9/22/23 but was seeking to schedule another one for this week if possible. Writer informed the patient that unfortunately there are no appts available for this week. Patient verbalized understanding.

## 2023-09-22 ENCOUNTER — TELEMEDICINE (OUTPATIENT)
Dept: BEHAVIORAL/MENTAL HEALTH CLINIC | Facility: CLINIC | Age: 33
End: 2023-09-22
Payer: COMMERCIAL

## 2023-09-22 DIAGNOSIS — F41.1 GAD (GENERALIZED ANXIETY DISORDER): ICD-10-CM

## 2023-09-22 DIAGNOSIS — F25.0 SCHIZOAFFECTIVE DISORDER, BIPOLAR TYPE (HCC): Primary | ICD-10-CM

## 2023-09-22 DIAGNOSIS — F43.10 POST TRAUMATIC STRESS DISORDER (PTSD): ICD-10-CM

## 2023-09-22 PROCEDURE — 90834 PSYTX W PT 45 MINUTES: CPT | Performed by: SOCIAL WORKER

## 2023-09-22 NOTE — PSYCH
Behavioral Health Psychotherapy Progress Note    Psychotherapy Provided: Individual Psychotherapy     1. Schizoaffective disorder, bipolar type ([address])        2. GOOD (generalized anxiety disorder)        3. Post traumatic stress disorder (PTSD)            Goals addressed in session: Goal 1, I am in "rrr" mode because I am stressed out. DATA: JUDY met with Janes  for session. Her mom is back and is there without Janes's father as a buffer. "She is driving her crazy." Her father will be here in 12 hrs so remind herself of that as a coping skill to use. Work is "driving her crazy."  There are discussing a strike on 10/2. She does not do well without the schedule of work. MSW recommended her developing a schedule for herself a head of time for her to follow if they do go out on strike. She will not have health care if that happens. MSW recommended she wait and cx one appt at a time if needed. If she needs to be seen then ask about the self pay fee. Her PA that prescribes her meds is leaving. "She did set her up and she is cover with meds."  MSW recommended she call the office to see about scheduling with who so she doesn't fall through the cracks. During this session, this clinician used the following therapeutic modalities: Client-centered Therapy, Cognitive Behavioral Therapy, Solution-Focused Therapy and Supportive Psychotherapy is diagnosed with a co-occurring substance use disorder, please indicate any changes in the frequency or amount of use: none  Stage of change for addressing substance use diagnoses: Action    ASSESSMENT:  Yris Miguel presents with a Euthymic/ normal mood. her affect is Normal range and intensity, which is congruent, with her mood and the content of the session. The client has made progress on their goals. Yris Miguel presents with a none risk of suicide, none risk of self-harm, and none risk of harm to others.     For any risk assessment that surpasses a "low" rating, a safety plan must be developed. A safety plan was indicated: no  If yes, describe in detail     PLAN: Between sessions, Chloé Yan will address tx plan goals. . At the next session, the therapist will use Client-centered Therapy, Cognitive Behavioral Therapy, Solution-Focused Therapy and Supportive Psychotherapy to address tx plan goals and issues that arise in between sessions. .    Behavioral Health Treatment Plan and Discharge Planning: Chloé Yan is aware of and agrees to continue to work on their treatment plan. They have identified and are working toward their discharge goals. yes    Visit start and stop times:    09/22/23  Start Time: 1713  Stop Time: 1800  Total Visit Time: 47 minutes    Virtual Regular Visit    Verification of patient location:    Patient is located at Home in the following state in which I hold an active license [place]      Assessment/Plan:    Problem List Items Addressed This Visit    None      Goals addressed in session: Goal 1          Reason for visit is   No chief complaint on file. Encounter provider Kaleb Hurst    Provider located at [address] [place] [id number] [phone]      Recent Visits  No visits were found meeting these conditions. Showing recent visits within past 7 days and meeting all other requirements  Today's Visits  Date Type Provider Dept   09/22/23 [id number] [place] today's visits and meeting all other requirements  Future Appointments  No visits were found meeting these conditions. Showing future appointments within next 150 days and meeting all other requirements       The patient was identified by name and date of birth. Chloé Yan was informed that this is a telemedicine visit and that the visit is being conducted throughthe [employer] platform.  She agrees to proceed. .  My office door was closed. No one else was in the room. She acknowledged consent and understanding of privacy and security of the video platform. The patient has agreed to participate and understands they can discontinue the visit at any time. Patient is aware this is a billable service. Nick Stover is a 28 y.o. female  . HPI     Past Medical History:   Diagnosis Date   • Bipolar 1 disorder ([address])    • Cancer ([address])     thyroid   • Depression    • Schizoaffective disorder, bipolar type ([address])    • Suicidal ideations    • Thyroid disease        Past Surgical History:   Procedure Laterality Date   • IR LUMBAR PUNCTURE  3/9/2023   • NECK SURGERY     • US GUIDED THYROID BIOPSY      normal. Onset: 2012       Current Outpatient Medications   Medication Sig Dispense Refill   • acetaZOLAMIDE (DIAMOX) 250 mg tablet TAKE 2 TABLETS BY MOUTH 2 TIMES A DAY.  360 tablet 2   • clonazePAM (KlonoPIN) 0.5 mg tablet Take 1 tablet (0.5 mg total) by mouth 2 (two) times a day 60 tablet 3   • ergocalciferol (VITAMIN D2) 50,000 units Take 50,000 Units by mouth once a week     • FLUoxetine (PROzac) 40 MG capsule Take 1 capsule (40 mg total) by mouth daily 90 capsule 1   • gabapentin (NEURONTIN) 300 mg capsule Take 300 mg by mouth 3 (three) times a day     • levothyroxine 300 MCG tablet Take 300 mcg by mouth daily     • Tess 0.25-35 MG-MCG per tablet Take 1 tablet by mouth daily     • OLANZapine (ZyPREXA) 20 MG tablet Take 1 tablet (20 mg total) by mouth daily at bedtime 90 tablet 1   • OLANZapine (ZyPREXA) 5 mg tablet Take 1 tablet (5 mg total) by mouth daily at bedtime 90 tablet 1   • phentermine 15 MG capsule Take 1 capsule by mouth Daily     • prazosin (MINIPRESS) 2 mg capsule Take 1 capsule (2 mg total) by mouth daily at bedtime 90 capsule 1   • traZODone (DESYREL) 150 mg tablet Take 0.5 tablets (75 mg total) by mouth daily at bedtime 45 tablet 1     No current facility-administered medications for this visit. No Known Allergies    Review of Systems    Video Exam    There were no vitals filed for this visit.     Physical Exam

## 2023-10-04 ENCOUNTER — TELEMEDICINE (OUTPATIENT)
Dept: BEHAVIORAL/MENTAL HEALTH CLINIC | Facility: CLINIC | Age: 33
End: 2023-10-04
Payer: COMMERCIAL

## 2023-10-04 DIAGNOSIS — F41.1 GAD (GENERALIZED ANXIETY DISORDER): ICD-10-CM

## 2023-10-04 DIAGNOSIS — F43.10 POST TRAUMATIC STRESS DISORDER (PTSD): ICD-10-CM

## 2023-10-04 DIAGNOSIS — F25.0 SCHIZOAFFECTIVE DISORDER, BIPOLAR TYPE (HCC): Primary | ICD-10-CM

## 2023-10-04 PROCEDURE — 90834 PSYTX W PT 45 MINUTES: CPT | Performed by: SOCIAL WORKER

## 2023-10-04 NOTE — PSYCH
Behavioral Health Psychotherapy Progress Note    Psychotherapy Provided: Individual Psychotherapy     1. Schizoaffective disorder, bipolar type ([address])        2. GOOD (generalized anxiety disorder)        3. Post traumatic stress disorder (PTSD)            Goals addressed in session: Goal 1, I am in "rrr" mode because I am stressed out. DATA: JUDY met with Janes  for session. Her work has decided not to strike. They are to vote no on the contract and ratify it. She had to do a training at work yesterday and today on team building. She listed all the things she doesn't like. "She doesn't like a lot of things."  She can't decompress at home because home is "filled with mother."  She had been very stressed out at work with worry about striking. Her father is at her house now also and he is a "buffer."  Last month was her anniversary of 3 yrs stable, no hospitalizations. Discussed is it her job she doesn't like or would it be any job she doesn't like. It "would be any job."  She has no life. She works, goes home, eats dinner, takes her night meds and is in bed by 6:00pm.  Discussed things to 'get a life."  Not take her night meds until 7:30pm and be in bed by 8:00 pm.  She is up at 4:00 for work. Go out for a walk after work. Go out on a date with girlfriend - not dinner at dinner time but out to get something to eat, somewhere even if it is going to [employer] for a treat. During this session, this clinician used the following therapeutic modalities: Client-centered Therapy, Cognitive Behavioral Therapy, Solution-Focused Therapy and Supportive Psychotherapy is diagnosed with a co-occurring substance use disorder, please indicate any changes in the frequency or amount of use: none  Stage of change for addressing substance use diagnoses: Action    ASSESSMENT:  Nikki Rivers presents with a Euthymic/ normal mood.      her affect is Normal range and intensity, which is congruent, with her mood and the content of the session. The client has made progress on their goals. Loretta Bean presents with a none risk of suicide, none risk of self-harm, and none risk of harm to others. For any risk assessment that surpasses a "low" rating, a safety plan must be developed. A safety plan was indicated: no  If yes, describe in detail     PLAN: Between sessions, Loretta Bean will address tx plan goals. . At the next session, the therapist will use Client-centered Therapy, Cognitive Behavioral Therapy, Solution-Focused Therapy and Supportive Psychotherapy to address tx plan goals and issues that arise in between sessions. .    Behavioral Health Treatment Plan and Discharge Planning: Loretta Bean is aware of and agrees to continue to work on their treatment plan. They have identified and are working toward their discharge goals. yes    Visit start and stop times:    10/04/23  Start Time: 1728  Stop Time: 1810  Total Visit Time: 42 minutes    Virtual Regular Visit    Verification of patient location:    Patient is located at Home in the following state in which I hold an active license [place]      Assessment/Plan:    Problem List Items Addressed This Visit    None      Goals addressed in session: Goal 1          Reason for visit is   No chief complaint on file. Encounter provider Raghav Diaz    Provider located at [address] [place] [id number] [phone]      Recent Visits  No visits were found meeting these conditions. Showing recent visits within past 7 days and meeting all other requirements  Today's Visits  Date Type Provider Dept   10/04/23 [address] [place] today's visits and meeting all other requirements  Future Appointments  No visits were found meeting these conditions.   Showing future appointments within next 150 days and meeting all other requirements       The patient was identified by name and date of birth. Jean Palm was informed that this is a telemedicine visit and that the visit is being conducted throughthe [employer] platform. She agrees to proceed. .  My office door was closed. No one else was in the room. She acknowledged consent and understanding of privacy and security of the video platform. The patient has agreed to participate and understands they can discontinue the visit at any time. Patient is aware this is a billable service. Subjective  Jean Palm is a 28 y.o. female  . HPI     Past Medical History:   Diagnosis Date   • Bipolar 1 disorder ([address])    • Cancer ([address])     thyroid   • Depression    • Schizoaffective disorder, bipolar type ([address])    • Suicidal ideations    • Thyroid disease        Past Surgical History:   Procedure Laterality Date   • IR LUMBAR PUNCTURE  3/9/2023   • NECK SURGERY     • US GUIDED THYROID BIOPSY      normal. Onset: 2012       Current Outpatient Medications   Medication Sig Dispense Refill   • acetaZOLAMIDE (DIAMOX) 250 mg tablet TAKE 2 TABLETS BY MOUTH 2 TIMES A DAY.  360 tablet 2   • clonazePAM (KlonoPIN) 0.5 mg tablet Take 1 tablet (0.5 mg total) by mouth 2 (two) times a day 60 tablet 3   • ergocalciferol (VITAMIN D2) 50,000 units Take 50,000 Units by mouth once a week     • FLUoxetine (PROzac) 40 MG capsule Take 1 capsule (40 mg total) by mouth daily 90 capsule 1   • gabapentin (NEURONTIN) 300 mg capsule Take 300 mg by mouth 3 (three) times a day     • levothyroxine 300 MCG tablet Take 300 mcg by mouth daily     • Tess 0.25-35 MG-MCG per tablet Take 1 tablet by mouth daily     • OLANZapine (ZyPREXA) 20 MG tablet Take 1 tablet (20 mg total) by mouth daily at bedtime 90 tablet 1   • OLANZapine (ZyPREXA) 5 mg tablet Take 1 tablet (5 mg total) by mouth daily at bedtime 90 tablet 1   • phentermine 15 MG capsule Take 1 capsule by mouth Daily     • prazosin (MINIPRESS) 2 mg capsule Take 1 capsule (2 mg total) by mouth daily at bedtime 90 capsule 1   • traZODone (DESYREL) 150 mg tablet Take 0.5 tablets (75 mg total) by mouth daily at bedtime 45 tablet 1     No current facility-administered medications for this visit. No Known Allergies    Review of Systems    Video Exam    There were no vitals filed for this visit.     Physical Exam

## 2023-10-10 ENCOUNTER — TELEPHONE (OUTPATIENT)
Dept: PSYCHIATRY | Facility: CLINIC | Age: 33
End: 2023-10-10

## 2023-10-10 NOTE — TELEPHONE ENCOUNTER
Writer called and left message for patient to schedule with new provider Rohan Kelsey. Writer provided call back number.

## 2023-10-12 ENCOUNTER — TELEPHONE (OUTPATIENT)
Dept: PSYCHIATRY | Facility: CLINIC | Age: 33
End: 2023-10-12

## 2023-10-12 ENCOUNTER — TELEMEDICINE (OUTPATIENT)
Dept: BEHAVIORAL/MENTAL HEALTH CLINIC | Facility: CLINIC | Age: 33
End: 2023-10-12
Payer: COMMERCIAL

## 2023-10-12 DIAGNOSIS — F41.1 GAD (GENERALIZED ANXIETY DISORDER): ICD-10-CM

## 2023-10-12 DIAGNOSIS — F25.0 SCHIZOAFFECTIVE DISORDER, BIPOLAR TYPE (HCC): Primary | ICD-10-CM

## 2023-10-12 DIAGNOSIS — F43.10 POST TRAUMATIC STRESS DISORDER (PTSD): ICD-10-CM

## 2023-10-12 PROCEDURE — 90834 PSYTX W PT 45 MINUTES: CPT | Performed by: SOCIAL WORKER

## 2023-10-12 NOTE — TELEPHONE ENCOUNTER
Pt called and left a voicemail that she was able to take the 10/12/2023  appt at 5 pm. Writer was able to schedule this appt.

## 2023-10-12 NOTE — PSYCH
Behavioral Health Psychotherapy Progress Note    Psychotherapy Provided: Individual Psychotherapy     1. Schizoaffective disorder, bipolar type ([address])        2. Post traumatic stress disorder (PTSD)        3. GOOD (generalized anxiety disorder)            Goals addressed in session: Goal 1, I am in "rrr" mode because I am stressed out. DATA: JUDY met with Janes  for session. 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 She is unsure of when they will return back to work. Discussed the importance of her keeping a schedule and taking her meds. So far she is taking her meds. Discussed the consequences if she does not take them. She has an appt set up with her new Dr for med management. "Her mother drives her crazy and she thinks that is why she picks because it really bothers her. During this session, this clinician used the following therapeutic modalities: Client-centered Therapy, Cognitive Behavioral Therapy, Solution-Focused Therapy and Supportive Psychotherapy is diagnosed with a co-occurring substance use disorder, please indicate any changes in the frequency or amount of use: none  Stage of change for addressing substance use diagnoses: Action    ASSESSMENT:  Pradip Fuentes presents with a Euthymic/ normal mood. her affect is Normal range and intensity, which is congruent, with her mood and the content of the session. The client has made progress on their goals. Pradip Fuentes presents with a none risk of suicide, none risk of self-harm, and none risk of harm to others. For any risk assessment that surpasses a "low" rating, a safety plan must be developed. A safety plan was indicated: no  If yes, describe in detail     PLAN: Between sessions, Pradip Fuentes will address tx plan goals. . At the next session, the therapist will use Client-centered Therapy, Cognitive Behavioral Therapy, Solution-Focused Therapy and Supportive Psychotherapy to address tx plan goals and issues that arise in between sessions. .    Behavioral Health Treatment Plan and Discharge Planning: Darian Zapata is aware of and agrees to continue to work on their treatment plan. They have identified and are working toward their discharge goals. yes    Visit start and stop times:    10/12/23  Start Time: 5015  Stop Time: 1800  Total Visit Time: 47 minutes    Virtual Regular Visit    Verification of patient location:    Patient is located at Home in the following state in which I hold an active license [place]      Assessment/Plan:    Problem List Items Addressed This Visit          Other    Post traumatic stress disorder (PTSD)    Schizoaffective disorder, bipolar type ([address]) - Primary    GOOD (generalized anxiety disorder)       Goals addressed in session: Goal 1          Reason for visit is   No chief complaint on file. Encounter provider Elana Bliss    Provider located at [address] [place] [id number] [phone]      Recent Visits  No visits were found meeting these conditions. Showing recent visits within past 7 days and meeting all other requirements  Today's Visits  Date Type Provider Dept   10/12/23 2001 [facility] Therapist LAURA   10/12/23 Telephone 207 [facility] today's visits and meeting all other requirements  Future Appointments  No visits were found meeting these conditions. Showing future appointments within next 150 days and meeting all other requirements       The patient was identified by name and date of birth. Darian Zapata was informed that this is a telemedicine visit and that the visit is being conducted throughthe [employer] platform. She agrees to proceed. .  My office door was closed. No one else was in the room.   She acknowledged consent and understanding of privacy and security of the video platform. The patient has agreed to participate and understands they can discontinue the visit at any time. Patient is aware this is a billable service. Nick Terrazas is a 28 y.o. female  . HPI     Past Medical History:   Diagnosis Date    Bipolar 1 disorder ([address])     Cancer ([address])     thyroid    Depression     Schizoaffective disorder, bipolar type ([address])     Suicidal ideations     Thyroid disease        Past Surgical History:   Procedure Laterality Date    IR LUMBAR PUNCTURE  3/9/2023    NECK SURGERY      US GUIDED THYROID BIOPSY      normal. Onset: 2012       Current Outpatient Medications   Medication Sig Dispense Refill    acetaZOLAMIDE (DIAMOX) 250 mg tablet TAKE 2 TABLETS BY MOUTH 2 TIMES A DAY. 360 tablet 2    clonazePAM (KlonoPIN) 0.5 mg tablet Take 1 tablet (0.5 mg total) by mouth 2 (two) times a day 60 tablet 3    ergocalciferol (VITAMIN D2) 50,000 units Take 50,000 Units by mouth once a week      FLUoxetine (PROzac) 40 MG capsule Take 1 capsule (40 mg total) by mouth daily 90 capsule 1    gabapentin (NEURONTIN) 300 mg capsule Take 300 mg by mouth 3 (three) times a day      levothyroxine 300 MCG tablet Take 300 mcg by mouth daily      Tess 0.25-35 MG-MCG per tablet Take 1 tablet by mouth daily      OLANZapine (ZyPREXA) 20 MG tablet Take 1 tablet (20 mg total) by mouth daily at bedtime 90 tablet 1    OLANZapine (ZyPREXA) 5 mg tablet Take 1 tablet (5 mg total) by mouth daily at bedtime 90 tablet 1    phentermine 15 MG capsule Take 1 capsule by mouth Daily      prazosin (MINIPRESS) 2 mg capsule Take 1 capsule (2 mg total) by mouth daily at bedtime 90 capsule 1    traZODone (DESYREL) 150 mg tablet Take 0.5 tablets (75 mg total) by mouth daily at bedtime 45 tablet 1     No current facility-administered medications for this visit. No Known Allergies    Review of Systems    Video Exam    There were no vitals filed for this visit.     Physical Exam

## 2023-10-12 NOTE — TELEPHONE ENCOUNTER
Writer GEORGES offering 10/12 appointment at [address] [place] [address]. Please schedule if patient is able to make that day and time.  Thank you

## 2023-10-23 ENCOUNTER — TELEPHONE (OUTPATIENT)
Dept: NEUROLOGY | Facility: CLINIC | Age: 33
End: 2023-10-23

## 2023-10-23 NOTE — TELEPHONE ENCOUNTER
Spoke to patient, I confirmed their upcoming appointment. Patient was informed of date/time and location of visit. Pt would like to reschedule.

## 2023-10-24 ENCOUNTER — TELEMEDICINE (OUTPATIENT)
Dept: BEHAVIORAL/MENTAL HEALTH CLINIC | Facility: CLINIC | Age: 33
End: 2023-10-24
Payer: COMMERCIAL

## 2023-10-24 DIAGNOSIS — F43.10 POST TRAUMATIC STRESS DISORDER (PTSD): ICD-10-CM

## 2023-10-24 DIAGNOSIS — F41.1 GAD (GENERALIZED ANXIETY DISORDER): ICD-10-CM

## 2023-10-24 DIAGNOSIS — F25.0 SCHIZOAFFECTIVE DISORDER, BIPOLAR TYPE (HCC): Primary | ICD-10-CM

## 2023-10-24 PROCEDURE — 90834 PSYTX W PT 45 MINUTES: CPT | Performed by: SOCIAL WORKER

## 2023-10-24 NOTE — PSYCH
Behavioral Health Psychotherapy Progress Note    Psychotherapy Provided: Individual Psychotherapy     1. Schizoaffective disorder, bipolar type ([address])        2. Post traumatic stress disorder (PTSD)        3. GOOD (generalized anxiety disorder)            Goals addressed in session: Goal 1,      DATA: JUDY met with Janes  for session. She continues to be laid off from work. She is setting her alarm as reminders to take her meds. She has been taking her meds. She has been spending her time sleeping and watching horror movies but is getting very board. "There is no progress that she knows of  regarding the strike."  She is doing ok financially  She worked over time a couple months before the strike knowing that they might strike and paid her bills a couple months ahead. Plus, her parents are staying with her and are helping her out. She is happy she is still stable with not working. She recognizes it is due to taking her meds. During this session, this clinician used the following therapeutic modalities: Client-centered Therapy, Cognitive Behavioral Therapy, Solution-Focused Therapy and Supportive Psychotherapy is diagnosed with a co-occurring substance use disorder, please indicate any changes in the frequency or amount of use: none  Stage of change for addressing substance use diagnoses: Action    ASSESSMENT:  Jose Avila presents with a Euthymic/ normal mood. her affect is Normal range and intensity, which is congruent, with her mood and the content of the session. The client has made progress on their goals. Jose Avila presents with a none risk of suicide, none risk of self-harm, and none risk of harm to others. For any risk assessment that surpasses a "low" rating, a safety plan must be developed. A safety plan was indicated: no  If yes, describe in detail     PLAN: Between sessions, Jose Avila will address tx plan goals. . At the next session, the therapist will use Client-centered Therapy, Cognitive Behavioral Therapy, Solution-Focused Therapy and Supportive Psychotherapy to address tx plan goals and issues that arise in between sessions. .    Behavioral Health Treatment Plan and Discharge Planning: Elizabeth Franco is aware of and agrees to continue to work on their treatment plan. They have identified and are working toward their discharge goals. yes    Visit start and stop times:    10/24/23  Start Time: 1712  Stop Time: 1800  Total Visit Time: 48 minutes    Virtual Regular Visit    Verification of patient location:    Patient is located at Home in the following state in which I hold an active license [place]      Assessment/Plan:    Problem List Items Addressed This Visit          Other    Post traumatic stress disorder (PTSD)    Schizoaffective disorder, bipolar type ([address]) - Primary    GOOD (generalized anxiety disorder)     Goals addressed in session: Goal 1          Reason for visit is   No chief complaint on file. Encounter provider Scarlett Mock    Provider located at [address] [place] [id number] [phone]      Recent Visits  No visits were found meeting these conditions. Showing recent visits within past 7 days and meeting all other requirements  Today's Visits  Date Type Provider Dept   10/24/23 [address] [place] [address],[address]   Showing today's visits and meeting all other requirements  Future Appointments  No visits were found meeting these conditions. Showing future appointments within next 150 days and meeting all other requirements       The patient was identified by name and date of birth. Elizabeth Franco was informed that this is a telemedicine visit and that the visit is being conducted throughthe [employer] platform. She agrees to proceed. .  My office door was closed. No one else was in the room.   She acknowledged consent and understanding of privacy and security of the video platform. The patient has agreed to participate and understands they can discontinue the visit at any time. Patient is aware this is a billable service. Nick Avila is a 28 y.o. female  . HPI     Past Medical History:   Diagnosis Date    Bipolar 1 disorder ([address])     Cancer ([address])     thyroid    Depression     Schizoaffective disorder, bipolar type ([address])     Suicidal ideations     Thyroid disease        Past Surgical History:   Procedure Laterality Date    IR LUMBAR PUNCTURE  3/9/2023    NECK SURGERY      US GUIDED THYROID BIOPSY      normal. Onset: 2012       Current Outpatient Medications   Medication Sig Dispense Refill    acetaZOLAMIDE (DIAMOX) 250 mg tablet TAKE 2 TABLETS BY MOUTH 2 TIMES A DAY. 360 tablet 2    clonazePAM (KlonoPIN) 0.5 mg tablet Take 1 tablet (0.5 mg total) by mouth 2 (two) times a day 60 tablet 3    ergocalciferol (VITAMIN D2) 50,000 units Take 50,000 Units by mouth once a week      FLUoxetine (PROzac) 40 MG capsule Take 1 capsule (40 mg total) by mouth daily 90 capsule 1    gabapentin (NEURONTIN) 300 mg capsule Take 300 mg by mouth 3 (three) times a day      levothyroxine 300 MCG tablet Take 300 mcg by mouth daily      Tess 0.25-35 MG-MCG per tablet Take 1 tablet by mouth daily      OLANZapine (ZyPREXA) 20 MG tablet Take 1 tablet (20 mg total) by mouth daily at bedtime 90 tablet 1    OLANZapine (ZyPREXA) 5 mg tablet Take 1 tablet (5 mg total) by mouth daily at bedtime 90 tablet 1    phentermine 15 MG capsule Take 1 capsule by mouth Daily      prazosin (MINIPRESS) 2 mg capsule Take 1 capsule (2 mg total) by mouth daily at bedtime 90 capsule 1    traZODone (DESYREL) 150 mg tablet Take 0.5 tablets (75 mg total) by mouth daily at bedtime 45 tablet 1     No current facility-administered medications for this visit.         No Known Allergies    Review of Systems    Video Exam    There were no vitals filed for this visit.     Physical Exam

## 2023-11-15 ENCOUNTER — TELEPHONE (OUTPATIENT)
Dept: PSYCHIATRY | Facility: CLINIC | Age: 33
End: 2023-11-15

## 2023-11-15 ENCOUNTER — TELEMEDICINE (OUTPATIENT)
Dept: BEHAVIORAL/MENTAL HEALTH CLINIC | Facility: CLINIC | Age: 33
End: 2023-11-15
Payer: COMMERCIAL

## 2023-11-15 DIAGNOSIS — F43.10 POST TRAUMATIC STRESS DISORDER (PTSD): ICD-10-CM

## 2023-11-15 DIAGNOSIS — F25.0 SCHIZOAFFECTIVE DISORDER, BIPOLAR TYPE (HCC): Primary | ICD-10-CM

## 2023-11-15 DIAGNOSIS — F41.1 GAD (GENERALIZED ANXIETY DISORDER): ICD-10-CM

## 2023-11-15 PROCEDURE — 90834 PSYTX W PT 45 MINUTES: CPT | Performed by: SOCIAL WORKER

## 2023-11-15 NOTE — TELEPHONE ENCOUNTER
Patient contacted the office to schedule a follow up visit with provider. Patient is now scheduled for 11/15/2023  at 4pm virtually.

## 2023-11-15 NOTE — TELEPHONE ENCOUNTER
Writer GEORGES to offer appointment for [email or web address] pm. Provider office to call back to schedule. Please assist upon return call.  Thank you

## 2023-11-24 ENCOUNTER — TELEMEDICINE (OUTPATIENT)
Dept: BEHAVIORAL/MENTAL HEALTH CLINIC | Facility: CLINIC | Age: 33
End: 2023-11-24
Payer: COMMERCIAL

## 2023-11-24 DIAGNOSIS — F43.10 POST TRAUMATIC STRESS DISORDER (PTSD): ICD-10-CM

## 2023-11-24 DIAGNOSIS — F25.0 SCHIZOAFFECTIVE DISORDER, BIPOLAR TYPE (HCC): Primary | ICD-10-CM

## 2023-11-24 DIAGNOSIS — F41.1 GAD (GENERALIZED ANXIETY DISORDER): ICD-10-CM

## 2023-11-24 PROCEDURE — 90834 PSYTX W PT 45 MINUTES: CPT | Performed by: SOCIAL WORKER

## 2023-11-24 NOTE — PSYCH
Behavioral Health Psychotherapy Progress Note    Psychotherapy Provided: Individual Psychotherapy     1. Schizoaffective disorder, bipolar type ([address])        2. GOOD (generalized anxiety disorder)        3. Post traumatic stress disorder (PTSD)            Goals addressed in session: Goal 1,      DATA: JUDY met with Janes  for session. She was on strike for 6 weeks. She returned on Monday and is currently off for the holiday. Due to them being out they have a great deal of OT they are mandated to work. "It only took 2 days for everything to go back to normal at work."  During session JUDY's computer went down and was unable to complete the updated tx plan. Will do next session. With her leisure time when not working she began reading. She used to be an avid reader. The books she chose to read where horror books. Some of them were very gruesome. Discussed maybe reading some other books. While out for the 6 weeks she kept up with taking her meds. During this session, this clinician used the following therapeutic modalities: Client-centered Therapy, Cognitive Behavioral Therapy, Solution-Focused Therapy and Supportive Psychotherapy is diagnosed with a co-occurring substance use disorder, please indicate any changes in the frequency or amount of use: none  Stage of change for addressing substance use diagnoses: Action    ASSESSMENT:  Denise Higginbotham presents with a Euthymic/ normal mood. her affect is Normal range and intensity, which is congruent, with her mood and the content of the session. The client has made progress on their goals. Denise Higginbotham presents with a none risk of suicide, none risk of self-harm, and none risk of harm to others. For any risk assessment that surpasses a "low" rating, a safety plan must be developed. A safety plan was indicated: no  If yes, describe in detail     PLAN: Between sessions, Denise Higginbotham will address tx plan goals. . At the next session, the therapist will use Client-centered Therapy, Cognitive Behavioral Therapy, Solution-Focused Therapy and Supportive Psychotherapy to address tx plan goals and issues that arise in between sessions. .    Behavioral Health Treatment Plan and Discharge Planning: Darian Zapata is aware of and agrees to continue to work on their treatment plan. They have identified and are working toward their discharge goals. yes    Visit start and stop times:    11/24/23  Start Time: 6734  Stop Time: 1800  Total Visit Time: 45 minutes    Virtual Regular Visit    Verification of patient location:    Patient is located at Home in the following state in which I hold an active license [place]      Assessment/Plan:    Problem List Items Addressed This Visit       Post traumatic stress disorder (PTSD)    Schizoaffective disorder, bipolar type ([address]) - Primary    GOOD (generalized anxiety disorder)   Goals addressed in session: Goal 1          Reason for visit is   No chief complaint on file. Encounter provider Elana Bliss    Provider located at [address] [place] [address] Road [id number] [phone]      Recent Visits  No visits were found meeting these conditions. Showing recent visits within past 7 days and meeting all other requirements  Today's Visits  Date Type Provider Dept   11/24/23 [id number] [place] today's visits and meeting all other requirements  Future Appointments  No visits were found meeting these conditions. Showing future appointments within next 150 days and meeting all other requirements       The patient was identified by name and date of birth. Darian Zapata was informed that this is a telemedicine visit and that the visit is being conducted throughthe [employer] platform. She agrees to proceed. .  My office door was closed. No one else was in the room. She acknowledged consent and understanding of privacy and security of the video platform. The patient has agreed to participate and understands they can discontinue the visit at any time. Patient is aware this is a billable service. Subjective  Benny Navarro is a 28 y.o. female  . HPI     Past Medical History:   Diagnosis Date    Bipolar 1 disorder ([address])     Cancer ([address])     thyroid    Depression     Schizoaffective disorder, bipolar type ([address])     Suicidal ideations     Thyroid disease        Past Surgical History:   Procedure Laterality Date    IR LUMBAR PUNCTURE  3/9/2023    NECK SURGERY      US GUIDED THYROID BIOPSY      normal. Onset: 2012       Current Outpatient Medications   Medication Sig Dispense Refill    acetaZOLAMIDE (DIAMOX) 250 mg tablet TAKE 2 TABLETS BY MOUTH 2 TIMES A DAY. 360 tablet 2    clonazePAM (KlonoPIN) 0.5 mg tablet Take 1 tablet (0.5 mg total) by mouth 2 (two) times a day 60 tablet 3    ergocalciferol (VITAMIN D2) 50,000 units Take 50,000 Units by mouth once a week      FLUoxetine (PROzac) 40 MG capsule Take 1 capsule (40 mg total) by mouth daily 90 capsule 1    gabapentin (NEURONTIN) 300 mg capsule Take 300 mg by mouth 3 (three) times a day      levothyroxine 300 MCG tablet Take 300 mcg by mouth daily      Tess 0.25-35 MG-MCG per tablet Take 1 tablet by mouth daily      OLANZapine (ZyPREXA) 20 MG tablet Take 1 tablet (20 mg total) by mouth daily at bedtime 90 tablet 1    OLANZapine (ZyPREXA) 5 mg tablet Take 1 tablet (5 mg total) by mouth daily at bedtime 90 tablet 1    phentermine 15 MG capsule Take 1 capsule by mouth Daily      prazosin (MINIPRESS) 2 mg capsule Take 1 capsule (2 mg total) by mouth daily at bedtime 90 capsule 1    traZODone (DESYREL) 150 mg tablet Take 0.5 tablets (75 mg total) by mouth daily at bedtime 45 tablet 1     No current facility-administered medications for this visit.         No Known Allergies    Review of Systems    Video Exam    There were no vitals filed for this visit.     Physical Exam

## 2023-11-24 NOTE — PSYCH
Treatment Plan Tracking    # 0Treatment Plan not completed within required time limits due to:  MSW's computer shut down during session. Unable to develop tx plan .

## 2023-12-06 ENCOUNTER — TELEMEDICINE (OUTPATIENT)
Dept: BEHAVIORAL/MENTAL HEALTH CLINIC | Facility: CLINIC | Age: 33
End: 2023-12-06
Payer: COMMERCIAL

## 2023-12-06 DIAGNOSIS — F43.10 POST TRAUMATIC STRESS DISORDER (PTSD): ICD-10-CM

## 2023-12-06 DIAGNOSIS — F41.1 GAD (GENERALIZED ANXIETY DISORDER): ICD-10-CM

## 2023-12-06 DIAGNOSIS — F25.0 SCHIZOAFFECTIVE DISORDER, BIPOLAR TYPE (HCC): Primary | ICD-10-CM

## 2023-12-06 PROCEDURE — 90837 PSYTX W PT 60 MINUTES: CPT | Performed by: SOCIAL WORKER

## 2023-12-06 NOTE — PSYCH
Behavioral Health Psychotherapy Progress Note    Psychotherapy Provided: Individual Psychotherapy     1. Schizoaffective disorder, bipolar type ([address])        2. GODO (generalized anxiety disorder)        3. Post traumatic stress disorder (PTSD)            Goals addressed in session: Goal 1,      DATA: JUDY met with Janes  for session. She stopped taking her Zyprexa on Oct 9th. MSW confronted her on not being truthful when MSW asked her if she was taking her meds when she was laid off. She is taking double the dose of a "sleep aid" med at night to sleep. Since not taking it she has lost 30 lbs which is the reason why she stopped taking it. With being laid off she was afraid of how much weight she would gain. She does not like how she felt on it. She "was starving all the time, she had no motivation. She didn't tell anyone. She was afraid to tell MW."  Discussed that last statement. She only told her girlfriend because people were asking her how was she losing so much weight. MSW suggested don't stop taking her meds to talk to the Dr first.  In her defence she was between drs. She is taking all her other meds. Discussed the consequences of her not taking an antipsychotic. MSW bumped her next appt up to next Fri and she sees the Dr 4 days after that. MSW recommended she be honest with the Dr.        During this session, this clinician used the following therapeutic modalities: Client-centered Therapy, Cognitive Behavioral Therapy, Solution-Focused Therapy and Supportive Psychotherapy is diagnosed with a co-occurring substance use disorder, please indicate any changes in the frequency or amount of use: none  Stage of change for addressing substance use diagnoses: Action    ASSESSMENT:  Medina Nesbitt presents with a Euthymic/ normal mood. her affect is Normal range and intensity, which is congruent, with her mood and the content of the session. The client has made progress on their goals. Patient Education        Urinary Tract Infection in Women: Care Instructions  Your Care Instructions    A urinary tract infection, or UTI, is a general term for an infection anywhere between the kidneys and the urethra (where urine comes out). Most UTIs are bladder infections. They often cause pain or burning when you urinate. UTIs are caused by bacteria and can be cured with antibiotics. Be sure to complete your treatment so that the infection goes away. Follow-up care is a key part of your treatment and safety. Be sure to make and go to all appointments, and call your doctor if you are having problems. It's also a good idea to know your test results and keep a list of the medicines you take. How can you care for yourself at home? · Take your antibiotics as directed. Do not stop taking them just because you feel better. You need to take the full course of antibiotics. · Drink extra water and other fluids for the next day or two. This may help wash out the bacteria that are causing the infection. (If you have kidney, heart, or liver disease and have to limit fluids, talk with your doctor before you increase your fluid intake.)  · Avoid drinks that are carbonated or have caffeine. They can irritate the bladder. · Urinate often. Try to empty your bladder each time. · To relieve pain, take a hot bath or lay a heating pad set on low over your lower belly or genital area. Never go to sleep with a heating pad in place. To prevent UTIs  · Drink plenty of water each day. This helps you urinate often, which clears bacteria from your system. (If you have kidney, heart, or liver disease and have to limit fluids, talk with your doctor before you increase your fluid intake.)  · Urinate when you need to. · Urinate right after you have sex. · Change sanitary pads often. · Avoid douches, bubble baths, feminine hygiene sprays, and other feminine hygiene products that have deodorants.   · After going to the bathroom, wipe Carlos Delacruz presents with a none risk of suicide, none risk of self-harm, and none risk of harm to others. For any risk assessment that surpasses a "low" rating, a safety plan must be developed. A safety plan was indicated: no  If yes, describe in detail     PLAN: Between sessions, Carlos Delacruz will address tx plan goals. . At the next session, the therapist will use Client-centered Therapy, Cognitive Behavioral Therapy, Solution-Focused Therapy and Supportive Psychotherapy to address tx plan goals and issues that arise in between sessions. .    Behavioral Health Treatment Plan and Discharge Planning: Carlos Delacruz is aware of and agrees to continue to work on their treatment plan. They have identified and are working toward their discharge goals. yes    Visit start and stop times:    12/06/23  Start Time: 8471  Stop Time: 1812  Total Visit Time: 60 minutes    Virtual Regular Visit    Verification of patient location:    Patient is located at Home in the following state in which I hold an active license [place]      Assessment/Plan:    Problem List Items Addressed This Visit       Post traumatic stress disorder (PTSD)    Schizoaffective disorder, bipolar type ([address]) - Primary    GOOD (generalized anxiety disorder)   Goals addressed in session: Goal 1          Reason for visit is   No chief complaint on file. Encounter provider Charles Mendoza    Provider located at [address] [place] [id number] [phone]      Recent Visits  No visits were found meeting these conditions.   Showing recent visits within past 7 days and meeting all other requirements  Today's Visits  Date Type Provider Dept   12/06/23 [id number] [facility] [place] today's visits and meeting all other requirements  Future Appointments  No visits were found meeting these from front to back. When should you call for help? Call your doctor now or seek immediate medical care if:    · Symptoms such as fever, chills, nausea, or vomiting get worse or appear for the first time.     · You have new pain in your back just below your rib cage. This is called flank pain.     · There is new blood or pus in your urine.     · You have any problems with your antibiotic medicine.    Watch closely for changes in your health, and be sure to contact your doctor if:    · You are not getting better after taking an antibiotic for 2 days.     · Your symptoms go away but then come back. Where can you learn more? Go to http://crispin-francisco.info/. Enter T907 in the search box to learn more about \"Urinary Tract Infection in Women: Care Instructions. \"  Current as of: December 19, 2018  Content Version: 12.2  © 5774-5675 [employer]. Care instructions adapted under license by [employer] (which disclaims liability or warranty for this information). If you have questions about a medical condition or this instruction, always ask your healthcare professional. [address] any warranty or liability for your use of this information. conditions. Showing future appointments within next 150 days and meeting all other requirements       The patient was identified by name and date of birth. Medina Nesbitt was informed that this is a telemedicine visit and that the visit is being conducted throughthe [employer] platform. She agrees to proceed. .  My office door was closed. No one else was in the room. She acknowledged consent and understanding of privacy and security of the video platform. The patient has agreed to participate and understands they can discontinue the visit at any time. Patient is aware this is a billable service. Subjective  Medina Nesbitt is a 28 y.o. female  . HPI     Past Medical History:   Diagnosis Date    Bipolar 1 disorder ([address])     Cancer ([address])     thyroid    Depression     Schizoaffective disorder, bipolar type ([address])     Suicidal ideations     Thyroid disease        Past Surgical History:   Procedure Laterality Date    IR LUMBAR PUNCTURE  3/9/2023    NECK SURGERY      US GUIDED THYROID BIOPSY      normal. Onset: 2012       Current Outpatient Medications   Medication Sig Dispense Refill    acetaZOLAMIDE (DIAMOX) 250 mg tablet TAKE 2 TABLETS BY MOUTH 2 TIMES A DAY.  360 tablet 2    clonazePAM (KlonoPIN) 0.5 mg tablet Take 1 tablet (0.5 mg total) by mouth 2 (two) times a day 60 tablet 3    ergocalciferol (VITAMIN D2) 50,000 units Take 50,000 Units by mouth once a week      FLUoxetine (PROzac) 40 MG capsule Take 1 capsule (40 mg total) by mouth daily 90 capsule 1    gabapentin (NEURONTIN) 300 mg capsule Take 300 mg by mouth 3 (three) times a day      levothyroxine 300 MCG tablet Take 300 mcg by mouth daily      Tess 0.25-35 MG-MCG per tablet Take 1 tablet by mouth daily      OLANZapine (ZyPREXA) 20 MG tablet Take 1 tablet (20 mg total) by mouth daily at bedtime 90 tablet 1    OLANZapine (ZyPREXA) 5 mg tablet Take 1 tablet (5 mg total) by mouth daily at bedtime 90 tablet 1    phentermine 15 MG capsule Take 1 capsule by mouth Daily      prazosin (MINIPRESS) 2 mg capsule Take 1 capsule (2 mg total) by mouth daily at bedtime 90 capsule 1    traZODone (DESYREL) 150 mg tablet Take 0.5 tablets (75 mg total) by mouth daily at bedtime 45 tablet 1     No current facility-administered medications for this visit. No Known Allergies    Review of Systems    Video Exam    There were no vitals filed for this visit.     Physical Exam

## 2023-12-15 ENCOUNTER — TELEMEDICINE (OUTPATIENT)
Dept: BEHAVIORAL/MENTAL HEALTH CLINIC | Facility: CLINIC | Age: 33
End: 2023-12-15
Payer: COMMERCIAL

## 2023-12-15 DIAGNOSIS — F41.1 GAD (GENERALIZED ANXIETY DISORDER): ICD-10-CM

## 2023-12-15 DIAGNOSIS — F25.0 SCHIZOAFFECTIVE DISORDER, BIPOLAR TYPE (HCC): Primary | ICD-10-CM

## 2023-12-15 DIAGNOSIS — F43.10 POST TRAUMATIC STRESS DISORDER (PTSD): ICD-10-CM

## 2023-12-15 PROCEDURE — 90834 PSYTX W PT 45 MINUTES: CPT | Performed by: SOCIAL WORKER

## 2023-12-15 NOTE — PSYCH
Behavioral Health Psychotherapy Progress Note    Psychotherapy Provided: Individual Psychotherapy     1. Schizoaffective disorder, bipolar type ([address])        2. GOOD (generalized anxiety disorder)        3. Post traumatic stress disorder (PTSD)              Goals addressed in session: Goal 1,      DATA: JUDY met with Janes  for session. She has been working 14 hrs of OT the past 2 weeks. The OT is mandatory due to them going on strike. She is very tired. She has to wake up at 2:30 am to go in the morning for the mandatory OT. She also works 8 hrs on [employer]. Discussed this is not good for her. She needs to sleep. She has been having trouble sleeping due to not taking the Zyprexa. Reviewed her meds. She has also not been taking the Klonipin at night. MSW recommended she follow the Dr's orders and at least take the Klonipin at night. She sees the new Dr on [address]. If she needs a letter to call. She has lost 35 lbs since stopping the Zyprexa. 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 During this session, this clinician used the following therapeutic modalities: Client-centered Therapy, Cognitive Behavioral Therapy, Solution-Focused Therapy and Supportive Psychotherapy is diagnosed with a co-occurring substance use disorder, please indicate any changes in the frequency or amount of use: none  Stage of change for addressing substance use diagnoses: Action    ASSESSMENT:  Yris Miguel presents with a Euthymic/ normal mood. her affect is Normal range and intensity, which is congruent, with her mood and the content of the session. The client has made progress on their goals. Yris Miguel presents with a none risk of suicide, none risk of self-harm, and none risk of harm to others. For any risk assessment that surpasses a "low" rating, a safety plan must be developed.     A safety plan was indicated: no  If yes, describe in detail     PLAN: Between sessions, Aleksandr Pinto will address tx plan goals. . At the next session, the therapist will use Client-centered Therapy, Cognitive Behavioral Therapy, Solution-Focused Therapy and Supportive Psychotherapy to address tx plan goals and issues that arise in between sessions. .    Behavioral Health Treatment Plan and Discharge Planning: Aleksandr Pinto is aware of and agrees to continue to work on their treatment plan. They have identified and are working toward their discharge goals. yes    Visit start and stop times:    12/15/23  Start Time: 6016  Stop Time: 1800  Total Visit Time: 47 minutes    Virtual Regular Visit    Verification of patient location:    Patient is located at Home in the following state in which I hold an active license [place]      Assessment/Plan:    Problem List Items Addressed This Visit    None    Goals addressed in session: Goal 1          Reason for visit is   No chief complaint on file. Encounter provider Leona Ruiz    Provider located at [address] [place] [id number] [phone]      Recent Visits  No visits were found meeting these conditions. Showing recent visits within past 7 days and meeting all other requirements  Today's Visits  Date Type Provider Dept   12/15/23 [id number] Tamiko Ross today's visits and meeting all other requirements  Future Appointments  No visits were found meeting these conditions. Showing future appointments within next 150 days and meeting all other requirements       The patient was identified by name and date of birth. Aleksandr Pinto was informed that this is a telemedicine visit and that the visit is being conducted throughthe [employer] platform. She agrees to proceed. .  My office door was closed. No one else was in the room.   She acknowledged consent and understanding of privacy and security of the video platform. The patient has agreed to participate and understands they can discontinue the visit at any time. Patient is aware this is a billable service. Nick Terrazas is a 28 y.o. female  . HPI     Past Medical History:   Diagnosis Date    Bipolar 1 disorder ([address])     Cancer ([address])     thyroid    Depression     Schizoaffective disorder, bipolar type ([address])     Suicidal ideations     Thyroid disease        Past Surgical History:   Procedure Laterality Date    IR LUMBAR PUNCTURE  3/9/2023    NECK SURGERY      US GUIDED THYROID BIOPSY      normal. Onset: 2012       Current Outpatient Medications   Medication Sig Dispense Refill    acetaZOLAMIDE (DIAMOX) 250 mg tablet TAKE 2 TABLETS BY MOUTH 2 TIMES A DAY. 360 tablet 2    clonazePAM (KlonoPIN) 0.5 mg tablet Take 1 tablet (0.5 mg total) by mouth 2 (two) times a day 60 tablet 3    ergocalciferol (VITAMIN D2) 50,000 units Take 50,000 Units by mouth once a week      FLUoxetine (PROzac) 40 MG capsule Take 1 capsule (40 mg total) by mouth daily 90 capsule 1    gabapentin (NEURONTIN) 300 mg capsule Take 300 mg by mouth 3 (three) times a day      levothyroxine 300 MCG tablet Take 300 mcg by mouth daily      Tess 0.25-35 MG-MCG per tablet Take 1 tablet by mouth daily      OLANZapine (ZyPREXA) 20 MG tablet Take 1 tablet (20 mg total) by mouth daily at bedtime 90 tablet 1    OLANZapine (ZyPREXA) 5 mg tablet Take 1 tablet (5 mg total) by mouth daily at bedtime 90 tablet 1    phentermine 15 MG capsule Take 1 capsule by mouth Daily      prazosin (MINIPRESS) 2 mg capsule Take 1 capsule (2 mg total) by mouth daily at bedtime 90 capsule 1    traZODone (DESYREL) 150 mg tablet Take 0.5 tablets (75 mg total) by mouth daily at bedtime 45 tablet 1     No current facility-administered medications for this visit. No Known Allergies    Review of Systems    Video Exam    There were no vitals filed for this visit.     Physical Exam

## 2023-12-19 ENCOUNTER — TELEMEDICINE (OUTPATIENT)
Dept: PSYCHIATRY | Facility: CLINIC | Age: 33
End: 2023-12-19
Payer: COMMERCIAL

## 2023-12-19 DIAGNOSIS — F31.9 BIPOLAR I DISORDER WITH MOOD-INCONGRUENT PSYCHOTIC FEATURES (HCC): Primary | ICD-10-CM

## 2023-12-19 DIAGNOSIS — F99 INSOMNIA DUE TO OTHER MENTAL DISORDER: ICD-10-CM

## 2023-12-19 DIAGNOSIS — F42.4 SKIN PICKING HABIT: ICD-10-CM

## 2023-12-19 DIAGNOSIS — F51.05 INSOMNIA DUE TO OTHER MENTAL DISORDER: ICD-10-CM

## 2023-12-19 DIAGNOSIS — F43.10 PTSD (POST-TRAUMATIC STRESS DISORDER): ICD-10-CM

## 2023-12-19 DIAGNOSIS — F25.0 SCHIZOAFFECTIVE DISORDER, BIPOLAR TYPE (HCC): ICD-10-CM

## 2023-12-19 DIAGNOSIS — F12.90 MARIJUANA USE: ICD-10-CM

## 2023-12-19 DIAGNOSIS — F41.1 GAD (GENERALIZED ANXIETY DISORDER): ICD-10-CM

## 2023-12-19 DIAGNOSIS — F43.10 POST TRAUMATIC STRESS DISORDER (PTSD): ICD-10-CM

## 2023-12-19 PROBLEM — E89.0 POSTOPERATIVE HYPOTHYROIDISM: Status: ACTIVE | Noted: 2019-03-13

## 2023-12-19 PROBLEM — Z85.850 HISTORY OF THYROID CANCER: Status: ACTIVE | Noted: 2019-03-13

## 2023-12-19 PROBLEM — Z91.148 NON COMPLIANCE W MEDICATION REGIMEN: Status: ACTIVE | Noted: 2020-09-18

## 2023-12-19 PROCEDURE — 90792 PSYCH DIAG EVAL W/MED SRVCS: CPT | Performed by: NURSE PRACTITIONER

## 2023-12-19 RX ORDER — CLONAZEPAM 0.5 MG/1
0.5 TABLET ORAL 3 TIMES DAILY
Qty: 90 TABLET | Refills: 3 | Status: SHIPPED | OUTPATIENT
Start: 2023-12-19

## 2023-12-19 RX ORDER — TRAZODONE HYDROCHLORIDE 150 MG/1
75 TABLET ORAL
Qty: 45 TABLET | Refills: 1 | Status: SHIPPED | OUTPATIENT
Start: 2023-12-19

## 2023-12-19 RX ORDER — LEVOTHYROXINE SODIUM 0.03 MG/1
25 TABLET ORAL DAILY
COMMUNITY
Start: 2023-11-20

## 2023-12-19 RX ORDER — ALBUTEROL SULFATE 90 UG/1
2 AEROSOL, METERED RESPIRATORY (INHALATION)
COMMUNITY
Start: 2023-11-20

## 2023-12-19 RX ORDER — CLONAZEPAM 0.5 MG/1
0.5 TABLET ORAL 3 TIMES DAILY
Qty: 60 TABLET | Refills: 3 | Status: SHIPPED | OUTPATIENT
Start: 2023-12-19 | End: 2023-12-19

## 2023-12-19 RX ORDER — OLANZAPINE 10 MG/1
10 TABLET ORAL
Qty: 30 TABLET | Refills: 3 | Status: SHIPPED | OUTPATIENT
Start: 2023-12-19

## 2023-12-19 RX ORDER — PRAZOSIN HYDROCHLORIDE 2 MG/1
2 CAPSULE ORAL
Qty: 90 CAPSULE | Refills: 1 | Status: SHIPPED | OUTPATIENT
Start: 2023-12-19

## 2023-12-19 RX ORDER — FLUOXETINE HYDROCHLORIDE 40 MG/1
40 CAPSULE ORAL DAILY
Qty: 90 CAPSULE | Refills: 1 | Status: SHIPPED | OUTPATIENT
Start: 2023-12-19

## 2023-12-19 NOTE — BH TREATMENT PLAN
TREATMENT PLAN (Medication Management Only)        [facility]    Name and Date of Birth:  Janes Locke 33 y.o. 1990  Date of Treatment Plan: December 19, 2023  Diagnosis/Diagnoses:    1. Bipolar I disorder with mood-incongruent psychotic features (HCC)    2. Skin picking habit    3. Schizoaffective disorder, bipolar type (HCC)    4. Post traumatic stress disorder (PTSD)    5. Marijuana use    6. Insomnia due to other mental disorder    7. GOOD (generalized anxiety disorder)    8. PTSD (post-traumatic stress disorder)      Strengths/Personal Resources for Self-Care: supportive family, supportive friends, ability to adapt to life changes, ability to communicate needs.  Area/Areas of need (in own words): anxiety symptoms  1. Long Term Goal: continue improvement in anxiety.  Target Date:6 months - 6/19/2024  Person/Persons responsible for completion of goal: Janes  2.  Short Term Objective (s) - How will we reach this goal?:   A. Provider new recommended medication/dosage changes and/or continue medication(s): continue current medications as prescribed Prozac, Zyprexa, Klonopin.  B. Keep all scheduled appointments.  C. Attend medication management appointments regularly.  Target Date:6 months - 6/19/2024  Person/Persons Responsible for Completion of Goal: Altheadorothy  Progress Towards Goals: progressing slowly  Treatment Modality: medication management every 2 months  Review due 180 days from date of this plan: 6 months - 6/19/2024  Expected length of service: maintenance  My Physician/PA/NP and I have developed this plan together and I agree to work on the goals and objectives. I understand the treatment goals that were developed for my treatment.
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
## 2023-12-26 ENCOUNTER — TELEMEDICINE (OUTPATIENT)
Dept: BEHAVIORAL/MENTAL HEALTH CLINIC | Facility: CLINIC | Age: 33
End: 2023-12-26
Payer: COMMERCIAL

## 2023-12-26 DIAGNOSIS — F31.9 BIPOLAR I DISORDER WITH MOOD-INCONGRUENT PSYCHOTIC FEATURES (HCC): Primary | ICD-10-CM

## 2023-12-26 DIAGNOSIS — F41.1 GAD (GENERALIZED ANXIETY DISORDER): ICD-10-CM

## 2023-12-26 DIAGNOSIS — F43.10 POST TRAUMATIC STRESS DISORDER (PTSD): ICD-10-CM

## 2023-12-26 PROCEDURE — 90834 PSYTX W PT 45 MINUTES: CPT | Performed by: SOCIAL WORKER

## 2023-12-26 NOTE — BH TREATMENT PLAN
"Outpatient Behavioral Health Psychotherapy Treatment Plan    Janes Locke  1990     Date of Initial Psychotherapy Assessment: 10/14/20  Date of Current Treatment Plan: 12/26/23  Treatment Plan Target Date: 6/24  Treatment Plan Expiration Date: 6/24    Diagnosis:   1. Bipolar I disorder with mood-incongruent psychotic features (HCC)        2. GOOD (generalized anxiety disorder)        3. Post traumatic stress disorder (PTSD)            Area(s) of Need:  I struggle with relationships.    Long Term Goal 1 (in the client's own words): I want to work on my relationship with others.    Stage of Change: Preparation    Target Date for completion: 6/24     Anticipated therapeutic modalities: CBT     People identified to complete this goal: Georgina      Objective 1:  I will take my meds.     I will \"not always look for the punch line.\"     I will be open to making new friends.     I will give people the benefit of the doubt.     I will have a couples session with girlfriend.     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     I will remember \"I made friends once I can do it again.\"          Long Term Goal 2 (in the client's own words):     Stage of Change: Preparation    Target Date for completion:      Anticipated therapeutic modalities:      People identified to complete this goal:       Objective 1: (identify the means of measuring success in meeting the objective):       Objective 2: (identify the means of measuring success in meeting the objective):      Long Term Goal 3 (in the client's own words):     Stage of Change: Preparation    Target Date for completion:      Anticipated therapeutic modalities:      People identified to complete this goal:       Objective 1: (identify the means of measuring success in meeting the objective):       Objective 2: (identify the means of measuring success in meeting the objective):      I am currently under the care of a [facility] " psychiatric provider: yes    My [facility]'s psychiatric provider is: Aixa Sue  I am currently taking psychiatric medications: Yes, as prescribed    I feel that I will be ready for discharge from mental health care when I reach the following (measurable goal/objective): when I reach my goals and nothing left to address on a tx plan.    For children and adults who have a legal guardian:   Has there been any change to custody orders and/or guardianship status? No. If yes, attach updated documentation.    I have updated my Crisis Plan and have been offered a copy of this plan    Behavioral Health Treatment Plan [facility]: Diagnosis and Treatment Plan explained to Janes Locke acknowledges an understanding of their diagnosis. Janes Locke agrees to this treatment plan.    I have been offered a copy of this Treatment Plan. yes

## 2023-12-26 NOTE — BH CRISIS PLAN
Client Name: Janes Locke       Client YOB: 1990  : 1990    Treatment Team (include name and contact information):     Psychotherapist: Billy Pablo LCSW    Psychiatrist: Yamilet Kwong   Release of information completed: [facility] Provider  Barak Orosco MD  [address] [facility] [address] [facility] [address]    Type of Plan   * Child plans (children 14 yo and younger) must be completed and signed by the child's legal guardian   * Plans for all individuals 15 yo and above must be signed by the client.     Plan Type: adolescent/adult (14 and over) Initial      My Personal Strengths are (in the client's own words):  I'm jovial, I hold full time job    The stressors and triggers that may put me at risk are:  people (describe - names, etc) mom    Coping skills I can use to keep myself calm and safe:  Call a friend or family member and Other (describe) video games    Coping skills/supports I can use to maintain abstinence from substance use:   Talking to Saroj and [employer]    The people that provide me with help and support: (Include name, contact, and how they can help)   Support person #1: Saroj    * Phone number: has #    * How can they help me? talking   Support person #2:brother    * Phone number: has #    * How can they help me? talking     Support person #3: parents    * Phone number: has #    * How can they help me? talking    In the past, the following has helped me in times of crisis:    Calling a friend and Other: video games      If it is an emergency and you need immediate help, call     If there is a possibility of danger to yourself or others, call the following crisis hotline resources:     Adult Crisis Numbers  Suicide Prevention Hotline - Dial   [facility]: [phone]  [facility]: [phone]  [facility]: [phone]  [facility]: [phone]  [facility]: [phone]  [facility]:  [phone]  [facility]: [phone]  [facility]: [phone] (daytime).       [phone] (after hours, weekends, holidays)     Child/Adolescent Crisis Numbers   [facility]: [phone]   [facility]: [phone]   [place]: [phone]   [place]: [phone]    Please note: Some [facility] do not have a separate number for Child/Adolescent specific crisis. If your county is not listed under Child/Adolescent, please call the adult number for your county     National Talk to Text Line   All Ages - [phone]    In the event your feelings become unmanageable, and you cannot reach your support system, you will call [phone] immediately or go to the nearest hospital emergency room.

## 2023-12-26 NOTE — PSYCH
"Behavioral Health Psychotherapy Progress Note    Psychotherapy Provided: Individual Psychotherapy     1. Bipolar I disorder with mood-incongruent psychotic features (HCC)        2. GOOD (generalized anxiety disorder)        3. Post traumatic stress disorder (PTSD)                Goals addressed in session: Goal 1,      DATA: JUDY met with Janes  for session.  She has lost 35 lbs total so far.  She met with her new psychiatrist.  \"She likes her.\"  Janes informed the Dr about the Zyprexa and her thoughts on it. The Dr increased Klonopin and also is questioning her dx of schizo affective disorder and change the dx to Bipolar 1.  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  Developed tx plan and crisis plan.      During this session, this clinician used the following therapeutic modalities: Client-centered Therapy, Cognitive Behavioral Therapy, Solution-Focused Therapy and Supportive Psychotherapy is diagnosed with a co-occurring substance use disorder, please indicate any changes in the frequency or amount of use: none  Stage of change for addressing substance use diagnoses: Action    ASSESSMENT:  Janes Locke presents with a Euthymic/ normal mood.     her affect is Normal range and intensity, which is congruent, with her mood and the content of the session. The client has made progress on their goals.     Janes Locke presents with a none risk of suicide, none risk of self-harm, and none risk of harm to others.    For any risk assessment that surpasses a \"low\" rating, a safety plan must be developed.    A safety plan was indicated: no  If yes, describe in detail     PLAN: Between sessions, Janes Locke will address tx plan goals.. At the next session, the therapist will use Client-centered Therapy, " Cognitive Behavioral Therapy, Solution-Focused Therapy and Supportive Psychotherapy to address tx plan goals and issues that arise in between sessions..    Behavioral Health Treatment Plan and Discharge Planning: Janes Locke is aware of and agrees to continue to work on their treatment plan. They have identified and are working toward their discharge goals. yes    Visit start and stop times:    12/26/23  Start Time: 0812  Stop Time: 0900  Total Visit Time: 48 minutes    Virtual Regular Visit    Verification of patient location:    Patient is located at Home in the following state in which I hold an active license [place]      Assessment/Plan:    Problem List Items Addressed This Visit    None    Goals addressed in session: Goal 1          Reason for visit is   No chief complaint on file.       Encounter provider Billy Pablo    Provider located at [facility] [place]  [facility] [place]  [address]  [place] [address] [phone]      Recent Visits  No visits were found meeting these conditions.  Showing recent visits within past 7 days and meeting all other requirements  Today's Visits  Date Type Provider Dept   12/26/23 Telemedicine [facility] [place]   Showing today's visits and meeting all other requirements  Future Appointments  No visits were found meeting these conditions.  Showing future appointments within next 150 days and meeting all other requirements       The patient was identified by name and date of birth. Janes Locke was informed that this is a telemedicine visit and that the visit is being conducted throughthe Venga platform. She agrees to proceed..  My office door was closed. No one else was in the room.  She acknowledged consent and understanding of privacy and security of the video platform. The patient has agreed to participate and understands they can discontinue the visit at any  time.    Patient is aware this is a billable service.     Subjective  Janes Locke is a 33 y.o. female  .      HPI     Past Medical History:   Diagnosis Date    Bipolar 1 disorder (HCC)     Cancer (HCC)     thyroid    Depression     Schizoaffective disorder, bipolar type (HCC)     Suicidal ideations     Thyroid disease        Past Surgical History:   Procedure Laterality Date    IR LUMBAR PUNCTURE  3/9/2023    NECK SURGERY      US GUIDED THYROID BIOPSY      normal. Onset: 2012       Current Outpatient Medications   Medication Sig Dispense Refill    acetaZOLAMIDE (DIAMOX) 250 mg tablet TAKE 2 TABLETS BY MOUTH 2 TIMES A DAY. 360 tablet 2    albuterol (PROVENTIL HFA,VENTOLIN HFA) 90 mcg/act inhaler Inhale 2 puffs every 4 - 6 hours as needed      Calcium Carbonate 1500 (600 Ca) MG TABS Take by mouth 2 (two) times a day      clonazePAM (KlonoPIN) 0.5 mg tablet Take 1 tablet (0.5 mg total) by mouth 3 (three) times a day 90 tablet 3    ergocalciferol (VITAMIN D2) 50,000 units Take 50,000 Units by mouth once a week      FLUoxetine (PROzac) 40 MG capsule Take 1 capsule (40 mg total) by mouth daily 90 capsule 1    gabapentin (NEURONTIN) 300 mg capsule Take 300 mg by mouth 3 (three) times a day      levothyroxine 25 mcg tablet Take 25 mcg by mouth daily      Tess 0.25-35 MG-MCG per tablet Take 1 tablet by mouth daily      OLANZapine (ZyPREXA) 10 mg tablet Take 1 tablet (10 mg total) by mouth daily at bedtime 30 tablet 3    phentermine 15 MG capsule Take 1 capsule by mouth Daily      prazosin (MINIPRESS) 2 mg capsule Take 1 capsule (2 mg total) by mouth daily at bedtime 90 capsule 1    traZODone (DESYREL) 150 mg tablet Take 0.5 tablets (75 mg total) by mouth daily at bedtime 45 tablet 1     No current facility-administered medications for this visit.        No Known Allergies    Review of Systems    Video Exam    There were no vitals filed for this visit.    Physical Exam

## 2024-01-05 ENCOUNTER — TELEMEDICINE (OUTPATIENT)
Dept: BEHAVIORAL/MENTAL HEALTH CLINIC | Facility: CLINIC | Age: 34
End: 2024-01-05
Payer: COMMERCIAL

## 2024-01-05 DIAGNOSIS — F31.9 BIPOLAR I DISORDER WITH MOOD-INCONGRUENT PSYCHOTIC FEATURES (HCC): Primary | ICD-10-CM

## 2024-01-05 DIAGNOSIS — F41.1 GAD (GENERALIZED ANXIETY DISORDER): ICD-10-CM

## 2024-01-05 DIAGNOSIS — F43.10 POST TRAUMATIC STRESS DISORDER (PTSD): ICD-10-CM

## 2024-01-05 PROCEDURE — 90847 FAMILY PSYTX W/PT 50 MIN: CPT | Performed by: SOCIAL WORKER

## 2024-01-05 NOTE — PSYCH
Behavioral Health Psychotherapy Progress Note    Psychotherapy Provided: Family Therapy    1. Bipolar I disorder with mood-incongruent psychotic features (HCC)        2. Post traumatic stress disorder (PTSD)        3. GOOD (generalized anxiety disorder)                  Goals addressed in session: Goal 1,      DATA: MSW met with Janes  and her (ex)girlfriend for a family session.    Prior to the start of session they began having relationship issues.  With the decrease in Zyprexa Janes began having a sex drive. On Fri Janes began texting a marry and was talking about sex.  Her girlfriend knew right away something was up and looked on her phone.  She saw the messages.  Her girlfriend brought up the topic of an open relationship.  Janes agreed.  From the start of the session her girlfriend was talking to Janes saying Janes doesn't want an open relationship now.  She joined the session and was very upset.  She did not want to break up but Janes wanted to.  Janes didn't want to hurt her.  Both said what they wanted but Janes is not sexually attacked to her and doesn't want to have a sexual relationship with a female.  Her girlfriend now ex girlfriend is very upset.   She has a traumatic past.  Denies SI.  Has insurance.  MSW encouraged her to start therapy. MSW informed pt to call 911 if pt does become suicidal.      During this session, this clinician used the following therapeutic modalities: Client-centered Therapy, Cognitive Behavioral Therapy, Solution-Focused Therapy and Supportive Psychotherapy is diagnosed with a co-occurring substance use disorder, please indicate any changes in the frequency or amount of use: none  Stage of change for addressing substance use diagnoses: Action    ASSESSMENT:  Janes Locke presents with a Euthymic/ normal mood.     her affect is Normal range and intensity, which is congruent, with her mood and the content of the session. The client has made progress on  "their goals.     Janes Locke presents with a none risk of suicide, none risk of self-harm, and none risk of harm to others.    For any risk assessment that surpasses a \"low\" rating, a safety plan must be developed.    A safety plan was indicated: no  If yes, describe in detail     PLAN: Between sessions, Janes Locke will address tx plan goals.. At the next session, the therapist will use Client-centered Therapy, Cognitive Behavioral Therapy, Solution-Focused Therapy and Supportive Psychotherapy to address tx plan goals and issues that arise in between sessions..    Behavioral Health Treatment Plan and Discharge Planning: Janes Locke is aware of and agrees to continue to work on their treatment plan. They have identified and are working toward their discharge goals. yes    Visit start and stop times:    01/05/24  Start Time: 1713  Stop Time: 1800  Total Visit Time: 47 minutes    Virtual Regular Visit    Verification of patient location:    Patient is located at Home in the following state in which I hold an active license [place]      Assessment/Plan:    Problem List Items Addressed This Visit    None    Goals addressed in session: Goal 1          Reason for visit is   No chief complaint on file.       Encounter provider Billy Pablo    Provider located at [facility] [place]  [facility] [place]  [address] [address] [phone]      Recent Visits  No visits were found meeting these conditions.  Showing recent visits within past 7 days and meeting all other requirements  Today's Visits  Date Type Provider Dept   01/05/24 Telemedicine [facility] [place]   Showing today's visits and meeting all other requirements  Future Appointments  No visits were found meeting these conditions.  Showing future appointments within next 150 days and meeting all other requirements       The patient was identified by " name and date of birth. Janes Locke was informed that this is a telemedicine visit and that the visit is being conducted throughthe Petflow platform. She agrees to proceed..  My office door was closed. No one else was in the room.  She acknowledged consent and understanding of privacy and security of the video platform. The patient has agreed to participate and understands they can discontinue the visit at any time.    Patient is aware this is a billable service.     Subjective  Janes Locke is a 33 y.o. female  .      HPI     Past Medical History:   Diagnosis Date    Bipolar 1 disorder (HCC)     Cancer (HCC)     thyroid    Depression     Schizoaffective disorder, bipolar type (HCC)     Suicidal ideations     Thyroid disease        Past Surgical History:   Procedure Laterality Date    IR LUMBAR PUNCTURE  3/9/2023    NECK SURGERY      US GUIDED THYROID BIOPSY      normal. Onset: 2012       Current Outpatient Medications   Medication Sig Dispense Refill    acetaZOLAMIDE (DIAMOX) 250 mg tablet TAKE 2 TABLETS BY MOUTH 2 TIMES A DAY. 360 tablet 2    albuterol (PROVENTIL HFA,VENTOLIN HFA) 90 mcg/act inhaler Inhale 2 puffs every 4 - 6 hours as needed      Calcium Carbonate 1500 (600 Ca) MG TABS Take by mouth 2 (two) times a day      clonazePAM (KlonoPIN) 0.5 mg tablet Take 1 tablet (0.5 mg total) by mouth 3 (three) times a day 90 tablet 3    ergocalciferol (VITAMIN D2) 50,000 units Take 50,000 Units by mouth once a week      FLUoxetine (PROzac) 40 MG capsule Take 1 capsule (40 mg total) by mouth daily 90 capsule 1    gabapentin (NEURONTIN) 300 mg capsule Take 300 mg by mouth 3 (three) times a day      levothyroxine 25 mcg tablet Take 25 mcg by mouth daily      Tess 0.25-35 MG-MCG per tablet Take 1 tablet by mouth daily      OLANZapine (ZyPREXA) 10 mg tablet Take 1 tablet (10 mg total) by mouth daily at bedtime 30 tablet 3    phentermine 15 MG capsule Take 1 capsule by mouth Daily      prazosin (MINIPRESS)  2 mg capsule Take 1 capsule (2 mg total) by mouth daily at bedtime 90 capsule 1    traZODone (DESYREL) 150 mg tablet Take 0.5 tablets (75 mg total) by mouth daily at bedtime 45 tablet 1     No current facility-administered medications for this visit.        No Known Allergies    Review of Systems    Video Exam    There were no vitals filed for this visit.    Physical Exam

## 2024-01-17 ENCOUNTER — TELEMEDICINE (OUTPATIENT)
Dept: BEHAVIORAL/MENTAL HEALTH CLINIC | Facility: CLINIC | Age: 34
End: 2024-01-17
Payer: COMMERCIAL

## 2024-01-17 DIAGNOSIS — F41.1 GAD (GENERALIZED ANXIETY DISORDER): ICD-10-CM

## 2024-01-17 DIAGNOSIS — F31.9 BIPOLAR I DISORDER WITH MOOD-INCONGRUENT PSYCHOTIC FEATURES (HCC): Primary | ICD-10-CM

## 2024-01-17 DIAGNOSIS — F43.10 POST TRAUMATIC STRESS DISORDER (PTSD): ICD-10-CM

## 2024-01-17 PROCEDURE — 90834 PSYTX W PT 45 MINUTES: CPT | Performed by: SOCIAL WORKER

## 2024-01-17 NOTE — PSYCH
"Behavioral Health Psychotherapy Progress Note    Psychotherapy Provided: Individual Psychotherapy     1. Bipolar I disorder with mood-incongruent psychotic features (HCC)        2. Post traumatic stress disorder (PTSD)        3. GOOD (generalized anxiety disorder)                    Goals addressed in session: Goal 1,      DATA: JUDY met with Janes vuong.    Discussed her new job which is very boring.  She is thinking of going to the gym because \"her skin is getting loose.\"  She has lost a total of 50 lbs.  She started journaling on January 5th.  Things with her exgirlfriend now just roommate are going ok.  Her ex was in the room as always but Janes didn't seem like she was uncomfortable talking.  She is sleeping ok  and waking up ok.  Discussed her meds.  She doesn't like being on Zyrexia due to the side effects but she named off other antipsychotics and their side effects which were worse than increased hunger.   JUDY messaged her in the chat saying that we need to meet alone so we freely talk.  She agrees.  Her dad is leaving next month.  Her mom will be here for another 5 months in order to get medical treatments.      During this session, this clinician used the following therapeutic modalities: Client-centered Therapy, Cognitive Behavioral Therapy, Solution-Focused Therapy and Supportive Psychotherapy is diagnosed with a co-occurring substance use disorder, please indicate any changes in the frequency or amount of use: none  Stage of change for addressing substance use diagnoses: Action    ASSESSMENT:  Janes Locke presents with a Euthymic/ normal mood.     her affect is Normal range and intensity, which is congruent, with her mood and the content of the session. The client has made progress on their goals.     Janes Locke presents with a none risk of suicide, none risk of self-harm, and none risk of harm to others.    For any risk assessment that surpasses a \"low\" rating, a safety plan must " be developed.    A safety plan was indicated: no  If yes, describe in detail     PLAN: Between sessions, Janes Locke will address tx plan goals.. At the next session, the therapist will use Client-centered Therapy, Cognitive Behavioral Therapy, Solution-Focused Therapy and Supportive Psychotherapy to address tx plan goals and issues that arise in between sessions..    Behavioral Health Treatment Plan and Discharge Planning: Janes Locke is aware of and agrees to continue to work on their treatment plan. They have identified and are working toward their discharge goals. yes    Visit start and stop times:    01/17/24  Start Time: 1713  Stop Time: 1800  Total Visit Time: 47 minutes    Virtual Regular Visit    Verification of patient location:    Patient is located at Home in the following state in which I hold an active license [place]      Assessment/Plan:    Problem List Items Addressed This Visit    None    Goals addressed in session: Goal 1          Reason for visit is   No chief complaint on file.       Encounter provider Billy Pablo    Provider located at [facility] [place]  [facility] [place]  [address] [address] [phone]      Recent Visits  No visits were found meeting these conditions.  Showing recent visits within past 7 days and meeting all other requirements  Today's Visits  Date Type Provider Dept   01/17/24 Telemedicine [facility] [place]   Showing today's visits and meeting all other requirements  Future Appointments  No visits were found meeting these conditions.  Showing future appointments within next 150 days and meeting all other requirements       The patient was identified by name and date of birth. Janes Locke was informed that this is a telemedicine visit and that the visit is being conducted throughthe Fix8 platform. She agrees to proceed..  My office door was  closed. No one else was in the room.  She acknowledged consent and understanding of privacy and security of the video platform. The patient has agreed to participate and understands they can discontinue the visit at any time.    Patient is aware this is a billable service.     Subjective  Janes Locke is a 33 y.o. female  .      HPI     Past Medical History:   Diagnosis Date    Bipolar 1 disorder (HCC)     Cancer (HCC)     thyroid    Depression     Schizoaffective disorder, bipolar type (HCC)     Suicidal ideations     Thyroid disease        Past Surgical History:   Procedure Laterality Date    IR LUMBAR PUNCTURE  3/9/2023    NECK SURGERY      US GUIDED THYROID BIOPSY      normal. Onset: 2012       Current Outpatient Medications   Medication Sig Dispense Refill    acetaZOLAMIDE (DIAMOX) 250 mg tablet TAKE 2 TABLETS BY MOUTH 2 TIMES A DAY. 360 tablet 2    albuterol (PROVENTIL HFA,VENTOLIN HFA) 90 mcg/act inhaler Inhale 2 puffs every 4 - 6 hours as needed      Calcium Carbonate 1500 (600 Ca) MG TABS Take by mouth 2 (two) times a day      clonazePAM (KlonoPIN) 0.5 mg tablet Take 1 tablet (0.5 mg total) by mouth 3 (three) times a day 90 tablet 3    ergocalciferol (VITAMIN D2) 50,000 units Take 50,000 Units by mouth once a week      FLUoxetine (PROzac) 40 MG capsule Take 1 capsule (40 mg total) by mouth daily 90 capsule 1    gabapentin (NEURONTIN) 300 mg capsule Take 300 mg by mouth 3 (three) times a day      levothyroxine 25 mcg tablet Take 25 mcg by mouth daily      Tess 0.25-35 MG-MCG per tablet Take 1 tablet by mouth daily      OLANZapine (ZyPREXA) 10 mg tablet Take 1 tablet (10 mg total) by mouth daily at bedtime 30 tablet 3    phentermine 15 MG capsule Take 1 capsule by mouth Daily      prazosin (MINIPRESS) 2 mg capsule Take 1 capsule (2 mg total) by mouth daily at bedtime 90 capsule 1    traZODone (DESYREL) 150 mg tablet Take 0.5 tablets (75 mg total) by mouth daily at bedtime 45 tablet 1     No current  facility-administered medications for this visit.        No Known Allergies    Review of Systems    Video Exam    There were no vitals filed for this visit.    Physical Exam

## 2024-01-30 ENCOUNTER — TELEMEDICINE (OUTPATIENT)
Dept: BEHAVIORAL/MENTAL HEALTH CLINIC | Facility: CLINIC | Age: 34
End: 2024-01-30
Payer: COMMERCIAL

## 2024-01-30 DIAGNOSIS — F31.9 BIPOLAR I DISORDER WITH MOOD-INCONGRUENT PSYCHOTIC FEATURES (HCC): Primary | ICD-10-CM

## 2024-01-30 DIAGNOSIS — F41.1 GAD (GENERALIZED ANXIETY DISORDER): ICD-10-CM

## 2024-01-30 DIAGNOSIS — F43.10 POST TRAUMATIC STRESS DISORDER (PTSD): ICD-10-CM

## 2024-01-30 PROCEDURE — 90837 PSYTX W PT 60 MINUTES: CPT | Performed by: SOCIAL WORKER

## 2024-01-30 NOTE — PSYCH
"Behavioral Health Psychotherapy Progress Note    Psychotherapy Provided: Individual Psychotherapy     1. Bipolar I disorder with mood-incongruent psychotic features (HCC)        2. GOOD (generalized anxiety disorder)        3. Post traumatic stress disorder (PTSD)                      Goals addressed in session: Goal 1,      DATA: JUDY met with Janes vuong.    Her dad is leaving to go back to [place] next month on the 24th.  Her mother will be staying.  \"Her ex girl friend is driving her crazy.  She is being passive aggressive all the time.\"  Work is ok.  She is not having to do OT.  She is getting into hobbies.  She is doing calligraphy.  Message the Dr that she is having trouble sleeping.  Coping skill suggested for stress is to start journaling.    During this session, this clinician used the following therapeutic modalities: Client-centered Therapy, Cognitive Behavioral Therapy, Solution-Focused Therapy and Supportive Psychotherapy is diagnosed with a co-occurring substance use disorder, please indicate any changes in the frequency or amount of use: none  Stage of change for addressing substance use diagnoses: Action    ASSESSMENT:  Janes Locke presents with a Euthymic/ normal mood.     her affect is Normal range and intensity, which is congruent, with her mood and the content of the session. The client has made progress on their goals.     Janes Locke presents with a none risk of suicide, none risk of self-harm, and none risk of harm to others.    For any risk assessment that surpasses a \"low\" rating, a safety plan must be developed.    A safety plan was indicated: no  If yes, describe in detail     PLAN: Between sessions, Janes Locke will address tx plan goals.. At the next session, the therapist will use Client-centered Therapy, Cognitive Behavioral Therapy, Solution-Focused Therapy and Supportive Psychotherapy to address tx plan goals and issues that arise in between " sessions..    Behavioral Health Treatment Plan and Discharge Planning: Janes Locke is aware of and agrees to continue to work on their treatment plan. They have identified and are working toward their discharge goals. yes    Visit start and stop times:    01/30/24       Virtual Regular Visit    Verification of patient location:    Patient is located at Home in the following state in which I hold an active license [place]      Assessment/Plan:    Problem List Items Addressed This Visit    None    Goals addressed in session: Goal 1          Reason for visit is   No chief complaint on file.       Encounter provider Billy Pablo    Provider located at [facility] [place]  [facility] [place]  [address]  [place] [address] [phone]      Recent Visits  No visits were found meeting these conditions.  Showing recent visits within past 7 days and meeting all other requirements  Future Appointments  No visits were found meeting these conditions.  Showing future appointments within next 150 days and meeting all other requirements       The patient was identified by name and date of birth. Janes Locke was informed that this is a telemedicine visit and that the visit is being conducted throughthe MedPro platform. She agrees to proceed..  My office door was closed. No one else was in the room.  She acknowledged consent and understanding of privacy and security of the video platform. The patient has agreed to participate and understands they can discontinue the visit at any time.    Patient is aware this is a billable service.     Subjective  Janes Locke is a 33 y.o. female  .      HPI     Past Medical History:   Diagnosis Date    Bipolar 1 disorder (HCC)     Cancer (HCC)     thyroid    Depression     Schizoaffective disorder, bipolar type (HCC)     Suicidal ideations     Thyroid disease        Past Surgical History:   Procedure Laterality Date     IR LUMBAR PUNCTURE  3/9/2023    NECK SURGERY      US GUIDED THYROID BIOPSY      normal. Onset: 2012       Current Outpatient Medications   Medication Sig Dispense Refill    acetaZOLAMIDE (DIAMOX) 250 mg tablet TAKE 2 TABLETS BY MOUTH 2 TIMES A DAY. 360 tablet 2    albuterol (PROVENTIL HFA,VENTOLIN HFA) 90 mcg/act inhaler Inhale 2 puffs every 4 - 6 hours as needed      Calcium Carbonate 1500 (600 Ca) MG TABS Take by mouth 2 (two) times a day      clonazePAM (KlonoPIN) 0.5 mg tablet Take 1 tablet (0.5 mg total) by mouth 3 (three) times a day 90 tablet 3    ergocalciferol (VITAMIN D2) 50,000 units Take 50,000 Units by mouth once a week      FLUoxetine (PROzac) 40 MG capsule Take 1 capsule (40 mg total) by mouth daily 90 capsule 1    gabapentin (NEURONTIN) 300 mg capsule Take 300 mg by mouth 3 (three) times a day      levothyroxine 25 mcg tablet Take 25 mcg by mouth daily      Tess 0.25-35 MG-MCG per tablet Take 1 tablet by mouth daily      OLANZapine (ZyPREXA) 10 mg tablet Take 1 tablet (10 mg total) by mouth daily at bedtime 30 tablet 3    phentermine 15 MG capsule Take 1 capsule by mouth Daily      prazosin (MINIPRESS) 2 mg capsule Take 1 capsule (2 mg total) by mouth daily at bedtime 90 capsule 1    traZODone (DESYREL) 150 mg tablet Take 0.5 tablets (75 mg total) by mouth daily at bedtime 45 tablet 1     No current facility-administered medications for this visit.        No Known Allergies    Review of Systems    Video Exam    There were no vitals filed for this visit.    Physical Exam

## 2024-02-16 ENCOUNTER — TELEMEDICINE (OUTPATIENT)
Dept: BEHAVIORAL/MENTAL HEALTH CLINIC | Facility: CLINIC | Age: 34
End: 2024-02-16
Payer: COMMERCIAL

## 2024-02-16 DIAGNOSIS — F31.9 BIPOLAR I DISORDER WITH MOOD-INCONGRUENT PSYCHOTIC FEATURES (HCC): Primary | ICD-10-CM

## 2024-02-16 DIAGNOSIS — F41.1 GAD (GENERALIZED ANXIETY DISORDER): ICD-10-CM

## 2024-02-16 DIAGNOSIS — F43.10 POST TRAUMATIC STRESS DISORDER (PTSD): ICD-10-CM

## 2024-02-16 PROCEDURE — 90834 PSYTX W PT 45 MINUTES: CPT | Performed by: SOCIAL WORKER

## 2024-02-19 ENCOUNTER — TELEPHONE (OUTPATIENT)
Dept: PSYCHIATRY | Facility: CLINIC | Age: 34
End: 2024-02-19

## 2024-02-19 NOTE — TELEPHONE ENCOUNTER
Writer called and left message to reschedule appointment on 2/20 at 4pm with Aixa Sue due to not being in the office.

## 2024-02-27 ENCOUNTER — TELEMEDICINE (OUTPATIENT)
Dept: BEHAVIORAL/MENTAL HEALTH CLINIC | Facility: CLINIC | Age: 34
End: 2024-02-27
Payer: COMMERCIAL

## 2024-02-27 DIAGNOSIS — F43.10 POST TRAUMATIC STRESS DISORDER (PTSD): ICD-10-CM

## 2024-02-27 DIAGNOSIS — F41.1 GAD (GENERALIZED ANXIETY DISORDER): ICD-10-CM

## 2024-02-27 DIAGNOSIS — F31.9 BIPOLAR I DISORDER WITH MOOD-INCONGRUENT PSYCHOTIC FEATURES (HCC): Primary | ICD-10-CM

## 2024-02-27 PROCEDURE — 90837 PSYTX W PT 60 MINUTES: CPT | Performed by: SOCIAL WORKER

## 2024-02-27 NOTE — PSYCH
"Behavioral Health Psychotherapy Progress Note    Psychotherapy Provided: Individual Psychotherapy     1. Bipolar I disorder with mood-incongruent psychotic features (HCC)        2. Post traumatic stress disorder (PTSD)        3. GOOD (generalized anxiety disorder)                          Goals addressed in session: Goal 1,      DATA: JUDY met with Janes vuong.    She is now taking the Zyprexa everyday and with that her weight has been up and down. She is hungry all the time since the restart of the Zyprexa.  She was eating while we where in session and she already \"had 2 dinners.\"  MSW suggested she talk to the Dr about the med. She has an appt next week with the Dr.    She is struggling with work.  \"Everyone is leaving (their positions, not the company).\"  She has difficulty with change.  She is talking to a new marry that she met online.  He smokes marijuana.    She asked her ex girlfriend to move to another bedroom and them not share one.  She moved.   Janes's father left to go back [place] to the [place].  Her mother is still staying with her.  \"She drives her crazy.\"        During this session, this clinician used the following therapeutic modalities: Client-centered Therapy, Cognitive Behavioral Therapy, Solution-Focused Therapy and Supportive Psychotherapy is diagnosed with a co-occurring substance use disorder, please indicate any changes in the frequency or amount of use: none  Stage of change for addressing substance use diagnoses: Action    ASSESSMENT:  Janes Locke presents with a Euthymic/ normal mood.     her affect is Normal range and intensity, which is congruent, with her mood and the content of the session. The client has made progress on their goals.     Janes Locke presents with a none risk of suicide, none risk of self-harm, and none risk of harm to others.    For any risk assessment that surpasses a \"low\" rating, a safety plan must be developed.    A safety plan was indicated: no  If " yes, describe in detail     PLAN: Between sessions, Janes Locke will address tx plan goals.. At the next session, the therapist will use Client-centered Therapy, Cognitive Behavioral Therapy, Solution-Focused Therapy and Supportive Psychotherapy to address tx plan goals and issues that arise in between sessions..    Behavioral Health Treatment Plan and Discharge Planning: Janes Locke is aware of and agrees to continue to work on their treatment plan. They have identified and are working toward their discharge goals. yes    Visit start and stop times:    02/27/24  Start Time: 1712  Stop Time: 1807  Total Visit Time: 55 minutes    Virtual Regular Visit    Verification of patient location:    Patient is located at Home in the following state in which I hold an active license [place]      Assessment/Plan:    Problem List Items Addressed This Visit    None    Goals addressed in session: Goal 1          Reason for visit is   No chief complaint on file.       Encounter provider Billy Pablo    Provider located at [facility] [place]  [facility] [place]  [address] [id number] [phone]      Recent Visits  No visits were found meeting these conditions.  Showing recent visits within past 7 days and meeting all other requirements  Today's Visits  Date Type Provider Dept   02/27/24 Telemedicine Billy Pablo [facility] [place]   Showing today's visits and meeting all other requirements  Future Appointments  No visits were found meeting these conditions.  Showing future appointments within next 150 days and meeting all other requirements       The patient was identified by name and date of birth. Janes Locke was informed that this is a telemedicine visit and that the visit is being conducted throughthe Cle[employer] platform. She agrees to proceed..  My office door was closed. No one else was in the room.  She acknowledged  consent and understanding of privacy and security of the video platform. The patient has agreed to participate and understands they can discontinue the visit at any time.    Patient is aware this is a billable service.     Subjective  Janes Locke is a 33 y.o. female  .      HPI     Past Medical History:   Diagnosis Date    Bipolar 1 disorder (HCC)     Cancer (HCC)     thyroid    Depression     Schizoaffective disorder, bipolar type (HCC)     Suicidal ideations     Thyroid disease        Past Surgical History:   Procedure Laterality Date    IR LUMBAR PUNCTURE  3/9/2023    NECK SURGERY      US GUIDED THYROID BIOPSY      normal. Onset: 2012       Current Outpatient Medications   Medication Sig Dispense Refill    acetaZOLAMIDE (DIAMOX) 250 mg tablet TAKE 2 TABLETS BY MOUTH 2 TIMES A DAY. 360 tablet 2    albuterol (PROVENTIL HFA,VENTOLIN HFA) 90 mcg/act inhaler Inhale 2 puffs every 4 - 6 hours as needed      Calcium Carbonate 1500 (600 Ca) MG TABS Take by mouth 2 (two) times a day      clonazePAM (KlonoPIN) 0.5 mg tablet Take 1 tablet (0.5 mg total) by mouth 3 (three) times a day 90 tablet 3    ergocalciferol (VITAMIN D2) 50,000 units Take 50,000 Units by mouth once a week      FLUoxetine (PROzac) 40 MG capsule Take 1 capsule (40 mg total) by mouth daily 90 capsule 1    gabapentin (NEURONTIN) 300 mg capsule Take 300 mg by mouth 3 (three) times a day      levothyroxine 25 mcg tablet Take 25 mcg by mouth daily      Tess 0.25-35 MG-MCG per tablet Take 1 tablet by mouth daily      OLANZapine (ZyPREXA) 10 mg tablet Take 1 tablet (10 mg total) by mouth daily at bedtime 30 tablet 3    phentermine 15 MG capsule Take 1 capsule by mouth Daily      prazosin (MINIPRESS) 2 mg capsule Take 1 capsule (2 mg total) by mouth daily at bedtime 90 capsule 1    traZODone (DESYREL) 150 mg tablet Take 0.5 tablets (75 mg total) by mouth daily at bedtime 45 tablet 1     No current facility-administered medications for this visit.         No Known Allergies    Review of Systems    Video Exam    There were no vitals filed for this visit.    Physical Exam

## 2024-03-07 ENCOUNTER — OFFICE VISIT (OUTPATIENT)
Dept: PSYCHIATRY | Facility: CLINIC | Age: 34
End: 2024-03-07

## 2024-03-07 DIAGNOSIS — F43.10 PTSD (POST-TRAUMATIC STRESS DISORDER): ICD-10-CM

## 2024-03-07 DIAGNOSIS — F41.1 GAD (GENERALIZED ANXIETY DISORDER): ICD-10-CM

## 2024-03-07 DIAGNOSIS — F42.4 SKIN PICKING HABIT: ICD-10-CM

## 2024-03-07 DIAGNOSIS — F43.10 POST TRAUMATIC STRESS DISORDER (PTSD): ICD-10-CM

## 2024-03-07 DIAGNOSIS — F31.9 BIPOLAR I DISORDER WITH MOOD-INCONGRUENT PSYCHOTIC FEATURES (HCC): Primary | ICD-10-CM

## 2024-03-07 DIAGNOSIS — F25.0 SCHIZOAFFECTIVE DISORDER, BIPOLAR TYPE (HCC): ICD-10-CM

## 2024-03-07 PROBLEM — C73 MALIGNANT TUMOR OF THYROID GLAND (HCC): Status: ACTIVE | Noted: 2024-03-07

## 2024-03-07 RX ORDER — PRAZOSIN HYDROCHLORIDE 2 MG/1
2 CAPSULE ORAL
Qty: 90 CAPSULE | Refills: 1 | Status: SHIPPED | OUTPATIENT
Start: 2024-03-07

## 2024-03-07 RX ORDER — GABAPENTIN 600 MG/1
600 TABLET ORAL 3 TIMES DAILY
Qty: 90 TABLET | Refills: 3 | Status: SHIPPED | OUTPATIENT
Start: 2024-03-07

## 2024-03-07 RX ORDER — FLUOXETINE HYDROCHLORIDE 40 MG/1
40 CAPSULE ORAL DAILY
Qty: 90 CAPSULE | Refills: 1 | Status: SHIPPED | OUTPATIENT
Start: 2024-03-07

## 2024-03-07 RX ORDER — DOXYCYCLINE HYCLATE 100 MG
100 TABLET ORAL 2 TIMES DAILY
COMMUNITY
Start: 2023-12-21

## 2024-03-07 RX ORDER — PREDNISONE 10 MG/1
TABLET ORAL
COMMUNITY
Start: 2023-12-21

## 2024-03-07 RX ORDER — LEVOTHYROXINE SODIUM 300 UG/1
300 TABLET ORAL DAILY
COMMUNITY
Start: 2024-02-08

## 2024-03-07 RX ORDER — OLANZAPINE 10 MG/1
10 TABLET ORAL DAILY PRN
Qty: 30 TABLET | Refills: 3 | Status: SHIPPED | OUTPATIENT
Start: 2024-03-07

## 2024-03-07 RX ORDER — CLONAZEPAM 0.5 MG/1
0.5 TABLET ORAL 3 TIMES DAILY
Qty: 90 TABLET | Refills: 3 | Status: SHIPPED | OUTPATIENT
Start: 2024-03-07

## 2024-03-08 ENCOUNTER — TELEPHONE (OUTPATIENT)
Dept: PSYCHIATRY | Facility: CLINIC | Age: 34
End: 2024-03-08

## 2024-03-08 NOTE — TELEPHONE ENCOUNTER
Called pharmacy to check status of script for Vraylar.  Was informed that medication was already picked up for no charge.  No PA is needed.

## 2024-03-08 NOTE — PSYCH
MEDICATION MANAGEMENT NOTE        [facility]    Name and Date of Birth:  Janes Locke 33 y.o. 1990 MRN: [id number]    Date of Visit: March 7, 2024    Visit Time    Visit Start Time: 1600   Visit Stop Time: 1630  Total Visit Duration:  30 minutes     The total visit duration detailed above includes: patient engagement, medication management, psychotherapy/counseling, discussion regarding treatment goals, and coordination of care.      Note Share Disclaimer:     This note was not shared with the patient due to reasonable likelihood of causing patient harm    No Known Allergies  SUBJECTIVE:    Janes is seen today for a follow up for Bipolar Disorder, PTSD, Generalized Anxiety Disorder, and OCD. She continues to experience significant symptoms since the last visit.  Patient reports she is not doing well.  States she is self discontinued her Zyprexa again.  Complaining of weight gain.  Also complaining of vision issues.  States since discontinuing the Zyprexa she has been experiencing significant mood lability and irritability.  States she cannot continue to take Zyprexa due to perceived side effects.  With following list of previously trialed medications: Past Psychiatric Medication Trials: Prozac, Celexa, Lexapro, Tegretol, Lamictal, Topamax, Neurontin, Risperdal, Abilify, Seroquel, Invega, Zyprexa, Latuda, Atarax, Ativan, Klonopin, Prazosin, Ambien, Lunesta, Melatonin and Sonata .  Most discontinued due to side effects, Abilify due to ineffectiveness.  She is agreeable to trial of Vraylar at this time for her bipolar disorder.  There is no overt psychosis.  Denies any suicidal thoughts or passive death wish.  Continues with individual therapy.  Today did fill out paperwork for ongoing intermittent leave.      HPI ROS Appetite Changes and Sleep:     She reports fluctuating sleep pattern, fluctuating appetite, fluctuating energy levels. Denies homicidal  ideation, denies suicidal ideation    Review Of Systems:   no complaints, all other systems are negative         Mental Status Evaluation:    Appearance:  marginal hygiene   Behavior:  restless   Speech:  normal rate, normal volume   Mood:  dysphoric   Affect:  increased in intensity   Thought Process:  circumstantial   Associations: tangential associations   Thought Content:  no overt delusions   Perceptual Disturbances: none   Risk Potential: Suicidal ideation - None  Homicidal ideation - None  Potential for aggression - No   Sensorium:  oriented to person, place, and time/date   Memory:  recent and remote memory grossly intact   Consciousness:  alert and awake   Attention: decreased concentration and decreased attention span   Insight:  limited   Judgment: limited   Gait/Station: normal gait/station, normal balance   Motor Activity: no abnormal movements       History Review:The following portions of the patient's history were reviewed and updated as appropriate: psychiatric history, trauma history allergies, current medications, past family history, past medical history, past social history, past surgical history, and problem list   OBJECTIVE:     Vital signs in last 24 hours:    There were no vitals filed for this visit.  Laboratory Results: I have personally reviewed all pertinent laboratory/tests results.      Suicide/Homicide Risk Assessment:    The following interventions are recommended: no intervention changes needed. Although patient's acute lethality risk is low, long-term/chronic lethality risk is mildly elevated in the presence of significant mood lability. At the current moment, Janes is future-oriented, forward-thinking, and demonstrates ability to act in a self-preserving manner as evidenced by volitionally presenting to the clinic today, seeking treatment. To mitigate future risk, patient should adhere to the recommendations below, avoid alcohol/illicit substance use, utilize community-based  resources and familiar support and prioritize mental health treatment. Presently, patient denies active suicidal/homicidal ideation in addition to thoughts of self-injury; contracts for safety.  At conclusion of evaluation, patient is amenable to the recommendations below. Patient is amenable to calling/contacting the outpatient office including this writer if any acute adverse effects of their medication regimen arise in addition to any comments or concerns pertaining to their psychiatric management.  Patient is amenable to calling/contacting crisis and/or attending to the nearest emergency department if their clinical condition deteriorates to assure their safety and stability, stating that they are able to appropriately confide in their family and friends regarding their psychiatric state.    Assessment/Plan:     PLAN:  Diagnoses and all orders for this visit:    Bipolar I disorder with mood-incongruent psychotic features (HCC)    GOOD (generalized anxiety disorder)    Skin picking habit, OCD    PTSD (post-traumatic stress disorder)    Treatment Recommendations/Precautions:    Start Vraylar 1.5 mg daily for bipolar disorder  Increase gabapentin to 600 mg p.o. 3 times daily for mood lability and anxiety  Continue clonazepam 0.5 mg p.o. 3 times daily for anxiety and OCD  .  Continue Prozac 40 mg for anxiety and OCD  Continue prazosin at bedtime for PTSD  She will use Zyprexa 10 mg p.o. daily as needed for severe agitation  Continue weekly psychotherapy  Medication management every 1 month  Aware of 24 hour and weekend coverage for urgent situations accessed by calling [facility] main practice number  Patient advised to call 911 if feeling suicidal or homicidal before acting out on their thoughts and they expressed understanding.    Medications Risks/Benefits      Risks, Benefits And Possible Side Effects Of Medications:    Discussed risks and benefits of treatment with patient including risk  of suicidality, serotonin syndrome, increased QTc interval and SIADH related to treatment with antidepressants; Risk of induction of manic symptoms in certain patient populations, risk of parkinsonian symptoms, metabolic syndrome, tardive dyskinesia and neuroleptic malignant syndrome related to treatment with antipsychotic medications, and risks of misuse, abuse or dependence, sedation and respiratory depression related to treatment with benzodiazepine medications         Controlled Medication Discussion:     Janes has been filling controlled prescriptions on time as prescribed according to [place] Prescription Drug Monitoring Program    Psychotherapy Provided:     Individual psychotherapy provided: Medications, treatment progress and treatment plan reviewed with Janes.  Medication changes discussed with Janes.  Medication education provided to Janes.  Reassurance and supportive therapy provided.   Crisis/safety plan discussed with Janes.     Treatment Plan:    Completed and signed during the session: Not applicable - Treatment Plan to be completed by [facility] therapist    This note was not shared with the patient due to reasonable likelihood of causing patient harm      GENESIS Lemons 03/07/24

## 2024-03-12 ENCOUNTER — TELEMEDICINE (OUTPATIENT)
Dept: BEHAVIORAL/MENTAL HEALTH CLINIC | Facility: CLINIC | Age: 34
End: 2024-03-12
Payer: COMMERCIAL

## 2024-03-12 DIAGNOSIS — F43.10 POST TRAUMATIC STRESS DISORDER (PTSD): ICD-10-CM

## 2024-03-12 DIAGNOSIS — F31.9 BIPOLAR I DISORDER WITH MOOD-INCONGRUENT PSYCHOTIC FEATURES (HCC): Primary | ICD-10-CM

## 2024-03-12 DIAGNOSIS — F41.1 GAD (GENERALIZED ANXIETY DISORDER): ICD-10-CM

## 2024-03-12 PROCEDURE — 90837 PSYTX W PT 60 MINUTES: CPT | Performed by: SOCIAL WORKER

## 2024-03-12 NOTE — BH TREATMENT PLAN
Outpatient Behavioral Health Psychotherapy Treatment Plan    aJnes Locke  1990     Date of Initial Psychotherapy Assessment: 10/14/20  Date of Current Treatment Plan: 03/12/24  Treatment Plan Target Date: 9/24  Treatment Plan Expiration Date: 9/24    Diagnosis:   1. Bipolar I disorder with mood-incongruent psychotic features (HCC)        2. Post traumatic stress disorder (PTSD)        3. GOOD (generalized anxiety disorder)            Area(s) of Need:  I pick my skin.    Long Term Goal 1 (in the client's own words): I want to stop picking.    Stage of Change: Preparation    Target Date for completion: 9/24     Anticipated therapeutic modalities: CBT     People identified to complete this goal: Georgina      Objective 1:   I will try different fidget toys.     I will try the stickers again.     I will get fake nails that I can't pick with.     I will take my meds     I will wear gloves.           Long Term Goal 2 (in the client's own words):     Stage of Change: Preparation    Target Date for completion:      Anticipated therapeutic modalities:      People identified to complete this goal:       Objective 1: (identify the means of measuring success in meeting the objective):       Objective 2: (identify the means of measuring success in meeting the objective):      Long Term Goal 3 (in the client's own words):     Stage of Change: Preparation    Target Date for completion:      Anticipated therapeutic modalities:      People identified to complete this goal:       Objective 1: (identify the means of measuring success in meeting the objective):       Objective 2: (identify the means of measuring success in meeting the objective):      I am currently under the care of a [facility] psychiatric provider: yes    My [facility] psychiatric provider is: Aixa Sue  I am currently taking psychiatric medications: Yes, as prescribed    I feel that I will be ready for discharge from mental health care when I reach the  following (measurable goal/objective): when I reach my goals and nothing left to address on a tx plan.    For children and adults who have a legal guardian:   Has there been any change to custody orders and/or guardianship status? No. If yes, attach updated documentation.    I have updated my Crisis Plan and have been offered a copy of this plan    Behavioral Health Treatment Plan [facility]: Diagnosis and Treatment Plan explained to Janes Locke acknowledges an understanding of their diagnosis. Janes Locke agrees to this treatment plan.    I have been offered a copy of this Treatment Plan. yes

## 2024-03-12 NOTE — PSYCH
"Behavioral Health Psychotherapy Progress Note    Psychotherapy Provided: Individual Psychotherapy     1. Bipolar I disorder with mood-incongruent psychotic features (HCC)        2. Post traumatic stress disorder (PTSD)        3. GOOD (generalized anxiety disorder)                            Goals addressed in session: Goal 1,      DATA: JUDY met with Janes vuong.    She is picking a great deal.  She wanted to develop a new tx plan to address her picking.  Developed tx plan and safety plans.  She met with her Dr and discussed the Zyprexa with her.  The Zyprexa is now PRN and she started a new med.  Janes has some anxiety about changing her meds.  The Zyprexa works and she knows what to expect from it but she does not like the side effects.        During this session, this clinician used the following therapeutic modalities: Client-centered Therapy, Cognitive Behavioral Therapy, Solution-Focused Therapy and Supportive Psychotherapy is diagnosed with a co-occurring substance use disorder, please indicate any changes in the frequency or amount of use: none  Stage of change for addressing substance use diagnoses: Action    ASSESSMENT:  Janes Locke presents with a Euthymic/ normal mood.     her affect is Normal range and intensity, which is congruent, with her mood and the content of the session. The client has made progress on their goals.     Janes Locke presents with a none risk of suicide, none risk of self-harm, and none risk of harm to others.    For any risk assessment that surpasses a \"low\" rating, a safety plan must be developed.    A safety plan was indicated: no  If yes, describe in detail     PLAN: Between sessions, Janes Locke will address tx plan goals.. At the next session, the therapist will use Client-centered Therapy, Cognitive Behavioral Therapy, Solution-Focused Therapy and Supportive Psychotherapy to address tx plan goals and issues that arise in between " sessions..    Behavioral Health Treatment Plan and Discharge Planning: Janes Locke is aware of and agrees to continue to work on their treatment plan. They have identified and are working toward their discharge goals. yes    Visit start and stop times:    03/12/24  Start Time: 1712  Stop Time: 1815  Total Visit Time: 63 minutes    Virtual Regular Visit    Verification of patient location:    Patient is located at Home in the following state in which I hold an active license [place]      Assessment/Plan:    Problem List Items Addressed This Visit       Post traumatic stress disorder (PTSD)    Bipolar I disorder with mood-incongruent psychotic features (HCC) - Primary    GOOD (generalized anxiety disorder)     Goals addressed in session: Goal 1          Reason for visit is   No chief complaint on file.       Encounter provider Billy Pablo    Provider located at [facility] [place]  [facility] [place]  [address]  [place] [id number] [phone]      Recent Visits  No visits were found meeting these conditions.  Showing recent visits within past 7 days and meeting all other requirements  Today's Visits  Date Type Provider Dept   03/12/24 Telemedicine [facility] [place]   Showing today's visits and meeting all other requirements  Future Appointments  No visits were found meeting these conditions.  Showing future appointments within next 150 days and meeting all other requirements       The patient was identified by name and date of birth. Janes Locke was informed that this is a telemedicine visit and that the visit is being conducted throughthe K[employer]hu platform. She agrees to proceed..  My office door was closed. No one else was in the room.  She acknowledged consent and understanding of privacy and security of the video platform. The patient has agreed to participate and understands they can discontinue the visit  at any time.    Patient is aware this is a billable service.     Subjective  Janes Locke is a 33 y.o. female  .      HPI     Past Medical History:   Diagnosis Date    Bipolar 1 disorder (HCC)     Cancer (HCC)     thyroid    Depression     Suicidal ideations     Thyroid disease        Past Surgical History:   Procedure Laterality Date    IR LUMBAR PUNCTURE  3/9/2023    NECK SURGERY      US GUIDED THYROID BIOPSY      normal. Onset: 2012       Current Outpatient Medications   Medication Sig Dispense Refill    acetaZOLAMIDE (DIAMOX) 250 mg tablet TAKE 2 TABLETS BY MOUTH 2 TIMES A DAY. 360 tablet 2    albuterol (PROVENTIL HFA,VENTOLIN HFA) 90 mcg/act inhaler Inhale 2 puffs every 4 - 6 hours as needed      Calcium Carbonate 1500 (600 Ca) MG TABS Take by mouth 2 (two) times a day      cariprazine (Vraylar) 1.5 MG capsule Take 1 capsule (1.5 mg total) by mouth daily 30 capsule 1    clonazePAM (KlonoPIN) 0.5 mg tablet Take 1 tablet (0.5 mg total) by mouth 3 (three) times a day 90 tablet 3    doxycycline hyclate (VIBRA-TABS) 100 mg tablet Take 100 mg by mouth 2 (two) times a day      ergocalciferol (VITAMIN D2) 50,000 units Take 50,000 Units by mouth once a week      FLUoxetine (PROzac) 40 MG capsule Take 1 capsule (40 mg total) by mouth daily 90 capsule 1    gabapentin (Neurontin) 600 MG tablet Take 1 tablet (600 mg total) by mouth 3 (three) times a day 90 tablet 3    levothyroxine 25 mcg tablet Take 25 mcg by mouth daily      levothyroxine 300 MCG tablet Take 300 mcg by mouth daily      Tess 0.25-35 MG-MCG per tablet Take 1 tablet by mouth daily      OLANZapine (ZyPREXA) 10 mg tablet Take 1 tablet (10 mg total) by mouth daily as needed (agitation) 30 tablet 3    phentermine 15 MG capsule Take 1 capsule by mouth Daily      prazosin (MINIPRESS) 2 mg capsule Take 1 capsule (2 mg total) by mouth daily at bedtime 90 capsule 1    predniSONE 10 mg tablet PLEASE SEE ATTACHED FOR DETAILED DIRECTIONS      traZODone (DESYREL)  150 mg tablet Take 0.5 tablets (75 mg total) by mouth daily at bedtime 45 tablet 1     No current facility-administered medications for this visit.        No Known Allergies    Review of Systems    Video Exam    There were no vitals filed for this visit.    Physical Exam

## 2024-03-29 ENCOUNTER — TELEPHONE (OUTPATIENT)
Dept: BEHAVIORAL/MENTAL HEALTH CLINIC | Facility: CLINIC | Age: 34
End: 2024-03-29

## 2024-03-29 DIAGNOSIS — F25.0 SCHIZOAFFECTIVE DISORDER, BIPOLAR TYPE (HCC): ICD-10-CM

## 2024-03-29 RX ORDER — OLANZAPINE 10 MG/1
10 TABLET ORAL DAILY PRN
Qty: 90 TABLET | Refills: 2 | Status: SHIPPED | OUTPATIENT
Start: 2024-03-29

## 2024-04-01 ENCOUNTER — TELEPHONE (OUTPATIENT)
Dept: PSYCHIATRY | Facility: CLINIC | Age: 34
End: 2024-04-01

## 2024-04-01 NOTE — TELEPHONE ENCOUNTER
NO-SHOW LETTER SENT VIA MY CHART TO Janes Locke.  ADDRESS: [address] [place] [id number]   No

## 2024-04-10 ENCOUNTER — TELEMEDICINE (OUTPATIENT)
Dept: BEHAVIORAL/MENTAL HEALTH CLINIC | Facility: CLINIC | Age: 34
End: 2024-04-10

## 2024-04-10 DIAGNOSIS — F43.10 POST TRAUMATIC STRESS DISORDER (PTSD): ICD-10-CM

## 2024-04-10 DIAGNOSIS — F41.1 GAD (GENERALIZED ANXIETY DISORDER): ICD-10-CM

## 2024-04-10 DIAGNOSIS — F31.9 BIPOLAR I DISORDER WITH MOOD-INCONGRUENT PSYCHOTIC FEATURES (HCC): Primary | ICD-10-CM

## 2024-04-10 NOTE — PSYCH
"Behavioral Health Psychotherapy Progress Note    Psychotherapy Provided: Individual Psychotherapy     1. Bipolar I disorder with mood-incongruent psychotic features (HCC)        2. Post traumatic stress disorder (PTSD)        3. GOOD (generalized anxiety disorder)                Goals addressed in session: Goal 1,      DATA: JUDY met with Janes vuong.   She stopped the Zyprexa, was on a new med but stopped that do to side effects also.  She is struggling right now and believes it is from not being on the Zyprexa.  Discussed what she is struggling with.  It does not appear to be from not being on the Zyprexa.  It appears to be from what is currently going on in her life.  Her ex girlfriend is still living with her but is driving her \"crazy\" and a marry she is seeing is \"needy\" and driving her \"crazy.\"  Discussed those are situations that are \"crazy\" provoking and  Zyprexa is not for that.  She stated no other symptoms.  Her mom is also difficult to live with but will be leaving the end of this week.  Some of the things that are driving her crazy like her mom did so as much before when on the Zyprexa as it does now not on it.       During this session, this clinician used the following therapeutic modalities: Client-centered Therapy, Cognitive Behavioral Therapy, Solution-Focused Therapy and Supportive Psychotherapy is diagnosed with a co-occurring substance use disorder, please indicate any changes in the frequency or amount of use: none  Stage of change for addressing substance use diagnoses: Action    ASSESSMENT:  Janes Locke presents with a Euthymic/ normal mood.     her affect is Normal range and intensity, which is congruent, with her mood and the content of the session. The client has made progress on their goals.     Janes Locke presents with a none risk of suicide, none risk of self-harm, and none risk of harm to others.    For any risk assessment that surpasses a \"low\" rating, a safety plan " must be developed.    A safety plan was indicated: no  If yes, describe in detail     PLAN: Between sessions, Janes Locke will address tx plan goals.. At the next session, the therapist will use Client-centered Therapy, Cognitive Behavioral Therapy, Solution-Focused Therapy and Supportive Psychotherapy to address tx plan goals and issues that arise in between sessions..    Behavioral Health Treatment Plan and Discharge Planning: Janes Locke is aware of and agrees to continue to work on their treatment plan. They have identified and are working toward their discharge goals. yes    Visit start and stop times:    04/10/24  Start Time: 1712  Stop Time: 1812  Total Visit Time: 60 minutes    Virtual Regular Visit    Verification of patient location:    Patient is located at Home in the following state in which I hold an active license [place]      Assessment/Plan:    Problem List Items Addressed This Visit       Post traumatic stress disorder (PTSD)    Bipolar I disorder with mood-incongruent psychotic features (HCC) - Primary    GOOD (generalized anxiety disorder)       Goals addressed in session: Goal 1          Reason for visit is   No chief complaint on file.       Encounter provider Billy Pablo    Provider located at [facility] [place]  [facility] [place]  [address]  [place] [address] [phone]      Recent Visits  No visits were found meeting these conditions.  Showing recent visits within past 7 days and meeting all other requirements  Today's Visits  Date Type Provider Dept   04/10/24 Telemedicine Billy Pablo  [facility] [place]   Showing today's visits and meeting all other requirements  Future Appointments  No visits were found meeting these conditions.  Showing future appointments within next 150 days and meeting all other requirements       The patient was identified by name and date of birth. Janes Locke was  informed that this is a telemedicine visit and that the visit is being conducted throughthe SnapAppointments platform. She agrees to proceed..  My office door was closed. No one else was in the room.  She acknowledged consent and understanding of privacy and security of the video platform. The patient has agreed to participate and understands they can discontinue the visit at any time.    Patient is aware this is a billable service.     Nick Locke is a 33 y.o. female  .      HPI     Past Medical History:   Diagnosis Date    Bipolar 1 disorder (HCC)     Cancer (HCC)     thyroid    Depression     Suicidal ideations     Thyroid disease        Past Surgical History:   Procedure Laterality Date    IR LUMBAR PUNCTURE  3/9/2023    NECK SURGERY      US GUIDED THYROID BIOPSY      normal. Onset: 2012       Current Outpatient Medications   Medication Sig Dispense Refill    acetaZOLAMIDE (DIAMOX) 250 mg tablet TAKE 2 TABLETS BY MOUTH 2 TIMES A DAY. 360 tablet 2    albuterol (PROVENTIL HFA,VENTOLIN HFA) 90 mcg/act inhaler Inhale 2 puffs every 4 - 6 hours as needed      Calcium Carbonate 1500 (600 Ca) MG TABS Take by mouth 2 (two) times a day      cariprazine (Vraylar) 1.5 MG capsule Take 1 capsule (1.5 mg total) by mouth daily 30 capsule 1    clonazePAM (KlonoPIN) 0.5 mg tablet Take 1 tablet (0.5 mg total) by mouth 3 (three) times a day 90 tablet 3    doxycycline hyclate (VIBRA-TABS) 100 mg tablet Take 100 mg by mouth 2 (two) times a day      ergocalciferol (VITAMIN D2) 50,000 units Take 50,000 Units by mouth once a week      FLUoxetine (PROzac) 40 MG capsule Take 1 capsule (40 mg total) by mouth daily 90 capsule 1    gabapentin (Neurontin) 600 MG tablet Take 1 tablet (600 mg total) by mouth 3 (three) times a day 90 tablet 3    levothyroxine 25 mcg tablet Take 25 mcg by mouth daily      levothyroxine 300 MCG tablet Take 300 mcg by mouth daily      Tess 0.25-35 MG-MCG per tablet Take 1 tablet by mouth daily       OLANZapine (ZyPREXA) 10 mg tablet TAKE 1 TABLET (10 MG TOTAL) BY MOUTH DAILY AS NEEDED (AGITATION) 90 tablet 2    phentermine 15 MG capsule Take 1 capsule by mouth Daily      prazosin (MINIPRESS) 2 mg capsule Take 1 capsule (2 mg total) by mouth daily at bedtime 90 capsule 1    predniSONE 10 mg tablet PLEASE SEE ATTACHED FOR DETAILED DIRECTIONS      traZODone (DESYREL) 150 mg tablet Take 0.5 tablets (75 mg total) by mouth daily at bedtime 45 tablet 1     No current facility-administered medications for this visit.        No Known Allergies    Review of Systems    Video Exam    There were no vitals filed for this visit.    Physical Exam

## 2024-04-16 ENCOUNTER — TELEMEDICINE (OUTPATIENT)
Dept: PSYCHIATRY | Facility: CLINIC | Age: 34
End: 2024-04-16

## 2024-04-16 DIAGNOSIS — F99 INSOMNIA DUE TO OTHER MENTAL DISORDER: ICD-10-CM

## 2024-04-16 DIAGNOSIS — F42.4 SKIN PICKING HABIT: ICD-10-CM

## 2024-04-16 DIAGNOSIS — F51.05 INSOMNIA DUE TO OTHER MENTAL DISORDER: ICD-10-CM

## 2024-04-16 DIAGNOSIS — F31.9 BIPOLAR I DISORDER WITH MOOD-INCONGRUENT PSYCHOTIC FEATURES (HCC): Primary | ICD-10-CM

## 2024-04-16 DIAGNOSIS — F41.1 GAD (GENERALIZED ANXIETY DISORDER): ICD-10-CM

## 2024-04-16 DIAGNOSIS — F43.10 POST TRAUMATIC STRESS DISORDER (PTSD): ICD-10-CM

## 2024-04-16 RX ORDER — OLANZAPINE 5 MG/1
5 TABLET ORAL
Qty: 30 TABLET | Refills: 2 | Status: SHIPPED | OUTPATIENT
Start: 2024-04-16

## 2024-04-16 RX ORDER — TRAZODONE HYDROCHLORIDE 150 MG/1
75 TABLET ORAL
Qty: 45 TABLET | Refills: 3 | Status: SHIPPED | OUTPATIENT
Start: 2024-04-16

## 2024-04-16 NOTE — PSYCH
Virtual Regular Visit    Verification of patient location:    Patient is located at Home in the following state in which I hold an active license [place]      Assessment/Plan:    Problem List Items Addressed This Visit       Post traumatic stress disorder (PTSD)        Bipolar I disorder with mood-incongruent psychotic features (HCC) - Primary        GOOD (generalized anxiety disorder)        Insomnia due to other mental disorder        Skin picking habit       Reason for visit is   Chief Complaint   Patient presents with    Virtual Regular Visit          Encounter provider GENESIS Lemons    Provider located at [facility]  [address] [place] [id number] [phone]      Recent Visits  No visits were found meeting these conditions.  Showing recent visits within past 7 days and meeting all other requirements  Today's Visits  Date Type Provider Dept   04/16/24 Telemedicine GENESIS Lemons  [facility]   Showing today's visits and meeting all other requirements  Future Appointments  No visits were found meeting these conditions.  Showing future appointments within next 150 days and meeting all other requirements       The patient was identified by name and date of birth. Janes Locke was informed that this is a telemedicine visit and that the visit is being conducted throughthe Guardium platform. She agrees to proceed..  My office door was closed. No one else was in the room.  She acknowledged consent and understanding of privacy and security of the video platform. The patient has agreed to participate and understands they can discontinue the visit at any time.    Patient is aware this is a billable service.     MEDICATION MANAGEMENT NOTE        [facility]    Name and Date of Birth:  Janes Locke 33 y.o. 1990 MRN: [id number]    Date of Visit: April 16,  "2024    Visit Time    Visit Start Time: 1515   Visit Stop Time: 1545  Total Visit Duration:  30 minutes     The total visit duration detailed above includes: patient engagement, medication management, psychotherapy/counseling, discussion regarding treatment goals, and coordination of care.      Note Share Disclaimer:     This note was not shared with the patient due to reasonable likelihood of causing patient harm    No Known Allergies  SUBJECTIVE:    Janes is seen today for a follow up for Bipolar Disorder, PTSD, and Generalized Anxiety Disorder. She continues to improve gradually since the last visit.  At last visit she had discontinued her Zyprexa on a regular to concerns it was causing blurry vision and also concerns of side effects from long-term use, weight gain and metabolic syndrome etc.  She was tried on Vraylar 1.5 mg daily and initially tolerated, developed some nausea which she thought was due to the Vraylar and stopped on her own since last visit.  She has been using Zyprexa 10 mg as needed \"not every day but when I need it \".  States overall her mood is stabilized since her last visit.  Decrease in mood lability and irritability and agitation.  At this point we will restart her Zyprexa 5 mg only.  She continue to use the Zyprexa 10 mg as needed for agitation and any psychosis.  We could even consider Zyprexa 2.5 mg daily if symptoms are well-managed.  She has tolerated the increase of her gabapentin to 600 mg 3 times daily and this seems to be helping with mood lability and helping us explore lower doses of Zyprexa.  At this point she is convinced like the Zyprexa is the only medication that helps her and we will try to manage her on the lowest dose possible.  Denying any suicidal thoughts or passive death wish.  States that she is working and otherwise things in her life are going \"pretty well\".    She denies any side effects from current psychiatric medications.      HPI ROS Appetite Changes and " Sleep:     She reports fluctuating sleep pattern, fluctuating appetite, fluctuating energy levels. Denies homicidal ideation, denies suicidal ideation    Review Of Systems:   no complaints, all other systems are negative, and as noted above         Mental Status Evaluation:    Appearance:  age appropriate   Behavior:  pleasant, cooperative   Speech:  normal rate, normal volume   Mood:  improved   Affect:  brighter   Thought Process:  goal directed   Associations: intact associations   Thought Content:  no overt delusions   Perceptual Disturbances: none   Risk Potential: Suicidal ideation - None at present  Homicidal ideation - None at present  Potential for aggression - No longer agitated   Sensorium:  oriented to person, place, time/date, and situation   Memory:  recent and remote memory grossly intact   Consciousness:  alert and awake   Attention: attention span and concentration appear shorter than expected for age   Insight:  improving   Judgment: improving   Gait/Station: normal gait/station, normal balance   Motor Activity: no abnormal movements       History Review:The following portions of the patient's history were reviewed and updated as appropriate: psychiatric history, trauma history allergies, current medications, past family history, past medical history, past social history, past surgical history, and problem list   OBJECTIVE:     Vital signs in last 24 hours:    There were no vitals filed for this visit.  Laboratory Results: I have personally reviewed all pertinent laboratory/tests results.      Suicide/Homicide Risk Assessment:    The following interventions are recommended: no intervention changes needed. Although patient's acute lethality risk is low, long-term/chronic lethality risk is mildly elevated in the presence of mood lability. At the current moment, Iveliezerss is future-oriented, forward-thinking, and demonstrates ability to act in a self-preserving manner as evidenced by volitionally  presenting to the clinic today, seeking treatment. To mitigate future risk, patient should adhere to the recommendations below, avoid alcohol/illicit substance use, utilize community-based resources and familiar support and prioritize mental health treatment. Presently, patient denies active suicidal/homicidal ideation in addition to thoughts of self-injury; contracts for safety.  At conclusion of evaluation, patient is amenable to the recommendations below. Patient is amenable to calling/contacting the outpatient office including this writer if any acute adverse effects of their medication regimen arise in addition to any comments or concerns pertaining to their psychiatric management.  Patient is amenable to calling/contacting crisis and/or attending to the nearest emergency department if their clinical condition deteriorates to assure their safety and stability, stating that they are able to appropriately confide in their family and friends regarding their psychiatric state.    Assessment/Plan:     PLAN:  Diagnoses and all orders for this visit:    Bipolar I disorder with mood-incongruent psychotic features (HCC)  -     OLANZapine (ZyPREXA) 5 mg tablet; Take 1 tablet (5 mg total) by mouth daily at bedtime    GOOD (generalized anxiety disorder)    Skin picking habit    Insomnia due to other mental disorder  -     traZODone (DESYREL) 150 mg tablet; Take 0.5 tablets (75 mg total) by mouth daily at bedtime    Post traumatic stress disorder (PTSD)         Treatment Recommendations/Precautions:    Discontinued Vraylar due to perceived side effects of nausea.  Restart Zyprexa 5 mg daily for mood lability  Continue clonazepam 0.5 mg p.o. 3 times daily for anxiety  Continue trazodone 150 mg at bedtime for sleep  Continue gabapentin 600 mg p.o. 3 times daily for mood lability  Continue prazosin 2 mg at bedtime for nightmares of PTSD  Continue Prozac 40 mg daily for depression  She will continue to use Zyprexa 10 mg p.o. as  needed for breakthrough agitation  Follow-up with PCP for ongoing medical issues  She continues individual therapy  Medication management with psychotherapy every 3 months  Aware of 24 hour and weekend coverage for urgent situations accessed by calling [facility] main practice number  Patient advised to call 911 if feeling suicidal or homicidal before acting out on their thoughts and they expressed understanding.    Medications Risks/Benefits    And  Risks, Benefits And Possible Side Effects Of Medications:    Discussed risks and benefits of treatment with patient including risk of suicidality, serotonin syndrome, increased QTc interval and SIADH related to treatment with antidepressants; Risk of induction of manic symptoms in certain patient populations, risk of parkinsonian symptoms, metabolic syndrome, tardive dyskinesia and neuroleptic malignant syndrome related to treatment with antipsychotic medications, and risks of misuse, abuse or dependence, sedation and respiratory depression related to treatment with benzodiazepine medications         Controlled Medication Discussion:     Janes has been filling controlled prescriptions on time as prescribed according to [place] Prescription Drug Monitoring Program    Psychotherapy Provided:     Individual psychotherapy provided: Medications, treatment progress and treatment plan reviewed with Janes.  Medication changes discussed with Janes.  Medication education provided to Mikeaneesh.  Reassurance and supportive therapy provided.   Crisis/safety plan discussed with Janes.     Treatment Plan:    Completed and signed during the session: Not applicable - Treatment Plan not due at this session    This note was not shared with the patient due to reasonable likelihood of causing patient harm      GENESIS Lemons 04/16/24

## 2024-04-23 ENCOUNTER — TELEMEDICINE (OUTPATIENT)
Dept: BEHAVIORAL/MENTAL HEALTH CLINIC | Facility: CLINIC | Age: 34
End: 2024-04-23
Payer: COMMERCIAL

## 2024-04-23 DIAGNOSIS — F43.10 POST TRAUMATIC STRESS DISORDER (PTSD): ICD-10-CM

## 2024-04-23 DIAGNOSIS — F31.9 BIPOLAR I DISORDER WITH MOOD-INCONGRUENT PSYCHOTIC FEATURES (HCC): Primary | ICD-10-CM

## 2024-04-23 DIAGNOSIS — F41.1 GAD (GENERALIZED ANXIETY DISORDER): ICD-10-CM

## 2024-04-23 PROCEDURE — 90834 PSYTX W PT 45 MINUTES: CPT | Performed by: SOCIAL WORKER

## 2024-04-23 NOTE — PSYCH
"Behavioral Health Psychotherapy Progress Note    Psychotherapy Provided: Individual Psychotherapy     1. Bipolar I disorder with mood-incongruent psychotic features (HCC)        2. GOOD (generalized anxiety disorder)        3. Post traumatic stress disorder (PTSD)                  Goals addressed in session: Goal 1,      DATA: JUDY met with Janes  session.  \"She is not doing good.\"  She is having racing thoughts.  \"Her emotions are good it is just her brain.\"  She is \"not acting on her impulses.\"  She tried taking the Zyprexa again per Drs orders at a lower amount but is still was giving her vision problems.  She is afraid of going back into the hospital. She has a hx of hospitalizations.  Her mother bought a Rottweiler puppy and plans on bringing it back and forth from the  to the .  Janes is concerned because she has cats and doesn't want to be the one responsible for taking care of it.  While in session JUDY left a message for her NP regarding status.  Discussed when the dog gets big she won't be able to take it back and forth on a plane as easily.        During this session, this clinician used the following therapeutic modalities: Client-centered Therapy, Cognitive Behavioral Therapy, Solution-Focused Therapy and Supportive Psychotherapy is diagnosed with a co-occurring substance use disorder, please indicate any changes in the frequency or amount of use: none  Stage of change for addressing substance use diagnoses: Action    ASSESSMENT:  Janes Locke presents with a Euthymic/ normal mood.     her affect is Normal range and intensity, which is congruent, with her mood and the content of the session. The client has made progress on their goals.     Janes Locke presents with a none risk of suicide, none risk of self-harm, and none risk of harm to others.    For any risk assessment that surpasses a \"low\" rating, a safety plan must be developed.    A safety plan was indicated: no  If yes, " describe in detail     PLAN: Between sessions, Janes Locke will address tx plan goals.. At the next session, the therapist will use Client-centered Therapy, Cognitive Behavioral Therapy, Solution-Focused Therapy and Supportive Psychotherapy to address tx plan goals and issues that arise in between sessions..    Behavioral Health Treatment Plan and Discharge Planning: Janes Locke is aware of and agrees to continue to work on their treatment plan. They have identified and are working toward their discharge goals. yes    Visit start and stop times:    04/23/24  Start Time: 1612  Stop Time: 1700  Total Visit Time: 48 minutes    Virtual Regular Visit    Verification of patient location:    Patient is located at Home in the following state in which I hold an active license [place]      Assessment/Plan:    Problem List Items Addressed This Visit    None        Goals addressed in session: Goal 1          Reason for visit is   No chief complaint on file.       Encounter provider Billy Pablo    Provider located at [facility] [place]  [facility] [place]  [address]  [place] [address] [phone]      Recent Visits  No visits were found meeting these conditions.  Showing recent visits within past 7 days and meeting all other requirements  Today's Visits  Date Type Provider Dept   04/23/24 Telemedicine Billy Pablo [facility] [place]   Showing today's visits and meeting all other requirements  Future Appointments  No visits were found meeting these conditions.  Showing future appointments within next 150 days and meeting all other requirements       The patient was identified by name and date of birth. Janes Locke was informed that this is a telemedicine visit and that the visit is being conducted throughthe Payloc[employer] platform. She agrees to proceed..  My office door was closed. No one else was in the room.  She acknowledged  consent and understanding of privacy and security of the video platform. The patient has agreed to participate and understands they can discontinue the visit at any time.    Patient is aware this is a billable service.     Subjective  Janes Locke is a 33 y.o. female  .      HPI     Past Medical History:   Diagnosis Date    Bipolar 1 disorder (HCC)     Cancer (HCC)     thyroid    Depression     Suicidal ideations     Thyroid disease        Past Surgical History:   Procedure Laterality Date    IR LUMBAR PUNCTURE  3/9/2023    NECK SURGERY      US GUIDED THYROID BIOPSY      normal. Onset: 2012       Current Outpatient Medications   Medication Sig Dispense Refill    acetaZOLAMIDE (DIAMOX) 250 mg tablet TAKE 2 TABLETS BY MOUTH 2 TIMES A DAY. 360 tablet 2    albuterol (PROVENTIL HFA,VENTOLIN HFA) 90 mcg/act inhaler Inhale 2 puffs every 4 - 6 hours as needed      Calcium Carbonate 1500 (600 Ca) MG TABS Take by mouth 2 (two) times a day      clonazePAM (KlonoPIN) 0.5 mg tablet Take 1 tablet (0.5 mg total) by mouth 3 (three) times a day 90 tablet 3    doxycycline hyclate (VIBRA-TABS) 100 mg tablet Take 100 mg by mouth 2 (two) times a day      ergocalciferol (VITAMIN D2) 50,000 units Take 50,000 Units by mouth once a week      FLUoxetine (PROzac) 40 MG capsule Take 1 capsule (40 mg total) by mouth daily 90 capsule 1    gabapentin (Neurontin) 600 MG tablet Take 1 tablet (600 mg total) by mouth 3 (three) times a day 90 tablet 3    levothyroxine 25 mcg tablet Take 25 mcg by mouth daily      levothyroxine 300 MCG tablet Take 300 mcg by mouth daily      Tess 0.25-35 MG-MCG per tablet Take 1 tablet by mouth daily      OLANZapine (ZyPREXA) 10 mg tablet TAKE 1 TABLET (10 MG TOTAL) BY MOUTH DAILY AS NEEDED (AGITATION) 90 tablet 2    OLANZapine (ZyPREXA) 5 mg tablet Take 1 tablet (5 mg total) by mouth daily at bedtime 30 tablet 2    phentermine 15 MG capsule Take 1 capsule by mouth Daily      prazosin (MINIPRESS) 2 mg capsule  Take 1 capsule (2 mg total) by mouth daily at bedtime 90 capsule 1    predniSONE 10 mg tablet PLEASE SEE ATTACHED FOR DETAILED DIRECTIONS      traZODone (DESYREL) 150 mg tablet Take 0.5 tablets (75 mg total) by mouth daily at bedtime 45 tablet 3     No current facility-administered medications for this visit.        No Known Allergies    Review of Systems    Video Exam    There were no vitals filed for this visit.    Physical Exam

## 2024-05-03 DIAGNOSIS — H47.10 PAPILLEDEMA: ICD-10-CM

## 2024-05-03 DIAGNOSIS — Z86.69 HISTORY OF MIGRAINE: ICD-10-CM

## 2024-05-03 RX ORDER — ACETAZOLAMIDE 250 MG/1
500 TABLET ORAL 2 TIMES DAILY
Qty: 120 TABLET | Refills: 8 | OUTPATIENT
Start: 2024-05-03

## 2024-05-06 NOTE — TELEPHONE ENCOUNTER
Called pt as requested in attempt offer a f/u appt with Markus. No answer. LMOM an call back phone number.

## 2024-05-10 ENCOUNTER — TELEMEDICINE (OUTPATIENT)
Dept: BEHAVIORAL/MENTAL HEALTH CLINIC | Facility: CLINIC | Age: 34
End: 2024-05-10

## 2024-05-10 DIAGNOSIS — F41.1 GAD (GENERALIZED ANXIETY DISORDER): ICD-10-CM

## 2024-05-10 DIAGNOSIS — F43.10 POST TRAUMATIC STRESS DISORDER (PTSD): ICD-10-CM

## 2024-05-10 DIAGNOSIS — F31.9 BIPOLAR I DISORDER WITH MOOD-INCONGRUENT PSYCHOTIC FEATURES (HCC): Primary | ICD-10-CM

## 2024-05-10 NOTE — PSYCH
"Behavioral Health Psychotherapy Progress Note    Psychotherapy Provided: Individual Psychotherapy     1. Bipolar I disorder with mood-incongruent psychotic features (HCC)        2. GOOD (generalized anxiety disorder)        3. Post traumatic stress disorder (PTSD)                    Goals addressed in session: Goal 1,      DATA: JUDY met with Altheadorothy  yevgeniy.  She is taking 10 mg of Zyprexa again after talking to the Dr.  Discussed the reasons why is has started taking it again and what side effects are she is having.  \"She couldn't take it anymore.  It was awful.\"  She tried from Oct through now to not take it but she \"needs it.\"  She has to take a water pill to counteract some of the side effects of the med and watch how she....  She asked, if she will always need it?  Discussed how applying coping skills can help with keeping the mg lower than if she did not apply coping skills.  She had 3 yrs of being stable with being on it.    During this session, this clinician used the following therapeutic modalities: Client-centered Therapy, Cognitive Behavioral Therapy, Solution-Focused Therapy and Supportive Psychotherapy is diagnosed with a co-occurring substance use disorder, please indicate any changes in the frequency or amount of use: none  Stage of change for addressing substance use diagnoses: Action    ASSESSMENT:  Janes Locke presents with a Euthymic/ normal mood.     her affect is Normal range and intensity, which is congruent, with her mood and the content of the session. The client has made progress on their goals.     Janes Locke presents with a none risk of suicide, none risk of self-harm, and none risk of harm to others.    For any risk assessment that surpasses a \"low\" rating, a safety plan must be developed.    A safety plan was indicated: no  If yes, describe in detail     PLAN: Between sessions, Janes Locke will address tx plan goals.. At the next session, the therapist will use " Client-centered Therapy, Cognitive Behavioral Therapy, Solution-Focused Therapy and Supportive Psychotherapy to address tx plan goals and issues that arise in between sessions..    Behavioral Health Treatment Plan and Discharge Planning: Janes Locke is aware of and agrees to continue to work on their treatment plan. They have identified and are working toward their discharge goals. yes    Visit start and stop times:    05/10/24  Start Time: 1712  Stop Time: 1805  Total Visit Time: 53 minutes    Virtual Regular Visit    Verification of patient location:    Patient is located at Home in the following state in which I hold an active license [place]      Assessment/Plan:    Problem List Items Addressed This Visit       Post traumatic stress disorder (PTSD)    Bipolar I disorder with mood-incongruent psychotic features (HCC) - Primary    GOOD (generalized anxiety disorder)         Goals addressed in session: Goal 1          Reason for visit is   No chief complaint on file.       Encounter provider Billy Pablo    Provider located at [facility] [place]  [facility] [place]  [address] [address] [phone]      Recent Visits  No visits were found meeting these conditions.  Showing recent visits within past 7 days and meeting all other requirements  Today's Visits  Date Type Provider Dept   05/10/24 Telemedicine Billy Pablo [facility] [place]   Showing today's visits and meeting all other requirements  Future Appointments  No visits were found meeting these conditions.  Showing future appointments within next 150 days and meeting all other requirements       The patient was identified by name and date of birth. Janes Locke was informed that this is a telemedicine visit and that the visit is being conducted throughthe [employer] platform. She agrees to proceed..  My office door was closed. No one else was in the room.  She  acknowledged consent and understanding of privacy and security of the video platform. The patient has agreed to participate and understands they can discontinue the visit at any time.    Patient is aware this is a billable service.     Subjective  Janes Locke is a 33 y.o. female  .      HPI     Past Medical History:   Diagnosis Date    Bipolar 1 disorder (HCC)     Cancer (HCC)     thyroid    Depression     Suicidal ideations     Thyroid disease        Past Surgical History:   Procedure Laterality Date    IR LUMBAR PUNCTURE  3/9/2023    NECK SURGERY      US GUIDED THYROID BIOPSY      normal. Onset: 2012       Current Outpatient Medications   Medication Sig Dispense Refill    acetaZOLAMIDE (DIAMOX) 250 mg tablet TAKE 2 TABLETS BY MOUTH 2 TIMES A DAY. 360 tablet 2    albuterol (PROVENTIL HFA,VENTOLIN HFA) 90 mcg/act inhaler Inhale 2 puffs every 4 - 6 hours as needed      Calcium Carbonate 1500 (600 Ca) MG TABS Take by mouth 2 (two) times a day      clonazePAM (KlonoPIN) 0.5 mg tablet Take 1 tablet (0.5 mg total) by mouth 3 (three) times a day 90 tablet 3    doxycycline hyclate (VIBRA-TABS) 100 mg tablet Take 100 mg by mouth 2 (two) times a day      ergocalciferol (VITAMIN D2) 50,000 units Take 50,000 Units by mouth once a week      FLUoxetine (PROzac) 40 MG capsule Take 1 capsule (40 mg total) by mouth daily 90 capsule 1    gabapentin (Neurontin) 600 MG tablet Take 1 tablet (600 mg total) by mouth 3 (three) times a day 90 tablet 3    levothyroxine 25 mcg tablet Take 25 mcg by mouth daily      levothyroxine 300 MCG tablet Take 300 mcg by mouth daily      Tess 0.25-35 MG-MCG per tablet Take 1 tablet by mouth daily      OLANZapine (ZyPREXA) 10 mg tablet TAKE 1 TABLET (10 MG TOTAL) BY MOUTH DAILY AS NEEDED (AGITATION) 90 tablet 2    OLANZapine (ZyPREXA) 5 mg tablet Take 1 tablet (5 mg total) by mouth daily at bedtime 30 tablet 2    phentermine 15 MG capsule Take 1 capsule by mouth Daily      prazosin (MINIPRESS) 2  mg capsule Take 1 capsule (2 mg total) by mouth daily at bedtime 90 capsule 1    predniSONE 10 mg tablet PLEASE SEE ATTACHED FOR DETAILED DIRECTIONS      traZODone (DESYREL) 150 mg tablet Take 0.5 tablets (75 mg total) by mouth daily at bedtime 45 tablet 3     No current facility-administered medications for this visit.        No Known Allergies    Review of Systems    Video Exam    There were no vitals filed for this visit.    Physical Exam

## 2024-05-22 ENCOUNTER — TELEMEDICINE (OUTPATIENT)
Dept: BEHAVIORAL/MENTAL HEALTH CLINIC | Facility: CLINIC | Age: 34
End: 2024-05-22

## 2024-05-22 DIAGNOSIS — F43.10 POST TRAUMATIC STRESS DISORDER (PTSD): ICD-10-CM

## 2024-05-22 DIAGNOSIS — F31.9 BIPOLAR I DISORDER WITH MOOD-INCONGRUENT PSYCHOTIC FEATURES (HCC): Primary | ICD-10-CM

## 2024-05-22 DIAGNOSIS — F41.1 GAD (GENERALIZED ANXIETY DISORDER): ICD-10-CM
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
## 2024-05-31 ENCOUNTER — TELEMEDICINE (OUTPATIENT)
Dept: BEHAVIORAL/MENTAL HEALTH CLINIC | Facility: CLINIC | Age: 34
End: 2024-05-31

## 2024-05-31 DIAGNOSIS — F31.9 BIPOLAR I DISORDER WITH MOOD-INCONGRUENT PSYCHOTIC FEATURES (HCC): Primary | ICD-10-CM

## 2024-05-31 DIAGNOSIS — F43.10 POST TRAUMATIC STRESS DISORDER (PTSD): ICD-10-CM

## 2024-05-31 DIAGNOSIS — F41.1 GAD (GENERALIZED ANXIETY DISORDER): ICD-10-CM

## 2024-05-31 NOTE — PSYCH
"Behavioral Health Psychotherapy Progress Note    Psychotherapy Provided: Individual Psychotherapy     1. Bipolar I disorder with mood-incongruent psychotic features (HCC)        2. Post traumatic stress disorder (PTSD)        3. GOOD (generalized anxiety disorder)                        Goals addressed in session: Goal 1,      DATA: JUDY met with Janes vuong.  Discussed her frustrations that she has at work.    Her mom is coming back to stay to the [place] the end of June which she is not looking forward to.  Her brother is getting  on July 7th,  MSW asked about her father coming up.  \"She is not sure if he is coming.\"  Discussed how her father \"can be.  He can be cold.  He didn't go to 15th birthday party.   Discussed her meds and side effects and the need to call the Dr if she is having side effects.    During this session, this clinician used the following therapeutic modalities: Client-centered Therapy, Cognitive Behavioral Therapy, Solution-Focused Therapy and Supportive Psychotherapy is diagnosed with a co-occurring substance use disorder, please indicate any changes in the frequency or amount of use: none  Stage of change for addressing substance use diagnoses: Action    ASSESSMENT:  Janes Locke presents with a Euthymic/ normal mood.     her affect is Normal range and intensity, which is congruent, with her mood and the content of the session. The client has made progress on their goals.     Janes Locke presents with a none risk of suicide, none risk of self-harm, and none risk of harm to others.    For any risk assessment that surpasses a \"low\" rating, a safety plan must be developed.    A safety plan was indicated: no  If yes, describe in detail     PLAN: Between sessions, Janes Locke will address tx plan goals.. At the next session, the therapist will use Client-centered Therapy, Cognitive Behavioral Therapy, Solution-Focused Therapy and Supportive Psychotherapy to address tx " plan goals and issues that arise in between sessions..    Behavioral Health Treatment Plan and Discharge Planning: Janes Locke is aware of and agrees to continue to work on their treatment plan. They have identified and are working toward their discharge goals. yes    Visit start and stop times:    05/31/24  Start Time: 1712  Stop Time: 1800  Total Visit Time: 48 minutes    Virtual Regular Visit    Verification of patient location:    Patient is located at Home in the following state in which I hold an active license [place]      Assessment/Plan:    Problem List Items Addressed This Visit       Post traumatic stress disorder (PTSD)    Bipolar I disorder with mood-incongruent psychotic features (HCC) - Primary    GOOD (generalized anxiety disorder)             Goals addressed in session: Goal 1          Reason for visit is   No chief complaint on file.       Encounter provider Billy Pablo    Provider located at [facility] [place]  [facility] [place]  [address]  [place] [address] [phone]      Recent Visits  No visits were found meeting these conditions.  Showing recent visits within past 7 days and meeting all other requirements  Today's Visits  Date Type Provider Dept   05/31/24 Telemedicine Billy Pablo  [facility] [place]   Showing today's visits and meeting all other requirements  Future Appointments  No visits were found meeting these conditions.  Showing future appointments within next 150 days and meeting all other requirements       The patient was identified by name and date of birth. Janes Locke was informed that this is a telemedicine visit and that the visit is being conducted throughthe [employer] platform. She agrees to proceed..  My office door was closed. No one else was in the room.  She acknowledged consent and understanding of privacy and security of the video platform. The patient has agreed to  participate and understands they can discontinue the visit at any time.    Patient is aware this is a billable service.     Subjective  Janes Locke is a 33 y.o. female  .      HPI     Past Medical History:   Diagnosis Date    Bipolar 1 disorder (HCC)     Cancer (HCC)     thyroid    Depression     Suicidal ideations     Thyroid disease        Past Surgical History:   Procedure Laterality Date    IR LUMBAR PUNCTURE  3/9/2023    NECK SURGERY      US GUIDED THYROID BIOPSY      normal. Onset: 2012       Current Outpatient Medications   Medication Sig Dispense Refill    acetaZOLAMIDE (DIAMOX) 250 mg tablet TAKE 2 TABLETS BY MOUTH 2 TIMES A DAY. 360 tablet 2    albuterol (PROVENTIL HFA,VENTOLIN HFA) 90 mcg/act inhaler Inhale 2 puffs every 4 - 6 hours as needed      Calcium Carbonate 1500 (600 Ca) MG TABS Take by mouth 2 (two) times a day      clonazePAM (KlonoPIN) 0.5 mg tablet Take 1 tablet (0.5 mg total) by mouth 3 (three) times a day 90 tablet 3    doxycycline hyclate (VIBRA-TABS) 100 mg tablet Take 100 mg by mouth 2 (two) times a day      ergocalciferol (VITAMIN D2) 50,000 units Take 50,000 Units by mouth once a week      FLUoxetine (PROzac) 40 MG capsule Take 1 capsule (40 mg total) by mouth daily 90 capsule 1    gabapentin (Neurontin) 600 MG tablet Take 1 tablet (600 mg total) by mouth 3 (three) times a day 90 tablet 3    levothyroxine 25 mcg tablet Take 25 mcg by mouth daily      levothyroxine 300 MCG tablet Take 300 mcg by mouth daily      Tess 0.25-35 MG-MCG per tablet Take 1 tablet by mouth daily      OLANZapine (ZyPREXA) 10 mg tablet TAKE 1 TABLET (10 MG TOTAL) BY MOUTH DAILY AS NEEDED (AGITATION) 90 tablet 2    OLANZapine (ZyPREXA) 5 mg tablet Take 1 tablet (5 mg total) by mouth daily at bedtime 30 tablet 2    phentermine 15 MG capsule Take 1 capsule by mouth Daily      prazosin (MINIPRESS) 2 mg capsule Take 1 capsule (2 mg total) by mouth daily at bedtime 90 capsule 1    predniSONE 10 mg tablet PLEASE  SEE ATTACHED FOR DETAILED DIRECTIONS      traZODone (DESYREL) 150 mg tablet Take 0.5 tablets (75 mg total) by mouth daily at bedtime 45 tablet 3     No current facility-administered medications for this visit.        No Known Allergies    Review of Systems    Video Exam    There were no vitals filed for this visit.    Physical Exam

## 2024-06-11 ENCOUNTER — TELEMEDICINE (OUTPATIENT)
Dept: BEHAVIORAL/MENTAL HEALTH CLINIC | Facility: CLINIC | Age: 34
End: 2024-06-11
Payer: COMMERCIAL

## 2024-06-11 DIAGNOSIS — F43.10 POST TRAUMATIC STRESS DISORDER (PTSD): ICD-10-CM

## 2024-06-11 DIAGNOSIS — F41.1 GAD (GENERALIZED ANXIETY DISORDER): ICD-10-CM

## 2024-06-11 DIAGNOSIS — F31.9 BIPOLAR I DISORDER WITH MOOD-INCONGRUENT PSYCHOTIC FEATURES (HCC): Primary | ICD-10-CM

## 2024-06-11 PROCEDURE — 90837 PSYTX W PT 60 MINUTES: CPT | Performed by: SOCIAL WORKER

## 2024-06-11 NOTE — PSYCH
"Behavioral Health Psychotherapy Progress Note    Psychotherapy Provided: Individual Psychotherapy     1. Bipolar I disorder with mood-incongruent psychotic features (HCC)        2. GOOD (generalized anxiety disorder)        3. Post traumatic stress disorder (PTSD)                          Goals addressed in session: Goal 1,      DATA: JUDY met with Janes vuong.  She has begun taking care of herself.  She got her nails done to help her stop picking the skin on her face and she got her hair done.  She is very upset because her mom is coming back to stay with her again the end of this month.  Janes pays the mortgage on the house but her parents come and go with the expectation that when they come to [place] the can stay with her.  \"She is tired of them treating her like she is 11 yrs old.\"    Discussed her options.      During this session, this clinician used the following therapeutic modalities: Client-centered Therapy, Cognitive Behavioral Therapy, Solution-Focused Therapy and Supportive Psychotherapy is diagnosed with a co-occurring substance use disorder, please indicate any changes in the frequency or amount of use: none  Stage of change for addressing substance use diagnoses: Action    ASSESSMENT:  Janes Locke presents with a Euthymic/ normal mood.     her affect is Normal range and intensity, which is congruent, with her mood and the content of the session. The client has made progress on their goals.     Janes Locke presents with a none risk of suicide, none risk of self-harm, and none risk of harm to others.    For any risk assessment that surpasses a \"low\" rating, a safety plan must be developed.    A safety plan was indicated: no  If yes, describe in detail     PLAN: Between sessions, Janes Locke will address tx plan goals.. At the next session, the therapist will use Client-centered Therapy, Cognitive Behavioral Therapy, Solution-Focused Therapy and Supportive Psychotherapy to " address tx plan goals and issues that arise in between sessions..    Behavioral Health Treatment Plan and Discharge Planning: Janes Locke is aware of and agrees to continue to work on their treatment plan. They have identified and are working toward their discharge goals. yes    Visit start and stop times:    06/11/24  Start Time: 1712  Stop Time: 1810  Total Visit Time: 58 minutes    Virtual Regular Visit    Verification of patient location:    Patient is located at Home in the following state in which I hold an active license [place]      Assessment/Plan:    Problem List Items Addressed This Visit       Post traumatic stress disorder (PTSD)    Bipolar I disorder with mood-incongruent psychotic features (HCC) - Primary    GOOD (generalized anxiety disorder)               Goals addressed in session: Goal 1          Reason for visit is   No chief complaint on file.       Encounter provider Billy Pablo    Provider located at [facility] [place]  [facility] [place]  [address]  [place] [id number] [phone]      Recent Visits  No visits were found meeting these conditions.  Showing recent visits within past 7 days and meeting all other requirements  Today's Visits  Date Type Provider Dept   06/11/24 Telemedicine Billy Pablo  [facility] [place]   Showing today's visits and meeting all other requirements  Future Appointments  No visits were found meeting these conditions.  Showing future appointments within next 150 days and meeting all other requirements       The patient was identified by name and date of birth. Janes Locke was informed that this is a telemedicine visit and that the visit is being conducted throughthe [employer] platform. She agrees to proceed..  My office door was closed. No one else was in the room.  She acknowledged consent and understanding of privacy and security of the video platform. The patient has  agreed to participate and understands they can discontinue the visit at any time.    Patient is aware this is a billable service.     Subjective  Janes Locke is a 33 y.o. female  .      HPI     Past Medical History:   Diagnosis Date    Bipolar 1 disorder (HCC)     Cancer (HCC)     thyroid    Depression     Suicidal ideations     Thyroid disease        Past Surgical History:   Procedure Laterality Date    IR LUMBAR PUNCTURE  3/9/2023    NECK SURGERY      US GUIDED THYROID BIOPSY      normal. Onset: 2012       Current Outpatient Medications   Medication Sig Dispense Refill    acetaZOLAMIDE (DIAMOX) 250 mg tablet TAKE 2 TABLETS BY MOUTH 2 TIMES A DAY. 360 tablet 2    albuterol (PROVENTIL HFA,VENTOLIN HFA) 90 mcg/act inhaler Inhale 2 puffs every 4 - 6 hours as needed      Calcium Carbonate 1500 (600 Ca) MG TABS Take by mouth 2 (two) times a day      clonazePAM (KlonoPIN) 0.5 mg tablet Take 1 tablet (0.5 mg total) by mouth 3 (three) times a day 90 tablet 3    doxycycline hyclate (VIBRA-TABS) 100 mg tablet Take 100 mg by mouth 2 (two) times a day      ergocalciferol (VITAMIN D2) 50,000 units Take 50,000 Units by mouth once a week      FLUoxetine (PROzac) 40 MG capsule Take 1 capsule (40 mg total) by mouth daily 90 capsule 1    gabapentin (Neurontin) 600 MG tablet Take 1 tablet (600 mg total) by mouth 3 (three) times a day 90 tablet 3    levothyroxine 25 mcg tablet Take 25 mcg by mouth daily      levothyroxine 300 MCG tablet Take 300 mcg by mouth daily      Tess 0.25-35 MG-MCG per tablet Take 1 tablet by mouth daily      OLANZapine (ZyPREXA) 10 mg tablet TAKE 1 TABLET (10 MG TOTAL) BY MOUTH DAILY AS NEEDED (AGITATION) 90 tablet 2    OLANZapine (ZyPREXA) 5 mg tablet Take 1 tablet (5 mg total) by mouth daily at bedtime 30 tablet 2    phentermine 15 MG capsule Take 1 capsule by mouth Daily      prazosin (MINIPRESS) 2 mg capsule Take 1 capsule (2 mg total) by mouth daily at bedtime 90 capsule 1    predniSONE 10 mg  tablet PLEASE SEE ATTACHED FOR DETAILED DIRECTIONS      traZODone (DESYREL) 150 mg tablet Take 0.5 tablets (75 mg total) by mouth daily at bedtime 45 tablet 3     No current facility-administered medications for this visit.        No Known Allergies    Review of Systems    Video Exam    There were no vitals filed for this visit.    Physical Exam

## 2024-06-28 DIAGNOSIS — H47.10 PAPILLEDEMA: ICD-10-CM

## 2024-06-28 DIAGNOSIS — Z86.69 HISTORY OF MIGRAINE: ICD-10-CM

## 2024-06-28 RX ORDER — ACETAZOLAMIDE 250 MG/1
500 TABLET ORAL 2 TIMES DAILY
Qty: 120 TABLET | Refills: 8 | Status: SHIPPED | OUTPATIENT
Start: 2024-06-28

## 2024-07-02 ENCOUNTER — TELEMEDICINE (OUTPATIENT)
Dept: PSYCHIATRY | Facility: CLINIC | Age: 34
End: 2024-07-02
Payer: COMMERCIAL

## 2024-07-02 DIAGNOSIS — F99 INSOMNIA DUE TO OTHER MENTAL DISORDER: ICD-10-CM

## 2024-07-02 DIAGNOSIS — F90.0 ATTENTION DEFICIT HYPERACTIVITY DISORDER, INATTENTIVE TYPE: ICD-10-CM

## 2024-07-02 DIAGNOSIS — F43.10 PTSD (POST-TRAUMATIC STRESS DISORDER): ICD-10-CM

## 2024-07-02 DIAGNOSIS — F41.1 GAD (GENERALIZED ANXIETY DISORDER): ICD-10-CM

## 2024-07-02 DIAGNOSIS — F42.4 SKIN PICKING HABIT: ICD-10-CM

## 2024-07-02 DIAGNOSIS — F31.9 BIPOLAR I DISORDER WITH MOOD-INCONGRUENT PSYCHOTIC FEATURES (HCC): Primary | ICD-10-CM

## 2024-07-02 DIAGNOSIS — F25.0 SCHIZOAFFECTIVE DISORDER, BIPOLAR TYPE (HCC): ICD-10-CM

## 2024-07-02 DIAGNOSIS — F43.10 POST TRAUMATIC STRESS DISORDER (PTSD): ICD-10-CM

## 2024-07-02 DIAGNOSIS — F51.05 INSOMNIA DUE TO OTHER MENTAL DISORDER: ICD-10-CM

## 2024-07-02 PROCEDURE — 99214 OFFICE O/P EST MOD 30 MIN: CPT | Performed by: NURSE PRACTITIONER

## 2024-07-02 RX ORDER — FLUOXETINE HYDROCHLORIDE 40 MG/1
40 CAPSULE ORAL DAILY
Qty: 90 CAPSULE | Refills: 1 | Status: SHIPPED | OUTPATIENT
Start: 2024-07-02

## 2024-07-02 RX ORDER — OLANZAPINE 5 MG/1
5 TABLET ORAL
Qty: 30 TABLET | Refills: 2 | Status: SHIPPED | OUTPATIENT
Start: 2024-07-02

## 2024-07-02 RX ORDER — TRAZODONE HYDROCHLORIDE 150 MG/1
75 TABLET ORAL
Qty: 45 TABLET | Refills: 3 | Status: SHIPPED | OUTPATIENT
Start: 2024-07-02

## 2024-07-02 RX ORDER — CLONAZEPAM 0.5 MG/1
0.5 TABLET ORAL 3 TIMES DAILY
Qty: 90 TABLET | Refills: 3 | Status: SHIPPED | OUTPATIENT
Start: 2024-07-02

## 2024-07-02 RX ORDER — GABAPENTIN 600 MG/1
600 TABLET ORAL 3 TIMES DAILY
Qty: 90 TABLET | Refills: 3 | Status: SHIPPED | OUTPATIENT
Start: 2024-07-02

## 2024-07-02 RX ORDER — PRAZOSIN HYDROCHLORIDE 2 MG/1
2 CAPSULE ORAL
Qty: 90 CAPSULE | Refills: 1 | Status: SHIPPED | OUTPATIENT
Start: 2024-07-02

## 2024-07-03 PROBLEM — F90.0 ATTENTION DEFICIT HYPERACTIVITY DISORDER, INATTENTIVE TYPE: Status: ACTIVE | Noted: 2024-07-03
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
## 2024-07-12 ENCOUNTER — TELEMEDICINE (OUTPATIENT)
Dept: BEHAVIORAL/MENTAL HEALTH CLINIC | Facility: CLINIC | Age: 34
End: 2024-07-12
Payer: COMMERCIAL

## 2024-07-12 DIAGNOSIS — F31.9 BIPOLAR I DISORDER WITH MOOD-INCONGRUENT PSYCHOTIC FEATURES (HCC): Primary | ICD-10-CM

## 2024-07-12 DIAGNOSIS — F43.10 POST TRAUMATIC STRESS DISORDER (PTSD): ICD-10-CM

## 2024-07-12 DIAGNOSIS — F41.1 GAD (GENERALIZED ANXIETY DISORDER): ICD-10-CM

## 2024-07-12 PROCEDURE — 90837 PSYTX W PT 60 MINUTES: CPT | Performed by: SOCIAL WORKER

## 2024-07-12 NOTE — PSYCH
"Behavioral Health Psychotherapy Progress Note    Psychotherapy Provided: Individual Psychotherapy     1. Bipolar I disorder with mood-incongruent psychotic features (HCC)        2. Post traumatic stress disorder (PTSD)        3. GOOD (generalized anxiety disorder)                            Goals addressed in session: Goal 1,      DATA: JUDY met with Janes vuong.  Her mother is back in the [facility] and is staying with her.  Her father is at her brother's house.  \"They are toxic for each other so they are trying  them to see if that is better.\"  Discussed work.  \"They are all starting to realize she is off/something is wrong.  She doesn't like that.  She is getting angrier easier and faster.  The 10mg of Zyprexa isn't enough but she can't take more due to the side effects.  She saw her psychiatrist.  \"She thinks I am ADHD and wants to evaluate me.\"  Janes is scared.  She is afraid of \"someone messing with her meds.\"  When she is off of the Zyprexa she ends up in the [facility] hospital.  She asked her mother for her dx.  She is dx with Schizophrenia Bipolar type, anxiety and PTSD.  Discussed she can ask questions and state her fears to the DR.    During this session, this clinician used the following therapeutic modalities: Client-centered Therapy, Cognitive Behavioral Therapy, Solution-Focused Therapy and Supportive Psychotherapy is diagnosed with a co-occurring substance use disorder, please indicate any changes in the frequency or amount of use: none  Stage of change for addressing substance use diagnoses: Action    ASSESSMENT:  Janes Locke presents with a Euthymic/ normal mood.     her affect is Normal range and intensity, which is congruent, with her mood and the content of the session. The client has made progress on their goals.     Janes Locke presents with a none risk of suicide, none risk of self-harm, and none risk of harm to others.    For any risk assessment that surpasses a \"low\" " rating, a safety plan must be developed.    A safety plan was indicated: no  If yes, describe in detail     PLAN: Between sessions, Janes Locke will address tx plan goals.. At the next session, the therapist will use Client-centered Therapy, Cognitive Behavioral Therapy, Solution-Focused Therapy and Supportive Psychotherapy to address tx plan goals and issues that arise in between sessions..    Behavioral Health Treatment Plan and Discharge Planning: Janes Locke is aware of and agrees to continue to work on their treatment plan. They have identified and are working toward their discharge goals. yes    Visit start and stop times:    07/12/24  Start Time: 1712  Stop Time: 1815  Total Visit Time: 63 minutes    Virtual Regular Visit    Verification of patient location:    Patient is located at Home in the following state in which I hold an active license [place]      Assessment/Plan:    Problem List Items Addressed This Visit    None                Goals addressed in session: Goal 1          Reason for visit is   No chief complaint on file.       Encounter provider Billy Pablo    Provider located at [facility] [place]  [facility] [place]  [address] [address] [phone]      Recent Visits  No visits were found meeting these conditions.  Showing recent visits within past 7 days and meeting all other requirements  Today's Visits  Date Type Provider Dept   07/12/24 Telemedicine Billy Pablo [facility] [place]   Showing today's visits and meeting all other requirements  Future Appointments  No visits were found meeting these conditions.  Showing future appointments within next 150 days and meeting all other requirements       The patient was identified by name and date of birth. Janes Locke was informed that this is a telemedicine visit and that the visit is being conducted through[employer] platform. She  agrees to proceed..  My office door was closed. No one else was in the room.  She acknowledged consent and understanding of privacy and security of the video platform. The patient has agreed to participate and understands they can discontinue the visit at any time.    Patient is aware this is a billable service.     Subjective  Janes Locke is a 34 y.o. female  .      HPI     Past Medical History:   Diagnosis Date    Bipolar 1 disorder (HCC)     Cancer (HCC)     thyroid    Depression     Suicidal ideations     Thyroid disease        Past Surgical History:   Procedure Laterality Date    IR LUMBAR PUNCTURE  3/9/2023    NECK SURGERY      US GUIDED THYROID BIOPSY      normal. Onset: 2012       Current Outpatient Medications   Medication Sig Dispense Refill    acetaZOLAMIDE (DIAMOX) 250 mg tablet TAKE 2 TABLETS BY MOUTH TWICE A  tablet 8    albuterol (PROVENTIL HFA,VENTOLIN HFA) 90 mcg/act inhaler Inhale 2 puffs every 4 - 6 hours as needed      Calcium Carbonate 1500 (600 Ca) MG TABS Take by mouth 2 (two) times a day      clonazePAM (KlonoPIN) 0.5 mg tablet Take 1 tablet (0.5 mg total) by mouth 3 (three) times a day 90 tablet 3    doxycycline hyclate (VIBRA-TABS) 100 mg tablet Take 100 mg by mouth 2 (two) times a day      ergocalciferol (VITAMIN D2) 50,000 units Take 50,000 Units by mouth once a week      FLUoxetine (PROzac) 40 MG capsule Take 1 capsule (40 mg total) by mouth daily 90 capsule 1    gabapentin (Neurontin) 600 MG tablet Take 1 tablet (600 mg total) by mouth 3 (three) times a day 90 tablet 3    levothyroxine 25 mcg tablet Take 25 mcg by mouth daily      levothyroxine 300 MCG tablet Take 300 mcg by mouth daily      Tess 0.25-35 MG-MCG per tablet Take 1 tablet by mouth daily      OLANZapine (ZyPREXA) 10 mg tablet TAKE 1 TABLET (10 MG TOTAL) BY MOUTH DAILY AS NEEDED (AGITATION) 90 tablet 2    OLANZapine (ZyPREXA) 5 mg tablet Take 1 tablet (5 mg total) by mouth daily at bedtime 30 tablet 2     phentermine 15 MG capsule Take 1 capsule by mouth Daily      prazosin (MINIPRESS) 2 mg capsule Take 1 capsule (2 mg total) by mouth daily at bedtime 90 capsule 1    predniSONE 10 mg tablet PLEASE SEE ATTACHED FOR DETAILED DIRECTIONS      traZODone (DESYREL) 150 mg tablet Take 0.5 tablets (75 mg total) by mouth daily at bedtime 45 tablet 3     No current facility-administered medications for this visit.        No Known Allergies    Review of Systems    Video Exam    There were no vitals filed for this visit.    Physical Exam

## 2024-07-16 ENCOUNTER — TELEMEDICINE (OUTPATIENT)
Dept: BEHAVIORAL/MENTAL HEALTH CLINIC | Facility: CLINIC | Age: 34
End: 2024-07-16
Payer: COMMERCIAL

## 2024-07-16 DIAGNOSIS — F41.1 GAD (GENERALIZED ANXIETY DISORDER): ICD-10-CM

## 2024-07-16 DIAGNOSIS — F43.10 POST TRAUMATIC STRESS DISORDER (PTSD): ICD-10-CM

## 2024-07-16 DIAGNOSIS — F31.9 BIPOLAR I DISORDER WITH MOOD-INCONGRUENT PSYCHOTIC FEATURES (HCC): Primary | ICD-10-CM

## 2024-07-16 PROCEDURE — 90834 PSYTX W PT 45 MINUTES: CPT | Performed by: SOCIAL WORKER

## 2024-07-16 NOTE — PSYCH
"Behavioral Health Psychotherapy Progress Note    Psychotherapy Provided: Individual Psychotherapy     1. Bipolar I disorder with mood-incongruent psychotic features (HCC)        2. Post traumatic stress disorder (PTSD)        3. GOOD (generalized anxiety disorder)                              Goals addressed in session: Goal 1,      DATA: JUDY met with Janes vuong. She is very frustrated with her mother.  Her mom told her she was going to take her car, she told her no but \"she did it any ways.  She is done.\"   She also \"doesn't want to deal with it.\"   Janes and her family are Citizen of [place].  \"They keep telling her to deal with it.  She is her mother.  \"She doesn't want to deal with it.\"   Janes lives in her father's home (on paper) but she is the one who pays the mortgage.  Discussed the only way to get \"away\" is to move out of the house.  Discussed just starting to look at houses as a coping skill for now.    JUDY was having connection issues with no staff available for assistance.          During this session, this clinician used the following therapeutic modalities: Client-centered Therapy, Cognitive Behavioral Therapy, Solution-Focused Therapy and Supportive Psychotherapy is diagnosed with a co-occurring substance use disorder, please indicate any changes in the frequency or amount of use: none  Stage of change for addressing substance use diagnoses: Action    ASSESSMENT:  Janes Locke presents with a Euthymic/ normal mood.     her affect is Normal range and intensity, which is congruent, with her mood and the content of the session. The client has made progress on their goals.     Janes Locke presents with a none risk of suicide, none risk of self-harm, and none risk of harm to others.    For any risk assessment that surpasses a \"low\" rating, a safety plan must be developed.    A safety plan was indicated: no  If yes, describe in detail     PLAN: Between sessions, Janes Locke will " address tx plan goals.. At the next session, the therapist will use Client-centered Therapy, Cognitive Behavioral Therapy, Solution-Focused Therapy and Supportive Psychotherapy to address tx plan goals and issues that arise in between sessions..    Behavioral Health Treatment Plan and Discharge Planning: Janes Locke is aware of and agrees to continue to work on their treatment plan. They have identified and are working toward their discharge goals. yes    Visit start and stop times:    07/16/24  Start Time: 0530  Stop Time: 1815  Total Visit Time: 765 minutes    Virtual Regular Visit    Verification of patient location:    Patient is located at Home in the following state in which I hold an active license [place]      Assessment/Plan:    Problem List Items Addressed This Visit       Post traumatic stress disorder (PTSD)    Bipolar I disorder with mood-incongruent psychotic features (HCC) - Primary    GOOD (generalized anxiety disorder)                 Goals addressed in session: Goal 1          Reason for visit is   No chief complaint on file.       Encounter provider Billy Pablo    Provider located at [facility]  [facility] [place]  [address] [id number] [phone]      Recent Visits  Date Type Provider Dept   07/12/24 Telemedicine [facility] [place]   Showing recent visits within past 7 days and meeting all other requirements  Today's Visits  Date Type Provider Dept   07/16/24 Telemedicine [facility] [place]   Showing today's visits and meeting all other requirements  Future Appointments  No visits were found meeting these conditions.  Showing future appointments within next 150 days and meeting all other requirements       The patient was identified by name and date of birth. Janes Locke was informed that this is a telemedicine visit and that the visit is being  conducted throughthe [employer] platform. She agrees to proceed..  My office door was closed. No one else was in the room.  She acknowledged consent and understanding of privacy and security of the video platform. The patient has agreed to participate and understands they can discontinue the visit at any time.    Patient is aware this is a billable service.     Subjective  Janes Locke is a 34 y.o. female  .      HPI     Past Medical History:   Diagnosis Date    Bipolar 1 disorder (HCC)     Cancer (HCC)     thyroid    Depression     Suicidal ideations     Thyroid disease        Past Surgical History:   Procedure Laterality Date    IR LUMBAR PUNCTURE  3/9/2023    NECK SURGERY      US GUIDED THYROID BIOPSY      normal. Onset: 2012       Current Outpatient Medications   Medication Sig Dispense Refill    acetaZOLAMIDE (DIAMOX) 250 mg tablet TAKE 2 TABLETS BY MOUTH TWICE A  tablet 8    albuterol (PROVENTIL HFA,VENTOLIN HFA) 90 mcg/act inhaler Inhale 2 puffs every 4 - 6 hours as needed      Calcium Carbonate 1500 (600 Ca) MG TABS Take by mouth 2 (two) times a day      clonazePAM (KlonoPIN) 0.5 mg tablet Take 1 tablet (0.5 mg total) by mouth 3 (three) times a day 90 tablet 3    doxycycline hyclate (VIBRA-TABS) 100 mg tablet Take 100 mg by mouth 2 (two) times a day      ergocalciferol (VITAMIN D2) 50,000 units Take 50,000 Units by mouth once a week      FLUoxetine (PROzac) 40 MG capsule Take 1 capsule (40 mg total) by mouth daily 90 capsule 1    gabapentin (Neurontin) 600 MG tablet Take 1 tablet (600 mg total) by mouth 3 (three) times a day 90 tablet 3    levothyroxine 25 mcg tablet Take 25 mcg by mouth daily      levothyroxine 300 MCG tablet Take 300 mcg by mouth daily      Tess 0.25-35 MG-MCG per tablet Take 1 tablet by mouth daily      OLANZapine (ZyPREXA) 10 mg tablet TAKE 1 TABLET (10 MG TOTAL) BY MOUTH DAILY AS NEEDED (AGITATION) 90 tablet 2    OLANZapine (ZyPREXA) 5 mg tablet Take 1 tablet (5 mg total)  by mouth daily at bedtime 30 tablet 2    phentermine 15 MG capsule Take 1 capsule by mouth Daily      prazosin (MINIPRESS) 2 mg capsule Take 1 capsule (2 mg total) by mouth daily at bedtime 90 capsule 1    predniSONE 10 mg tablet PLEASE SEE ATTACHED FOR DETAILED DIRECTIONS      traZODone (DESYREL) 150 mg tablet Take 0.5 tablets (75 mg total) by mouth daily at bedtime 45 tablet 3     No current facility-administered medications for this visit.        No Known Allergies    Review of Systems    Video Exam    There were no vitals filed for this visit.    Physical Exam

## 2024-07-18 ENCOUNTER — TELEPHONE (OUTPATIENT)
Age: 34
End: 2024-07-18

## 2024-07-18 ENCOUNTER — OFFICE VISIT (OUTPATIENT)
Dept: PSYCHIATRY | Facility: CLINIC | Age: 34
End: 2024-07-18

## 2024-07-18 DIAGNOSIS — F41.1 GAD (GENERALIZED ANXIETY DISORDER): ICD-10-CM

## 2024-07-18 DIAGNOSIS — F25.0 SCHIZOAFFECTIVE DISORDER, BIPOLAR TYPE (HCC): ICD-10-CM

## 2024-07-18 DIAGNOSIS — F43.10 POST TRAUMATIC STRESS DISORDER (PTSD): ICD-10-CM

## 2024-07-18 DIAGNOSIS — F31.9 BIPOLAR I DISORDER WITH MOOD-INCONGRUENT PSYCHOTIC FEATURES (HCC): ICD-10-CM

## 2024-07-18 DIAGNOSIS — F90.0 ATTENTION DEFICIT HYPERACTIVITY DISORDER, INATTENTIVE TYPE: Primary | ICD-10-CM

## 2024-07-18 DIAGNOSIS — F51.05 INSOMNIA DUE TO OTHER MENTAL DISORDER: ICD-10-CM

## 2024-07-18 DIAGNOSIS — F99 INSOMNIA DUE TO OTHER MENTAL DISORDER: ICD-10-CM

## 2024-07-18 RX ORDER — OLANZAPINE 10 MG/1
10 TABLET ORAL DAILY PRN
Qty: 90 TABLET | Refills: 0 | Status: SHIPPED | OUTPATIENT
Start: 2024-07-18

## 2024-07-18 RX ORDER — DEXTROAMPHETAMINE SACCHARATE, AMPHETAMINE ASPARTATE MONOHYDRATE, DEXTROAMPHETAMINE SULFATE AND AMPHETAMINE SULFATE 2.5; 2.5; 2.5; 2.5 MG/1; MG/1; MG/1; MG/1
10 CAPSULE, EXTENDED RELEASE ORAL EVERY MORNING
Qty: 30 CAPSULE | Refills: 0 | Status: SHIPPED | OUTPATIENT
Start: 2024-07-18

## 2024-07-18 NOTE — PSYCH
"MEDICATION MANAGEMENT NOTE        [facility]    Name and Date of Birth:  Janes Locke 34 y.o. 1990 MRN: [id number]    Date of Visit: July 18, 2024    Visit Time    Visit Start Time: 1600   Visit Stop Time: 1630  Total Visit Duration:  30 minutes     The total visit duration detailed above includes: patient engagement, medication management, psychotherapy/counseling, discussion regarding treatment goals, and coordination of care.      Note Share Disclaimer:     This note was not shared with the patient due to reasonable likelihood of causing patient harm    No Known Allergies  SUBJECTIVE:    Janes is seen today for a follow up for Bipolar Disorder, PTSD, Generalized Anxiety Disorder, and ADHD. She continues to experience significant symptoms since the last visit. She came to the office today so we could complete the Adult ADHD-RS-IV with Adult prompts rating scale in the office.  She scored 24 points for the first 9 items, and 19 points for the second 9 items.  Which confirms her previous diagnosis of ADHD, predominantly inattentive type.  She is having significant symptoms at this time causing her significant distress.  Rating scale was scanned into chart. She has been on stimulant in the past with some effect, but did not continue.  Will restart today at low dose and titrate up as tolerated.  She is in agreement with this plan.   She denies hallucinations, and mood swings/irritability have been \"okay\" on current dose of Zyprexa. Venting frustrations in ongoing issues with Mom and continues to discuss with her therapist. Will follow up in one month.        HPI ROS Appetite Changes and Sleep:     She reports fluctuating sleep pattern, fluctuating appetite, fluctuating energy levels. Denies homicidal ideation, denies suicidal ideation    Review Of Systems:   no complaints, all other systems are negative         Mental Status Evaluation:    Appearance:  " age appropriate   Behavior:  pleasant, cooperative   Speech:  normal rate, normal volume   Mood:  improved   Affect:  flat   Thought Process:  goal directed   Associations: intact associations   Thought Content:  no overt delusions   Perceptual Disturbances: denies auditory hallucinations when asked   Risk Potential: Suicidal ideation - None  Homicidal ideation - None  Potential for aggression - No   Sensorium:  oriented to person, place, and time/date   Memory:  recent and remote memory grossly intact   Consciousness:  alert and awake   Attention: decreased concentration and decreased attention span   Insight:  limited   Judgment: limited   Gait/Station: normal gait/station, normal balance   Motor Activity: no abnormal movements       History Review:The following portions of the patient's history were reviewed and updated as appropriate: psychiatric history, trauma history allergies, current medications, past family history, past medical history, past social history, past surgical history, and problem list   OBJECTIVE:     Vital signs in last 24 hours:    There were no vitals filed for this visit.  Laboratory Results: I have personally reviewed all pertinent laboratory/tests results.      Suicide/Homicide Risk Assessment:    The following interventions are recommended: no intervention changes needed. Although patient's acute lethality risk is low, long-term/chronic lethality risk is mildly elevated in the presence of bipolar diagnosis. At the current moment, Janes is future-oriented, forward-thinking, and demonstrates ability to act in a self-preserving manner as evidenced by volitionally presenting to the clinic today, seeking treatment. To mitigate future risk, patient should adhere to the recommendations below, avoid alcohol/illicit substance use, utilize community-based resources and familiar support and prioritize mental health treatment. Presently, patient denies active suicidal/homicidal ideation in  addition to thoughts of self-injury; contracts for safety.  At conclusion of evaluation, patient is amenable to the recommendations below. Patient is amenable to calling/contacting the outpatient office including this writer if any acute adverse effects of their medication regimen arise in addition to any comments or concerns pertaining to their psychiatric management.  Patient is amenable to calling/contacting crisis and/or attending to the nearest emergency department if their clinical condition deteriorates to assure their safety and stability, stating that they are able to appropriately confide in their family and friends regarding their psychiatric state.    Assessment/Plan:     PLAN:  Diagnoses and all orders for this visit:    Attention deficit hyperactivity disorder, inattentive type  -     amphetamine-dextroamphetamine (ADDERALL XR, 10MG,) 10 MG 24 hr capsule; Take 1 capsule (10 mg total) by mouth every morning Max Daily Amount: 10 mg    Bipolar I disorder with mood-incongruent psychotic features (HCC)    GOOD (generalized anxiety disorder)    Insomnia due to other mental disorder    Post traumatic stress disorder (PTSD)    Schizoaffective disorder, bipolar type (HCC)  -     OLANZapine (ZyPREXA) 10 mg tablet; Take 1 tablet (10 mg total) by mouth daily as needed (agitation)         Treatment Recommendations/Precautions:    Start Adderall XR 10 mg daily for ADHD  Continue clonazepam 0.5 mg p.o. 3 times daily for anxiety  Continue Prozac 40 mg daily for depression  Continue gabapentin 600 mg p.o. 3 times daily for anxiety and mood lability  She is now on Zyprexa 10 mg daily for mood lability and rare hallucinations.  Continue prazosin 2 milligrams at bedtime for PTSD  Continue trazodone 75 mg daily at bedtime for sleep  Continue individual therapy with Billy  Medication management every 1 month.  Medication management every 5 weeks  Aware of 24 hour and weekend coverage for urgent situations accessed by calling  [facility] main practice number  Patient advised to call 911 if feeling suicidal or homicidal before acting out on their thoughts and they expressed understanding.    Medications Risks/Benefits      Risks, Benefits And Possible Side Effects Of Medications:    Discussed risks and benefits of treatment with patient including risk of suicidality, serotonin syndrome, increased QTc interval and SIADH related to treatment with antidepressants; Risk of induction of manic symptoms in certain patient populations, risk of parkinsonian symptoms, metabolic syndrome, tardive dyskinesia and neuroleptic malignant syndrome related to treatment with antipsychotic medications, risks of misuse, abuse or dependence, sedation and respiratory depression related to treatment with benzodiazepine medications, and risks of cardiovascular side effects including elevated blood pressure, risk of misuse, abuse or dependence and risk of increased anxiety related to treatment with stimulant medications         Controlled Medication Discussion:     Janes has been filling controlled prescriptions on time as prescribed according to [place] Prescription Drug Monitoring Program    Psychotherapy Provided:     Individual psychotherapy provided: Medications, treatment progress and treatment plan reviewed with Janes.  Medication changes discussed with Janes.  Medication education provided to Janes.  Reassurance and supportive therapy provided.      Treatment Plan:    Completed and signed during the session: Not applicable - Treatment Plan not due at this session    This note was not shared with the patient due to reasonable likelihood of causing patient harm      GENESIS Lemons 07/18/24

## 2024-07-18 NOTE — TELEPHONE ENCOUNTER
Pt called asking if her prescription for Adderal was sent to the pharmacy. IC explained to patient the prescription was already send over to [facility] pharmacy by her provider. Pt express her understanding.

## 2024-07-29 ENCOUNTER — APPOINTMENT (OUTPATIENT)
Dept: LAB | Facility: HOSPITAL | Age: 34
End: 2024-07-29
Payer: COMMERCIAL

## 2024-07-29 DIAGNOSIS — E55.9 VITAMIN D DEFICIENCY: ICD-10-CM

## 2024-07-29 DIAGNOSIS — E03.9 MYXEDEMA HEART DISEASE: ICD-10-CM

## 2024-07-29 DIAGNOSIS — I51.9 MYXEDEMA HEART DISEASE: ICD-10-CM

## 2024-07-29 DIAGNOSIS — Z00.01 ENCOUNTER FOR GENERAL ADULT MEDICAL EXAMINATION WITH ABNORMAL FINDINGS: ICD-10-CM

## 2024-07-29 LAB
25(OH)D3 SERPL-MCNC: 44.1 NG/ML (ref 30–100)
ALBUMIN SERPL BCG-MCNC: 3.6 G/DL (ref 3.5–5)
ALP SERPL-CCNC: 40 U/L (ref 34–104)
ALT SERPL W P-5'-P-CCNC: 32 U/L (ref 7–52)
ANION GAP SERPL CALCULATED.3IONS-SCNC: 7 MMOL/L (ref 4–13)
AST SERPL W P-5'-P-CCNC: 23 U/L (ref 13–39)
BASOPHILS # BLD AUTO: 0.05 THOUSANDS/ÂΜL (ref 0–0.1)
BASOPHILS NFR BLD AUTO: 1 % (ref 0–1)
BILIRUB SERPL-MCNC: 0.38 MG/DL (ref 0.2–1)
BUN SERPL-MCNC: 10 MG/DL (ref 5–25)
CALCIUM SERPL-MCNC: 6.7 MG/DL (ref 8.4–10.2)
CHLORIDE SERPL-SCNC: 106 MMOL/L (ref 96–108)
CHOLEST SERPL-MCNC: 235 MG/DL
CO2 SERPL-SCNC: 25 MMOL/L (ref 21–32)
CREAT SERPL-MCNC: 0.93 MG/DL (ref 0.6–1.3)
EOSINOPHIL # BLD AUTO: 0.26 THOUSAND/ÂΜL (ref 0–0.61)
EOSINOPHIL NFR BLD AUTO: 4 % (ref 0–6)
ERYTHROCYTE [DISTWIDTH] IN BLOOD BY AUTOMATED COUNT: 16.8 % (ref 11.6–15.1)
GFR SERPL CREATININE-BSD FRML MDRD: 79 ML/MIN/1.73SQ M
GLUCOSE P FAST SERPL-MCNC: 85 MG/DL (ref 65–99)
HCT VFR BLD AUTO: 37.2 % (ref 34.8–46.1)
HDLC SERPL-MCNC: 67 MG/DL
HGB BLD-MCNC: 11.5 G/DL (ref 11.5–15.4)
IMM GRANULOCYTES # BLD AUTO: 0 THOUSAND/UL (ref 0–0.2)
IMM GRANULOCYTES NFR BLD AUTO: 0 % (ref 0–2)
LDLC SERPL CALC-MCNC: 152 MG/DL (ref 0–100)
LYMPHOCYTES # BLD AUTO: 2.27 THOUSANDS/ÂΜL (ref 0.6–4.47)
LYMPHOCYTES NFR BLD AUTO: 38 % (ref 14–44)
MCH RBC QN AUTO: 26.4 PG (ref 26.8–34.3)
MCHC RBC AUTO-ENTMCNC: 30.9 G/DL (ref 31.4–37.4)
MCV RBC AUTO: 86 FL (ref 82–98)
MONOCYTES # BLD AUTO: 0.41 THOUSAND/ÂΜL (ref 0.17–1.22)
MONOCYTES NFR BLD AUTO: 7 % (ref 4–12)
NEUTROPHILS # BLD AUTO: 3.02 THOUSANDS/ÂΜL (ref 1.85–7.62)
NEUTS SEG NFR BLD AUTO: 50 % (ref 43–75)
NONHDLC SERPL-MCNC: 168 MG/DL
NRBC BLD AUTO-RTO: 0 /100 WBCS
PLATELET # BLD AUTO: 366 THOUSANDS/UL (ref 149–390)
PMV BLD AUTO: 10.1 FL (ref 8.9–12.7)
POTASSIUM SERPL-SCNC: 4 MMOL/L (ref 3.5–5.3)
PROT SERPL-MCNC: 7.2 G/DL (ref 6.4–8.4)
RBC # BLD AUTO: 4.35 MILLION/UL (ref 3.81–5.12)
SODIUM SERPL-SCNC: 138 MMOL/L (ref 135–147)
T4 SERPL-MCNC: 10.21 UG/DL (ref 6.09–12.23)
TRIGL SERPL-MCNC: 78 MG/DL
TSH SERPL DL<=0.05 MIU/L-ACNC: 0.88 UIU/ML (ref 0.45–4.5)
WBC # BLD AUTO: 6.01 THOUSAND/UL (ref 4.31–10.16)

## 2024-07-29 PROCEDURE — 36415 COLL VENOUS BLD VENIPUNCTURE: CPT

## 2024-07-29 PROCEDURE — 84443 ASSAY THYROID STIM HORMONE: CPT

## 2024-07-29 PROCEDURE — 80061 LIPID PANEL: CPT

## 2024-07-29 PROCEDURE — 85025 COMPLETE CBC W/AUTO DIFF WBC: CPT

## 2024-07-29 PROCEDURE — 82306 VITAMIN D 25 HYDROXY: CPT

## 2024-07-29 PROCEDURE — 80053 COMPREHEN METABOLIC PANEL: CPT

## 2024-07-29 PROCEDURE — 84436 ASSAY OF TOTAL THYROXINE: CPT

## 2024-08-01 ENCOUNTER — TELEPHONE (OUTPATIENT)
Dept: PSYCHIATRY | Facility: CLINIC | Age: 34
End: 2024-08-01

## 2024-08-01 ENCOUNTER — TELEPHONE (OUTPATIENT)
Age: 34
End: 2024-08-01

## 2024-08-01 NOTE — TELEPHONE ENCOUNTER
Pt called [facility] and they told her that [facility] papers has missing information:  Start/End date/ part C provider needs to initial all and amount of leave needed 3B Please fax asap

## 2024-08-02 ENCOUNTER — TELEPHONE (OUTPATIENT)
Dept: PSYCHIATRY | Facility: CLINIC | Age: 34
End: 2024-08-02

## 2024-08-02 NOTE — TELEPHONE ENCOUNTER
Provider reviewed [employer] forms and included missing information. Writer faxed over forms to [employer] at [phone] and scanned into media.

## 2024-08-05 ENCOUNTER — TELEMEDICINE (OUTPATIENT)
Dept: BEHAVIORAL/MENTAL HEALTH CLINIC | Facility: CLINIC | Age: 34
End: 2024-08-05
Payer: COMMERCIAL

## 2024-08-05 DIAGNOSIS — F31.9 BIPOLAR I DISORDER WITH MOOD-INCONGRUENT PSYCHOTIC FEATURES (HCC): Primary | ICD-10-CM

## 2024-08-05 DIAGNOSIS — F41.1 GAD (GENERALIZED ANXIETY DISORDER): ICD-10-CM

## 2024-08-05 DIAGNOSIS — F90.0 ATTENTION DEFICIT HYPERACTIVITY DISORDER, INATTENTIVE TYPE: ICD-10-CM

## 2024-08-05 DIAGNOSIS — F43.10 POST TRAUMATIC STRESS DISORDER (PTSD): ICD-10-CM

## 2024-08-05 PROCEDURE — 90837 PSYTX W PT 60 MINUTES: CPT | Performed by: SOCIAL WORKER

## 2024-08-05 NOTE — PSYCH
"Behavioral Health Psychotherapy Progress Note    Psychotherapy Provided: Individual Psychotherapy     1. Bipolar I disorder with mood-incongruent psychotic features (HCC)        2. Post traumatic stress disorder (PTSD)        3. GOOD (generalized anxiety disorder)        4. Attention deficit hyperactivity disorder, inattentive type                              Goals addressed in session: Goal 1,      DATA: JUDY met with Janes vuong. She started the ADHD med.  \"She feels like it helps\" but when it wears off she becomes irritable.  MSW recommended she talk to the Dr.  She said, \"a lot of things to her family that she has been holding onto,\" which was all negative.  With that her mom is leaving for [place] in 5 days to stay with a friend.  Janes is unsure if when she is coming back.  With her mother leaving her dad who is at her brother's \"to get away from her mother\" will be going to stay with her.  She is laid off for 2 weeks with a plan shut down but will be back at work next week.                During this session, this clinician used the following therapeutic modalities: Client-centered Therapy, Cognitive Behavioral Therapy, Solution-Focused Therapy and Supportive Psychotherapy is diagnosed with a co-occurring substance use disorder, please indicate any changes in the frequency or amount of use: none  Stage of change for addressing substance use diagnoses: Action    ASSESSMENT:  Janes Locke presents with a Euthymic/ normal mood.     her affect is Normal range and intensity, which is congruent, with her mood and the content of the session. The client has made progress on their goals.     Janes Locke presents with a none risk of suicide, none risk of self-harm, and none risk of harm to others.    For any risk assessment that surpasses a \"low\" rating, a safety plan must be developed.    A safety plan was indicated: no  If yes, describe in detail     PLAN: Between sessions, Janes Locke will " address tx plan goals.. At the next session, the therapist will use Client-centered Therapy, Cognitive Behavioral Therapy, Solution-Focused Therapy and Supportive Psychotherapy to address tx plan goals and issues that arise in between sessions..    Behavioral Health Treatment Plan and Discharge Planning: Janes Locke is aware of and agrees to continue to work on their treatment plan. They have identified and are working toward their discharge goals. yes    Visit start and stop times:    08/05/24  Start Time: 1712  Stop Time: 1805  Total Visit Time: 53 minutes    Virtual Regular Visit    Verification of patient location:    Patient is located at Home in the following state in which I hold an active license [place]      Assessment/Plan:    Problem List Items Addressed This Visit       Post traumatic stress disorder (PTSD)    Bipolar I disorder with mood-incongruent psychotic features (HCC) - Primary    GOOD (generalized anxiety disorder)    Attention deficit hyperactivity disorder, inattentive type                 Goals addressed in session: Goal 1          Reason for visit is   No chief complaint on file.       Encounter provider Billy Pablo    Provider located at [facility]  [facility] [place]  [address] [place], [place] [address] [id number] [phone]      Recent Visits  Date Type Provider Dept   08/02/24 Telephone Billy Pablo [facility] [place]   Showing recent visits within past 7 days and meeting all other requirements  Today's Visits  Date Type Provider Dept   08/05/24 Telemedicine Billy [facility] [place]   Showing today's visits and meeting all other requirements  Future Appointments  No visits were found meeting these conditions.  Showing future appointments within next 150 days and meeting all other requirements       The patient was identified by name and date of birth. Janes Locke was informed that this is  a telemedicine visit and that the visit is being conducted throughthe [email or web address] platform. She agrees to proceed..  My office door was closed. No one else was in the room.  She acknowledged consent and understanding of privacy and security of the video platform. The patient has agreed to participate and understands they can discontinue the visit at any time.    Patient is aware this is a billable service.     Subjective  Janes Locke is a 34 y.o. female  .      HPI     Past Medical History:   Diagnosis Date    Bipolar 1 disorder (HCC)     Cancer (HCC)     thyroid    Depression     Suicidal ideations     Thyroid disease        Past Surgical History:   Procedure Laterality Date    IR LUMBAR PUNCTURE  3/9/2023    NECK SURGERY      US GUIDED THYROID BIOPSY      normal. Onset: 2012       Current Outpatient Medications   Medication Sig Dispense Refill    acetaZOLAMIDE (DIAMOX) 250 mg tablet TAKE 2 TABLETS BY MOUTH TWICE A  tablet 8    albuterol (PROVENTIL HFA,VENTOLIN HFA) 90 mcg/act inhaler Inhale 2 puffs every 4 - 6 hours as needed      amphetamine-dextroamphetamine (ADDERALL XR, 10MG,) 10 MG 24 hr capsule Take 1 capsule (10 mg total) by mouth every morning Max Daily Amount: 10 mg 30 capsule 0    Calcium Carbonate 1500 (600 Ca) MG TABS Take by mouth 2 (two) times a day      clonazePAM (KlonoPIN) 0.5 mg tablet Take 1 tablet (0.5 mg total) by mouth 3 (three) times a day 90 tablet 3    doxycycline hyclate (VIBRA-TABS) 100 mg tablet Take 100 mg by mouth 2 (two) times a day      ergocalciferol (VITAMIN D2) 50,000 units Take 50,000 Units by mouth once a week      FLUoxetine (PROzac) 40 MG capsule Take 1 capsule (40 mg total) by mouth daily 90 capsule 1    gabapentin (Neurontin) 600 MG tablet Take 1 tablet (600 mg total) by mouth 3 (three) times a day 90 tablet 3    levothyroxine 25 mcg tablet Take 25 mcg by mouth daily      levothyroxine 300 MCG tablet Take 300 mcg by mouth daily      Tess 0.25-35 MG-MCG per  tablet Take 1 tablet by mouth daily      OLANZapine (ZyPREXA) 10 mg tablet Take 1 tablet (10 mg total) by mouth daily as needed (agitation) 90 tablet 0    OLANZapine (ZyPREXA) 5 mg tablet Take 1 tablet (5 mg total) by mouth daily at bedtime 30 tablet 2    phentermine 15 MG capsule Take 1 capsule by mouth Daily      prazosin (MINIPRESS) 2 mg capsule Take 1 capsule (2 mg total) by mouth daily at bedtime 90 capsule 1    predniSONE 10 mg tablet PLEASE SEE ATTACHED FOR DETAILED DIRECTIONS      traZODone (DESYREL) 150 mg tablet Take 0.5 tablets (75 mg total) by mouth daily at bedtime 45 tablet 3     No current facility-administered medications for this visit.        No Known Allergies    Review of Systems    Video Exam    There were no vitals filed for this visit.    Physical Exam

## 2024-08-15 ENCOUNTER — TELEPHONE (OUTPATIENT)
Age: 34
End: 2024-08-15

## 2024-08-15 ENCOUNTER — TELEPHONE (OUTPATIENT)
Dept: OTHER | Facility: OTHER | Age: 34
End: 2024-08-15

## 2024-08-15 DIAGNOSIS — F90.0 ATTENTION DEFICIT HYPERACTIVITY DISORDER, INATTENTIVE TYPE: Primary | ICD-10-CM

## 2024-08-15 DIAGNOSIS — F90.0 ATTENTION DEFICIT HYPERACTIVITY DISORDER, INATTENTIVE TYPE: ICD-10-CM

## 2024-08-15 RX ORDER — DEXTROAMPHETAMINE SACCHARATE, AMPHETAMINE ASPARTATE MONOHYDRATE, DEXTROAMPHETAMINE SULFATE AND AMPHETAMINE SULFATE 2.5; 2.5; 2.5; 2.5 MG/1; MG/1; MG/1; MG/1
10 CAPSULE, EXTENDED RELEASE ORAL EVERY MORNING
Qty: 30 CAPSULE | Refills: 0 | Status: CANCELLED | OUTPATIENT
Start: 2024-08-15

## 2024-08-15 RX ORDER — DEXTROAMPHETAMINE SACCHARATE, AMPHETAMINE ASPARTATE MONOHYDRATE, DEXTROAMPHETAMINE SULFATE AND AMPHETAMINE SULFATE 3.75; 3.75; 3.75; 3.75 MG/1; MG/1; MG/1; MG/1
15 CAPSULE, EXTENDED RELEASE ORAL EVERY MORNING
Qty: 30 CAPSULE | Refills: 0 | Status: SHIPPED | OUTPATIENT
Start: 2024-08-15 | End: 2024-08-16 | Stop reason: SDUPTHER

## 2024-08-15 NOTE — TELEPHONE ENCOUNTER
Pt called to request amphetamine-dextroamphetamine (ADDERALL XR, 15MG,) 15 MG 24 hr capsule prescription to be send to [facility] [place] [address] [phone]. She states the pharmacy this medication was send to is out of stock.     Please advise, thank you.

## 2024-08-15 NOTE — TELEPHONE ENCOUNTER
Patient called in regards to the Adderall. Patient said that she was not sure whether she should request a refill or wait and go without it for 4 days because she is not sure if provider is going to keep her on that medication or switch it to something else. Writer informed patient that she would relay message to provider. Patient was requesting someone contact her back with the answer when available.     Patient can be reached at P# [phone]

## 2024-08-15 NOTE — TELEPHONE ENCOUNTER
Patient called in stating they realized they were going to run out of medication sooner then they expected and wanted to make sure the provider knew when they requested their Adderall that they had discussed being prescribed a higher dose of 15mg    Medication Refill Request     Name of Medication Adderall XR  Dose/Frequency 10mg, Take 1 capsule (10 mg total) by mouth every morning   Quantity 30 capsule  Verified pharmacy   [x]  Verified ordering Provider   [x]  Does patient have enough for the next 3 days? Yes [] No [x]  Does patient have a follow-up appointment scheduled? Yes [x] No []   If so when is appointment: 8/22/24 @4pm virtually

## 2024-08-16 RX ORDER — DEXTROAMPHETAMINE SACCHARATE, AMPHETAMINE ASPARTATE MONOHYDRATE, DEXTROAMPHETAMINE SULFATE AND AMPHETAMINE SULFATE 3.75; 3.75; 3.75; 3.75 MG/1; MG/1; MG/1; MG/1
15 CAPSULE, EXTENDED RELEASE ORAL EVERY MORNING
Qty: 30 CAPSULE | Refills: 0 | Status: SHIPPED | OUTPATIENT
Start: 2024-08-16 | End: 2024-08-22

## 2024-08-16 NOTE — TELEPHONE ENCOUNTER
Follow up call to Georgina.  Informed her that script was sent to [facility].

## 2024-08-16 NOTE — TELEPHONE ENCOUNTER
Patient called rx refill inqurinig about status of her pharmacy change due to medication not in stock-patient is worried since she will be out of medication by tomorrow. Explained to patient I will send a high priority message on her behalf to expedite her request

## 2024-08-22 ENCOUNTER — TELEMEDICINE (OUTPATIENT)
Dept: PSYCHIATRY | Facility: CLINIC | Age: 34
End: 2024-08-22
Payer: COMMERCIAL

## 2024-08-22 DIAGNOSIS — F51.05 INSOMNIA DUE TO OTHER MENTAL DISORDER: ICD-10-CM

## 2024-08-22 DIAGNOSIS — F43.10 POST TRAUMATIC STRESS DISORDER (PTSD): ICD-10-CM

## 2024-08-22 DIAGNOSIS — F42.4 SKIN PICKING HABIT: ICD-10-CM

## 2024-08-22 DIAGNOSIS — F31.9 BIPOLAR I DISORDER WITH MOOD-INCONGRUENT PSYCHOTIC FEATURES (HCC): Primary | ICD-10-CM

## 2024-08-22 DIAGNOSIS — F99 INSOMNIA DUE TO OTHER MENTAL DISORDER: ICD-10-CM

## 2024-08-22 DIAGNOSIS — F41.1 GAD (GENERALIZED ANXIETY DISORDER): ICD-10-CM

## 2024-08-22 DIAGNOSIS — F90.0 ATTENTION DEFICIT HYPERACTIVITY DISORDER, INATTENTIVE TYPE: ICD-10-CM

## 2024-08-22 PROBLEM — Z91.148 NON COMPLIANCE W MEDICATION REGIMEN: Status: RESOLVED | Noted: 2020-09-18 | Resolved: 2024-08-22

## 2024-08-22 PROBLEM — F12.90 MARIJUANA USE: Status: RESOLVED | Noted: 2023-05-23 | Resolved: 2024-08-22

## 2024-08-22 PROCEDURE — 99214 OFFICE O/P EST MOD 30 MIN: CPT | Performed by: NURSE PRACTITIONER

## 2024-08-22 RX ORDER — DEXTROAMPHETAMINE SACCHARATE, AMPHETAMINE ASPARTATE MONOHYDRATE, DEXTROAMPHETAMINE SULFATE AND AMPHETAMINE SULFATE 5; 5; 5; 5 MG/1; MG/1; MG/1; MG/1
20 CAPSULE, EXTENDED RELEASE ORAL EVERY MORNING
Qty: 30 CAPSULE | Refills: 0 | Status: SHIPPED | OUTPATIENT
Start: 2024-09-20 | End: 2024-08-29

## 2024-08-22 RX ORDER — DEXTROAMPHETAMINE SACCHARATE, AMPHETAMINE ASPARTATE MONOHYDRATE, DEXTROAMPHETAMINE SULFATE AND AMPHETAMINE SULFATE 3.75; 3.75; 3.75; 3.75 MG/1; MG/1; MG/1; MG/1
15 CAPSULE, EXTENDED RELEASE ORAL EVERY MORNING
Qty: 30 CAPSULE | Refills: 0 | Status: SHIPPED | OUTPATIENT
Start: 2024-08-22 | End: 2024-08-29

## 2024-08-22 NOTE — PSYCH
Virtual Regular Visit    Verification of patient location:    Patient is located at Home in the following state in which I hold an active license [place]      Assessment/Plan:    Problem List Items Addressed This Visit       Post traumatic stress disorder (PTSD)    Relevant Medications    amphetamine-dextroamphetamine (ADDERALL XR, 15MG,) 15 MG 24 hr capsule    amphetamine-dextroamphetamine (ADDERALL XR, 20MG,) 20 MG 24 hr capsule (Start on 9/20/2024)    Bipolar I disorder with mood-incongruent psychotic features (HCC) - Primary    Relevant Medications    amphetamine-dextroamphetamine (ADDERALL XR, 15MG,) 15 MG 24 hr capsule    amphetamine-dextroamphetamine (ADDERALL XR, 20MG,) 20 MG 24 hr capsule (Start on 9/20/2024)    GOOD (generalized anxiety disorder)    Relevant Medications    amphetamine-dextroamphetamine (ADDERALL XR, 15MG,) 15 MG 24 hr capsule    amphetamine-dextroamphetamine (ADDERALL XR, 20MG,) 20 MG 24 hr capsule (Start on 9/20/2024)    Insomnia due to other mental disorder    Relevant Medications    amphetamine-dextroamphetamine (ADDERALL XR, 15MG,) 15 MG 24 hr capsule    amphetamine-dextroamphetamine (ADDERALL XR, 20MG,) 20 MG 24 hr capsule (Start on 9/20/2024)    Skin picking habit    Attention deficit hyperactivity disorder, inattentive type    Relevant Medications    amphetamine-dextroamphetamine (ADDERALL XR, 15MG,) 15 MG 24 hr capsule    amphetamine-dextroamphetamine (ADDERALL XR, 20MG,) 20 MG 24 hr capsule (Start on 9/20/2024)          Reason for visit is   Chief Complaint   Patient presents with    Virtual Regular Visit          Encounter provider GENESIS Lemons      Recent Visits  Date Type Provider Dept   08/15/24 Telephone Barak Orosco MD [facility]   Showing recent visits within past 7 days and meeting all other requirements  Today's Visits  Date Type Provider Dept   08/22/24 Telemedicine GENESIS Lemons [facility]   Showing today's visits and meeting all other  requirements  Future Appointments  No visits were found meeting these conditions.  Showing future appointments within next 150 days and meeting all other requirements       The patient was identified by name and date of birth. Janes Locke was informed that this is a telemedicine visit and that the visit is being conducted throughthe [employer] platform. She agrees to proceed..  My office door was closed. No one else was in the room.  She acknowledged consent and understanding of privacy and security of the video platform. The patient has agreed to participate and understands they can discontinue the visit at any time.    Patient is aware this is a billable service.     MEDICATION MANAGEMENT NOTE        [facility]    Name and Date of Birth:  Janes Locke 34 y.o. 1990 MRN: [id number]    Date of Visit: August 22, 2024    Total Visit Duration:  30 minutes     The total visit duration detailed above includes: patient engagement, medication management, psychotherapy/counseling, discussion regarding treatment goals, and coordination of care.      Note Share Disclaimer:     This note was not shared with the patient due to reasonable likelihood of causing patient harm    No Known Allergies  SUBJECTIVE:    Janes is seen today for a follow up for mood disorder, ADHD, and psychosis. She continues to improve gradually since the last visit. Continues to report improvement in concentration and focus, more productive, less anxiety, less skin picking.  Denying any recent angry outbursts.  In good spirits today as she got a promotion at work.    She denies any side effects from medications.      HPI ROS Appetite Changes and Sleep:     She reports fluctuating sleep pattern, fluctuating appetite, fluctuating energy levels. Denies homicidal ideation, denies suicidal ideation    Review Of Systems:   no complaints, all other systems are negative , except as noted  above         Mental Status Evaluation:    Appearance:  age appropriate   Behavior:  pleasant, cooperative   Speech:  normal rate, normal volume   Mood:  improved   Affect:  brighter   Thought Process:  goal directed   Associations: intact associations   Thought Content:  no overt delusions   Perceptual Disturbances: none   Risk Potential: Suicidal ideation - None  Homicidal ideation - None  Potential for aggression - No   Sensorium:  oriented to person, place, and time/date   Memory:  recent and remote memory grossly intact   Consciousness:  alert and awake   Attention: attention span and concentration are age appropriate   Insight:  improving   Judgment: improving   Gait/Station: normal gait/station, normal balance   Motor Activity: no abnormal movements       History Review:The following portions of the patient's history were reviewed and updated as appropriate: psychiatric history, trauma history allergies, current medications, past family history, past medical history, past social history, past surgical history, and problem list   OBJECTIVE:     Vital signs in last 24 hours:    There were no vitals filed for this visit.  Laboratory Results: I have personally reviewed all pertinent laboratory/tests results.      Suicide/Homicide Risk Assessment:    The following interventions are recommended: no intervention changes needed. Although patient's acute lethality risk is low, long-term/chronic lethality risk is mildly elevated in the presence of mood disorder. At the current moment, Janes is future-oriented, forward-thinking, and demonstrates ability to act in a self-preserving manner as evidenced by volitionally presenting to the clinic today, seeking treatment. To mitigate future risk, patient should adhere to the recommendations below, avoid alcohol/illicit substance use, utilize community-based resources and familiar support and prioritize mental health treatment. Presently, patient denies active  suicidal/homicidal ideation in addition to thoughts of self-injury; contracts for safety.  At conclusion of evaluation, patient is amenable to the recommendations below. Patient is amenable to calling/contacting the outpatient office including this writer if any acute adverse effects of their medication regimen arise in addition to any comments or concerns pertaining to their psychiatric management.  Patient is amenable to calling/contacting crisis and/or attending to the nearest emergency department if their clinical condition deteriorates to assure their safety and stability, stating that they are able to appropriately confide in their friends and family and therapist regarding their psychiatric state.    Assessment/Plan:     PLAN:  Diagnoses and all orders for this visit:    Bipolar I disorder with mood-incongruent psychotic features (HCC)    GOOD (generalized anxiety disorder)    Skin picking habit    Post traumatic stress disorder (PTSD)    Insomnia due to other mental disorder    Attention deficit hyperactivity disorder, inattentive type  -     amphetamine-dextroamphetamine (ADDERALL XR, 15MG,) 15 MG 24 hr capsule; Take 1 capsule (15 mg total) by mouth every morning Note dose increase Max Daily Amount: 15 mg  -     amphetamine-dextroamphetamine (ADDERALL XR, 20MG,) 20 MG 24 hr capsule; Take 1 capsule (20 mg total) by mouth every morning Max Daily Amount: 20 mg Do not start before September 20, 2024.         Treatment Recommendations/Precautions:    Increase Adderall XR to 20 mg next month for ADHD  Continue Zyprexa 10 mg daily for agitation  Continue Gabapentin 900 mg TID for mood swings  Continue clonazepam 0.5 mg TID for anxiety  Continue Prozac 40 mg daily for depression/anxiety  Continue Trazodone 150 mg for sleep  Continue with individual therapy  Medication management every 2 months  Aware of 24 hour and weekend coverage for urgent situations accessed by calling [facility] main  practice number  Patient advised to call 911 if feeling suicidal or homicidal before acting out on their thoughts and they expressed understanding.    Medications Risks/Benefits      Risks, Benefits And Possible Side Effects Of Medications:    Discussed risks and benefits of treatment with patient including risk of suicidality, serotonin syndrome, increased QTc interval and SIADH related to treatment with antidepressants; Risk of induction of manic symptoms in certain patient populations, risk of parkinsonian symptoms, metabolic syndrome, tardive dyskinesia and neuroleptic malignant syndrome related to treatment with antipsychotic medications, risks of misuse, abuse or dependence, sedation and respiratory depression related to treatment with benzodiazepine medications, and risks of cardiovascular side effects including elevated blood pressure, risk of misuse, abuse or dependence and risk of increased anxiety related to treatment with stimulant medications         Controlled Medication Discussion:     Janes has been filling controlled prescriptions on time as prescribed according to [place] Prescription Drug Monitoring Program    Psychotherapy Provided:     Individual psychotherapy provided: Medications, treatment progress and treatment plan reviewed with Janes.  Medication changes discussed with Janes.  Medication education provided to Janes.  Reassurance and supportive therapy provided.      Treatment Plan:    Completed and signed during the session: Not applicable - Treatment Plan not due at this session    This note was not shared with the patient due to reasonable likelihood of causing patient harm      GENESIS Lemons 08/22/24

## 2024-08-23 ENCOUNTER — TELEMEDICINE (OUTPATIENT)
Dept: BEHAVIORAL/MENTAL HEALTH CLINIC | Facility: CLINIC | Age: 34
End: 2024-08-23
Payer: COMMERCIAL

## 2024-08-23 DIAGNOSIS — F90.0 ATTENTION DEFICIT HYPERACTIVITY DISORDER, INATTENTIVE TYPE: ICD-10-CM

## 2024-08-23 DIAGNOSIS — F43.10 POST TRAUMATIC STRESS DISORDER (PTSD): ICD-10-CM

## 2024-08-23 DIAGNOSIS — F41.1 GAD (GENERALIZED ANXIETY DISORDER): ICD-10-CM

## 2024-08-23 DIAGNOSIS — F31.9 BIPOLAR I DISORDER WITH MOOD-INCONGRUENT PSYCHOTIC FEATURES (HCC): Primary | ICD-10-CM

## 2024-08-23 PROCEDURE — 90837 PSYTX W PT 60 MINUTES: CPT | Performed by: SOCIAL WORKER

## 2024-08-23 NOTE — PSYCH
"Behavioral Health Psychotherapy Progress Note    Psychotherapy Provided: Individual Psychotherapy     1. Bipolar I disorder with mood-incongruent psychotic features (HCC)        2. GOOD (generalized anxiety disorder)        3. Attention deficit hyperactivity disorder, inattentive type        4. Post traumatic stress disorder (PTSD)                                Goals addressed in session: Goal 1,      DATA: MSW met with Janes  session. She interviewed and got the job which was a  position and a promotion.   She can't believe she was confident and assertive.  \"Everybody is proud of her.\"  Her mom is still not staying with her but will be back the beginning of Sept not Oct.  Her roommate reports she is doing more around the house and is more caring with people she cares about.  She is addressing issues with her family.  She is working with her dad on taking over the house.    She is going out and doing more such as going to the store, cleaning the house, putting groceries away.  She sees a lot of process since taking the Adderall.   She doesn't pick anymore and is less time on her phone.  She got back together with her girlfriend.  Janes is afraid of becoming addicted to the Adderal so she gave it to her girlfriend to hold and give it to her.              During this session, this clinician used the following therapeutic modalities: Client-centered Therapy, Cognitive Behavioral Therapy, Solution-Focused Therapy and Supportive Psychotherapy is diagnosed with a co-occurring substance use disorder, please indicate any changes in the frequency or amount of use: none  Stage of change for addressing substance use diagnoses: Action    ASSESSMENT:  Georgina Locke presents with a Euthymic/ normal mood.     her affect is Normal range and intensity, which is congruent, with her mood and the content of the session. The client has made progress on their goals.     Georgina Locke presents with a none risk of " "suicide, none risk of self-harm, and none risk of harm to others.    For any risk assessment that surpasses a \"low\" rating, a safety plan must be developed.    A safety plan was indicated: no  If yes, describe in detail     PLAN: Between sessions, Georgina Locke will address tx plan goals.. At the next session, the therapist will use Client-centered Therapy, Cognitive Behavioral Therapy, Solution-Focused Therapy and Supportive Psychotherapy to address tx plan goals and issues that arise in between sessions..    Behavioral Health Treatment Plan and Discharge Planning: Georgina Locke is aware of and agrees to continue to work on their treatment plan. They have identified and are working toward their discharge goals. yes    Visit start and stop times:    08/23/24  Start Time: 1712    Virtual Regular Visit    Verification of patient location:    Patient is located at Home in the following state in which I hold an active license [place]      Assessment/Plan:    Problem List Items Addressed This Visit    None                  Goals addressed in session: Goal 1          Reason for visit is   No chief complaint on file.       Encounter provider Billy Pablo    Provider located at [facility]  [facility]  [address] [id number] [phone]      Recent Visits  No visits were found meeting these conditions.  Showing recent visits within past 7 days and meeting all other requirements  Today's Visits  Date Type Provider Dept   08/23/24 Telemedicine Billy Pablo [facility] [place]   Showing today's visits and meeting all other requirements  Future Appointments  No visits were found meeting these conditions.  Showing future appointments within next 150 days and meeting all other requirements       The patient was identified by name and date of birth. Janes Locke was informed that this is a telemedicine visit and that the " visit is being conducted throughthe [employer] platform. She agrees to proceed..  My office door was closed. No one else was in the room.  She acknowledged consent and understanding of privacy and security of the video platform. The patient has agreed to participate and understands they can discontinue the visit at any time.    Patient is aware this is a billable service.     Subjective  Janes Locke is a 34 y.o. female  .      HPI     Past Medical History:   Diagnosis Date    Bipolar 1 disorder (HCC)     Cancer (HCC)     thyroid    Depression     Suicidal ideations     Thyroid disease        Past Surgical History:   Procedure Laterality Date    IR LUMBAR PUNCTURE  3/9/2023    NECK SURGERY      US GUIDED THYROID BIOPSY      normal. Onset: 2012       Current Outpatient Medications   Medication Sig Dispense Refill    acetaZOLAMIDE (DIAMOX) 250 mg tablet TAKE 2 TABLETS BY MOUTH TWICE A  tablet 8    albuterol (PROVENTIL HFA,VENTOLIN HFA) 90 mcg/act inhaler Inhale 2 puffs every 4 - 6 hours as needed      amphetamine-dextroamphetamine (ADDERALL XR, 15MG,) 15 MG 24 hr capsule Take 1 capsule (15 mg total) by mouth every morning Note dose increase Max Daily Amount: 15 mg 30 capsule 0    [START ON 9/20/2024] amphetamine-dextroamphetamine (ADDERALL XR, 20MG,) 20 MG 24 hr capsule Take 1 capsule (20 mg total) by mouth every morning Max Daily Amount: 20 mg Do not start before September 20, 2024. 30 capsule 0    Calcium Carbonate 1500 (600 Ca) MG TABS Take by mouth 2 (two) times a day      clonazePAM (KlonoPIN) 0.5 mg tablet Take 1 tablet (0.5 mg total) by mouth 3 (three) times a day 90 tablet 3    doxycycline hyclate (VIBRA-TABS) 100 mg tablet Take 100 mg by mouth 2 (two) times a day      ergocalciferol (VITAMIN D2) 50,000 units Take 50,000 Units by mouth once a week      FLUoxetine (PROzac) 40 MG capsule Take 1 capsule (40 mg total) by mouth daily 90 capsule 1    gabapentin (Neurontin) 600 MG tablet Take 1 tablet  (600 mg total) by mouth 3 (three) times a day 90 tablet 3    levothyroxine 25 mcg tablet Take 25 mcg by mouth daily      levothyroxine 300 MCG tablet Take 300 mcg by mouth daily      Tess 0.25-35 MG-MCG per tablet Take 1 tablet by mouth daily      OLANZapine (ZyPREXA) 10 mg tablet Take 1 tablet (10 mg total) by mouth daily as needed (agitation) 90 tablet 0    OLANZapine (ZyPREXA) 5 mg tablet Take 1 tablet (5 mg total) by mouth daily at bedtime 30 tablet 2    phentermine 15 MG capsule Take 1 capsule by mouth Daily      prazosin (MINIPRESS) 2 mg capsule Take 1 capsule (2 mg total) by mouth daily at bedtime 90 capsule 1    predniSONE 10 mg tablet PLEASE SEE ATTACHED FOR DETAILED DIRECTIONS      traZODone (DESYREL) 150 mg tablet Take 0.5 tablets (75 mg total) by mouth daily at bedtime 45 tablet 3     No current facility-administered medications for this visit.        No Known Allergies    Review of Systems    Video Exam    There were no vitals filed for this visit.    Physical Exam

## 2024-08-28 ENCOUNTER — TELEPHONE (OUTPATIENT)
Age: 34
End: 2024-08-28

## 2024-08-28 DIAGNOSIS — F90.0 ATTENTION DEFICIT HYPERACTIVITY DISORDER, INATTENTIVE TYPE: ICD-10-CM

## 2024-08-28 NOTE — TELEPHONE ENCOUNTER
amphetamine-dextroamphetamine (ADDERALL XR, 20MG,) 20 MG 24 hr capsule (Future)  Take 1 capsule (20 mg total) by mouth every morning Max Daily Amount: 20 mg Do not start before September 20, 2024.    Pt states she had the Rx filled on 8/16/24 and that she will run out of medication on 9/15/24. She is not supposed to get the next refill until 9/20/24.  Pt requesting if it is possible for Provider to send the Rx earlier since she will not have enough to last her between 9/15 and 9/20.  Please return call to [phone]

## 2024-08-29 RX ORDER — DEXTROAMPHETAMINE SACCHARATE, AMPHETAMINE ASPARTATE MONOHYDRATE, DEXTROAMPHETAMINE SULFATE AND AMPHETAMINE SULFATE 3.75; 3.75; 3.75; 3.75 MG/1; MG/1; MG/1; MG/1
15 CAPSULE, EXTENDED RELEASE ORAL EVERY MORNING
Start: 2024-08-16 | End: 2024-09-15

## 2024-08-29 RX ORDER — DEXTROAMPHETAMINE SACCHARATE, AMPHETAMINE ASPARTATE MONOHYDRATE, DEXTROAMPHETAMINE SULFATE AND AMPHETAMINE SULFATE 5; 5; 5; 5 MG/1; MG/1; MG/1; MG/1
20 CAPSULE, EXTENDED RELEASE ORAL EVERY MORNING
Qty: 30 CAPSULE | Refills: 0 | Status: SHIPPED | OUTPATIENT
Start: 2024-09-14

## 2024-08-29 RX ORDER — GABAPENTIN 300 MG/1
CAPSULE ORAL
COMMUNITY
Start: 2024-08-26

## 2024-08-29 NOTE — TELEPHONE ENCOUNTER
Called Georgina and let her know that [facility] changed the order so she can pick it up on 9/14. She was very appreciative.

## 2024-09-01 ENCOUNTER — TELEPHONE (OUTPATIENT)
Dept: OTHER | Facility: OTHER | Age: 34
End: 2024-09-01

## 2024-09-01 DIAGNOSIS — F90.0 ATTENTION DEFICIT HYPERACTIVITY DISORDER, INATTENTIVE TYPE: Primary | ICD-10-CM

## 2024-09-01 NOTE — TELEPHONE ENCOUNTER
Pt needs prior authorization for medication to go from generic to brand name. Please follow up with pt.

## 2024-09-01 NOTE — TELEPHONE ENCOUNTER
"Pt called stating, \" I found a pharmacy able to fill the prescription for amphetamine-dextroamphetamine (ADDERALL XR, 20MG,). I was informed by them that it needs to be a 90 days supply and dispense as written. Pt also requested for this be done as the provider earliest convenience due to the process taking up to 14 days to be completed. [facility] [phone].    Please advise.   "

## 2024-09-03 RX ORDER — DEXTROAMPHETAMINE SULFATE, DEXTROAMPHETAMINE SACCHARATE, AMPHETAMINE SULFATE AND AMPHETAMINE ASPARTATE 5; 5; 5; 5 MG/1; MG/1; MG/1; MG/1
20 CAPSULE, EXTENDED RELEASE ORAL EVERY MORNING
Qty: 90 CAPSULE | Refills: 0 | Status: SHIPPED | OUTPATIENT
Start: 2024-09-03 | End: 2024-09-04 | Stop reason: SDUPTHER

## 2024-09-03 NOTE — TELEPHONE ENCOUNTER
Called [facility] and was informed that Adderall brand will require a Prior Authorization.      Will forward to the PA pod for processing.

## 2024-09-04 ENCOUNTER — TELEPHONE (OUTPATIENT)
Age: 34
End: 2024-09-04

## 2024-09-04 ENCOUNTER — TELEMEDICINE (OUTPATIENT)
Dept: BEHAVIORAL/MENTAL HEALTH CLINIC | Facility: CLINIC | Age: 34
End: 2024-09-04
Payer: COMMERCIAL

## 2024-09-04 DIAGNOSIS — F90.0 ATTENTION DEFICIT HYPERACTIVITY DISORDER, INATTENTIVE TYPE: ICD-10-CM

## 2024-09-04 DIAGNOSIS — F43.10 POST TRAUMATIC STRESS DISORDER (PTSD): ICD-10-CM

## 2024-09-04 DIAGNOSIS — F41.1 GAD (GENERALIZED ANXIETY DISORDER): ICD-10-CM

## 2024-09-04 DIAGNOSIS — F90.0 ATTENTION DEFICIT HYPERACTIVITY DISORDER, INATTENTIVE TYPE: Primary | ICD-10-CM

## 2024-09-04 PROCEDURE — 90837 PSYTX W PT 60 MINUTES: CPT | Performed by: SOCIAL WORKER

## 2024-09-04 RX ORDER — DEXTROAMPHETAMINE SULFATE, DEXTROAMPHETAMINE SACCHARATE, AMPHETAMINE SULFATE AND AMPHETAMINE ASPARTATE 5; 5; 5; 5 MG/1; MG/1; MG/1; MG/1
20 CAPSULE, EXTENDED RELEASE ORAL EVERY MORNING
Qty: 30 CAPSULE | Refills: 0 | Status: SHIPPED | OUTPATIENT
Start: 2024-09-04

## 2024-09-04 NOTE — PSYCH
"Behavioral Health Psychotherapy Progress Note    Psychotherapy Provided: Individual Psychotherapy     1. Attention deficit hyperactivity disorder, inattentive type        2. GOOD (generalized anxiety disorder)        3. Post traumatic stress disorder (PTSD)                                  Goals addressed in session: Goal 1,      DATA: JUDY met with Janes vuong. She got her certificate for .  She starts this position soon.  Her mom is coming back to stay with her in 2 days which she is dreading and not leaving until Dec.  She doesn't want her to stay with her but feels she has no choice.  Her dad is returning to  this month.  Her mother has been asking her for money  which she doesn't like either.  She wanted to increase sessions to 1 time per week which we did.            During this session, this clinician used the following therapeutic modalities: Client-centered Therapy, Cognitive Behavioral Therapy, Solution-Focused Therapy and Supportive Psychotherapy is diagnosed with a co-occurring substance use disorder, please indicate any changes in the frequency or amount of use: none  Stage of change for addressing substance use diagnoses: Action    ASSESSMENT:  Georgina Locke presents with a Euthymic/ normal mood.     her affect is Normal range and intensity, which is congruent, with her mood and the content of the session. The client has made progress on their goals.     Georgina Locke presents with a none risk of suicide, none risk of self-harm, and none risk of harm to others.    For any risk assessment that surpasses a \"low\" rating, a safety plan must be developed.    A safety plan was indicated: no  If yes, describe in detail     PLAN: Between sessions, Georgina Locke will address tx plan goals.. At the next session, the therapist will use Client-centered Therapy, Cognitive Behavioral Therapy, Solution-Focused Therapy and Supportive Psychotherapy to address tx plan goals and issues that arise " in between sessions..    Behavioral Health Treatment Plan and Discharge Planning: Georgina Locke is aware of and agrees to continue to work on their treatment plan. They have identified and are working toward their discharge goals. yes    Visit start and stop times:    09/04/24  Start Time: 1812  Stop Time: 1905  Total Visit Time: 53 minutes    Virtual Regular Visit    Verification of patient location:    Patient is located at Home in the following state in which I hold an active license [place]      Assessment/Plan:    Problem List Items Addressed This Visit    None                  Goals addressed in session: Goal 1          Reason for visit is   No chief complaint on file.       Encounter provider Billy Pablo    Provider located at [facility]  [facility] [place]  [address] [address] [phone]      Recent Visits  No visits were found meeting these conditions.  Showing recent visits within past 7 days and meeting all other requirements  Today's Visits  Date Type Provider Dept   09/04/24 Telemedicine Billy Pablo [facility] [place]   Showing today's visits and meeting all other requirements  Future Appointments  No visits were found meeting these conditions.  Showing future appointments within next 150 days and meeting all other requirements       The patient was identified by name and date of birth. Janes Locke was informed that this is a telemedicine visit and that the visit is being conducted throughthe "[employer] platform. She agrees to proceed..  My office door was closed. No one else was in the room.  She acknowledged consent and understanding of privacy and security of the video platform. The patient has agreed to participate and understands they can discontinue the visit at any time.    Patient is aware this is a billable service.     Subjective  Janes Locke is a 34 y.o. female  .      HPI      Past Medical History:   Diagnosis Date    Bipolar 1 disorder (HCC)     Cancer (HCC)     thyroid    Depression     Suicidal ideations     Thyroid disease        Past Surgical History:   Procedure Laterality Date    IR LUMBAR PUNCTURE  3/9/2023    NECK SURGERY      US GUIDED THYROID BIOPSY      normal. Onset: 2012       Current Outpatient Medications   Medication Sig Dispense Refill    acetaZOLAMIDE (DIAMOX) 250 mg tablet TAKE 2 TABLETS BY MOUTH TWICE A  tablet 8    ADDERALL XR, 20MG, 20 MG 24 hr capsule Take 1 capsule (20 mg total) by mouth every morning Max Daily Amount: 20 mg 30 capsule 0    albuterol (PROVENTIL HFA,VENTOLIN HFA) 90 mcg/act inhaler Inhale 2 puffs every 4 - 6 hours as needed      Calcium Carbonate 1500 (600 Ca) MG TABS Take by mouth 2 (two) times a day      clonazePAM (KlonoPIN) 0.5 mg tablet Take 1 tablet (0.5 mg total) by mouth 3 (three) times a day 90 tablet 3    doxycycline hyclate (VIBRA-TABS) 100 mg tablet Take 100 mg by mouth 2 (two) times a day      ergocalciferol (VITAMIN D2) 50,000 units Take 50,000 Units by mouth once a week      FLUoxetine (PROzac) 40 MG capsule Take 1 capsule (40 mg total) by mouth daily 90 capsule 1    gabapentin (NEURONTIN) 300 mg capsule       gabapentin (Neurontin) 600 MG tablet Take 1 tablet (600 mg total) by mouth 3 (three) times a day 90 tablet 3    levothyroxine 25 mcg tablet Take 25 mcg by mouth daily      levothyroxine 300 MCG tablet Take 300 mcg by mouth daily      Tess 0.25-35 MG-MCG per tablet Take 1 tablet by mouth daily      OLANZapine (ZyPREXA) 10 mg tablet Take 1 tablet (10 mg total) by mouth daily as needed (agitation) 90 tablet 0    OLANZapine (ZyPREXA) 5 mg tablet Take 1 tablet (5 mg total) by mouth daily at bedtime 30 tablet 2    phentermine 15 MG capsule Take 1 capsule by mouth Daily      prazosin (MINIPRESS) 2 mg capsule Take 1 capsule (2 mg total) by mouth daily at bedtime 90 capsule 1    predniSONE 10 mg tablet PLEASE SEE ATTACHED FOR  DETAILED DIRECTIONS      traZODone (DESYREL) 150 mg tablet Take 0.5 tablets (75 mg total) by mouth daily at bedtime 45 tablet 3     No current facility-administered medications for this visit.        No Known Allergies    Review of Systems    Video Exam    There were no vitals filed for this visit.    Physical Exam

## 2024-09-04 NOTE — TELEPHONE ENCOUNTER
PA for ADDERALL XR 20 mg BRAND SUBMITTED     via    []CMM-KEY:   [x]Kike-Case ID # [id number]   []Faxed to plan   []Other website   []Phone call Case ID #     Office notes sent, clinical questions answered. Awaiting determination    Turnaround time for your insurance to make a decision on your Prior Authorization can take 7-21 business days.

## 2024-09-04 NOTE — TELEPHONE ENCOUNTER
PA for ADDERALL XR 20 mg BRAND  APPROVED     Date(s) approved 8/21/2024 to 11/3/2024     Case #    Patient advised by          []Theravancehart Message  [x]Phone call-Georgina informed of approval  []LMOM  []L/M to call office as no active Communication consent on file  []Unable to leave detailed message as VM not approved on Communication consent       Pharmacy advised by    []Fax  [x]Phone call-called [facility], automated system has approval info. Auth #: [id number]    Approval letter scanned into Media No

## 2024-09-04 NOTE — TELEPHONE ENCOUNTER
Received a call from Georgina who said [facility] has been trying to call needing clarification on the adderall. Called [facility] and the representative said a 90 day supply requires a MD or DO to order the script. She also said a new script could be sent for a 30 day supply. Forwarding to provider for review.

## 2024-09-06 ENCOUNTER — TELEPHONE (OUTPATIENT)
Dept: OTHER | Facility: OTHER | Age: 34
End: 2024-09-06

## 2024-09-06 NOTE — TELEPHONE ENCOUNTER
Patient is requesting a call from Billy Pablo regarding an ongoing situation. Patient states she is safe at this time and can wait until the office opens for a call.

## 2024-09-11 ENCOUNTER — TELEPHONE (OUTPATIENT)
Dept: OTHER | Facility: OTHER | Age: 34
End: 2024-09-11

## 2024-09-11 ENCOUNTER — TELEPHONE (OUTPATIENT)
Age: 34
End: 2024-09-11

## 2024-09-11 DIAGNOSIS — F51.05 INSOMNIA DUE TO OTHER MENTAL DISORDER: ICD-10-CM

## 2024-09-11 DIAGNOSIS — F90.0 ATTENTION DEFICIT HYPERACTIVITY DISORDER, INATTENTIVE TYPE: Primary | ICD-10-CM

## 2024-09-11 DIAGNOSIS — F99 INSOMNIA DUE TO OTHER MENTAL DISORDER: ICD-10-CM

## 2024-09-11 DIAGNOSIS — F31.9 BIPOLAR I DISORDER WITH MOOD-INCONGRUENT PSYCHOTIC FEATURES (HCC): ICD-10-CM

## 2024-09-11 RX ORDER — DEXTROAMPHETAMINE SACCHARATE, AMPHETAMINE ASPARTATE MONOHYDRATE, DEXTROAMPHETAMINE SULFATE AND AMPHETAMINE SULFATE 5; 5; 5; 5 MG/1; MG/1; MG/1; MG/1
20 CAPSULE, EXTENDED RELEASE ORAL EVERY MORNING
Qty: 10 CAPSULE | Refills: 0 | Status: SHIPPED | OUTPATIENT
Start: 2024-09-11

## 2024-09-11 NOTE — TELEPHONE ENCOUNTER
Pt reached the after hours service, she is trying to set-up her medications to be delivered by [facility] however, they need clarification before they will accept the request. Pt states she gave provider Aixa Sue's name and they do not have her connected to Cookie Carlton at the Fax # [phone] only at [place] therefore, they are requiring confirmation from the office. Could  you please call [facility] [phone].

## 2024-09-11 NOTE — TELEPHONE ENCOUNTER
Patient found 10 pills at the [facility], if a script can be sent over for her.

## 2024-09-11 NOTE — TELEPHONE ENCOUNTER
Patient called to obtain a temporary refill of her ADDERAL XR until the mail ordered one is shipped out. She stated she will be out of medication by Saturday. She is going to call local pharmacies to check if they have it in stock and call back.

## 2024-09-12 RX ORDER — TRAZODONE HYDROCHLORIDE 150 MG/1
75 TABLET ORAL
Qty: 135 TABLET | Refills: 1 | Status: SHIPPED | OUTPATIENT
Start: 2024-09-12

## 2024-09-12 RX ORDER — GABAPENTIN 600 MG/1
900 TABLET ORAL 3 TIMES DAILY
Qty: 405 TABLET | Refills: 1 | Status: SHIPPED | OUTPATIENT
Start: 2024-09-12

## 2024-09-12 NOTE — TELEPHONE ENCOUNTER
Called [facility] and they said they need a 90 day supply of trazadone and gabapentin.   Fax number [phone]  Provider line to call it in [phone]

## 2024-09-13 ENCOUNTER — TELEMEDICINE (OUTPATIENT)
Dept: BEHAVIORAL/MENTAL HEALTH CLINIC | Facility: CLINIC | Age: 34
End: 2024-09-13

## 2024-09-13 DIAGNOSIS — F41.1 GAD (GENERALIZED ANXIETY DISORDER): ICD-10-CM

## 2024-09-13 DIAGNOSIS — F90.0 ATTENTION DEFICIT HYPERACTIVITY DISORDER, INATTENTIVE TYPE: ICD-10-CM

## 2024-09-13 DIAGNOSIS — F43.10 POST TRAUMATIC STRESS DISORDER (PTSD): Primary | ICD-10-CM

## 2024-09-13 NOTE — PSYCH
"Behavioral Health Psychotherapy Progress Note    Psychotherapy Provided: Individual Psychotherapy     1. Post traumatic stress disorder (PTSD)        2. GOOD (generalized anxiety disorder)        3. Attention deficit hyperactivity disorder, inattentive type                                    Goals addressed in session: Goal 1,      DATA: JUDY met with Janes  yevgeniy. She called and left a message for MSW which MSW did not get.  JUDY left a message for the .  Last Fri she had reached her limit with her mother.  So she changed the locks on the house so her mom could not get in.  She then called her bother to come and get her parents.   She has no issues with her dad and brother \"but she felt like no one was listening to her.  She couldn't do it any more.\"  She describes her mother as \"toxic.\"  She described how she is toxic.  She is happy with herself on how she handled the situation with no acting out.  Work is going good.  She is enjoying her promotion.  It apears Janes's promotion gained confidence in herself helped her stand up to her stand up to her mother and family.            During this session, this clinician used the following therapeutic modalities: Client-centered Therapy, Cognitive Behavioral Therapy, Solution-Focused Therapy and Supportive Psychotherapy is diagnosed with a co-occurring substance use disorder, please indicate any changes in the frequency or amount of use: none  Stage of change for addressing substance use diagnoses: Action    ASSESSMENT:  Georgina Locke presents with a Euthymic/ normal mood.     her affect is Normal range and intensity, which is congruent, with her mood and the content of the session. The client has made progress on their goals.     Georgina Locke presents with a none risk of suicide, none risk of self-harm, and none risk of harm to others.    For any risk assessment that surpasses a \"low\" rating, a safety plan must be developed.    A safety plan was " indicated: no  If yes, describe in detail     PLAN: Between sessions, Georgina Locke will address tx plan goals.. At the next session, the therapist will use Client-centered Therapy, Cognitive Behavioral Therapy, Solution-Focused Therapy and Supportive Psychotherapy to address tx plan goals and issues that arise in between sessions..    Behavioral Health Treatment Plan and Discharge Planning: Georgina Locke is aware of and agrees to continue to work on their treatment plan. They have identified and are working toward their discharge goals. yes    Visit start and stop times:    09/13/24  Start Time: 1712  Stop Time: 1805  Total Visit Time: 53 minutes    Virtual Regular Visit    Verification of patient location:    Patient is located at Home in the following state in which I hold an active license [place]      Assessment/Plan:    Problem List Items Addressed This Visit       Post traumatic stress disorder (PTSD) - Primary    GOOD (generalized anxiety disorder)    Attention deficit hyperactivity disorder, inattentive type                   Goals addressed in session: Goal 1          Reason for visit is   No chief complaint on file.       Encounter provider Billy Pablo    Provider located at [facility] [place]  [facility] [place]  [address]  [place] [id number] [phone]      Recent Visits  No visits were found meeting these conditions.  Showing recent visits within past 7 days and meeting all other requirements  Today's Visits  Date Type Provider Dept   09/13/24 Telemedicine [facility] [place]   Showing today's visits and meeting all other requirements  Future Appointments  No visits were found meeting these conditions.  Showing future appointments within next 150 days and meeting all other requirements       The patient was identified by name and date of birth. Janes Locke was informed that this is a telemedicine  visit and that the visit is being conducted throughthe OMG platform. She agrees to proceed..  My office door was closed. No one else was in the room.  She acknowledged consent and understanding of privacy and security of the video platform. The patient has agreed to participate and understands they can discontinue the visit at any time.    Patient is aware this is a billable service.     Subjective  Janes Locke is a 34 y.o. female  .      HPI     Past Medical History:   Diagnosis Date    Bipolar 1 disorder (HCC)     Cancer (HCC)     thyroid    Depression     Suicidal ideations     Thyroid disease        Past Surgical History:   Procedure Laterality Date    IR LUMBAR PUNCTURE  3/9/2023    NECK SURGERY      US GUIDED THYROID BIOPSY      normal. Onset: 2012       Current Outpatient Medications   Medication Sig Dispense Refill    acetaZOLAMIDE (DIAMOX) 250 mg tablet TAKE 2 TABLETS BY MOUTH TWICE A  tablet 8    ADDERALL XR, 20MG, 20 MG 24 hr capsule Take 1 capsule (20 mg total) by mouth every morning Max Daily Amount: 20 mg 30 capsule 0    albuterol (PROVENTIL HFA,VENTOLIN HFA) 90 mcg/act inhaler Inhale 2 puffs every 4 - 6 hours as needed      amphetamine-dextroamphetamine (ADDERALL XR, 20MG,) 20 MG 24 hr capsule Take 1 capsule (20 mg total) by mouth every morning Max Daily Amount: 20 mg 10 capsule 0    Calcium Carbonate 1500 (600 Ca) MG TABS Take by mouth 2 (two) times a day      clonazePAM (KlonoPIN) 0.5 mg tablet Take 1 tablet (0.5 mg total) by mouth 3 (three) times a day 90 tablet 3    doxycycline hyclate (VIBRA-TABS) 100 mg tablet Take 100 mg by mouth 2 (two) times a day      ergocalciferol (VITAMIN D2) 50,000 units Take 50,000 Units by mouth once a week      FLUoxetine (PROzac) 40 MG capsule Take 1 capsule (40 mg total) by mouth daily 90 capsule 1    gabapentin (Neurontin) 600 MG tablet Take 1.5 tablets (900 mg total) by mouth 3 (three) times a day 405 tablet 1    levothyroxine 25 mcg tablet  Take 25 mcg by mouth daily      levothyroxine 300 MCG tablet Take 300 mcg by mouth daily      Tess 0.25-35 MG-MCG per tablet Take 1 tablet by mouth daily      OLANZapine (ZyPREXA) 10 mg tablet Take 1 tablet (10 mg total) by mouth daily as needed (agitation) 90 tablet 0    OLANZapine (ZyPREXA) 5 mg tablet Take 1 tablet (5 mg total) by mouth daily at bedtime 30 tablet 2    phentermine 15 MG capsule Take 1 capsule by mouth Daily      prazosin (MINIPRESS) 2 mg capsule Take 1 capsule (2 mg total) by mouth daily at bedtime 90 capsule 1    predniSONE 10 mg tablet PLEASE SEE ATTACHED FOR DETAILED DIRECTIONS      traZODone (DESYREL) 150 mg tablet Take 0.5 tablets (75 mg total) by mouth daily at bedtime 135 tablet 1     No current facility-administered medications for this visit.        No Known Allergies    Review of Systems    Video Exam    There were no vitals filed for this visit.    Physical Exam

## 2024-09-17 ENCOUNTER — TELEMEDICINE (OUTPATIENT)
Dept: BEHAVIORAL/MENTAL HEALTH CLINIC | Facility: CLINIC | Age: 34
End: 2024-09-17
Payer: COMMERCIAL

## 2024-09-17 DIAGNOSIS — F90.0 ATTENTION DEFICIT HYPERACTIVITY DISORDER, INATTENTIVE TYPE: Primary | ICD-10-CM

## 2024-09-17 DIAGNOSIS — F43.10 POST TRAUMATIC STRESS DISORDER (PTSD): ICD-10-CM

## 2024-09-17 DIAGNOSIS — F41.1 GAD (GENERALIZED ANXIETY DISORDER): ICD-10-CM

## 2024-09-17 PROCEDURE — 90837 PSYTX W PT 60 MINUTES: CPT | Performed by: SOCIAL WORKER

## 2024-09-17 NOTE — PSYCH
"Behavioral Health Psychotherapy Progress Note    Psychotherapy Provided: Individual Psychotherapy     1. Attention deficit hyperactivity disorder, inattentive type        2. Post traumatic stress disorder (PTSD)        3. GOOD (generalized anxiety disorder)                                      Goals addressed in session: Goal 1,      DATA: JUDY met with Janes vuong. Work is going good.  Her father went back to [place] already and her mom was to go back next month but her brother brought her a ticket for this weekend. He understands now what she has been saying.   Her father is in the process of signing the house into her name.  Developed tx plan and crisis plan.             During this session, this clinician used the following therapeutic modalities: Client-centered Therapy, Cognitive Behavioral Therapy, Solution-Focused Therapy and Supportive Psychotherapy is diagnosed with a co-occurring substance use disorder, please indicate any changes in the frequency or amount of use: none  Stage of change for addressing substance use diagnoses: Action    ASSESSMENT:  Georgina Locke presents with a Euthymic/ normal mood.     her affect is Normal range and intensity, which is congruent, with her mood and the content of the session. The client has made progress on their goals.     Georgina Locke presents with a none risk of suicide, none risk of self-harm, and none risk of harm to others.    For any risk assessment that surpasses a \"low\" rating, a safety plan must be developed.    A safety plan was indicated: no  If yes, describe in detail     PLAN: Between sessions, Georgina Locke will address tx plan goals.. At the next session, the therapist will use Client-centered Therapy, Cognitive Behavioral Therapy, Solution-Focused Therapy and Supportive Psychotherapy to address tx plan goals and issues that arise in between sessions..    Behavioral Health Treatment Plan and Discharge Planning: Georgina Locke is aware of and " agrees to continue to work on their treatment plan. They have identified and are working toward their discharge goals. yes    Visit start and stop times:    09/17/24  Start Time: 1712  Stop Time: 1805  Total Visit Time: 53 minutes    Virtual Regular Visit    Verification of patient location:    Patient is located at Home in the following state in which I hold an active license [place]      Assessment/Plan:    Problem List Items Addressed This Visit       Post traumatic stress disorder (PTSD)    GOOD (generalized anxiety disorder)    Attention deficit hyperactivity disorder, inattentive type - Primary                     Goals addressed in session: Goal 1          Reason for visit is   No chief complaint on file.       Encounter provider Billy Pablo    Provider located at [facility] [place]  [facility] [place]  [address] [address] [phone]      Recent Visits  Date Type Provider Dept   09/13/24 Telemedicine [facility] [place]   Showing recent visits within past 7 days and meeting all other requirements  Today's Visits  Date Type Provider Dept   09/17/24 Telemedicine [facility] [place]   Showing today's visits and meeting all other requirements  Future Appointments  No visits were found meeting these conditions.  Showing future appointments within next 150 days and meeting all other requirements       The patient was identified by name and date of birth. Janes Locke was informed that this is a telemedicine visit and that the visit is being conducted throughthe [employer] Roundtable platform. She agrees to proceed..  My office door was closed. No one else was in the room.  She acknowledged consent and understanding of privacy and security of the video platform. The patient has agreed to participate and understands they can discontinue the visit at any time.    Patient is aware this is a  [facility] service.     Subjective  Janes Locke is a 34 y.o. female  .      HPI     Past Medical History:   Diagnosis Date    Bipolar 1 disorder (HCC)     Cancer (HCC)     thyroid    Depression     Suicidal ideations     Thyroid disease        Past Surgical History:   Procedure Laterality Date    IR LUMBAR PUNCTURE  3/9/2023    NECK SURGERY      US GUIDED THYROID BIOPSY      normal. Onset: 2012       Current Outpatient Medications   Medication Sig Dispense Refill    acetaZOLAMIDE (DIAMOX) 250 mg tablet TAKE 2 TABLETS BY MOUTH TWICE A  tablet 8    ADDERALL XR, 20MG, 20 MG 24 hr capsule Take 1 capsule (20 mg total) by mouth every morning Max Daily Amount: 20 mg 30 capsule 0    albuterol (PROVENTIL HFA,VENTOLIN HFA) 90 mcg/act inhaler Inhale 2 puffs every 4 - 6 hours as needed      amphetamine-dextroamphetamine (ADDERALL XR, 20MG,) 20 MG 24 hr capsule Take 1 capsule (20 mg total) by mouth every morning Max Daily Amount: 20 mg 10 capsule 0    Calcium Carbonate 1500 (600 Ca) MG TABS Take by mouth 2 (two) times a day      clonazePAM (KlonoPIN) 0.5 mg tablet Take 1 tablet (0.5 mg total) by mouth 3 (three) times a day 90 tablet 3    doxycycline hyclate (VIBRA-TABS) 100 mg tablet Take 100 mg by mouth 2 (two) times a day      ergocalciferol (VITAMIN D2) 50,000 units Take 50,000 Units by mouth once a week      FLUoxetine (PROzac) 40 MG capsule Take 1 capsule (40 mg total) by mouth daily 90 capsule 1    gabapentin (Neurontin) 600 MG tablet Take 1.5 tablets (900 mg total) by mouth 3 (three) times a day 405 tablet 1    levothyroxine 25 mcg tablet Take 25 mcg by mouth daily      levothyroxine 300 MCG tablet Take 300 mcg by mouth daily      Tess 0.25-35 MG-MCG per tablet Take 1 tablet by mouth daily      OLANZapine (ZyPREXA) 10 mg tablet Take 1 tablet (10 mg total) by mouth daily as needed (agitation) 90 tablet 0    OLANZapine (ZyPREXA) 5 mg tablet Take 1 tablet (5 mg total) by mouth daily at bedtime 30 tablet 2     phentermine 15 MG capsule Take 1 capsule by mouth Daily      prazosin (MINIPRESS) 2 mg capsule Take 1 capsule (2 mg total) by mouth daily at bedtime 90 capsule 1    predniSONE 10 mg tablet PLEASE SEE ATTACHED FOR DETAILED DIRECTIONS      traZODone (DESYREL) 150 mg tablet Take 0.5 tablets (75 mg total) by mouth daily at bedtime 135 tablet 1     No current facility-administered medications for this visit.        No Known Allergies    Review of Systems    Video Exam    There were no vitals filed for this visit.    Physical Exam

## 2024-09-17 NOTE — BH TREATMENT PLAN
Outpatient Behavioral Health Psychotherapy Treatment Plan    Georgina Locke  1990     Date of Initial Psychotherapy Assessment: 10/14/20  Date of Current Treatment Plan: 09/17/24  Treatment Plan Target Date: 3/25  Treatment Plan Expiration Date: 3/25    Diagnosis:   1. Attention deficit hyperactivity disorder, inattentive type        2. Post traumatic stress disorder (PTSD)        3. GOOD (generalized anxiety disorder)            Area(s) of Need:  I did not set with my family and I went I tried to they did not take what I said into consideration. I am a new .    Long Term Goal 1 (in the client's own words): I want to keep the boundaries I set with family and not let the cross no matter what they say.    Stage of Change: Preparation    Target Date for completion: 3/25     Anticipated therapeutic modalities: CBT     People identified to complete this goal: Georgina      Objective 1:   I will continue to be assertive with my family.     I will keep the boundaries that I set with my family.     I will not allow my mom to stat with me.        I will take my meds.        Long Term Goal 2 (in the client's own words):   I want to make it past the 90 day probationary cy and  do my best at my new position.    Stage of Change: Preparation    Target Date for completion: 3/25     Anticipated therapeutic modalities: Preparation     People identified to complete this goal:       Objective 1:  I will show up every day for work.     I will resist the urge to call of.     I will take my meds as proscribed.     I will do the best.      I will ask for help when I need it.                   Long Term Goal 3 (in the client's own words):     Stage of Change: Preparation    Target Date for completion:      Anticipated therapeutic modalities:      People identified to complete this goal:       Objective 1: (identify the means of measuring success in meeting the objective):       Objective 2: (identify the means of measuring  success in meeting the objective):      I am currently under the care of a [facility] psychiatric provider: yes    My [facility] psychiatric provider is: Aixa Sue  I am currently taking psychiatric medications: Yes, as prescribed    I feel that I will be ready for discharge from mental health care when I reach the following (measurable goal/objective): when I reach my goals and nothing left to address on a tx plan.    For children and adults who have a legal guardian:   Has there been any change to custody orders and/or guardianship status? No. If yes, attach updated documentation.    I have updated my Crisis Plan and have been offered a copy of this plan    Behavioral Health Treatment Plan [facility]: Diagnosis and Treatment Plan explained to Georgina Locke acknowledges an understanding of their diagnosis. Georgina Locke agrees to this treatment plan.    I have been offered a copy of this Treatment Plan. yes

## 2024-09-17 NOTE — BH CRISIS PLAN
"Client Name: Georgina Locke       Client YOB: 1990    [place] Safety Plan      Creation Date: 3/12/24 Update Date: 9/17/24   Created By: Billy Pablo Last Updated By: Billy Pablo      Step 1: Warning Signs:   Warning Signs   \"searching\"   not sleeping   talking too much   other people telling her \"she is not ok.\"            Step 2: Internal Coping Strategies:   Internal Coping Strategies   scrolling the internet            Step 3: People and social settings that provide distraction:   Name Contact Information   Saroj lives with    Places   home           Step 4: People whom I can ask for help during a crisis:      Name Contact Information    Saroj lives with      Step 5: Professionals or agencies I can contact during a crisis:      Clinican/Agency Name Phone Emergency Contact    Aixa Fischer has #       Local Emergency Department Emergency Department Phone Emergency Department Address    [facility] [address]         Crisis Phone Numbers:   Suicide Prevention Lifeline: Call or Text  560 Crisis Text Line: Text HOME to 919-938   Please note: Some [facility] do not have a separate number for Child/Adolescent specific crisis. If your county is not listed under Child/Adolescent, please call the adult number for your county      Adult Crisis Numbers: Child/Adolescent Crisis Numbers   [facility]: [phone] [facility]: [phone]   [facility]: [phone] [facility]: [phone]   [facility]: [phone] [place]: [phone]   [facility]: [phone] [facility]: [phone]   [facility]: [phone]   [facility]: [phone]   [facility]: [phone]   [facility]: [phone] (daytime) [phone] (after hours, weekends, holidays)      Step 6: Making the environment safer (plan for lethal means safety):   Patient did not identify any lethal methods: Yes     Optional: What is most important to me " and worth living for?      [place] Safety Plan. Annette Andujar and Deng Ahmadi. Used with permission of the authors.

## 2024-09-24 ENCOUNTER — TELEMEDICINE (OUTPATIENT)
Dept: BEHAVIORAL/MENTAL HEALTH CLINIC | Facility: CLINIC | Age: 34
End: 2024-09-24
Payer: COMMERCIAL

## 2024-09-24 DIAGNOSIS — F90.0 ATTENTION DEFICIT HYPERACTIVITY DISORDER, INATTENTIVE TYPE: Primary | ICD-10-CM

## 2024-09-24 DIAGNOSIS — F43.10 POST TRAUMATIC STRESS DISORDER (PTSD): ICD-10-CM

## 2024-09-24 DIAGNOSIS — F41.1 GAD (GENERALIZED ANXIETY DISORDER): ICD-10-CM

## 2024-09-24 DIAGNOSIS — F31.9 BIPOLAR I DISORDER WITH MOOD-INCONGRUENT PSYCHOTIC FEATURES (HCC): ICD-10-CM

## 2024-09-24 PROCEDURE — 90837 PSYTX W PT 60 MINUTES: CPT | Performed by: SOCIAL WORKER

## 2024-09-24 NOTE — PSYCH
"Behavioral Health Psychotherapy Progress Note    Psychotherapy Provided: Individual Psychotherapy     1. Attention deficit hyperactivity disorder, inattentive type        2. Post traumatic stress disorder (PTSD)        3. Bipolar I disorder with mood-incongruent psychotic features (HCC)        4. GOOD (generalized anxiety disorder)                                        Goals addressed in session: Goal 1,      DATA: JUDY met with Iveliezerss  session. She is \"renee.\"  Discussed why she is so \"renee.\"  Her adderall is working, she likes her job, her mother left the country and went back home to the .  She asked, \"If she still needs to be on the Zyprexa?\" MSW told her I'm not sure but when they wanted to take her off she did not like how she felt.  Not on it made her \"feel like she was losing her mind.\"  MSW recommend she talk to her doctor about the meds.  Discussed she feels good because she took 3 risks.  She talked put in for a promotion, she confronted her mom and she tried a new med.  All of them worked out.      During this session, this clinician used the following therapeutic modalities: Client-centered Therapy, Cognitive Behavioral Therapy, Solution-Focused Therapy and Supportive Psychotherapy is diagnosed with a co-occurring substance use disorder, please indicate any changes in the frequency or amount of use: none  Stage of change for addressing substance use diagnoses: Action    ASSESSMENT:  Georgina Locke presents with a Euthymic/ normal mood.     her affect is Normal range and intensity, which is congruent, with her mood and the content of the session. The client has made progress on their goals.     Georgina Locke presents with a none risk of suicide, none risk of self-harm, and none risk of harm to others.    For any risk assessment that surpasses a \"low\" rating, a safety plan must be developed.    A safety plan was indicated: no  If yes, describe in detail     PLAN: Between sessions, Georgina Locke " will address tx plan goals.. At the next session, the therapist will use Client-centered Therapy, Cognitive Behavioral Therapy, Solution-Focused Therapy and Supportive Psychotherapy to address tx plan goals and issues that arise in between sessions..    Behavioral Health Treatment Plan and Discharge Planning: Georgina Locke is aware of and agrees to continue to work on their treatment plan. They have identified and are working toward their discharge goals. yes    Visit start and stop times:    09/24/24  Start Time: 1712  Stop Time: 1805  Total Visit Time: 53 minutes    Virtual Regular Visit    Verification of patient location:    Patient is located at Home in the following state in which I hold an active license [place]      Assessment/Plan:    Problem List Items Addressed This Visit       Post traumatic stress disorder (PTSD)    Bipolar I disorder with mood-incongruent psychotic features (HCC)    GOOD (generalized anxiety disorder)    Attention deficit hyperactivity disorder, inattentive type - Primary                       Goals addressed in session: Goal 1          Reason for visit is   No chief complaint on file.       Encounter provider Billy Pablo    Provider located at [facility] [place]  [facility] [place]  [address] [place], [place] [address]LATOSHA ROSALES [address] [phone]      Recent Visits  Date Type Provider Dept   09/17/24 Telemedicine Billy Pablo [facility] [place]   Showing recent visits within past 7 days and meeting all other requirements  Today's Visits  Date Type Provider Dept   09/24/24 Telemedicine Billy Pablo [facility] [place]   Showing today's visits and meeting all other requirements  Future Appointments  No visits were found meeting these conditions.  Showing future appointments within next 150 days and meeting all other requirements       The patient was identified by name and date of birth. Jaens Locke was  informed that this is a telemedicine visit and that the visit is being conducted throughthe [employer] platform. She agrees to proceed..  My office door was closed. No one else was in the room.  She acknowledged consent and understanding of privacy and security of the video platform. The patient has agreed to participate and understands they can discontinue the visit at any time.    Patient is aware this is a billable service.     Nick Locke is a 34 y.o. female  .      HPI     Past Medical History:   Diagnosis Date    Bipolar 1 disorder (HCC)     Cancer (HCC)     thyroid    Depression     Suicidal ideations     Thyroid disease        Past Surgical History:   Procedure Laterality Date    IR LUMBAR PUNCTURE  3/9/2023    NECK SURGERY      US GUIDED THYROID BIOPSY      normal. Onset: 2012       Current Outpatient Medications   Medication Sig Dispense Refill    acetaZOLAMIDE (DIAMOX) 250 mg tablet TAKE 2 TABLETS BY MOUTH TWICE A  tablet 8    ADDERALL XR, 20MG, 20 MG 24 hr capsule Take 1 capsule (20 mg total) by mouth every morning Max Daily Amount: 20 mg 30 capsule 0    albuterol (PROVENTIL HFA,VENTOLIN HFA) 90 mcg/act inhaler Inhale 2 puffs every 4 - 6 hours as needed      amphetamine-dextroamphetamine (ADDERALL XR, 20MG,) 20 MG 24 hr capsule Take 1 capsule (20 mg total) by mouth every morning Max Daily Amount: 20 mg 10 capsule 0    Calcium Carbonate 1500 (600 Ca) MG TABS Take by mouth 2 (two) times a day      clonazePAM (KlonoPIN) 0.5 mg tablet Take 1 tablet (0.5 mg total) by mouth 3 (three) times a day 90 tablet 3    doxycycline hyclate (VIBRA-TABS) 100 mg tablet Take 100 mg by mouth 2 (two) times a day      ergocalciferol (VITAMIN D2) 50,000 units Take 50,000 Units by mouth once a week      FLUoxetine (PROzac) 40 MG capsule Take 1 capsule (40 mg total) by mouth daily 90 capsule 1    gabapentin (Neurontin) 600 MG tablet Take 1.5 tablets (900 mg total) by mouth 3 (three) times a day 405  tablet 1    levothyroxine 25 mcg tablet Take 25 mcg by mouth daily      levothyroxine 300 MCG tablet Take 300 mcg by mouth daily      Tess 0.25-35 MG-MCG per tablet Take 1 tablet by mouth daily      OLANZapine (ZyPREXA) 10 mg tablet Take 1 tablet (10 mg total) by mouth daily as needed (agitation) 90 tablet 0    OLANZapine (ZyPREXA) 5 mg tablet Take 1 tablet (5 mg total) by mouth daily at bedtime 30 tablet 2    phentermine 15 MG capsule Take 1 capsule by mouth Daily      prazosin (MINIPRESS) 2 mg capsule Take 1 capsule (2 mg total) by mouth daily at bedtime 90 capsule 1    predniSONE 10 mg tablet PLEASE SEE ATTACHED FOR DETAILED DIRECTIONS      traZODone (DESYREL) 150 mg tablet Take 0.5 tablets (75 mg total) by mouth daily at bedtime 135 tablet 1     No current facility-administered medications for this visit.        No Known Allergies    Review of Systems    Video Exam    There were no vitals filed for this visit.    Physical Exam

## 2024-10-04 ENCOUNTER — TELEMEDICINE (OUTPATIENT)
Dept: BEHAVIORAL/MENTAL HEALTH CLINIC | Facility: CLINIC | Age: 34
End: 2024-10-04
Payer: COMMERCIAL

## 2024-10-04 DIAGNOSIS — F90.0 ATTENTION DEFICIT HYPERACTIVITY DISORDER, INATTENTIVE TYPE: Primary | ICD-10-CM

## 2024-10-04 DIAGNOSIS — F43.10 POST TRAUMATIC STRESS DISORDER (PTSD): ICD-10-CM

## 2024-10-04 DIAGNOSIS — F41.1 GAD (GENERALIZED ANXIETY DISORDER): ICD-10-CM

## 2024-10-04 PROCEDURE — 90834 PSYTX W PT 45 MINUTES: CPT | Performed by: SOCIAL WORKER

## 2024-10-04 NOTE — PSYCH
"Behavioral Health Psychotherapy Progress Note    Psychotherapy Provided: Individual Psychotherapy     1. Attention deficit hyperactivity disorder, inattentive type        2. GOOD (generalized anxiety disorder)        3. Post traumatic stress disorder (PTSD)                                          Goals addressed in session: Goal 1,      DATA: JUDY met with Janes vuong. Instead of calling her Dr about the Zyprexia she \"decided on her own to stop it.  She learned her lesson and won't do that again.  She was very angry.\"  She restarted it after 4 days of no taking it.  She had to call off of work the day after she restarted the Zyprexa because it made her very tired. She went [place] a walk to the post office.  She hadn't taken a walk for years.  \"She won't do that again.  A crack heard, he told her he was a crack head, kept talking to her.\"      During this session, this clinician used the following therapeutic modalities: Client-centered Therapy, Cognitive Behavioral Therapy, Solution-Focused Therapy and Supportive Psychotherapy is diagnosed with a co-occurring substance use disorder, please indicate any changes in the frequency or amount of use: none  Stage of change for addressing substance use diagnoses: Action    ASSESSMENT:  Georgina Locke presents with a Euthymic/ normal mood.     her affect is Normal range and intensity, which is congruent, with her mood and the content of the session. The client has made progress on their goals.     Georgina Locke presents with a none risk of suicide, none risk of self-harm, and none risk of harm to others.    For any risk assessment that surpasses a \"low\" rating, a safety plan must be developed.    A safety plan was indicated: no  If yes, describe in detail     PLAN: Between sessions, Georgina Locke will address tx plan goals.. At the next session, the therapist will use Client-centered Therapy, Cognitive Behavioral Therapy, Solution-Focused Therapy and Supportive " Psychotherapy to address tx plan goals and issues that arise in between sessions..    Behavioral Health Treatment Plan and Discharge Planning: Georgina Locke is aware of and agrees to continue to work on their treatment plan. They have identified and are working toward their discharge goals. yes    Visit start and stop times:    10/04/24  Start Time: 1712  Stop Time: 1800  Total Visit Time: 48 minutes    Virtual Regular Visit    Verification of patient location:    Patient is located at Home in the following state in which I hold an active license [place]      Assessment/Plan:    Problem List Items Addressed This Visit       Post traumatic stress disorder (PTSD)    GOOD (generalized anxiety disorder)    Attention deficit hyperactivity disorder, inattentive type - Primary                         Goals addressed in session: Goal 1          Reason for visit is   No chief complaint on file.       Encounter provider Billy Pablo    Provider located at [facility] [place]  [facility] [place]  [address]  [place] [id number] [phone]      Recent Visits  No visits were found meeting these conditions.  Showing recent visits within past 7 days and meeting all other requirements  Today's Visits  Date Type Provider Dept   10/04/24 Telemedicine Billy Pablo  [facility] [place]   Showing today's visits and meeting all other requirements  Future Appointments  No visits were found meeting these conditions.  Showing future appointments within next 150 days and meeting all other requirements       The patient was identified by name and date of birth. Janes Locke was informed that this is a telemedicine visit and that the visit is being conducted throughthe [employer] platform. She agrees to proceed..  My office door was closed. No one else was in the room.  She acknowledged consent and understanding of privacy and security of the video platform. The  patient has agreed to participate and understands they can discontinue the visit at any time.    Patient is aware this is a billable service.     Subjective  Janes Locke is a 34 y.o. female  .      HPI     Past Medical History:   Diagnosis Date    Bipolar 1 disorder (HCC)     Cancer (HCC)     thyroid    Depression     Suicidal ideations     Thyroid disease        Past Surgical History:   Procedure Laterality Date    IR LUMBAR PUNCTURE  3/9/2023    NECK SURGERY      US GUIDED THYROID BIOPSY      normal. Onset: 2012       Current Outpatient Medications   Medication Sig Dispense Refill    acetaZOLAMIDE (DIAMOX) 250 mg tablet TAKE 2 TABLETS BY MOUTH TWICE A  tablet 8    ADDERALL XR, 20MG, 20 MG 24 hr capsule Take 1 capsule (20 mg total) by mouth every morning Max Daily Amount: 20 mg 30 capsule 0    albuterol (PROVENTIL HFA,VENTOLIN HFA) 90 mcg/act inhaler Inhale 2 puffs every 4 - 6 hours as needed      amphetamine-dextroamphetamine (ADDERALL XR, 20MG,) 20 MG 24 hr capsule Take 1 capsule (20 mg total) by mouth every morning Max Daily Amount: 20 mg 10 capsule 0    Calcium Carbonate 1500 (600 Ca) MG TABS Take by mouth 2 (two) times a day      clonazePAM (KlonoPIN) 0.5 mg tablet Take 1 tablet (0.5 mg total) by mouth 3 (three) times a day 90 tablet 3    doxycycline hyclate (VIBRA-TABS) 100 mg tablet Take 100 mg by mouth 2 (two) times a day      ergocalciferol (VITAMIN D2) 50,000 units Take 50,000 Units by mouth once a week      FLUoxetine (PROzac) 40 MG capsule Take 1 capsule (40 mg total) by mouth daily 90 capsule 1    gabapentin (Neurontin) 600 MG tablet Take 1.5 tablets (900 mg total) by mouth 3 (three) times a day 405 tablet 1    levothyroxine 25 mcg tablet Take 25 mcg by mouth daily      levothyroxine 300 MCG tablet Take 300 mcg by mouth daily      Tess 0.25-35 MG-MCG per tablet Take 1 tablet by mouth daily      OLANZapine (ZyPREXA) 10 mg tablet Take 1 tablet (10 mg total) by mouth daily as needed  (agitation) 90 tablet 0    OLANZapine (ZyPREXA) 5 mg tablet Take 1 tablet (5 mg total) by mouth daily at bedtime 30 tablet 2    phentermine 15 MG capsule Take 1 capsule by mouth Daily      prazosin (MINIPRESS) 2 mg capsule Take 1 capsule (2 mg total) by mouth daily at bedtime 90 capsule 1    predniSONE 10 mg tablet PLEASE SEE ATTACHED FOR DETAILED DIRECTIONS      traZODone (DESYREL) 150 mg tablet Take 0.5 tablets (75 mg total) by mouth daily at bedtime 135 tablet 1     No current facility-administered medications for this visit.        No Known Allergies    Review of Systems    Video Exam    There were no vitals filed for this visit.    Physical Exam

## 2024-10-10 ENCOUNTER — TELEMEDICINE (OUTPATIENT)
Dept: PSYCHIATRY | Facility: CLINIC | Age: 34
End: 2024-10-10
Payer: COMMERCIAL

## 2024-10-10 ENCOUNTER — TELEPHONE (OUTPATIENT)
Age: 34
End: 2024-10-10

## 2024-10-10 DIAGNOSIS — F41.1 GAD (GENERALIZED ANXIETY DISORDER): ICD-10-CM

## 2024-10-10 DIAGNOSIS — F42.4 SKIN PICKING HABIT: ICD-10-CM

## 2024-10-10 DIAGNOSIS — F31.9 BIPOLAR I DISORDER WITH MOOD-INCONGRUENT PSYCHOTIC FEATURES (HCC): Primary | ICD-10-CM

## 2024-10-10 DIAGNOSIS — F25.0 SCHIZOAFFECTIVE DISORDER, BIPOLAR TYPE (HCC): ICD-10-CM

## 2024-10-10 DIAGNOSIS — F51.05 INSOMNIA DUE TO OTHER MENTAL DISORDER: ICD-10-CM

## 2024-10-10 DIAGNOSIS — F43.10 PTSD (POST-TRAUMATIC STRESS DISORDER): ICD-10-CM

## 2024-10-10 DIAGNOSIS — F90.0 ATTENTION DEFICIT HYPERACTIVITY DISORDER, INATTENTIVE TYPE: ICD-10-CM

## 2024-10-10 DIAGNOSIS — F99 INSOMNIA DUE TO OTHER MENTAL DISORDER: ICD-10-CM

## 2024-10-10 PROCEDURE — 99214 OFFICE O/P EST MOD 30 MIN: CPT | Performed by: NURSE PRACTITIONER

## 2024-10-10 RX ORDER — PRAZOSIN HYDROCHLORIDE 2 MG/1
2 CAPSULE ORAL
Qty: 90 CAPSULE | Refills: 1 | Status: SHIPPED | OUTPATIENT
Start: 2024-10-10

## 2024-10-10 RX ORDER — GABAPENTIN 600 MG/1
900 TABLET ORAL 3 TIMES DAILY
Qty: 405 TABLET | Refills: 1 | Status: SHIPPED | OUTPATIENT
Start: 2024-10-10

## 2024-10-10 RX ORDER — CLONAZEPAM 0.5 MG/1
0.5 TABLET ORAL 3 TIMES DAILY
Qty: 270 TABLET | Refills: 3 | Status: SHIPPED | OUTPATIENT
Start: 2024-10-10

## 2024-10-10 RX ORDER — FLUOXETINE 40 MG/1
40 CAPSULE ORAL DAILY
Qty: 90 CAPSULE | Refills: 1 | Status: SHIPPED | OUTPATIENT
Start: 2024-10-10

## 2024-10-10 RX ORDER — DEXTROAMPHETAMINE SULFATE, DEXTROAMPHETAMINE SACCHARATE, AMPHETAMINE SULFATE AND AMPHETAMINE ASPARTATE 5; 5; 5; 5 MG/1; MG/1; MG/1; MG/1
20 CAPSULE, EXTENDED RELEASE ORAL EVERY MORNING
Qty: 30 CAPSULE | Refills: 0 | Status: SHIPPED | OUTPATIENT
Start: 2024-10-10

## 2024-10-10 RX ORDER — OLANZAPINE 10 MG/1
10 TABLET ORAL
Qty: 90 TABLET | Refills: 1 | Status: SHIPPED | OUTPATIENT
Start: 2024-10-10

## 2024-10-10 RX ORDER — TRAZODONE HYDROCHLORIDE 150 MG/1
75 TABLET ORAL
Qty: 45 TABLET | Refills: 1 | Status: SHIPPED | OUTPATIENT
Start: 2024-10-10

## 2024-10-10 NOTE — ASSESSMENT & PLAN NOTE
Orders:    traZODone (DESYREL) 150 mg tablet; Take 0.5 tablets (75 mg total) by mouth daily at bedtime

## 2024-10-10 NOTE — ASSESSMENT & PLAN NOTE
Orders:    FLUoxetine (PROzac) 40 MG capsule; Take 1 capsule (40 mg total) by mouth daily    clonazePAM (KlonoPIN) 0.5 mg tablet; Take 1 tablet (0.5 mg total) by mouth 3 (three) times a day

## 2024-10-10 NOTE — ASSESSMENT & PLAN NOTE
Orders:    FLUoxetine (PROzac) 40 MG capsule; Take 1 capsule (40 mg total) by mouth daily    gabapentin (Neurontin) 600 MG tablet; Take 1.5 tablets (900 mg total) by mouth 3 (three) times a day

## 2024-10-10 NOTE — PSYCH
Virtual Regular Visit    Verification of patient location:    Patient is located at Home in the following state in which I hold an active license [place]      Assessment/Plan:    Problem List Items Addressed This Visit       Bipolar I disorder with mood-incongruent psychotic features (HCC) - Primary    Relevant Medications    ADDERALL XR, 20MG, 20 MG 24 hr capsule    FLUoxetine (PROzac) 40 MG capsule    traZODone (DESYREL) 150 mg tablet    gabapentin (Neurontin) 600 MG tablet    OLANZapine (ZyPREXA) 10 mg tablet    GOOD (generalized anxiety disorder)    Relevant Medications    ADDERALL XR, 20MG, 20 MG 24 hr capsule    FLUoxetine (PROzac) 40 MG capsule    clonazePAM (KlonoPIN) 0.5 mg tablet    traZODone (DESYREL) 150 mg tablet    OLANZapine (ZyPREXA) 10 mg tablet    Insomnia due to other mental disorder    Relevant Medications    ADDERALL XR, 20MG, 20 MG 24 hr capsule    FLUoxetine (PROzac) 40 MG capsule    traZODone (DESYREL) 150 mg tablet    OLANZapine (ZyPREXA) 10 mg tablet    Skin picking habit    Relevant Medications    FLUoxetine (PROzac) 40 MG capsule    clonazePAM (KlonoPIN) 0.5 mg tablet    Attention deficit hyperactivity disorder, inattentive type    Relevant Medications    ADDERALL XR, 20MG, 20 MG 24 hr capsule    FLUoxetine (PROzac) 40 MG capsule    traZODone (DESYREL) 150 mg tablet    OLANZapine (ZyPREXA) 10 mg tablet     Other Visit Diagnoses       PTSD (post-traumatic stress disorder)        Relevant Medications    ADDERALL XR, 20MG, 20 MG 24 hr capsule    FLUoxetine (PROzac) 40 MG capsule    traZODone (DESYREL) 150 mg tablet    prazosin (MINIPRESS) 2 mg capsule    OLANZapine (ZyPREXA) 10 mg tablet    Schizoaffective disorder, bipolar type (HCC)        Relevant Medications    ADDERALL XR, 20MG, 20 MG 24 hr capsule    FLUoxetine (PROzac) 40 MG capsule    traZODone (DESYREL) 150 mg tablet    OLANZapine (ZyPREXA) 10 mg tablet              Reason for visit is   Chief Complaint   Patient presents with     Virtual Regular Visit          Encounter provider GENESIS Lemons      Recent Visits  No visits were found meeting these conditions.  Showing recent visits within past 7 days and meeting all other requirements  Today's Visits  Date Type Provider Dept   10/10/24 Telemedicine GENESIS Lemons [facility] [address]   Showing today's visits and meeting all other requirements  Future Appointments  No visits were found meeting these conditions.  Showing future appointments within next 150 days and meeting all other requirements       The patient was identified by name and date of birth. Janes Locke was informed that this is a telemedicine visit and that the visit is being conducted throughthe [employer] platform. She agrees to proceed..  My office door was closed. No one else was in the room.  She acknowledged consent and understanding of privacy and security of the video platform. The patient has agreed to participate and understands they can discontinue the visit at any time.    Patient is aware this is a billable service.     MEDICATION MANAGEMENT NOTE        [facility]    Name and Date of Birth:  Janes Locke 34 y.o. 1990 MRN: [id number]    Date of Visit: October 10, 2024    Reason for Visit: Psychiatric medication management follow-up visit    No Known Allergies      Assessment & Plan  Attention deficit hyperactivity disorder, inattentive type    Orders:    ADDERALL XR, 20MG, 20 MG 24 hr capsule; Take 1 capsule (20 mg total) by mouth every morning Max Daily Amount: 20 mg    GOOD (generalized anxiety disorder)    Orders:    FLUoxetine (PROzac) 40 MG capsule; Take 1 capsule (40 mg total) by mouth daily    clonazePAM (KlonoPIN) 0.5 mg tablet; Take 1 tablet (0.5 mg total) by mouth 3 (three) times a day    Skin picking habit    Orders:    FLUoxetine (PROzac) 40 MG capsule; Take 1 capsule (40 mg total) by mouth daily    clonazePAM (KlonoPIN) 0.5  mg tablet; Take 1 tablet (0.5 mg total) by mouth 3 (three) times a day    Insomnia due to other mental disorder    Orders:    traZODone (DESYREL) 150 mg tablet; Take 0.5 tablets (75 mg total) by mouth daily at bedtime    Bipolar I disorder with mood-incongruent psychotic features (HCC)    Orders:    FLUoxetine (PROzac) 40 MG capsule; Take 1 capsule (40 mg total) by mouth daily    gabapentin (Neurontin) 600 MG tablet; Take 1.5 tablets (900 mg total) by mouth 3 (three) times a day    PTSD (post-traumatic stress disorder)    Orders:    prazosin (MINIPRESS) 2 mg capsule; Take 1 capsule (2 mg total) by mouth daily at bedtime    Schizoaffective disorder, bipolar type (HCC)    Orders:    OLANZapine (ZyPREXA) 10 mg tablet; Take 1 tablet (10 mg total) by mouth daily at bedtime           SUBJECTIVE:    Janes is seen today for a follow up for Bipolar Disorder, mood disorder, Generalized Anxiety Disorder, ADHD, and OCD. She continues to improve gradually since the last visit.  She continues to report significant improvement in anxiety since getting back on medications for her ADHD.  Also noting improvement in concentration and focus and motivation, doing well at her new job at work.  She states she did try again stopping the Zyprexa, wondering if managing her ADHD would mean she would did not need Zyprexa.  However, she did note a significant increase in irritability and has since restarted it.  She is continuing on the 10 mg dose and currently not complaining of any side effects from this medication.    She denies any side effects from psychiatric medications.      HPI ROS Appetite Changes and Sleep:     She reports fluctuating sleep pattern, fluctuating appetite, fluctuating energy levels. Denies homicidal ideation, denies suicidal ideation    Review Of Systems:   no complaints, all other systems are negative , except as noted above         Mental Status Evaluation:    Appearance:  age appropriate   Behavior:  pleasant,  cooperative   Speech:  normal rate, normal volume   Mood:  improved   Affect:  brighter   Thought Process:  goal directed   Associations: intact associations   Thought Content:  no overt delusions   Perceptual Disturbances: none   Risk Potential: Suicidal ideation - None  Homicidal ideation - None  Potential for aggression - No   Sensorium:  oriented to person, place, and time/date   Memory:  recent and remote memory grossly intact   Consciousness:  alert and awake   Attention: attention span and concentration are age appropriate   Insight:  improving   Judgment: improving   Gait/Station: normal gait/station, normal balance   Motor Activity: no abnormal movements       History Review:The following portions of the patient's history were reviewed and updated as appropriate: psychiatric history, trauma history allergies, current medications, past family history, past medical history, past social history, past surgical history, and problem list   OBJECTIVE:     Vital signs in last 24 hours:    There were no vitals filed for this visit.  Laboratory Results: I have personally reviewed all pertinent laboratory/tests results.      Treatment Recommendations/Precautions:    Continue Adderall XR 20 mg daily.  Continue clonazepam 0.5 mg p.o. 3 times daily for anxiety  Continue Zyprexa 10 mg daily for mood lability and irritability  Continue gabapentin 900 mg p.o. 3 times daily for mood lability and chronic pain  She continues with individual therapy  Aware of 24 hour and weekend coverage for urgent situations accessed by calling [facility] main practice number  Medication management every 2 months  Patient advised to call 911 if feeling suicidal or homicidal before acting out on their thoughts and they expressed understanding.    Medications Risks/Benefits      Risks, Benefits And Possible Side Effects Of Medications:    Discussed risks and benefits of treatment with patient including risk of suicidality,  serotonin syndrome, increased QTc interval and SIADH related to treatment with antidepressants; Risk of induction of manic symptoms in certain patient populations, risk of parkinsonian symptoms, metabolic syndrome, tardive dyskinesia and neuroleptic malignant syndrome related to treatment with antipsychotic medications, risks of misuse, abuse or dependence, sedation and respiratory depression related to treatment with benzodiazepine medications, and risks of cardiovascular side effects including elevated blood pressure, risk of misuse, abuse or dependence and risk of increased anxiety related to treatment with stimulant medications         Controlled Medication Discussion:     Janes has been filling controlled prescriptions on time as prescribed according to [place] Prescription Drug Monitoring Program    Psychotherapy Provided:     Individual psychotherapy provided: Medications, treatment progress and treatment plan reviewed with Janes.  Medication changes discussed with Janes.  Medication education provided to Janes.  Reassurance and supportive therapy provided.      Treatment Plan:    Completed and signed during the session: Not applicable - Treatment Plan not due at this session      Total Visit Duration:  30 minutes     The total visit duration detailed above includes: patient engagement, medication management, psychotherapy/counseling, discussion regarding treatment goals, and coordination of care.      Note Share Disclaimer:     This note was not shared with the patient due to this is a psychotherapy note    GENESIS Lemons 10/10/24    This note was completed in part utilizing Dragon dictation Software. Grammatical, translation, syntax errors, random word insertions, spelling mistakes, and incomplete sentences may be an occasional consequence of this system secondary to software limitations with voice recognition, ambient noise, and hardware issues. If you have any questions or concerns about  the content, text, or information contained within the body of this dictation, please contact the provider for clarification.

## 2024-10-10 NOTE — TELEPHONE ENCOUNTER
Georgina is waiting for her link for her appt with Aixa Sue. Writer informed that provider is with another pt and to wait a few minutes and she will be in contact with her. Writer messaged Provider that pt is waiting.

## 2024-10-10 NOTE — ASSESSMENT & PLAN NOTE
Orders:    ADDERALL XR, 20MG, 20 MG 24 hr capsule; Take 1 capsule (20 mg total) by mouth every morning Max Daily Amount: 20 mg

## 2024-10-16 DIAGNOSIS — H47.10 PAPILLEDEMA: ICD-10-CM

## 2024-10-16 DIAGNOSIS — Z86.69 HISTORY OF MIGRAINE: ICD-10-CM

## 2024-10-16 RX ORDER — ACETAZOLAMIDE 250 MG/1
500 TABLET ORAL 2 TIMES DAILY
Qty: 360 TABLET | Refills: 1 | Status: CANCELLED | OUTPATIENT
Start: 2024-10-16

## 2024-10-24 ENCOUNTER — TELEMEDICINE (OUTPATIENT)
Dept: BEHAVIORAL/MENTAL HEALTH CLINIC | Facility: CLINIC | Age: 34
End: 2024-10-24
Payer: COMMERCIAL

## 2024-10-24 DIAGNOSIS — F90.0 ATTENTION DEFICIT HYPERACTIVITY DISORDER, INATTENTIVE TYPE: ICD-10-CM

## 2024-10-24 DIAGNOSIS — F43.10 POST TRAUMATIC STRESS DISORDER (PTSD): Primary | ICD-10-CM

## 2024-10-24 DIAGNOSIS — F41.1 GAD (GENERALIZED ANXIETY DISORDER): ICD-10-CM

## 2024-10-24 PROCEDURE — 90837 PSYTX W PT 60 MINUTES: CPT | Performed by: SOCIAL WORKER

## 2024-10-24 NOTE — PSYCH
"Behavioral Health Psychotherapy Progress Note    Psychotherapy Provided: Individual Psychotherapy     1. Post traumatic stress disorder (PTSD)        2. GOOD (generalized anxiety disorder)        3. Attention deficit hyperactivity disorder, inattentive type                            Goals addressed in session: Goal 1,      DATA: JUDY met with Janes  session.   \"She likes her job and her boss.\"  Her boss was out and she covered for him.  \"It went well.\"  Home is also going good.  She is going to turn her parents rooms into an office and a plant room.  \"This way they cannot stay with her when they come back to the [place].  She is taking better care\"ct\" of herself.  She is not taking a shower everyday but almost everyday.  Sometimes she does not have time to take one.  Discussed her schedule.  All she does is work and go home.  She takes 45 minute showers.    She was very tired during session.  She took her Zyprexa for the night even though she was not due to take it until night.  If she does not take it early enough she feels more tired in the morning.  She is taking the Zyprexa at 2:45 p.m.  It is a night time med.  Discussed her taking it later.  She used to take it at 4:45 p.m.  Discussed her going back to taking it at 4:45.    During this session, this clinician used the following therapeutic modalities: Client-centered Therapy, Cognitive Behavioral Therapy, Solution-Focused Therapy and Supportive Psychotherapy is diagnosed with a co-occurring substance use disorder, please indicate any changes in the frequency or amount of use: none  Stage of change for addressing substance use diagnoses: Action    ASSESSMENT:  Georgina Locke presents with a Euthymic/ normal mood.     her affect is Normal range and intensity, which is congruent, with her mood and the content of the session. The client has made progress on their goals.     Georgina Locke presents with a none risk of suicide, none risk of self-harm, and none risk " "of harm to others.    For any risk assessment that surpasses a \"low\" rating, a safety plan must be developed.    A safety plan was indicated: no  If yes, describe in detail     PLAN: Between sessions, Georgina Locke will address tx plan goals.. At the next session, the therapist will use Client-centered Therapy, Cognitive Behavioral Therapy, Solution-Focused Therapy and Supportive Psychotherapy to address tx plan goals and issues that arise in between sessions..    Behavioral Health Treatment Plan and Discharge Planning: Georgina Locke is aware of and agrees to continue to work on their treatment plan. They have identified and are working toward their discharge goals. yes    Visit start and stop times:    10/24/24  Start Time: 1712  Stop Time: 1805  Total Visit Time: 53 minutes    Virtual Regular Visit    Verification of patient location:    Patient is located at Home in the following state in which I hold an active license [place]      Assessment/Plan:    Problem List Items Addressed This Visit       Post traumatic stress disorder (PTSD) - Primary    GOOD (generalized anxiety disorder)    Attention deficit hyperactivity disorder, inattentive type                           Goals addressed in session: Goal 1          Reason for visit is   No chief complaint on file.       Encounter provider Billy Pablo    Provider located at [facility] [place]  [facility] [place]  [address]  [place] [address] [phone]      Recent Visits  No visits were found meeting these conditions.  Showing recent visits within past 7 days and meeting all other requirements  Today's Visits  Date Type Provider Dept   10/24/24 Telemedicine [facility] [place]   Showing today's visits and meeting all other requirements  Future Appointments  No visits were found meeting these conditions.  Showing future appointments within next 150 days and meeting all " other requirements       The patient was identified by name and date of birth. Janes Locke was informed that this is a telemedicine visit and that the visit is being conducted throughthe [email or web address] platform. She agrees to proceed..  My office door was closed. No one else was in the room.  She acknowledged consent and understanding of privacy and security of the video platform. The patient has agreed to participate and understands they can discontinue the visit at any time.    Patient is aware this is a billable service.     Subjective  Janes Locke is a 34 y.o. female  .      HPI     Past Medical History:   Diagnosis Date    Bipolar 1 disorder (HCC)     Cancer (HCC)     thyroid    Depression     Suicidal ideations     Thyroid disease        Past Surgical History:   Procedure Laterality Date    IR LUMBAR PUNCTURE  3/9/2023    NECK SURGERY      US GUIDED THYROID BIOPSY      normal. Onset: 2012       Current Outpatient Medications   Medication Sig Dispense Refill    acetaZOLAMIDE (DIAMOX) 250 mg tablet TAKE 2 TABLETS BY MOUTH TWICE A  tablet 8    ADDERALL XR, 20MG, 20 MG 24 hr capsule Take 1 capsule (20 mg total) by mouth every morning Max Daily Amount: 20 mg 30 capsule 0    albuterol (PROVENTIL HFA,VENTOLIN HFA) 90 mcg/act inhaler Inhale 2 puffs every 4 - 6 hours as needed      Calcium Carbonate 1500 (600 Ca) MG TABS Take by mouth 2 (two) times a day      clonazePAM (KlonoPIN) 0.5 mg tablet Take 1 tablet (0.5 mg total) by mouth 3 (three) times a day 270 tablet 3    doxycycline hyclate (VIBRA-TABS) 100 mg tablet Take 100 mg by mouth 2 (two) times a day      ergocalciferol (VITAMIN D2) 50,000 units Take 50,000 Units by mouth once a week      FLUoxetine (PROzac) 40 MG capsule Take 1 capsule (40 mg total) by mouth daily 90 capsule 1    gabapentin (Neurontin) 600 MG tablet Take 1.5 tablets (900 mg total) by mouth 3 (three) times a day 405 tablet 1    levothyroxine 25 mcg tablet Take 25 mcg by mouth  daily      levothyroxine 300 MCG tablet Take 300 mcg by mouth daily      Tess 0.25-35 MG-MCG per tablet Take 1 tablet by mouth daily      OLANZapine (ZyPREXA) 10 mg tablet Take 1 tablet (10 mg total) by mouth daily at bedtime 90 tablet 1    phentermine 15 MG capsule Take 1 capsule by mouth Daily      prazosin (MINIPRESS) 2 mg capsule Take 1 capsule (2 mg total) by mouth daily at bedtime 90 capsule 1    predniSONE 10 mg tablet PLEASE SEE ATTACHED FOR DETAILED DIRECTIONS      traZODone (DESYREL) 150 mg tablet Take 0.5 tablets (75 mg total) by mouth daily at bedtime 45 tablet 1     No current facility-administered medications for this visit.        No Known Allergies    Review of Systems    Video Exam    There were no vitals filed for this visit.    Physical Exam

## 2024-11-06 ENCOUNTER — TELEMEDICINE (OUTPATIENT)
Dept: BEHAVIORAL/MENTAL HEALTH CLINIC | Facility: CLINIC | Age: 34
End: 2024-11-06
Payer: COMMERCIAL

## 2024-11-06 DIAGNOSIS — F90.0 ATTENTION DEFICIT HYPERACTIVITY DISORDER, INATTENTIVE TYPE: Primary | ICD-10-CM

## 2024-11-06 DIAGNOSIS — F41.1 GAD (GENERALIZED ANXIETY DISORDER): ICD-10-CM

## 2024-11-06 DIAGNOSIS — F43.10 POST TRAUMATIC STRESS DISORDER (PTSD): ICD-10-CM

## 2024-11-06 PROCEDURE — 90837 PSYTX W PT 60 MINUTES: CPT | Performed by: SOCIAL WORKER

## 2024-11-06 NOTE — PSYCH
"Behavioral Health Psychotherapy Progress Note    Psychotherapy Provided: Individual Psychotherapy     1. Attention deficit hyperactivity disorder, inattentive type        2. GOOD (generalized anxiety disorder)        3. Post traumatic stress disorder (PTSD)                              Goals addressed in session: Goal 1,      DATA: JUDY met with Altheass  yevgeniy.  JUDY was late to session due to a crisis in her last session.   She has been having a lot of stress at work and knew it so she asked for a day off.   Discussed a lot of different things.   She is very intelligent.   JUDY was unaware of all the things that she knew until this session.  \"She is self taught.\"                    During this session, this clinician used the following therapeutic modalities: Client-centered Therapy, Cognitive Behavioral Therapy, Solution-Focused Therapy and Supportive Psychotherapy is diagnosed with a co-occurring substance use disorder, please indicate any changes in the frequency or amount of use: none  Stage of change for addressing substance use diagnoses: Action    ASSESSMENT:  Georgina Locke presents with a Euthymic/ normal mood.     her affect is Normal range and intensity, which is congruent, with her mood and the content of the session. The client has made progress on their goals.     Georgina Locke presents with a none risk of suicide, none risk of self-harm, and none risk of harm to others.    For any risk assessment that surpasses a \"low\" rating, a safety plan must be developed.    A safety plan was indicated: no  If yes, describe in detail     PLAN: Between sessions, Georgina Locke will address tx plan goals.. At the next session, the therapist will use Client-centered Therapy, Cognitive Behavioral Therapy, Solution-Focused Therapy and Supportive Psychotherapy to address tx plan goals and issues that arise in between sessions..    Behavioral Health Treatment Plan and Discharge Planning: Georgina Locke is aware " of and agrees to continue to work on their treatment plan. They have identified and are working toward their discharge goals. yes    Visit start and stop times:    11/06/24  Start Time: 1720  Stop Time: 1815  Total Visit Time: 55 minutes    Virtual Regular Visit    Verification of patient location:    Patient is located at Home in the following state in which I hold an active license [place]      Assessment/Plan:    Problem List Items Addressed This Visit       Post traumatic stress disorder (PTSD)    GOOD (generalized anxiety disorder)    Attention deficit hyperactivity disorder, inattentive type - Primary                             Goals addressed in session: Goal 1          Reason for visit is   No chief complaint on file.       Encounter provider Billy Pablo    Provider located at [facility] [place]  [facility] [place]  [address] [address] [phone]      Recent Visits  No visits were found meeting these conditions.  Showing recent visits within past 7 days and meeting all other requirements  Today's Visits  Date Type Provider Dept   11/06/24 Telemedicine [facility] [place]   Showing today's visits and meeting all other requirements  Future Appointments  No visits were found meeting these conditions.  Showing future appointments within next 150 days and meeting all other requirements       The patient was identified by name and date of birth. Janes TRI Locke was informed that this is a telemedicine visit and that the visit is being conducted throughthe [employer] platform. She agrees to proceed..  My office door was closed. No one else was in the room.  She acknowledged consent and understanding of privacy and security of the video platform. The patient has agreed to participate and understands they can discontinue the visit at any time.    Patient is aware this is a billable service.     Subjective  Janes  TRI Locke is a 34 y.o. female  .      HPI     Past Medical History:   Diagnosis Date    Bipolar 1 disorder (HCC)     Cancer (HCC)     thyroid    Depression     Suicidal ideations     Thyroid disease        Past Surgical History:   Procedure Laterality Date    IR LUMBAR PUNCTURE  3/9/2023    NECK SURGERY      US GUIDED THYROID BIOPSY      normal. Onset: 2012       Current Outpatient Medications   Medication Sig Dispense Refill    acetaZOLAMIDE (DIAMOX) 250 mg tablet TAKE 2 TABLETS BY MOUTH TWICE A  tablet 8    ADDERALL XR, 20MG, 20 MG 24 hr capsule Take 1 capsule (20 mg total) by mouth every morning Max Daily Amount: 20 mg 30 capsule 0    albuterol (PROVENTIL HFA,VENTOLIN HFA) 90 mcg/act inhaler Inhale 2 puffs every 4 - 6 hours as needed      Calcium Carbonate 1500 (600 Ca) MG TABS Take by mouth 2 (two) times a day      clonazePAM (KlonoPIN) 0.5 mg tablet Take 1 tablet (0.5 mg total) by mouth 3 (three) times a day 270 tablet 3    doxycycline hyclate (VIBRA-TABS) 100 mg tablet Take 100 mg by mouth 2 (two) times a day      ergocalciferol (VITAMIN D2) 50,000 units Take 50,000 Units by mouth once a week      FLUoxetine (PROzac) 40 MG capsule Take 1 capsule (40 mg total) by mouth daily 90 capsule 1    gabapentin (Neurontin) 600 MG tablet Take 1.5 tablets (900 mg total) by mouth 3 (three) times a day 405 tablet 1    levothyroxine 25 mcg tablet Take 25 mcg by mouth daily      levothyroxine 300 MCG tablet Take 300 mcg by mouth daily      Tess 0.25-35 MG-MCG per tablet Take 1 tablet by mouth daily      OLANZapine (ZyPREXA) 10 mg tablet Take 1 tablet (10 mg total) by mouth daily at bedtime 90 tablet 1    phentermine 15 MG capsule Take 1 capsule by mouth Daily      prazosin (MINIPRESS) 2 mg capsule Take 1 capsule (2 mg total) by mouth daily at bedtime 90 capsule 1    predniSONE 10 mg tablet PLEASE SEE ATTACHED FOR DETAILED DIRECTIONS      traZODone (DESYREL) 150 mg tablet Take 0.5 tablets (75 mg total) by mouth daily  at bedtime 45 tablet 1     No current facility-administered medications for this visit.        No Known Allergies    Review of Systems    Video Exam    There were no vitals filed for this visit.    Physical Exam

## 2024-11-11 ENCOUNTER — TELEPHONE (OUTPATIENT)
Age: 34
End: 2024-11-11

## 2024-11-11 DIAGNOSIS — F90.0 ATTENTION DEFICIT HYPERACTIVITY DISORDER, INATTENTIVE TYPE: ICD-10-CM

## 2024-11-11 NOTE — TELEPHONE ENCOUNTER
Reason for call:   [x] Prior Auth  [] Other:     Caller:  [x] Patient  [] Pharmacy  Name:   Address:   Callback Number:     Medication: ADDERALL XR, 20 MG     Dose/Frequency: Take 1 capsule (20 mg total) by mouth every morning     Quantity: 30 capsule    Ordering Provider:   [] PCP/Provider -   [x] Speciality/Provider - Psychiatry    [facility] - [place], [place]

## 2024-11-11 NOTE — TELEPHONE ENCOUNTER
PA for ADDERALL XR 20 MG capsule SUBMITTED     []CMM-KEY:   [x]Surescripts-Case ID # [id number]   []Availity-Auth ID # NDC #   []Faxed to plan   []Other website   []Phone call Case ID #     []PA sent as URGENT    All office notes, labs and other pertaining documents and studies sent. Clinical questions answered. Awaiting determination from insurance company.     Turnaround time for your insurance to make a decision on your Prior Authorization can take 7-21 business days.

## 2024-11-12 RX ORDER — DEXTROAMPHETAMINE SACCHARATE, AMPHETAMINE ASPARTATE MONOHYDRATE, DEXTROAMPHETAMINE SULFATE AND AMPHETAMINE SULFATE 5; 5; 5; 5 MG/1; MG/1; MG/1; MG/1
20 CAPSULE, EXTENDED RELEASE ORAL EVERY MORNING
Qty: 30 CAPSULE | Refills: 0 | Status: SHIPPED | OUTPATIENT
Start: 2024-11-12

## 2024-11-12 NOTE — TELEPHONE ENCOUNTER
Called Georgina back and informed her that script for generic Adderall was sent to [facility].

## 2024-11-12 NOTE — TELEPHONE ENCOUNTER
Patient called office stating the generic is available so she to know if the generic can be sent to [facility] and she is still requesting PA for medications as well. Writer informed patient the message would be sent.

## 2024-11-16 DIAGNOSIS — H47.10 PAPILLEDEMA: ICD-10-CM

## 2024-11-16 DIAGNOSIS — Z86.69 HISTORY OF MIGRAINE: ICD-10-CM

## 2024-11-18 RX ORDER — ACETAZOLAMIDE 250 MG/1
500 TABLET ORAL 2 TIMES DAILY
Qty: 360 TABLET | Refills: 3 | OUTPATIENT
Start: 2024-11-18

## 2024-11-19 ENCOUNTER — TELEPHONE (OUTPATIENT)
Age: 34
End: 2024-11-19

## 2024-11-19 ENCOUNTER — TELEMEDICINE (OUTPATIENT)
Dept: PSYCHIATRY | Facility: CLINIC | Age: 34
End: 2024-11-19
Payer: COMMERCIAL

## 2024-11-19 DIAGNOSIS — F90.0 ATTENTION DEFICIT HYPERACTIVITY DISORDER, INATTENTIVE TYPE: Primary | ICD-10-CM

## 2024-11-19 DIAGNOSIS — F43.10 POST TRAUMATIC STRESS DISORDER (PTSD): ICD-10-CM

## 2024-11-19 DIAGNOSIS — F41.1 GAD (GENERALIZED ANXIETY DISORDER): ICD-10-CM

## 2024-11-19 PROCEDURE — 90837 PSYTX W PT 60 MINUTES: CPT | Performed by: SOCIAL WORKER

## 2024-11-19 NOTE — TELEPHONE ENCOUNTER
Georgina was transferred to this writer from the call center. She said she is frustrated because [facility] won't send her scripts until the 30th. She asked that we call to see what is going on. Dispense history shows that [facility] dispensed it on 11/12. Called [facility]. They said the estimated ship date was 11/30 but it is actually about to ship out. Called Georgina back and reviewed.

## 2024-11-19 NOTE — PSYCH
"Behavioral Health Psychotherapy Progress Note    Psychotherapy Provided: Individual Psychotherapy     1. Attention deficit hyperactivity disorder, inattentive type        2. GOOD (generalized anxiety disorder)        3. Post traumatic stress disorder (PTSD)                                Goals addressed in session: Goal 1,      DATA: JUDY met with Janes vuong.  She is having trouble getting her Adderall filled.   \"No body has it.\"   MSW googled it and there is a nation wide shortage.  MSW discussed options with her such as checking other pharmacies, calling the pharmaceutical company, taking to the DR about other meds, fish oil, seeing about taking the weekends and holidays off, etc.  She is very discouraged.  \"It helped her so much.\"  In \"a couple weeks the house will be completely signed over to her.  She is worried about this \"what if something happens?  She doesn't have her parents or brother to fall back on.\"  MSW asked her how long has it been since she taken over the house?  \"8 years.\"  Then asked her how many times has she needed to \"call her parents for help and did she when something happened.\"  She has never called them for help.  After the conversation, \"She felt better.\"                During this session, this clinician used the following therapeutic modalities: Client-centered Therapy, Cognitive Behavioral Therapy, Solution-Focused Therapy and Supportive Psychotherapy is diagnosed with a co-occurring substance use disorder, please indicate any changes in the frequency or amount of use: none  Stage of change for addressing substance use diagnoses: Action    ASSESSMENT:  Georgina Locke presents with a Euthymic/ normal mood.     her affect is Normal range and intensity, which is congruent, with her mood and the content of the session. The client has made progress on their goals.     Georgina Locke presents with a none risk of suicide, none risk of self-harm, and none risk of harm to others.    For " "any risk assessment that surpasses a \"low\" rating, a safety plan must be developed.    A safety plan was indicated: no  If yes, describe in detail     PLAN: Between sessions, Georgina Locke will address tx plan goals.. At the next session, the therapist will use Client-centered Therapy, Cognitive Behavioral Therapy, Solution-Focused Therapy and Supportive Psychotherapy to address tx plan goals and issues that arise in between sessions..    Behavioral Health Treatment Plan and Discharge Planning: Georgina Locke is aware of and agrees to continue to work on their treatment plan. They have identified and are working toward their discharge goals. yes    Visit start and stop times:    11/19/24  Start Time: 1712  Stop Time: 1805  Total Visit Time: 53 minutes    Virtual Regular Visit    Verification of patient location:    Patient is located at Home in the following state in which I hold an active license [place]      Assessment/Plan:    Problem List Items Addressed This Visit       Post traumatic stress disorder (PTSD)    GOOD (generalized anxiety disorder)    Attention deficit hyperactivity disorder, inattentive type - Primary                               Goals addressed in session: Goal 1          Reason for visit is   No chief complaint on file.       Encounter provider Billy Pablo    Provider located at [facility] [place]  [facility] [place]  [address]  [place] [address] [phone]      Recent Visits  No visits were found meeting these conditions.  Showing recent visits within past 7 days and meeting all other requirements  Today's Visits  Date Type Provider Dept   11/19/24 [facility]medicine [facility]   Showing today's visits and meeting all other requirements  Future Appointments  No visits were found meeting these conditions.  Showing future appointments within next 150 days and meeting all other requirements       The " patient was identified by name and date of birth. Janes Locke was informed that this is a telemedicine visit and that the visit is being conducted throughthe [employer] platform. She agrees to proceed..  My office door was closed. No one else was in the room.  She acknowledged consent and understanding of privacy and security of the video platform. The patient has agreed to participate and understands they can discontinue the visit at any time.    Patient is aware this is a billable service.     Subjective  Janes Locke is a 34 y.o. female  .      HPI     Past Medical History:   Diagnosis Date    Bipolar 1 disorder (HCC)     Cancer (HCC)     thyroid    Depression     Suicidal ideations     Thyroid disease        Past Surgical History:   Procedure Laterality Date    IR LUMBAR PUNCTURE  3/9/2023    NECK SURGERY      US GUIDED THYROID BIOPSY      normal. Onset: 2012       Current Outpatient Medications   Medication Sig Dispense Refill    acetaZOLAMIDE (DIAMOX) 250 mg tablet TAKE 2 TABLETS BY MOUTH TWICE A  tablet 8    albuterol (PROVENTIL HFA,VENTOLIN HFA) 90 mcg/act inhaler Inhale 2 puffs every 4 - 6 hours as needed      amphetamine-dextroamphetamine (ADDERALL XR, 20MG,) 20 MG 24 hr capsule Take 1 capsule (20 mg total) by mouth every morning Max Daily Amount: 20 mg 30 capsule 0    Calcium Carbonate 1500 (600 Ca) MG TABS Take by mouth 2 (two) times a day      clonazePAM (KlonoPIN) 0.5 mg tablet Take 1 tablet (0.5 mg total) by mouth 3 (three) times a day 270 tablet 3    doxycycline hyclate (VIBRA-TABS) 100 mg tablet Take 100 mg by mouth 2 (two) times a day      ergocalciferol (VITAMIN D2) 50,000 units Take 50,000 Units by mouth once a week      FLUoxetine (PROzac) 40 MG capsule Take 1 capsule (40 mg total) by mouth daily 90 capsule 1    gabapentin (Neurontin) 600 MG tablet Take 1.5 tablets (900 mg total) by mouth 3 (three) times a day 405 tablet 1    levothyroxine 25 mcg tablet Take 25 mcg by mouth  daily      levothyroxine 300 MCG tablet Take 300 mcg by mouth daily      Tess 0.25-35 MG-MCG per tablet Take 1 tablet by mouth daily      OLANZapine (ZyPREXA) 10 mg tablet Take 1 tablet (10 mg total) by mouth daily at bedtime 90 tablet 1    phentermine 15 MG capsule Take 1 capsule by mouth Daily      prazosin (MINIPRESS) 2 mg capsule Take 1 capsule (2 mg total) by mouth daily at bedtime 90 capsule 1    predniSONE 10 mg tablet PLEASE SEE ATTACHED FOR DETAILED DIRECTIONS      traZODone (DESYREL) 150 mg tablet Take 0.5 tablets (75 mg total) by mouth daily at bedtime 45 tablet 1     No current facility-administered medications for this visit.        No Known Allergies    Review of Systems    Video Exam    There were no vitals filed for this visit.    Physical Exam

## 2024-11-19 NOTE — TELEPHONE ENCOUNTER
Patient would like to know if she can have he prescription for Adderall  changed to a 90 day because her delivery is always late and she runs out before she gets her refill.

## 2024-11-19 NOTE — TELEPHONE ENCOUNTER
Returned Georgina's call.  Informed her that provider cannot send in a 90 day supply on her script, nor can she send in refills.  Georgina is very frustrated, not at provider but at pharmacy.  They have still not sent out her medication and she has to go without it.  States she feels as though she is taking a step backwards.

## 2024-11-24 ENCOUNTER — TELEPHONE (OUTPATIENT)
Dept: OTHER | Facility: OTHER | Age: 34
End: 2024-11-24

## 2024-11-24 NOTE — TELEPHONE ENCOUNTER
Pt called asking if a prior auth for her adderall could get started. Please call once office reopens.

## 2024-11-25 ENCOUNTER — TELEPHONE (OUTPATIENT)
Age: 34
End: 2024-11-25

## 2024-11-25 DIAGNOSIS — F90.0 ATTENTION DEFICIT HYPERACTIVITY DISORDER, INATTENTIVE TYPE: ICD-10-CM

## 2024-11-25 RX ORDER — DEXTROAMPHETAMINE SACCHARATE, AMPHETAMINE ASPARTATE MONOHYDRATE, DEXTROAMPHETAMINE SULFATE AND AMPHETAMINE SULFATE 5; 5; 5; 5 MG/1; MG/1; MG/1; MG/1
20 CAPSULE, EXTENDED RELEASE ORAL EVERY MORNING
Qty: 30 CAPSULE | Refills: 0 | Status: SHIPPED | OUTPATIENT
Start: 2024-11-25

## 2024-11-25 RX ORDER — DEXTROAMPHETAMINE SACCHARATE, AMPHETAMINE ASPARTATE MONOHYDRATE, DEXTROAMPHETAMINE SULFATE AND AMPHETAMINE SULFATE 5; 5; 5; 5 MG/1; MG/1; MG/1; MG/1
20 CAPSULE, EXTENDED RELEASE ORAL EVERY MORNING
Qty: 30 CAPSULE | Refills: 0 | Status: CANCELLED | OUTPATIENT
Start: 2024-11-25

## 2024-11-25 NOTE — TELEPHONE ENCOUNTER
Georgina patient called and she is looking for her medication refill, she has none left.  Please call patient when script is sent to the pharmacy.    CB: [phone]

## 2024-11-25 NOTE — TELEPHONE ENCOUNTER
Called Georgina and informed [facility] to [facility] to see because [facility] dispensed it on 11/21. She said they lost the package but she will call again to inquire about this.

## 2024-11-25 NOTE — TELEPHONE ENCOUNTER
Called [facility] [phone] again to verify PA is required/not required.  Rep stated that as long as patient receives generic she will not not need PA.  Please send script or generic.         PA for Adderall XR 20MG er capsules NOT REQUIRED     Reason (screenshot if applicable)          Patient advised by          [] MyChart Message  [] Phone call   []LMOM  []L/M to call office as no active Communication consent on file  []Unable to leave detailed message as VM not approved on Communication consent       Pharmacy advised by    []Fax  []Phone call

## 2024-11-25 NOTE — TELEPHONE ENCOUNTER
Patient called and would like script sent to \[facility]\"" in [place]. She would like brand name and she will pay out of pocket for the script. Aware that if generic was sent a prior authorization was not required. She states \[facility]\"" in [place] does not have the generic script which is why she will pay out of pocket for the brand name.

## 2024-11-25 NOTE — TELEPHONE ENCOUNTER
Georgina is completely out of medication because [facility] she thinks they lost her medication. She was waiting for the Rx to come and she never received it. She is getting the generic brand and needs a PA for it. Writer saw that an electronic request came in for a PA. Writer asked Georgina if it could be because of the PA that she did not receive the medication. Writer asked if any of the [facility] pharmacies on her list have the Adderall and she said no. I asked her about the \[facility]\"" pharmacy and she said she would call them. Writer stated if she can get them at a regular pharmacy she could pick them up right away and not wait for the mail order. This is the third day Georgina is off of her Adderall and she is not feeling well. Writer informed that if she needs to call back we can transfer her to the nurses. Please advise.    Medication Refill Request   Name of Medication amphetamine-dextroamphetamine (ADDERALL XR, 20MG,) 20 MG 24 hr capsule  Dose/Frequency Take 1 capsule (20 mg total) by mouth every morning Max Daily Amount: 20 mg  Quantity 30  Verified pharmacy   [x]  Verified ordering Provider   [x]  Does patient have enough for the next 3 days? Yes [] No [x]  Does patient have a follow-up appointment scheduled? Yes [x] No []  If so when is appointment: 12/10/24

## 2024-11-25 NOTE — TELEPHONE ENCOUNTER
Called Georgina and she said she picked up the script at [facility]. She asked if her scripts could be sent there from now on for all her medications, not just adderall. [facility].

## 2024-11-25 NOTE — TELEPHONE ENCOUNTER
Patient called the RX Refill Line. Message is being forwarded to the office.     Patient is requesting a script for the generic adderall, said that she spoke with the pharmacy and they do have the generic in stock so she'd rather have a script for that     Please contact patient at

## 2024-11-25 NOTE — TELEPHONE ENCOUNTER
Called [employer]  [phone], to see if PA is required for Adderall XR 20 mg capsule.  Rep stated provider cancelled prescription in October, they will need new prescription and also it will need a PA.  Last shipment was in October.        PA for Adderall XR 20MG er capsules SUBMITTED to [facility]    via    [x]CMM-KEY: [id number]  [][facility]-Case ID #   []Availity-Auth ID # NDC [id number]   []Faxed to plan   []Other website   []Phone call Case ID #     []PA sent as URGENT    All office notes, labs and other pertaining documents and studies sent. Clinical questions answered. Awaiting determination from insurance company.     Turnaround time for your insurance to make a decision on your Prior Authorization can take 7-21 business days.

## 2024-11-25 NOTE — TELEPHONE ENCOUNTER
Called [facility]  [phone], to see if PA is required for Adderall XR 20 mg capsule.  Rep stated provider cancelled prescription in October, they will need new prescription and also it will need a PA.  Last shipment was in October.

## 2024-11-25 NOTE — TELEPHONE ENCOUNTER
"The patient called in regarding her medication : amphetamine-dextroamphetamine (ADDERALL XR, 20MG,) 20 MG 24 hr capsule -she has not had the medication for 3 days, and is feeling very angry/agitated. Patient commented: \"She feels like kicking someone in the face\". Advised I would send a note over to to her provider for her, as high priority.  "

## 2024-12-02 ENCOUNTER — TELEPHONE (OUTPATIENT)
Age: 34
End: 2024-12-02

## 2024-12-02 NOTE — TELEPHONE ENCOUNTER
Pt called explaining that she had just received a call, however, pt was unable to hear the caller as she received the call on her watch. Writer reviewed pt's chart in order to find a reason for the call. Unfortunately, writer unable to find documentation pertaining to an outgoing call made today. Please return pt's call at soonest convenience.

## 2024-12-06 ENCOUNTER — TELEMEDICINE (OUTPATIENT)
Dept: PSYCHIATRY | Facility: CLINIC | Age: 34
End: 2024-12-06
Payer: COMMERCIAL

## 2024-12-06 DIAGNOSIS — F43.10 POST TRAUMATIC STRESS DISORDER (PTSD): ICD-10-CM

## 2024-12-06 DIAGNOSIS — F90.0 ATTENTION DEFICIT HYPERACTIVITY DISORDER, INATTENTIVE TYPE: Primary | ICD-10-CM

## 2024-12-06 DIAGNOSIS — F41.1 GAD (GENERALIZED ANXIETY DISORDER): ICD-10-CM

## 2024-12-06 PROCEDURE — 90837 PSYTX W PT 60 MINUTES: CPT | Performed by: SOCIAL WORKER

## 2024-12-06 NOTE — PSYCH
"Behavioral Health Psychotherapy Progress Note    Psychotherapy Provided: Individual Psychotherapy     1. Attention deficit hyperactivity disorder, inattentive type        2. GOOD (generalized anxiety disorder)        3. Post traumatic stress disorder (PTSD)                                  Goals addressed in session: Goal 1,      DATA: JUDY met with Iveliss  session.  Her mother asked her if she could come back and stay because of her SSI.   She told her mom, \"She cannot stay with her and her brother and her do not like her.\"   Her brother was upset with her for a little bit \"for telling their mother the truth.\"  She does not want to let her mother back in the house.  For the first time she got a Rich tree and decorated and she put lights up outside.  They cleaned the bathroom \"really good\" and threw away all of her mother's things.  She was tearful when discussing her mother.  Discussed how her mother is and the need to protect ourselves from very negative people.  \"Her mother is crazy.\"  Iveliss means that.   Next week she closes on the house.            During this session, this clinician used the following therapeutic modalities: Client-centered Therapy, Cognitive Behavioral Therapy, Solution-Focused Therapy and Supportive Psychotherapy is diagnosed with a co-occurring substance use disorder, please indicate any changes in the frequency or amount of use: none  Stage of change for addressing substance use diagnoses: Action    ASSESSMENT:  Georgina Locke presents with a Euthymic/ normal mood.     her affect is Normal range and intensity, which is congruent, with her mood and the content of the session. The client has made progress on their goals.     Georgina Locke presents with a none risk of suicide, none risk of self-harm, and none risk of harm to others.    For any risk assessment that surpasses a \"low\" rating, a safety plan must be developed.    A safety plan was indicated: no  If yes, describe in " detail     PLAN: Between sessions, Georgina Locke will address tx plan goals.. At the next session, the therapist will use Client-centered Therapy, Cognitive Behavioral Therapy, Solution-Focused Therapy and Supportive Psychotherapy to address tx plan goals and issues that arise in between sessions..    Behavioral Health Treatment Plan and Discharge Planning: Georgina Locke is aware of and agrees to continue to work on their treatment plan. They have identified and are working toward their discharge goals. yes    Visit start and stop times:    12/06/24  Start Time: 1712  Stop Time: 1805  Total Visit Time: 53 minutes    Virtual Regular Visit    Verification of patient location:    Patient is located at Home in the following state in which I hold an active license [place]      Assessment/Plan:    Problem List Items Addressed This Visit       Post traumatic stress disorder (PTSD)    GOOD (generalized anxiety disorder)    Attention deficit hyperactivity disorder, inattentive type - Primary                                 Goals addressed in session: Goal 1          Reason for visit is   No chief complaint on file.       Encounter provider Billy Pablo    Provider located at [facility] [place]  [facility] [place]  [address]  [place] [address] [phone]      Recent Visits  No visits were found meeting these conditions.  Showing recent visits within past 7 days and meeting all other requirements  Today's Visits  Date Type Provider Dept   12/06/24 [facility]medicine [facility]   Showing today's visits and meeting all other requirements  Future Appointments  No visits were found meeting these conditions.  Showing future appointments within next 150 days and meeting all other requirements       The patient was identified by name and date of birth. Janes Locke was informed that this is a telemedicine visit and that the visit is  being conducted throughthe [employer] platform. She agrees to proceed..  My office door was closed. No one else was in the room.  She acknowledged consent and understanding of privacy and security of the video platform. The patient has agreed to participate and understands they can discontinue the visit at any time.    Patient is aware this is a billable service.     Subjective  Janes Locke is a 34 y.o. female  .      HPI     Past Medical History:   Diagnosis Date    Bipolar 1 disorder (HCC)     Cancer (HCC)     thyroid    Depression     Suicidal ideations     Thyroid disease        Past Surgical History:   Procedure Laterality Date    IR LUMBAR PUNCTURE  3/9/2023    NECK SURGERY      US GUIDED THYROID BIOPSY      normal. Onset: 2012       Current Outpatient Medications   Medication Sig Dispense Refill    acetaZOLAMIDE (DIAMOX) 250 mg tablet TAKE 2 TABLETS BY MOUTH TWICE A  tablet 8    albuterol (PROVENTIL HFA,VENTOLIN HFA) 90 mcg/act inhaler Inhale 2 puffs every 4 - 6 hours as needed      amphetamine-dextroamphetamine (ADDERALL XR, 20MG,) 20 MG 24 hr capsule Take 1 capsule (20 mg total) by mouth every morning Max Daily Amount: 20 mg 30 capsule 0    Calcium Carbonate 1500 (600 Ca) MG TABS Take by mouth 2 (two) times a day      clonazePAM (KlonoPIN) 0.5 mg tablet Take 1 tablet (0.5 mg total) by mouth 3 (three) times a day 270 tablet 3    doxycycline hyclate (VIBRA-TABS) 100 mg tablet Take 100 mg by mouth 2 (two) times a day      ergocalciferol (VITAMIN D2) 50,000 units Take 50,000 Units by mouth once a week      FLUoxetine (PROzac) 40 MG capsule Take 1 capsule (40 mg total) by mouth daily 90 capsule 1    gabapentin (Neurontin) 600 MG tablet Take 1.5 tablets (900 mg total) by mouth 3 (three) times a day 405 tablet 1    levothyroxine 25 mcg tablet Take 25 mcg by mouth daily      levothyroxine 300 MCG tablet Take 300 mcg by mouth daily      Tess 0.25-35 MG-MCG per tablet Take 1 tablet by mouth daily       OLANZapine (ZyPREXA) 10 mg tablet Take 1 tablet (10 mg total) by mouth daily at bedtime 90 tablet 1    phentermine 15 MG capsule Take 1 capsule by mouth Daily      prazosin (MINIPRESS) 2 mg capsule Take 1 capsule (2 mg total) by mouth daily at bedtime 90 capsule 1    predniSONE 10 mg tablet PLEASE SEE ATTACHED FOR DETAILED DIRECTIONS      traZODone (DESYREL) 150 mg tablet Take 0.5 tablets (75 mg total) by mouth daily at bedtime 45 tablet 1     No current facility-administered medications for this visit.        No Known Allergies    Review of Systems    Video Exam    There were no vitals filed for this visit.    Physical Exam

## 2024-12-10 ENCOUNTER — TELEMEDICINE (OUTPATIENT)
Dept: PSYCHIATRY | Facility: CLINIC | Age: 34
End: 2024-12-10
Payer: COMMERCIAL

## 2024-12-10 DIAGNOSIS — F43.10 POST TRAUMATIC STRESS DISORDER (PTSD): ICD-10-CM

## 2024-12-10 DIAGNOSIS — F41.1 GAD (GENERALIZED ANXIETY DISORDER): ICD-10-CM

## 2024-12-10 DIAGNOSIS — F51.05 INSOMNIA DUE TO OTHER MENTAL DISORDER: ICD-10-CM

## 2024-12-10 DIAGNOSIS — F99 INSOMNIA DUE TO OTHER MENTAL DISORDER: ICD-10-CM

## 2024-12-10 DIAGNOSIS — F90.0 ATTENTION DEFICIT HYPERACTIVITY DISORDER, INATTENTIVE TYPE: ICD-10-CM

## 2024-12-10 DIAGNOSIS — F31.9 BIPOLAR I DISORDER WITH MOOD-INCONGRUENT PSYCHOTIC FEATURES (HCC): Primary | ICD-10-CM

## 2024-12-10 PROBLEM — F42.4 SKIN PICKING HABIT: Status: RESOLVED | Noted: 2021-08-18 | Resolved: 2024-12-10

## 2024-12-10 PROCEDURE — 99214 OFFICE O/P EST MOD 30 MIN: CPT | Performed by: NURSE PRACTITIONER

## 2024-12-10 NOTE — ASSESSMENT & PLAN NOTE
Orders:    amphetamine-dextroamphetamine (ADDERALL XR, 20MG,) 20 MG 24 hr capsule; Take 1 capsule (20 mg total) by mouth every morning Max Daily Amount: 20 mg Do not start before December 23, 2024.

## 2024-12-10 NOTE — PSYCH
Virtual Regular Visit    Verification of patient location:    Patient is located at Home in the following state in which I hold an active license [place]      Assessment/Plan:    Problem List Items Addressed This Visit       Post traumatic stress disorder (PTSD)                  Relevant Medications    traZODone (DESYREL) 150 mg tablet    OLANZapine (ZyPREXA) 10 mg tablet    amphetamine-dextroamphetamine (ADDERALL XR, 20MG,) 20 MG 24 hr capsule (Start on 12/23/2024)    FLUoxetine (PROzac) 40 MG capsule    Bipolar I disorder with mood-incongruent psychotic features (HCC) - Primary                  Relevant Medications    traZODone (DESYREL) 150 mg tablet    OLANZapine (ZyPREXA) 10 mg tablet    gabapentin (Neurontin) 600 MG tablet    amphetamine-dextroamphetamine (ADDERALL XR, 20MG,) 20 MG 24 hr capsule (Start on 12/23/2024)    clonazePAM (KlonoPIN) 0.5 mg tablet    FLUoxetine (PROzac) 40 MG capsule    GOOD (generalized anxiety disorder)                  Relevant Medications    traZODone (DESYREL) 150 mg tablet    OLANZapine (ZyPREXA) 10 mg tablet    gabapentin (Neurontin) 600 MG tablet    amphetamine-dextroamphetamine (ADDERALL XR, 20MG,) 20 MG 24 hr capsule (Start on 12/23/2024)    clonazePAM (KlonoPIN) 0.5 mg tablet    FLUoxetine (PROzac) 40 MG capsule    Insomnia due to other mental disorder                  Relevant Medications    traZODone (DESYREL) 150 mg tablet    OLANZapine (ZyPREXA) 10 mg tablet    amphetamine-dextroamphetamine (ADDERALL XR, 20MG,) 20 MG 24 hr capsule (Start on 12/23/2024)    FLUoxetine (PROzac) 40 MG capsule    Attention deficit hyperactivity disorder, inattentive type                  Relevant Medications    traZODone (DESYREL) 150 mg tablet    OLANZapine (ZyPREXA) 10 mg tablet    gabapentin (Neurontin) 600 MG tablet    amphetamine-dextroamphetamine (ADDERALL XR, 20MG,) 20 MG 24 hr capsule (Start on 12/23/2024)    FLUoxetine (PROzac) 40 MG capsule       Depression Follow-up Plan Completed:  Yes    Reason for visit is   Chief Complaint   Patient presents with    Virtual Regular Visit          Encounter provider GENESIS Lemons      Recent Visits  Date Type Provider Dept   12/10/24 Telemedicine [facility]IS Lemons [facility]    Showing recent visits within past 7 days and meeting all other requirements  Future Appointments  No visits were found meeting these conditions.  Showing future appointments within next 150 days and meeting all other requirements       The patient was identified by name and date of birth. Janes Locke was informed that this is a telemedicine visit and that the visit is being conducted throughthe [employer] Embedded platform. She agrees to proceed..  My office door was closed. No one else was in the room.  She acknowledged consent and understanding of privacy and security of the video platform. The patient has agreed to participate and understands they can discontinue the visit at any time.    Patient is aware this is a billable service.     MEDICATION MANAGEMENT NOTE        [facility]    Name and Date of Birth:  Janes Locke 34 y.o. 1990 MRN: [id number]    Date of Visit: December 11, 2024    Reason for Visit: Psychiatric medication management follow-up visit    No Known Allergies      Assessment & Plan  Bipolar I disorder with mood-incongruent psychotic features (HCC)    Orders:    OLANZapine (ZyPREXA) 10 mg tablet; Take 1 tablet (10 mg total) by mouth daily at bedtime    gabapentin (Neurontin) 600 MG tablet; Take 1.5 tablets (900 mg total) by mouth 3 (three) times a day    FLUoxetine (PROzac) 40 MG capsule; Take 1 capsule (40 mg total) by mouth daily    GOOD (generalized anxiety disorder)    Orders:    gabapentin (Neurontin) 600 MG tablet; Take 1.5 tablets (900 mg total) by mouth 3 (three) times a day    clonazePAM (KlonoPIN) 0.5 mg tablet; Take 1 tablet (0.5 mg total) by mouth 3 (three) times a day     FLUoxetine (PROzac) 40 MG capsule; Take 1 capsule (40 mg total) by mouth daily    Insomnia due to other mental disorder    Orders:    traZODone (DESYREL) 150 mg tablet; Take 0.5 tablets (75 mg total) by mouth daily at bedtime    Post traumatic stress disorder (PTSD)         Attention deficit hyperactivity disorder, inattentive type    Orders:    amphetamine-dextroamphetamine (ADDERALL XR, 20MG,) 20 MG 24 hr capsule; Take 1 capsule (20 mg total) by mouth every morning Max Daily Amount: 20 mg Do not start before December 23, 2024.           SUBJECTIVE:    Janes is seen today for a follow up for Bipolar Disorder, PTSD, Generalized Anxiety Disorder, and ADHD. She continues to do relatively well since the last visit. She continues to note she is doing better since restarting stimulant for ADHD.  She is noting increase in productivity at home and work, leading to decrease in anxiety and feeling better about herself.  She continues with some mood lability and occasional irritability but decrease in frequency and intensity.  Continues with individual therapy which is helping her to manage issues with her parents.     She denies any side effects from current psychiatric medications.      HPI ROS Appetite Changes and Sleep:     She reports fluctuating sleep pattern, fluctuating appetite, fluctuating energy levels. Denies homicidal ideation, denies suicidal ideation    Review Of Systems:   no complaints, all other systems are negative , except as noted above         Mental Status Evaluation:    Appearance:  age appropriate   Behavior:  pleasant, cooperative   Speech:  normal rate, normal volume   Mood:  improved   Affect:  brighter   Thought Process:  goal directed   Associations: intact associations   Thought Content:  no overt delusions   Perceptual Disturbances: none   Risk Potential: Suicidal ideation - None  Homicidal ideation - None  Potential for aggression - No   Sensorium:  oriented to person, place, and time/date    Memory:  recent and remote memory grossly intact   Consciousness:  alert and awake   Attention: attention span and concentration are age appropriate   Insight:  improving   Judgment: improving   Gait/Station: normal gait/station, normal balance   Motor Activity: no abnormal movements       History Review:The following portions of the patient's history were reviewed and updated as appropriate: psychiatric history, trauma history allergies, current medications, past family history, past medical history, past social history, past surgical history, and problem list   OBJECTIVE:     Vital signs in last 24 hours:    There were no vitals filed for this visit.  Laboratory Results: I have personally reviewed all pertinent laboratory/tests results.      Treatment Recommendations/Precautions:    Continue all medications the same as noted above.  Aware of need to follow up with family physician for medical issues  Aware of 24 hour and weekend coverage for urgent situations accessed by calling [facility] main practice number  Medication management every 2 months  Continue psychotherapy with own therapist  Patient advised to call 911 if feeling suicidal or homicidal before acting out on their thoughts and they expressed understanding.    Medications Risks/Benefits      Risks, Benefits And Possible Side Effects Of Medications:    Discussed risks and benefits of treatment with patient including risk of suicidality, serotonin syndrome, increased QTc interval and SIADH related to treatment with antidepressants; Risk of induction of manic symptoms in certain patient populations, risk of parkinsonian symptoms, metabolic syndrome, tardive dyskinesia and neuroleptic malignant syndrome related to treatment with antipsychotic medications, risks of misuse, abuse or dependence, sedation and respiratory depression related to treatment with benzodiazepine medications, risks of cardiovascular side effects including  elevated blood pressure, risk of misuse, abuse or dependence and risk of increased anxiety related to treatment with stimulant medications, and Risk of sedation, dizziness and CNS depression during treatment with Gabapentin         Controlled Medication Discussion:     Janes has been filling controlled prescriptions on time as prescribed according to [place] Prescription Drug Monitoring Program    Psychotherapy Provided:     Individual psychotherapy provided: Medications, treatment progress and treatment plan reviewed with Janes.  Medication changes discussed with Janes.  Medication education provided to Janes.  Reassurance and supportive therapy provided.      Treatment Plan:    Completed and signed during the session: Not applicable - Treatment Plan not due at this session      Total Visit Duration:  30 minutes     The total visit duration detailed above includes: patient engagement, medication management, psychotherapy/counseling, discussion regarding treatment goals, and coordination of care.      Note Share Disclaimer:     This note was not shared with the patient due to this is a psychotherapy note    GENESIS Lemons 12/11/24    This note was completed in part utilizing Dragon dictation Software. Grammatical, translation, syntax errors, random word insertions, spelling mistakes, and incomplete sentences may be an occasional consequence of this system secondary to software limitations with voice recognition, ambient noise, and hardware issues. If you have any questions or concerns about the content, text, or information contained within the body of this dictation, please contact the provider for clarification.

## 2024-12-10 NOTE — ASSESSMENT & PLAN NOTE
Orders:    gabapentin (Neurontin) 600 MG tablet; Take 1.5 tablets (900 mg total) by mouth 3 (three) times a day    clonazePAM (KlonoPIN) 0.5 mg tablet; Take 1 tablet (0.5 mg total) by mouth 3 (three) times a day    FLUoxetine (PROzac) 40 MG capsule; Take 1 capsule (40 mg total) by mouth daily

## 2024-12-10 NOTE — ASSESSMENT & PLAN NOTE
Orders:    OLANZapine (ZyPREXA) 10 mg tablet; Take 1 tablet (10 mg total) by mouth daily at bedtime    gabapentin (Neurontin) 600 MG tablet; Take 1.5 tablets (900 mg total) by mouth 3 (three) times a day    FLUoxetine (PROzac) 40 MG capsule; Take 1 capsule (40 mg total) by mouth daily

## 2024-12-11 RX ORDER — GABAPENTIN 600 MG/1
900 TABLET ORAL 3 TIMES DAILY
Qty: 405 TABLET | Refills: 1 | Status: SHIPPED | OUTPATIENT
Start: 2024-12-11

## 2024-12-11 RX ORDER — FLUOXETINE 40 MG/1
40 CAPSULE ORAL DAILY
Qty: 90 CAPSULE | Refills: 1 | Status: SHIPPED | OUTPATIENT
Start: 2024-12-11

## 2024-12-11 RX ORDER — OLANZAPINE 10 MG/1
10 TABLET ORAL
Qty: 90 TABLET | Refills: 1 | Status: SHIPPED | OUTPATIENT
Start: 2024-12-11

## 2024-12-11 RX ORDER — TRAZODONE HYDROCHLORIDE 150 MG/1
75 TABLET ORAL
Qty: 45 TABLET | Refills: 1 | Status: SHIPPED | OUTPATIENT
Start: 2024-12-11

## 2024-12-11 RX ORDER — DEXTROAMPHETAMINE SACCHARATE, AMPHETAMINE ASPARTATE MONOHYDRATE, DEXTROAMPHETAMINE SULFATE AND AMPHETAMINE SULFATE 5; 5; 5; 5 MG/1; MG/1; MG/1; MG/1
20 CAPSULE, EXTENDED RELEASE ORAL EVERY MORNING
Qty: 30 CAPSULE | Refills: 0 | Status: SHIPPED | OUTPATIENT
Start: 2024-12-23

## 2024-12-11 RX ORDER — CLONAZEPAM 0.5 MG/1
0.5 TABLET ORAL 3 TIMES DAILY
Qty: 270 TABLET | Refills: 1 | Status: SHIPPED | OUTPATIENT
Start: 2024-12-11

## 2024-12-18 ENCOUNTER — TELEMEDICINE (OUTPATIENT)
Dept: PSYCHIATRY | Facility: CLINIC | Age: 34
End: 2024-12-18
Payer: COMMERCIAL

## 2024-12-18 DIAGNOSIS — F43.10 POST TRAUMATIC STRESS DISORDER (PTSD): ICD-10-CM

## 2024-12-18 DIAGNOSIS — F41.1 GAD (GENERALIZED ANXIETY DISORDER): ICD-10-CM

## 2024-12-18 DIAGNOSIS — F90.0 ATTENTION DEFICIT HYPERACTIVITY DISORDER, INATTENTIVE TYPE: ICD-10-CM

## 2024-12-18 DIAGNOSIS — F31.9 BIPOLAR I DISORDER WITH MOOD-INCONGRUENT PSYCHOTIC FEATURES (HCC): Primary | ICD-10-CM

## 2024-12-18 PROCEDURE — 90837 PSYTX W PT 60 MINUTES: CPT | Performed by: SOCIAL WORKER

## 2024-12-18 NOTE — PSYCH
"Behavioral Health Psychotherapy Progress Note    Psychotherapy Provided: Individual Psychotherapy     1. Bipolar I disorder with mood-incongruent psychotic features (HCC)        2. GOOD (generalized anxiety disorder)        3. Attention deficit hyperactivity disorder, inattentive type        4. Post traumatic stress disorder (PTSD)                                    Goals addressed in session: Goal 1,      DATA: MSW met with Janes  session.  She stopped the Zyprexa \"cold turkey.  She wanted to see what would happen.\"  She stopped taking it last Tues.    MSW reminded her she did this before a few times.  Her response was \"she was only on a low dose of Adderall and now she is on a higher dose.  She described her mood which is she is irritable.  She has been mean to people at work to where a friend at work asked her, \"if she was taking her meds.\"  Janes stated that Aixa, her Dr, said, \"she didn't have bipolar and and can take the Zyprexa PRN.\"  MSW stated she never heard that before with that med and looked up the Dr's note.  The note states she has bipolar and is to take the med daily.  MSW read this to Janes.  Discussed bipolar, mood swings, ADHD, the meds she takes for each, Janes's hx of stopping the Zyprexa and what has happened in the past and the fact that her mother has Bipolar.  Janes agreed to take her meds as prescribed.           During this session, this clinician used the following therapeutic modalities: Client-centered Therapy, Cognitive Behavioral Therapy, Solution-Focused Therapy and Supportive Psychotherapy is diagnosed with a co-occurring substance use disorder, please indicate any changes in the frequency or amount of use: none  Stage of change for addressing substance use diagnoses: Action    ASSESSMENT:  Georgina Locke presents with a Euthymic/ normal mood.     her affect is Normal range and intensity, which is congruent, with her mood and the content of the session. The client has " "made progress on their goals.     Georgina Locke presents with a none risk of suicide, none risk of self-harm, and none risk of harm to others.    For any risk assessment that surpasses a \"low\" rating, a safety plan must be developed.    A safety plan was indicated: no  If yes, describe in detail     PLAN: Between sessions, Georgina Locke will address tx plan goals.. At the next session, the therapist will use Client-centered Therapy, Cognitive Behavioral Therapy, Solution-Focused Therapy and Supportive Psychotherapy to address tx plan goals and issues that arise in between sessions..    Behavioral Health Treatment Plan and Discharge Planning: Georgina Lcoke is aware of and agrees to continue to work on their treatment plan. They have identified and are working toward their discharge goals. yes    Visit start and stop times:    12/18/24  Start Time: 1811  Stop Time: 1915  Total Visit Time: 64 minutes    Virtual Regular Visit    Verification of patient location:    Patient is located at Home in the following state in which I hold an active license [place]      Assessment/Plan:    Problem List Items Addressed This Visit       Post traumatic stress disorder (PTSD)    Bipolar I disorder with mood-incongruent psychotic features (HCC) - Primary    GOOD (generalized anxiety disorder)    Attention deficit hyperactivity disorder, inattentive type                                   Goals addressed in session: Goal 1          Reason for visit is   No chief complaint on file.       Encounter provider Billy Pablo    Provider located at [facility] [place]  [facility] [place]  [address]  [place] [address] [phone]      Recent Visits  No visits were found meeting these conditions.  Showing recent visits within past 7 days and meeting all other requirements  Today's Visits  Date Type Provider Dept   12/18/24 Telemedicine [facility] "   Showing today's visits and meeting all other requirements  Future Appointments  No visits were found meeting these conditions.  Showing future appointments within next 150 days and meeting all other requirements       The patient was identified by name and date of birth. Janes Locke was informed that this is a telemedicine visit and that the visit is being conducted throughthe [employer] platform. She agrees to proceed..  My office door was closed. No one else was in the room.  She acknowledged consent and understanding of privacy and security of the video platform. The patient has agreed to participate and understands they can discontinue the visit at any time.    Patient is aware this is a billable service.     Subjective  Janes Locke is a 34 y.o. female  .      HPI     Past Medical History:   Diagnosis Date    Bipolar 1 disorder (HCC)     Cancer (HCC)     thyroid    Depression     Skin picking habit 08/18/2021    Suicidal ideations     Thyroid disease        Past Surgical History:   Procedure Laterality Date    IR LUMBAR PUNCTURE  3/9/2023    NECK SURGERY      US GUIDED THYROID BIOPSY      normal. Onset: 2012       Current Outpatient Medications   Medication Sig Dispense Refill    acetaZOLAMIDE (DIAMOX) 250 mg tablet TAKE 2 TABLETS BY MOUTH TWICE A  tablet 8    albuterol (PROVENTIL HFA,VENTOLIN HFA) 90 mcg/act inhaler Inhale 2 puffs every 4 - 6 hours as needed      [START ON 12/23/2024] amphetamine-dextroamphetamine (ADDERALL XR, 20MG,) 20 MG 24 hr capsule Take 1 capsule (20 mg total) by mouth every morning Max Daily Amount: 20 mg Do not start before December 23, 2024. 30 capsule 0    Calcium Carbonate 1500 (600 Ca) MG TABS Take by mouth 2 (two) times a day      clonazePAM (KlonoPIN) 0.5 mg tablet Take 1 tablet (0.5 mg total) by mouth 3 (three) times a day 270 tablet 1    doxycycline hyclate (VIBRA-TABS) 100 mg tablet Take 100 mg by mouth 2 (two) times a day      ergocalciferol (VITAMIN  D2) 50,000 units Take 50,000 Units by mouth once a week      FLUoxetine (PROzac) 40 MG capsule Take 1 capsule (40 mg total) by mouth daily 90 capsule 1    gabapentin (Neurontin) 600 MG tablet Take 1.5 tablets (900 mg total) by mouth 3 (three) times a day 405 tablet 1    levothyroxine 25 mcg tablet Take 25 mcg by mouth daily      levothyroxine 300 MCG tablet Take 300 mcg by mouth daily      Tess 0.25-35 MG-MCG per tablet Take 1 tablet by mouth daily      OLANZapine (ZyPREXA) 10 mg tablet Take 1 tablet (10 mg total) by mouth daily at bedtime 90 tablet 1    phentermine 15 MG capsule Take 1 capsule by mouth Daily      predniSONE 10 mg tablet PLEASE SEE ATTACHED FOR DETAILED DIRECTIONS      traZODone (DESYREL) 150 mg tablet Take 0.5 tablets (75 mg total) by mouth daily at bedtime 45 tablet 1     No current facility-administered medications for this visit.        No Known Allergies    Review of Systems    Video Exam    There were no vitals filed for this visit.    Physical Exam

## 2025-01-07 ENCOUNTER — TELEMEDICINE (OUTPATIENT)
Dept: PSYCHIATRY | Facility: CLINIC | Age: 35
End: 2025-01-07
Payer: COMMERCIAL

## 2025-01-07 DIAGNOSIS — F41.1 GAD (GENERALIZED ANXIETY DISORDER): ICD-10-CM

## 2025-01-07 DIAGNOSIS — F43.10 POST TRAUMATIC STRESS DISORDER (PTSD): ICD-10-CM

## 2025-01-07 DIAGNOSIS — F90.0 ATTENTION DEFICIT HYPERACTIVITY DISORDER, INATTENTIVE TYPE: ICD-10-CM

## 2025-01-07 DIAGNOSIS — F31.9 BIPOLAR I DISORDER WITH MOOD-INCONGRUENT PSYCHOTIC FEATURES (HCC): Primary | ICD-10-CM

## 2025-01-07 PROCEDURE — 90837 PSYTX W PT 60 MINUTES: CPT | Performed by: SOCIAL WORKER

## 2025-01-07 NOTE — PSYCH
"Behavioral Health Psychotherapy Progress Note    Psychotherapy Provided: Individual Psychotherapy     1. Bipolar I disorder with mood-incongruent psychotic features (HCC)        2. GOOD (generalized anxiety disorder)        3. Attention deficit hyperactivity disorder, inattentive type        4. Post traumatic stress disorder (PTSD)                                      Goals addressed in session: Goal 1,      DATA: JUDY met with Altheass  session.  JUDY was a few minutes late to session due to [facility] shutting down.  She has been having nightmares last week. The night coincides with talking to her parents last week.  They want to come up and stay with her starting in Feb.  Discussed this is why she is having nightmares.  The last time they were up and stayed with her  Discussed her childhood.  Janes became emotional when discussing her.  As of tomorrow she owns the house and she doesn't want them to say with her.Since last session she restarted her Zyprexia and feels better.Felt better by end of session.        During this session, this clinician used the following therapeutic modalities: Client-centered Therapy, Cognitive Behavioral Therapy, Solution-Focused Therapy and Supportive Psychotherapy is diagnosed with a co-occurring substance use disorder, please indicate any changes in the frequency or amount of use: none  Stage of change for addressing substance use diagnoses: Action    ASSESSMENT:  Georgina Locke presents with a Euthymic/ normal mood.     her affect is Normal range and intensity, which is congruent, with her mood and the content of the session. The client has made progress on their goals.     Georgina Locke presents with a none risk of suicide, none risk of self-harm, and none risk of harm to others.    For any risk assessment that surpasses a \"low\" rating, a safety plan must be developed.    A safety plan was indicated: no  If yes, describe in detail     PLAN: Between sessions, Georgina Locke will " address tx plan goals.. At the next session, the therapist will use Client-centered Therapy, Cognitive Behavioral Therapy, Solution-Focused Therapy and Supportive Psychotherapy to address tx plan goals and issues that arise in between sessions..    Behavioral Health Treatment Plan and Discharge Planning: Georgina Locke is aware of and agrees to continue to work on their treatment plan. They have identified and are working toward their discharge goals. yes    Visit start and stop times:    01/07/25  Start Time: 1715  Stop Time: 1810  Total Visit Time: 55 minutes    Virtual Regular Visit    Verification of patient location:    Patient is located at Home in the following state in which I hold an active license [place]      Assessment/Plan:    Problem List Items Addressed This Visit       Post traumatic stress disorder (PTSD)    Bipolar I disorder with mood-incongruent psychotic features (HCC) - Primary    GOOD (generalized anxiety disorder)    Attention deficit hyperactivity disorder, inattentive type                                     Goals addressed in session: Goal 1          Reason for visit is   No chief complaint on file.       Encounter provider Billy Pablo    Provider located at [facility] [place]  [facility] [place]  [address]  [place] [address] [phone]      Recent Visits  No visits were found meeting these conditions.  Showing recent visits within past 7 days and meeting all other requirements  Today's Visits  Date Type Provider Dept   01/07/25 Telemedicine [facility]   Showing today's visits and meeting all other requirements  Future Appointments  No visits were found meeting these conditions.  Showing future appointments within next 150 days and meeting all other requirements       The patient was identified by name and date of birth. Janes Locke was informed that this is a telemedicine visit and  that the visit is being conducted throughthe [employer] platform. She agrees to proceed..  My office door was closed. No one else was in the room.  She acknowledged consent and understanding of privacy and security of the video platform. The patient has agreed to participate and understands they can discontinue the visit at any time.    Patient is aware this is a billable service.     Nick Locke is a 34 y.o. female  .      HPI     Past Medical History:   Diagnosis Date    Bipolar 1 disorder (HCC)     Cancer (HCC)     thyroid    Depression     Skin picking habit 08/18/2021    Suicidal ideations     Thyroid disease        Past Surgical History:   Procedure Laterality Date    IR LUMBAR PUNCTURE  3/9/2023    NECK SURGERY      US GUIDED THYROID BIOPSY      normal. Onset: 2012       Current Outpatient Medications   Medication Sig Dispense Refill    acetaZOLAMIDE (DIAMOX) 250 mg tablet TAKE 2 TABLETS BY MOUTH TWICE A  tablet 8    albuterol (PROVENTIL HFA,VENTOLIN HFA) 90 mcg/act inhaler Inhale 2 puffs every 4 - 6 hours as needed      amphetamine-dextroamphetamine (ADDERALL XR, 20MG,) 20 MG 24 hr capsule Take 1 capsule (20 mg total) by mouth every morning Max Daily Amount: 20 mg Do not start before December 23, 2024. 30 capsule 0    Calcium Carbonate 1500 (600 Ca) MG TABS Take by mouth 2 (two) times a day      clonazePAM (KlonoPIN) 0.5 mg tablet Take 1 tablet (0.5 mg total) by mouth 3 (three) times a day 270 tablet 1    doxycycline hyclate (VIBRA-TABS) 100 mg tablet Take 100 mg by mouth 2 (two) times a day      ergocalciferol (VITAMIN D2) 50,000 units Take 50,000 Units by mouth once a week      FLUoxetine (PROzac) 40 MG capsule Take 1 capsule (40 mg total) by mouth daily 90 capsule 1    gabapentin (Neurontin) 600 MG tablet Take 1.5 tablets (900 mg total) by mouth 3 (three) times a day 405 tablet 1    levothyroxine 25 mcg tablet Take 25 mcg by mouth daily      levothyroxine 300 MCG tablet Take 300  mcg by mouth daily      Tess 0.25-35 MG-MCG per tablet Take 1 tablet by mouth daily      OLANZapine (ZyPREXA) 10 mg tablet Take 1 tablet (10 mg total) by mouth daily at bedtime 90 tablet 1    phentermine 15 MG capsule Take 1 capsule by mouth Daily      predniSONE 10 mg tablet PLEASE SEE ATTACHED FOR DETAILED DIRECTIONS      traZODone (DESYREL) 150 mg tablet Take 0.5 tablets (75 mg total) by mouth daily at bedtime 45 tablet 1     No current facility-administered medications for this visit.        No Known Allergies    Review of Systems    Video Exam    There were no vitals filed for this visit.    Physical Exam

## 2025-01-09 DIAGNOSIS — F90.0 ATTENTION DEFICIT HYPERACTIVITY DISORDER, INATTENTIVE TYPE: ICD-10-CM

## 2025-01-10 RX ORDER — DEXTROAMPHETAMINE SACCHARATE, AMPHETAMINE ASPARTATE MONOHYDRATE, DEXTROAMPHETAMINE SULFATE AND AMPHETAMINE SULFATE 5; 5; 5; 5 MG/1; MG/1; MG/1; MG/1
20 CAPSULE, EXTENDED RELEASE ORAL EVERY MORNING
Qty: 30 CAPSULE | Refills: 0 | Status: SHIPPED | OUTPATIENT
Start: 2025-01-10 | End: 2025-01-10 | Stop reason: SDUPTHER

## 2025-01-10 RX ORDER — DEXTROAMPHETAMINE SACCHARATE, AMPHETAMINE ASPARTATE MONOHYDRATE, DEXTROAMPHETAMINE SULFATE AND AMPHETAMINE SULFATE 5; 5; 5; 5 MG/1; MG/1; MG/1; MG/1
20 CAPSULE, EXTENDED RELEASE ORAL EVERY MORNING
Qty: 30 CAPSULE | Refills: 0 | Status: SHIPPED | OUTPATIENT
Start: 2025-01-20

## 2025-01-21 ENCOUNTER — TELEMEDICINE (OUTPATIENT)
Dept: PSYCHIATRY | Facility: CLINIC | Age: 35
End: 2025-01-21
Payer: COMMERCIAL

## 2025-01-21 DIAGNOSIS — F90.0 ATTENTION DEFICIT HYPERACTIVITY DISORDER, INATTENTIVE TYPE: Primary | ICD-10-CM

## 2025-01-21 DIAGNOSIS — F41.1 GAD (GENERALIZED ANXIETY DISORDER): ICD-10-CM

## 2025-01-21 DIAGNOSIS — F43.10 POST TRAUMATIC STRESS DISORDER (PTSD): ICD-10-CM

## 2025-01-21 PROCEDURE — 90834 PSYTX W PT 45 MINUTES: CPT | Performed by: SOCIAL WORKER

## 2025-01-21 NOTE — PSYCH
"Behavioral Health Psychotherapy Progress Note    Psychotherapy Provided: Individual Psychotherapy     1. Attention deficit hyperactivity disorder, inattentive type        2. GOOD (generalized anxiety disorder)        3. Post traumatic stress disorder (PTSD)                      Goals addressed in session: Goal 1,      DATA: JUDY met with Janes  session.  Her father is coming to stay with her from  on Feb 1st.  It will just him because of a dog they now have.  She is not sure how long he will be staying.  When he leaves her mom will be coming for a visit.  MSW asked her if she will be staying with her because in the past she told her, \"she was not staying with her again.\"  She replied, \"If she has to she will.\"  Over the weekend Janes got another cat.  This one is a boy.  They have 3 other cats all of which are girls.  The kitten cuddles up to her a great deal.  Work is going \"ok.  Last week her supervisor yelled out her,\" but it was worked out.  Janes was very tired during session due to drinking a sleeping time tea.  Ended session after 45 mins.        During this session, this clinician used the following therapeutic modalities: Client-centered Therapy, Cognitive Behavioral Therapy, Solution-Focused Therapy and Supportive Psychotherapy is diagnosed with a co-occurring substance use disorder, please indicate any changes in the frequency or amount of use: none  Stage of change for addressing substance use diagnoses: Action    ASSESSMENT:  Georgina Locke presents with a Euthymic/ normal mood.     her affect is Normal range and intensity, which is congruent, with her mood and the content of the session. The client has made progress on their goals.     Georgina Locke presents with a none risk of suicide, none risk of self-harm, and none risk of harm to others.    For any risk assessment that surpasses a \"low\" rating, a safety plan must be developed.    A safety plan was indicated: no  If yes, describe in " detail     PLAN: Between sessions, Georgina Locke will address tx plan goals.. At the next session, the therapist will use Client-centered Therapy, Cognitive Behavioral Therapy, Solution-Focused Therapy and Supportive Psychotherapy to address tx plan goals and issues that arise in between sessions..    Behavioral Health Treatment Plan and Discharge Planning: Georgina Locke is aware of and agrees to continue to work on their treatment plan. They have identified and are working toward their discharge goals. yes    Visit start and stop times:    01/21/25  Start Time: 1714  Stop Time: 1759  Total Visit Time: 45 minutes    Virtual Regular Visit    Verification of patient location:    Patient is located at Home in the following state in which I hold an active license [place]      Assessment/Plan:    Problem List Items Addressed This Visit       Post traumatic stress disorder (PTSD)    GOOD (generalized anxiety disorder)    Attention deficit hyperactivity disorder, inattentive type - Primary                                       Goals addressed in session: Goal 1          Reason for visit is   No chief complaint on file.       Encounter provider iBlly Pablo    Provider located at [facility] [place]  [facility] [place]  [address]  [place] [address] [phone]      Recent Visits  No visits were found meeting these conditions.  Showing recent visits within past 7 days and meeting all other requirements  Today's Visits  Date Type Provider Dept   01/21/25 Telemedicine [facility]   Showing today's visits and meeting all other requirements  Future Appointments  No visits were found meeting these conditions.  Showing future appointments within next 150 days and meeting all other requirements       The patient was identified by name and date of birth. Janes Locke was informed that this is a telemedicine visit and that the  visit is being conducted throughthe [email or web address] platform. She agrees to proceed..  My office door was closed. No one else was in the room.  She acknowledged consent and understanding of privacy and security of the video platform. The patient has agreed to participate and understands they can discontinue the visit at any time.    Patient is aware this is a billable service.     Subjective  Janes Locke is a 34 y.o. female  .      HPI     Past Medical History:   Diagnosis Date    Bipolar 1 disorder (HCC)     Cancer (HCC)     thyroid    Depression     Skin picking habit 08/18/2021    Suicidal ideations     Thyroid disease        Past Surgical History:   Procedure Laterality Date    IR LUMBAR PUNCTURE  3/9/2023    NECK SURGERY      US GUIDED THYROID BIOPSY      normal. Onset: 2012       Current Outpatient Medications   Medication Sig Dispense Refill    amphetamine-dextroamphetamine (ADDERALL XR, 20MG,) 20 MG 24 hr capsule Take 1 capsule (20 mg total) by mouth every morning Max Daily Amount: 20 mg Do not start before January 20, 2025. 30 capsule 0    acetaZOLAMIDE (DIAMOX) 250 mg tablet TAKE 2 TABLETS BY MOUTH TWICE A  tablet 8    albuterol (PROVENTIL HFA,VENTOLIN HFA) 90 mcg/act inhaler Inhale 2 puffs every 4 - 6 hours as needed      Calcium Carbonate 1500 (600 Ca) MG TABS Take by mouth 2 (two) times a day      clonazePAM (KlonoPIN) 0.5 mg tablet Take 1 tablet (0.5 mg total) by mouth 3 (three) times a day 270 tablet 1    doxycycline hyclate (VIBRA-TABS) 100 mg tablet Take 100 mg by mouth 2 (two) times a day      ergocalciferol (VITAMIN D2) 50,000 units Take 50,000 Units by mouth once a week      FLUoxetine (PROzac) 40 MG capsule Take 1 capsule (40 mg total) by mouth daily 90 capsule 1    gabapentin (Neurontin) 600 MG tablet Take 1.5 tablets (900 mg total) by mouth 3 (three) times a day 405 tablet 1    levothyroxine 25 mcg tablet Take 25 mcg by mouth daily      levothyroxine 300 MCG tablet Take 300 mcg by  mouth daily      Tess 0.25-35 MG-MCG per tablet Take 1 tablet by mouth daily      OLANZapine (ZyPREXA) 10 mg tablet Take 1 tablet (10 mg total) by mouth daily at bedtime 90 tablet 1    phentermine 15 MG capsule Take 1 capsule by mouth Daily      predniSONE 10 mg tablet PLEASE SEE ATTACHED FOR DETAILED DIRECTIONS      traZODone (DESYREL) 150 mg tablet Take 0.5 tablets (75 mg total) by mouth daily at bedtime 45 tablet 1     No current facility-administered medications for this visit.        No Known Allergies    Review of Systems    Video Exam    There were no vitals filed for this visit.    Physical Exam

## 2025-02-07 ENCOUNTER — TELEMEDICINE (OUTPATIENT)
Dept: PSYCHIATRY | Facility: CLINIC | Age: 35
End: 2025-02-07
Payer: COMMERCIAL

## 2025-02-07 DIAGNOSIS — F90.0 ATTENTION DEFICIT HYPERACTIVITY DISORDER, INATTENTIVE TYPE: Primary | ICD-10-CM

## 2025-02-07 DIAGNOSIS — F43.10 POST TRAUMATIC STRESS DISORDER (PTSD): ICD-10-CM

## 2025-02-07 DIAGNOSIS — F41.1 GAD (GENERALIZED ANXIETY DISORDER): ICD-10-CM

## 2025-02-07 PROCEDURE — 90837 PSYTX W PT 60 MINUTES: CPT | Performed by: SOCIAL WORKER

## 2025-02-07 NOTE — PSYCH
"Behavioral Health Psychotherapy Progress Note    Psychotherapy Provided: Individual Psychotherapy     1. Attention deficit hyperactivity disorder, inattentive type        2. GOOD (generalized anxiety disorder)        3. Post traumatic stress disorder (PTSD)                        Goals addressed in session: Goal 1,      DATA: JUDY met with Janes vuong.  At the beginning of session she was very angry.  Her girlfriend and her had had an argument.  She discussed her angry with her girlfriend which wasn't the real reason why she was angry.  Discussed work.  She had a \"very long week at work.\"  She vented out her anger regarding work.  Lastly, was her brother.  She was very angry with him and vented out her anger regarding him.  Her father is currently in [place] and is staying with her brother.  On the 15th her mother is due to come to [place].  \"She is not staying with her.\"  After vented out all of her anger \"she felt better\" and was much more relaxed and \"felt better.\"      During this session, this clinician used the following therapeutic modalities: Client-centered Therapy, Cognitive Behavioral Therapy, Solution-Focused Therapy and Supportive Psychotherapy is diagnosed with a co-occurring substance use disorder, please indicate any changes in the frequency or amount of use: none  Stage of change for addressing substance use diagnoses: Action    ASSESSMENT:  Georgina Locke presents with a Euthymic/ normal mood.     her affect is Normal range and intensity, which is congruent, with her mood and the content of the session. The client has made progress on their goals.     Georgina Locke presents with a none risk of suicide, none risk of self-harm, and none risk of harm to others.    For any risk assessment that surpasses a \"low\" rating, a safety plan must be developed.    A safety plan was indicated: no  If yes, describe in detail     PLAN: Between sessions, Georgina Locke will address tx plan goals.. At the " next session, the therapist will use Client-centered Therapy, Cognitive Behavioral Therapy, Solution-Focused Therapy and Supportive Psychotherapy to address tx plan goals and issues that arise in between sessions..    Behavioral Health Treatment Plan and Discharge Planning: Georgina Locke is aware of and agrees to continue to work on their treatment plan. They have identified and are working toward their discharge goals. yes    Visit start and stop times:    02/07/25  Start Time: 1711  Stop Time: 1805  Total Visit Time: 54 minutes    Virtual Regular Visit    Verification of patient location:    Patient is located at Home in the following state in which I hold an active license [place]      Assessment/Plan:    Problem List Items Addressed This Visit    None                                        Goals addressed in session: Goal 1          Reason for visit is   No chief complaint on file.       Encounter provider Billy Pablo    Provider located at [facility] [place]  [facility] [place]  [address]  [place] [address] [phone]      Recent Visits  No visits were found meeting these conditions.  Showing recent visits within past 7 days and meeting all other requirements  Today's Visits  Date Type Provider Dept   02/07/25 Telemedicine Billy Pablo  [facility]   Showing today's visits and meeting all other requirements  Future Appointments  No visits were found meeting these conditions.  Showing future appointments within next 150 days and meeting all other requirements       The patient was identified by name and date of birth. Janes Locke was informed that this is a telemedicine visit and that the visit is being conducted throughthe [employer] platform. She agrees to proceed..  My office door was closed. No one else was in the room.  She acknowledged consent and understanding of privacy and security of the video platform. The  patient has agreed to participate and understands they can discontinue the visit at any time.    Patient is aware this is a billable service.     Subjective  Janes Locke is a 34 y.o. female  .      HPI     Past Medical History:   Diagnosis Date    Bipolar 1 disorder (HCC)     Cancer (HCC)     thyroid    Depression     Skin picking habit 08/18/2021    Suicidal ideations     Thyroid disease        Past Surgical History:   Procedure Laterality Date    IR LUMBAR PUNCTURE  3/9/2023    NECK SURGERY      US GUIDED THYROID BIOPSY      normal. Onset: 2012       Current Outpatient Medications   Medication Sig Dispense Refill    amphetamine-dextroamphetamine (ADDERALL XR, 20MG,) 20 MG 24 hr capsule Take 1 capsule (20 mg total) by mouth every morning Max Daily Amount: 20 mg Do not start before January 20, 2025. 30 capsule 0    acetaZOLAMIDE (DIAMOX) 250 mg tablet TAKE 2 TABLETS BY MOUTH TWICE A  tablet 8    albuterol (PROVENTIL HFA,VENTOLIN HFA) 90 mcg/act inhaler Inhale 2 puffs every 4 - 6 hours as needed      Calcium Carbonate 1500 (600 Ca) MG TABS Take by mouth 2 (two) times a day      clonazePAM (KlonoPIN) 0.5 mg tablet Take 1 tablet (0.5 mg total) by mouth 3 (three) times a day 270 tablet 1    doxycycline hyclate (VIBRA-TABS) 100 mg tablet Take 100 mg by mouth 2 (two) times a day      ergocalciferol (VITAMIN D2) 50,000 units Take 50,000 Units by mouth once a week      FLUoxetine (PROzac) 40 MG capsule Take 1 capsule (40 mg total) by mouth daily 90 capsule 1    gabapentin (Neurontin) 600 MG tablet Take 1.5 tablets (900 mg total) by mouth 3 (three) times a day 405 tablet 1    levothyroxine 25 mcg tablet Take 25 mcg by mouth daily      levothyroxine 300 MCG tablet Take 300 mcg by mouth daily      Tess 0.25-35 MG-MCG per tablet Take 1 tablet by mouth daily      OLANZapine (ZyPREXA) 10 mg tablet Take 1 tablet (10 mg total) by mouth daily at bedtime 90 tablet 1    phentermine 15 MG capsule Take 1 capsule by mouth  Daily      predniSONE 10 mg tablet PLEASE SEE ATTACHED FOR DETAILED DIRECTIONS      traZODone (DESYREL) 150 mg tablet Take 0.5 tablets (75 mg total) by mouth daily at bedtime 45 tablet 1     No current facility-administered medications for this visit.        No Known Allergies    Review of Systems    Video Exam    There were no vitals filed for this visit.    Physical Exam

## 2025-02-11 ENCOUNTER — TELEMEDICINE (OUTPATIENT)
Dept: PSYCHIATRY | Facility: CLINIC | Age: 35
End: 2025-02-11
Payer: COMMERCIAL

## 2025-02-11 DIAGNOSIS — F31.9 BIPOLAR I DISORDER WITH MOOD-INCONGRUENT PSYCHOTIC FEATURES (HCC): Primary | ICD-10-CM

## 2025-02-11 DIAGNOSIS — F99 INSOMNIA DUE TO OTHER MENTAL DISORDER: ICD-10-CM

## 2025-02-11 DIAGNOSIS — F41.1 GAD (GENERALIZED ANXIETY DISORDER): ICD-10-CM

## 2025-02-11 DIAGNOSIS — F51.05 INSOMNIA DUE TO OTHER MENTAL DISORDER: ICD-10-CM

## 2025-02-11 DIAGNOSIS — F43.10 POST TRAUMATIC STRESS DISORDER (PTSD): ICD-10-CM

## 2025-02-11 DIAGNOSIS — F90.0 ATTENTION DEFICIT HYPERACTIVITY DISORDER, INATTENTIVE TYPE: ICD-10-CM

## 2025-02-11 PROCEDURE — 99214 OFFICE O/P EST MOD 30 MIN: CPT | Performed by: NURSE PRACTITIONER

## 2025-02-11 RX ORDER — DEXTROAMPHETAMINE SACCHARATE, AMPHETAMINE ASPARTATE MONOHYDRATE, DEXTROAMPHETAMINE SULFATE AND AMPHETAMINE SULFATE 5; 5; 5; 5 MG/1; MG/1; MG/1; MG/1
20 CAPSULE, EXTENDED RELEASE ORAL EVERY MORNING
Qty: 30 CAPSULE | Refills: 0 | Status: SHIPPED | OUTPATIENT
Start: 2025-02-18

## 2025-02-11 RX ORDER — PRAZOSIN HYDROCHLORIDE 2 MG/1
2 CAPSULE ORAL
Qty: 30 CAPSULE | Refills: 3 | Status: SHIPPED | OUTPATIENT
Start: 2025-02-11

## 2025-02-11 RX ORDER — CLONAZEPAM 0.5 MG/1
0.5 TABLET ORAL 3 TIMES DAILY
Qty: 270 TABLET | Refills: 1 | Status: SHIPPED | OUTPATIENT
Start: 2025-02-11

## 2025-02-11 NOTE — PSYCH
Virtual Regular Visit    Verification of patient location:    Patient is located at Home in the following state in which I hold an active license [place]      Assessment/Plan:    Problem List Items Addressed This Visit       Bipolar I disorder with mood-incongruent psychotic features (HCC)    GOOD (generalized anxiety disorder) - Primary    Insomnia due to other mental disorder    Attention deficit hyperactivity disorder, inattentive type       Depression Follow-up Plan Completed: Yes    Reason for visit is   Chief Complaint   Patient presents with    Virtual Regular Visit          Encounter provider GENESIS Lemons      Recent Visits  No visits were found meeting these conditions.  Showing recent visits within past 7 days and meeting all other requirements  Today's Visits  Date Type Provider Dept   02/11/25 Telemedicine GENESIS Lemons [facility]   Showing today's visits and meeting all other requirements  Future Appointments  No visits were found meeting these conditions.  Showing future appointments within next 150 days and meeting all other requirements       The patient was identified by name and date of birth. Janes Locke was informed that this is a telemedicine visit and that the visit is being conducted throughthe Epic Embedded platform. She agrees to proceed..  My office door was closed. No one else was in the room.  She acknowledged consent and understanding of privacy and security of the video platform. The patient has agreed to participate and understands they can discontinue the visit at any time.    Patient is aware this is a billable service.     MEDICATION MANAGEMENT NOTE        [facility]    Name and Date of Birth:  Janes Locke 34 y.o. 1990 MRN: [id number]    Date of Visit: February 11, 2025    Reason for Visit: Psychiatric medication management follow-up visit    No Known Allergies      Assessment & Plan  GOOD  (generalized anxiety disorder)  Continue clonazepam 0.5 mg TID       Insomnia due to other mental disorder  Continue prazosin 2 mg       Bipolar I disorder with mood-incongruent psychotic features (HCC)  Continue Zyprexa 10 mg       Attention deficit hyperactivity disorder, inattentive type  Continue Adderall XR 20 mg              SUBJECTIVE:    Janes is seen today for a follow up for Bipolar Disorder, PTSD, Generalized Anxiety Disorder, and ADHD. She continues to experience on and off symptoms since the last visit.  Even admits she did go through a time when she questioned whether or not she still needed olanzapine.  When she stopped taking olanzapine she had significant increase in mood swings and irritability.  She has restarted this medication.  Throughout this episode she wondered if the Adderall XR was still working.  I did encourage her to monitor issues with inattentiveness, distractiveness, concentration and focus while being vigilant and compliant with all of her medications.  We will follow up next month.  Her mood continues much improved.  Some family discord.  Some stress at work.  But issues at home with her partner are going well.    She denies any side effects from current psychiatric medications.      HPI ROS Appetite Changes and Sleep:     She reports fluctuating sleep pattern, fluctuating appetite, fluctuating energy levels. Denies homicidal ideation, denies suicidal ideation    Review Of Systems:   no complaints , except as noted above         Mental Status Evaluation:    Appearance:  age appropriate   Behavior:  pleasant, cooperative   Speech:  normal rate, normal volume   Mood:  improved   Affect:  brighter   Thought Process:  goal directed   Associations: intact associations   Thought Content:  no overt delusions   Perceptual Disturbances: none   Risk Potential: Suicidal ideation - None  Homicidal ideation - None  Potential for aggression - No   Sensorium:  oriented to person, place, and  time/date   Memory:  recent and remote memory grossly intact   Consciousness:  alert and awake   Attention: attention span and concentration are age appropriate   Insight:  improving   Judgment: improving   Gait/Station: normal gait/station, normal balance   Motor Activity: no abnormal movements       History Review:The following portions of the patient's history were reviewed and updated as appropriate: psychiatric history, trauma history allergies, current medications, past family history, past medical history, past social history, past surgical history, and problem list   OBJECTIVE:     Vital signs in last 24 hours:    There were no vitals filed for this visit.  Laboratory Results: I have personally reviewed all pertinent laboratory/tests results.      Treatment Recommendations/Precautions:    Continue all medications the same  Continue with individual therapist.  Aware of need to follow up with family physician for medical issues  Aware of 24 hour and weekend coverage for urgent situations accessed by calling [facility] main practice number  Medication management every 3 months  Patient advised to call 911 if feeling suicidal or homicidal before acting out on their thoughts and they expressed understanding.    Medications Risks/Benefits      Risks, Benefits And Possible Side Effects Of Medications:    Discussed risks and benefits of treatment with patient including risk of suicidality, serotonin syndrome, increased QTc interval and SIADH related to treatment with antidepressants; Risk of induction of manic symptoms in certain patient populations, risk of parkinsonian symptoms, metabolic syndrome, tardive dyskinesia and neuroleptic malignant syndrome related to treatment with antipsychotic medications, risks of misuse, abuse or dependence, sedation and respiratory depression related to treatment with benzodiazepine medications, and risks of cardiovascular side effects including elevated  blood pressure, risk of misuse, abuse or dependence and risk of increased anxiety related to treatment with stimulant medications         Controlled Medication Discussion:     Janes has been filling controlled prescriptions on time as prescribed according to [place] Prescription Drug Monitoring Program    Psychotherapy Provided:     Individual psychotherapy provided: Medications, treatment progress and treatment plan reviewed with Janes.  Medication changes discussed with Janes.  Medication education provided to Janes.  Reassurance and supportive therapy provided.      Treatment Plan:    Completed and signed during the session: Not applicable - Treatment Plan not due at this session      Total Visit Duration:  30 minutes     The total visit duration detailed above includes: patient engagement, medication management, psychotherapy/counseling, discussion regarding treatment goals, and coordination of care.      Note Share Disclaimer:     This note was not shared with the patient due to this is a psychotherapy note    GENESIS Lemons 02/11/25    This note was completed in part utilizing Dragon dictation Software. Grammatical, translation, syntax errors, random word insertions, spelling mistakes, and incomplete sentences may be an occasional consequence of this system secondary to software limitations with voice recognition, ambient noise, and hardware issues. If you have any questions or concerns about the content, text, or information contained within the body of this dictation, please contact the provider for clarification.

## 2025-02-19 ENCOUNTER — TELEMEDICINE (OUTPATIENT)
Dept: PSYCHIATRY | Facility: CLINIC | Age: 35
End: 2025-02-19
Payer: COMMERCIAL

## 2025-02-19 DIAGNOSIS — F43.10 POST TRAUMATIC STRESS DISORDER (PTSD): ICD-10-CM

## 2025-02-19 DIAGNOSIS — F41.1 GAD (GENERALIZED ANXIETY DISORDER): ICD-10-CM

## 2025-02-19 DIAGNOSIS — F90.0 ATTENTION DEFICIT HYPERACTIVITY DISORDER, INATTENTIVE TYPE: Primary | ICD-10-CM

## 2025-02-19 PROCEDURE — 90837 PSYTX W PT 60 MINUTES: CPT | Performed by: SOCIAL WORKER

## 2025-02-19 NOTE — PSYCH
"Behavioral Health Psychotherapy Progress Note    Psychotherapy Provided: Individual Psychotherapy     1. Attention deficit hyperactivity disorder, inattentive type        2. GOOD (generalized anxiety disorder)        3. Post traumatic stress disorder (PTSD)                          Goals addressed in session: Goal 1,      DATA: JUDY met with Janes  session.  Her father recently left and went back home to .  Her mother is coming and will be at her house very soon.  Her mom broke her leg and is coming to the [facility] for medical care.  Janes has spoken up to her, her father and her brother that she does not want her mother at her house but due to culture she is going to stay with Janes .  She is unsure of how long her mother is staying. Janes thinks \"a month.\"  Discussed coping skills to help her deal with her mother.  Her mother is a huge trigger for Janes and the worse possible thing that can happen to Janes  is her mother staying with her.    During this session, this clinician used the following therapeutic modalities: Client-centered Therapy, Cognitive Behavioral Therapy, Solution-Focused Therapy and Supportive Psychotherapy is diagnosed with a co-occurring substance use disorder, please indicate any changes in the frequency or amount of use: none  Stage of change for addressing substance use diagnoses: Action    ASSESSMENT:  Georgina Locke presents with a Euthymic/ normal mood.     her affect is Normal range and intensity, which is congruent, with her mood and the content of the session. The client has made progress on their goals.     Georgina Locke presents with a none risk of suicide, none risk of self-harm, and none risk of harm to others.    For any risk assessment that surpasses a \"low\" rating, a safety plan must be developed.    A safety plan was indicated: no  If yes, describe in detail     PLAN: Between sessions, Georgina Locke will address tx plan goals.. At the next session, the " therapist will use Client-centered Therapy, Cognitive Behavioral Therapy, Solution-Focused Therapy and Supportive Psychotherapy to address tx plan goals and issues that arise in between sessions..    Behavioral Health Treatment Plan and Discharge Planning: Georgina Locke is aware of and agrees to continue to work on their treatment plan. They have identified and are working toward their discharge goals. yes    Visit start and stop times:    02/19/25  Start Time: 1711  Stop Time: 1810  Total Visit Time: 59 minutes    Virtual Regular Visit    Verification of patient location:    Patient is located at Home in the following state in which I hold an active license [place]      Assessment/Plan:    Problem List Items Addressed This Visit       Post traumatic stress disorder (PTSD)    GOOD (generalized anxiety disorder)    Attention deficit hyperactivity disorder, inattentive type - Primary                                         Goals addressed in session: Goal 1          Reason for visit is   No chief complaint on file.       Encounter provider Billy Pablo    Provider located at [facility] [place]  [facility] [place]  [address]  [place] [address] [phone]      Recent Visits  No visits were found meeting these conditions.  Showing recent visits within past 7 days and meeting all other requirements  Today's Visits  Date Type Provider Dept   02/19/25 Telemedicine Billy Pablo [facility]   Showing today's visits and meeting all other requirements  Future Appointments  No visits were found meeting these conditions.  Showing future appointments within next 150 days and meeting all other requirements       The patient was identified by name and date of birth. Janes Locke was informed that this is a telemedicine visit and that the visit is being conducted throughthe [employer] platform. She agrees to proceed..  My office door was  closed. No one else was in the room.  She acknowledged consent and understanding of privacy and security of the video platform. The patient has agreed to participate and understands they can discontinue the visit at any time.    Patient is aware this is a billable service.     Subjective  Janes Locke is a 34 y.o. female  .      HPI     Past Medical History:   Diagnosis Date    Bipolar 1 disorder (HCC)     Cancer (HCC)     thyroid    Depression     Skin picking habit 08/18/2021    Suicidal ideations     Thyroid disease        Past Surgical History:   Procedure Laterality Date    IR LUMBAR PUNCTURE  3/9/2023    NECK SURGERY      US GUIDED THYROID BIOPSY      normal. Onset: 2012       Current Outpatient Medications   Medication Sig Dispense Refill    acetaZOLAMIDE (DIAMOX) 250 mg tablet TAKE 2 TABLETS BY MOUTH TWICE A  tablet 8    albuterol (PROVENTIL HFA,VENTOLIN HFA) 90 mcg/act inhaler Inhale 2 puffs every 4 - 6 hours as needed      amphetamine-dextroamphetamine (ADDERALL XR, 20MG,) 20 MG 24 hr capsule Take 1 capsule (20 mg total) by mouth every morning Max Daily Amount: 20 mg Do not start before February 18, 2025. 30 capsule 0    Calcium Carbonate 1500 (600 Ca) MG TABS Take by mouth 2 (two) times a day      clonazePAM (KlonoPIN) 0.5 mg tablet Take 1 tablet (0.5 mg total) by mouth 3 (three) times a day 270 tablet 1    doxycycline hyclate (VIBRA-TABS) 100 mg tablet Take 100 mg by mouth 2 (two) times a day      ergocalciferol (VITAMIN D2) 50,000 units Take 50,000 Units by mouth once a week      FLUoxetine (PROzac) 40 MG capsule Take 1 capsule (40 mg total) by mouth daily 90 capsule 1    gabapentin (Neurontin) 600 MG tablet Take 1.5 tablets (900 mg total) by mouth 3 (three) times a day 405 tablet 1    levothyroxine 25 mcg tablet Take 25 mcg by mouth daily      levothyroxine 300 MCG tablet Take 300 mcg by mouth daily      Tess 0.25-35 MG-MCG per tablet Take 1 tablet by mouth daily      OLANZapine (ZyPREXA)  10 mg tablet Take 1 tablet (10 mg total) by mouth daily at bedtime 90 tablet 1    phentermine 15 MG capsule Take 1 capsule by mouth Daily      prazosin (MINIPRESS) 2 mg capsule Take 1 capsule (2 mg total) by mouth daily at bedtime 30 capsule 3    predniSONE 10 mg tablet PLEASE SEE ATTACHED FOR DETAILED DIRECTIONS      traZODone (DESYREL) 150 mg tablet Take 0.5 tablets (75 mg total) by mouth daily at bedtime 45 tablet 1     No current facility-administered medications for this visit.        No Known Allergies    Review of Systems    Video Exam    There were no vitals filed for this visit.    Physical Exam

## 2025-02-20 ENCOUNTER — TELEPHONE (OUTPATIENT)
Age: 35
End: 2025-02-20

## 2025-02-20 NOTE — TELEPHONE ENCOUNTER
"Pt called stating she needs to speak to Billy Pablo. Writer informed that we can put a message through to her but we do not have a direct line. Writer asked her what it's in regards to. She states \"It's my Mother\"! She then stated she will not get out of the car until she talks to provider Billy Pablo. Writer asked if pt wants to speak to a Nurse and that they can help her or get a message through to Billy Pablo as well. Pt said yes so writer warm transferred her to the Nurse Pod.  "

## 2025-02-20 NOTE — TELEPHONE ENCOUNTER
"Nurse spoke with Georgina.      \"I am pissed off and livid. My mom has a broken leg and I have to help her so much around the house. Now she is telling me the milk is sour and I need to buy more milk. How can the milk be sour, we just bought it on Sunday! I want to be a good daughter but she just keeps pushing my buttons. I am tired of being angry all the time. I do not want to be around her. I do better when she is not here.  My mom lives in the [place] and arrived yesterday. I think my mom  her leg on purpose to get attention.\"    \"My girlfriend will help me with my mom but she hates my mom too. No one likes my mom, she makes more enemies than friends.It hurts to have my mom be this way.\"      \"I say mean things to my mom because I am angry, but I would never hurt her. I think I will sit in my car for a while before I go in the house. My girlfriend is at home with my mom right now.\"       \"My mom has nowhere to go. No one wants my mom to live with them, so I have to have her with me.\"    Georgina denied thoughts of SI/HI.     Janes Locke verbalized understanding to call [facility], [phone], go to ED if feeling as though they would act on thoughts to hurt self or others.              "

## 2025-02-27 ENCOUNTER — TELEPHONE (OUTPATIENT)
Dept: BEHAVIORAL/MENTAL HEALTH CLINIC | Facility: CLINIC | Age: 35
End: 2025-02-27

## 2025-02-28 ENCOUNTER — TELEPHONE (OUTPATIENT)
Dept: BEHAVIORAL/MENTAL HEALTH CLINIC | Facility: CLINIC | Age: 35
End: 2025-02-28

## 2025-03-04 ENCOUNTER — TELEMEDICINE (OUTPATIENT)
Dept: PSYCHIATRY | Facility: CLINIC | Age: 35
End: 2025-03-04
Payer: COMMERCIAL

## 2025-03-04 DIAGNOSIS — F41.1 GAD (GENERALIZED ANXIETY DISORDER): ICD-10-CM

## 2025-03-04 DIAGNOSIS — F90.0 ATTENTION DEFICIT HYPERACTIVITY DISORDER, INATTENTIVE TYPE: Primary | ICD-10-CM

## 2025-03-04 DIAGNOSIS — F43.10 POST TRAUMATIC STRESS DISORDER (PTSD): ICD-10-CM

## 2025-03-04 PROCEDURE — 90837 PSYTX W PT 60 MINUTES: CPT | Performed by: SOCIAL WORKER

## 2025-03-04 NOTE — PSYCH
"Treatment Plan Tracking    # Treatment Plan not completed within required time limits due to: Georgina Locke presented with emotional/behavioral issues that required clinical intervention.                               Behavioral Health Psychotherapy Progress Note    Psychotherapy Provided: Individual Psychotherapy     1. Attention deficit hyperactivity disorder, inattentive type        2. GOOD (generalized anxiety disorder)        3. Post traumatic stress disorder (PTSD)                            Goals addressed in session: Goal 1,      DATA: JUDY met with Janes vuong.  Informed her that I called to get her in after her discussion with the nurse.  JUDY was unable to LM.  Discussed her conversation she had with the nurse regarding her mother and what has happened since then.  Two days after her discussion with the nurse she confronted her mother.  Her brother picked up their mother and she has not heard from her parents nor her brother since then.  \"She feel like she is free!\"  Plan is to think of goals and will develop tx plan next session.          During this session, this clinician used the following therapeutic modalities: Client-centered Therapy, Cognitive Behavioral Therapy, Solution-Focused Therapy and Supportive Psychotherapy is diagnosed with a co-occurring substance use disorder, please indicate any changes in the frequency or amount of use: none  Stage of change for addressing substance use diagnoses: Action    ASSESSMENT:  Georgina Locke presents with a Euthymic/ normal mood.     her affect is Normal range and intensity, which is congruent, with her mood and the content of the session. The client has made progress on their goals.     Georgina Locke presents with a none risk of suicide, none risk of self-harm, and none risk of harm to others.    For any risk assessment that surpasses a \"low\" rating, a safety plan must be developed.    A safety plan was indicated: no  If yes, describe in detail "     PLAN: Between sessions, Georgina Locke will address tx plan goals.. At the next session, the therapist will use Client-centered Therapy, Cognitive Behavioral Therapy, Solution-Focused Therapy and Supportive Psychotherapy to address tx plan goals and issues that arise in between sessions..    Behavioral Health Treatment Plan and Discharge Planning: Georgina Locke is aware of and agrees to continue to work on their treatment plan. They have identified and are working toward their discharge goals. yes    Visit start and stop times:    03/04/25  Start Time: 1714  Stop Time: 1807  Total Visit Time: 53 minutes    Virtual Regular Visit    Verification of patient location:    Patient is located at Home in the following state in which I hold an active license [place]      Assessment/Plan:    Problem List Items Addressed This Visit       Post traumatic stress disorder (PTSD)    GOOD (generalized anxiety disorder)    Attention deficit hyperactivity disorder, inattentive type - Primary                                           Goals addressed in session: Goal 1          Reason for visit is   No chief complaint on file.       Encounter provider Billy Pablo    Provider located at [facility] [place]  [facility] [place] [id number] [phone]      Recent Visits  Date Type Provider Dept   02/28/25 Telephone [facility] [place]   02/27/25 Telephone [facility] [place]   Showing recent visits within past 7 days and meeting all other requirements  Today's Visits  Date Type Provider Dept   03/04/25 Telemedicine [facility]   Showing today's visits and meeting all other requirements  Future Appointments  No visits were found meeting these conditions.  Showing future appointments within next 150 days and meeting all other requirements       The patient was  identified by name and date of birth. Janes Locke was informed that this is a telemedicine visit and that the visit is being conducted throughthe IMN platform. She agrees to proceed..  My office door was closed. No one else was in the room.  She acknowledged consent and understanding of privacy and security of the video platform. The patient has agreed to participate and understands they can discontinue the visit at any time.    Patient is aware this is a billable service.     Subjective  Janes Locke is a 34 y.o. female  .      HPI     Past Medical History:   Diagnosis Date    Bipolar 1 disorder (HCC)     Cancer (HCC)     thyroid    Depression     Skin picking habit 08/18/2021    Suicidal ideations     Thyroid disease        Past Surgical History:   Procedure Laterality Date    IR LUMBAR PUNCTURE  3/9/2023    NECK SURGERY      US GUIDED THYROID BIOPSY      normal. Onset: 2012       Current Outpatient Medications   Medication Sig Dispense Refill    acetaZOLAMIDE (DIAMOX) 250 mg tablet TAKE 2 TABLETS BY MOUTH TWICE A  tablet 8    albuterol (PROVENTIL HFA,VENTOLIN HFA) 90 mcg/act inhaler Inhale 2 puffs every 4 - 6 hours as needed      amphetamine-dextroamphetamine (ADDERALL XR, 20MG,) 20 MG 24 hr capsule Take 1 capsule (20 mg total) by mouth every morning Max Daily Amount: 20 mg Do not start before February 18, 2025. 30 capsule 0    Calcium Carbonate 1500 (600 Ca) MG TABS Take by mouth 2 (two) times a day      clonazePAM (KlonoPIN) 0.5 mg tablet Take 1 tablet (0.5 mg total) by mouth 3 (three) times a day 270 tablet 1    doxycycline hyclate (VIBRA-TABS) 100 mg tablet Take 100 mg by mouth 2 (two) times a day      ergocalciferol (VITAMIN D2) 50,000 units Take 50,000 Units by mouth once a week      FLUoxetine (PROzac) 40 MG capsule Take 1 capsule (40 mg total) by mouth daily 90 capsule 1    gabapentin (Neurontin) 600 MG tablet Take 1.5 tablets (900 mg total) by mouth 3 (three) times a day 405  tablet 1    levothyroxine 25 mcg tablet Take 25 mcg by mouth daily      levothyroxine 300 MCG tablet Take 300 mcg by mouth daily      Tess 0.25-35 MG-MCG per tablet Take 1 tablet by mouth daily      OLANZapine (ZyPREXA) 10 mg tablet Take 1 tablet (10 mg total) by mouth daily at bedtime 90 tablet 1    phentermine 15 MG capsule Take 1 capsule by mouth Daily      prazosin (MINIPRESS) 2 mg capsule Take 1 capsule (2 mg total) by mouth daily at bedtime 30 capsule 3    predniSONE 10 mg tablet PLEASE SEE ATTACHED FOR DETAILED DIRECTIONS      traZODone (DESYREL) 150 mg tablet Take 0.5 tablets (75 mg total) by mouth daily at bedtime 45 tablet 1     No current facility-administered medications for this visit.        No Known Allergies    Review of Systems    Video Exam    There were no vitals filed for this visit.    Physical Exam

## 2025-03-06 DIAGNOSIS — F90.0 ATTENTION DEFICIT HYPERACTIVITY DISORDER, INATTENTIVE TYPE: ICD-10-CM

## 2025-03-06 RX ORDER — DEXTROAMPHETAMINE SACCHARATE, AMPHETAMINE ASPARTATE MONOHYDRATE, DEXTROAMPHETAMINE SULFATE AND AMPHETAMINE SULFATE 5; 5; 5; 5 MG/1; MG/1; MG/1; MG/1
20 CAPSULE, EXTENDED RELEASE ORAL EVERY MORNING
Qty: 30 CAPSULE | Refills: 0 | Status: SHIPPED | OUTPATIENT
Start: 2025-03-17 | End: 2025-03-20

## 2025-03-06 NOTE — TELEPHONE ENCOUNTER
Please see notes. Pt knows she is requesting refill early. Can script be sent in with appropriate fill date?    02/18/2025 02/11/2025 clonazePAM (Tablet) 90.0 30 0.5 MG [facility] / 1 [place]      1 [id number] 02/18/2025 02/11/2025 Mixed Amphetamine Salts (Capsule, Extended Release) 30.0 30 20 MG [facility] [place] / 0 [place]    1 [id number] 02/18/2025 02/11/2025 Mixed Amphetamine Salts (Capsule, Extended Release) 30.0 30 20 MG [facility] [place] / 0 [place]    1 [id number] 01/21/2025 12/11/2024 clonazePAM (Tablet) 90.0 30 0.5 MG NA AFSANEH LUONG [facility] [place] / 1 [place]    1 [id number] 01/20/2025 01/10/2025 Mixed Amphetamine Salts (Capsule, Extended Release) 30.0 30 20 MG NA AFSANEH LUONG [facility]

## 2025-03-06 NOTE — TELEPHONE ENCOUNTER
Reason for call:   [x] Refill   [] Prior Auth  [x] Other: PT UNDERSTAND SHE IS REQUESTING EARLY, SHE IS ASKING DOCTOR TO PEND MED FOR THE 18TH    Office:   [] PCP/Provider -   [x] Specialty/Provider -     amphetamine-dextroamphetamine (ADDERALL XR, 20MG,) 20 MG 24 hr capsule 20 mg, Oral, Every morning          Edit  Cancel Reorder       Quantity: 30    Pharmacy: [facility]   Does the patient have enough for 3 days?   [x] Yes   [] No - Send as HP to POD    Mail Away Pharmacy   Does the patient have enough for 10 days?   [] Yes   [] No - Send as HP to POD

## 2025-03-06 NOTE — TELEPHONE ENCOUNTER
Pt called asking about adderall refill states she didn't see it in her chart, I advised it was pending and also verified that she did not need it until the 18th she confirmed that is the case.

## 2025-03-19 ENCOUNTER — TELEPHONE (OUTPATIENT)
Age: 35
End: 2025-03-19

## 2025-03-19 NOTE — TELEPHONE ENCOUNTER
Per response from Aixa HURTADO - she will give note tomorrow at the appointment.  Georgina is aware of same and will be completing the LAKISHA sent via My Chart.

## 2025-03-19 NOTE — TELEPHONE ENCOUNTER
Patient called and stated she needs a letter work stating she takes adderall in case she gets tested for anything, writer advised patient she would need to fill out ivette and writer emailed ivette to patient

## 2025-03-20 ENCOUNTER — TELEMEDICINE (OUTPATIENT)
Dept: PSYCHIATRY | Facility: CLINIC | Age: 35
End: 2025-03-20
Payer: COMMERCIAL

## 2025-03-20 DIAGNOSIS — F90.0 ATTENTION DEFICIT HYPERACTIVITY DISORDER, INATTENTIVE TYPE: ICD-10-CM

## 2025-03-20 DIAGNOSIS — F31.9 BIPOLAR I DISORDER WITH MOOD-INCONGRUENT PSYCHOTIC FEATURES (HCC): Primary | ICD-10-CM

## 2025-03-20 DIAGNOSIS — F99 INSOMNIA DUE TO OTHER MENTAL DISORDER: ICD-10-CM

## 2025-03-20 DIAGNOSIS — F51.05 INSOMNIA DUE TO OTHER MENTAL DISORDER: ICD-10-CM

## 2025-03-20 DIAGNOSIS — F43.10 POST TRAUMATIC STRESS DISORDER (PTSD): ICD-10-CM

## 2025-03-20 PROCEDURE — 99214 OFFICE O/P EST MOD 30 MIN: CPT | Performed by: NURSE PRACTITIONER

## 2025-03-20 RX ORDER — DEXTROAMPHETAMINE SACCHARATE, AMPHETAMINE ASPARTATE MONOHYDRATE, DEXTROAMPHETAMINE SULFATE AND AMPHETAMINE SULFATE 5; 5; 5; 5 MG/1; MG/1; MG/1; MG/1
20 CAPSULE, EXTENDED RELEASE ORAL EVERY MORNING
Status: SHIPPED
Start: 2025-03-17 | End: 2025-04-16

## 2025-03-20 RX ORDER — DEXTROAMPHETAMINE SACCHARATE, AMPHETAMINE ASPARTATE MONOHYDRATE, DEXTROAMPHETAMINE SULFATE AND AMPHETAMINE SULFATE 7.5; 7.5; 7.5; 7.5 MG/1; MG/1; MG/1; MG/1
30 CAPSULE, EXTENDED RELEASE ORAL EVERY MORNING
Qty: 30 CAPSULE | Refills: 0 | Status: SHIPPED | OUTPATIENT
Start: 2025-04-17

## 2025-03-20 NOTE — ASSESSMENT & PLAN NOTE
Orders:    amphetamine-dextroamphetamine (ADDERALL XR, 20MG,) 20 MG 24 hr capsule; Take 1 capsule (20 mg total) by mouth every morning Max Daily Amount: 20 mg    amphetamine-dextroamphetamine (ADDERALL XR, 30MG,) 30 MG 24 hr capsule; Take 1 capsule (30 mg total) by mouth every morning Note dose increase on 4/17/2025 Max Daily Amount: 30 mg Do not start before April 17, 2025.

## 2025-03-20 NOTE — PSYCH
MEDICATION MANAGEMENT NOTE    Name: Janes Locke      : 1990      MRN: [id number]  Encounter Provider: GENESIS Lemons  Encounter Date: 3/20/2025   Encounter department: [facility]    Insurance: Payor: BLUE CROSS / Plan: MISC BLUE CROSS / Product Type: Blue Fee for Service /      Reason for Visit: medication management follow up visit  Assessment & Plan  Bipolar I disorder with mood-incongruent psychotic features (HCC)         Post traumatic stress disorder (PTSD)         Insomnia due to other mental disorder         Attention deficit hyperactivity disorder, inattentive type    Orders:    amphetamine-dextroamphetamine (ADDERALL XR, 20MG,) 20 MG 24 hr capsule; Take 1 capsule (20 mg total) by mouth every morning Max Daily Amount: 20 mg    amphetamine-dextroamphetamine (ADDERALL XR, 30MG,) 30 MG 24 hr capsule; Take 1 capsule (30 mg total) by mouth every morning Note dose increase on 2025 Max Daily Amount: 30 mg Do not start before 2025.        Treatment Recommendations:    Educated about diagnosis and treatment modalities. Verbalizes understanding and agreement with the treatment plan.  Discussed self monitoring of symptoms, and symptom monitoring tools.  Discussed medications and if treatment adjustment was needed or desired.  Medication management every 3 months  Aware of need to follow up with family physician for medical issues  Aware of 24 hour and weekend coverage for urgent situations accessed by calling [facility] main practice number  Continue psychotherapy with [facility] therapist Billy Pablo  I am scheduling this patient out for greater than 3 months: No    Medications Risks/Benefits:      Risks, Benefits And Possible Side Effects Of Medications:    Risks, benefits, and possible side effects of medications explained to Georgina and she (or legal representative) verbalizes understanding and agreement for  "treatment.    Controlled Medication Discussion:     Georgina has been filling controlled prescriptions on time as prescribed according to [place] Prescription Drug Monitoring Program      History of Present Illness     CC: Georgina presents today for follow up on mood lability and symptoms of ADHD       Georgina continues to note some difficulty in concentration and focus, completing tasks on time , forgetting to do things.  She is requesting increase of Adderall.    Med Compliance: yes    Since our last visit, overall symptoms have been fluctuating with recent improvement. She is otherwise reporting mood relatively stable \"as  long as I take the Zyprexa\"      HPI ROS:     Medication Side Effects: none  Depression: 5 /10 (10 worst)  Anxiety: 5 /10 (10 worst)  Safety concerns (SI, HI, others): no  Sleep: fair  Energy: fair  Appetite: fair  Weight Change: no    Georgina denies any side effects from medications unless noted above.    Review Of Systems: A review of systems is obtained and is negative except for the pertinent positives listed in HPI/Subjective above.      Current Rating Scores:     None completed today.    Areas of Improvement: reviewed in HPI/Subjective Section and reviewed in Assessment and Plan Section    Past Psychiatric History: (unchanged information from previous note copied and updated)    Traumatic History: (unchanged information from previous note copied and updated)    Past Medical History:   Diagnosis Date    Bipolar 1 disorder (HCC)     Cancer (HCC)     thyroid    Depression     Skin picking habit 08/18/2021    Suicidal ideations     Thyroid disease         Past Surgical History:   Procedure Laterality Date    IR LUMBAR PUNCTURE  3/9/2023    NECK SURGERY      US GUIDED THYROID BIOPSY      normal. Onset: 2012     Allergies: No Known Allergies    Current Outpatient Medications   Medication Sig Dispense Refill    amphetamine-dextroamphetamine (ADDERALL XR, 20MG,) 20 MG 24 hr capsule Take 1 capsule (20 mg " total) by mouth every morning Max Daily Amount: 20 mg      [START ON 4/17/2025] amphetamine-dextroamphetamine (ADDERALL XR, 30MG,) 30 MG 24 hr capsule Take 1 capsule (30 mg total) by mouth every morning Note dose increase on 4/17/2025 Max Daily Amount: 30 mg Do not start before April 17, 2025. 30 capsule 0    acetaZOLAMIDE (DIAMOX) 250 mg tablet TAKE 2 TABLETS BY MOUTH TWICE A  tablet 8    albuterol (PROVENTIL HFA,VENTOLIN HFA) 90 mcg/act inhaler Inhale 2 puffs every 4 - 6 hours as needed      Calcium Carbonate 1500 (600 Ca) MG TABS Take by mouth 2 (two) times a day      clonazePAM (KlonoPIN) 0.5 mg tablet Take 1 tablet (0.5 mg total) by mouth 3 (three) times a day 270 tablet 1    doxycycline hyclate (VIBRA-TABS) 100 mg tablet Take 100 mg by mouth 2 (two) times a day      ergocalciferol (VITAMIN D2) 50,000 units Take 50,000 Units by mouth once a week      FLUoxetine (PROzac) 40 MG capsule Take 1 capsule (40 mg total) by mouth daily 90 capsule 1    gabapentin (Neurontin) 600 MG tablet Take 1.5 tablets (900 mg total) by mouth 3 (three) times a day 405 tablet 1    levothyroxine 25 mcg tablet Take 25 mcg by mouth daily      levothyroxine 300 MCG tablet Take 300 mcg by mouth daily      Tess 0.25-35 MG-MCG per tablet Take 1 tablet by mouth daily      OLANZapine (ZyPREXA) 10 mg tablet Take 1 tablet (10 mg total) by mouth daily at bedtime 90 tablet 1    phentermine 15 MG capsule Take 1 capsule by mouth Daily      prazosin (MINIPRESS) 2 mg capsule Take 1 capsule (2 mg total) by mouth daily at bedtime 30 capsule 3    predniSONE 10 mg tablet PLEASE SEE ATTACHED FOR DETAILED DIRECTIONS      traZODone (DESYREL) 150 mg tablet Take 0.5 tablets (75 mg total) by mouth daily at bedtime 45 tablet 1     No current facility-administered medications for this visit.       Substance Abuse History:    Social History     Substance and Sexual Activity   Alcohol Use Yes    Comment: social drinker     Social History     Substance and  Sexual Activity   Drug Use No       Social History:    Social History     Socioeconomic History    Marital status: Single     Spouse name: Not on file    Number of children: Not on file    Years of education: Not on file    Highest education level: Not on file   Occupational History    Not on file   Tobacco Use    Smoking status: Former     Current packs/day: 1.00     Types: Cigarettes    Smokeless tobacco: Never   Vaping Use    Vaping status: Some Days    Substances: Flavoring   Substance and Sexual Activity    Alcohol use: Yes     Comment: social drinker    Drug use: No    Sexual activity: Not Currently   Other Topics Concern    Not on file   Social History Narrative    Not on file     Social Drivers of Health     Financial Resource Strain: Not on file   Food Insecurity: Not on file   Transportation Needs: Not on file   Physical Activity: Not on file   Stress: Not on file   Social Connections: Unknown (6/18/2024)    Received from [facility]     How often do you feel lonely or isolated from those around you? (Adult - for ages 18 years and over): Not on file   Intimate Partner Violence: Not on file   Housing Stability: Not on file       Family Psychiatric History:     History reviewed. No pertinent family history.    Medical History Reviewed by provider this encounter:  Tobacco  Allergies  Meds  Problems  Med Hx  Surg Hx  Fam Hx          Objective   There were no vitals taken for this visit.     Mental Status Evaluation:    Appearance age appropriate, casually dressed   Behavior cooperative, calm   Speech normal rate, normal volume, normal pitch, spontaneous   Mood improved   Affect normal range and intensity, appropriate, mood-congruent   Thought Processes organized, goal directed   Thought Content no overt delusions   Perceptual Disturbances: no auditory hallucinations, no visual hallucinations   Abnormal Thoughts  Risk Potential Suicidal ideation - None  Homicidal ideation -  None  Potential for aggression - No   Orientation oriented to person, place, time/date, and situation   Memory recent and remote memory grossly intact   Consciousness alert and awake   Attention Span Concentration Span attention span and concentration are age appropriate   Intellect appears to be of average intelligence   Insight intact   Judgement intact   Muscle Strength and  Gait normal muscle strength and normal muscle tone, normal gait and normal balance   Motor activity no abnormal movements   Language no difficulty naming common objects, no difficulty repeating a phrase, no difficulty writing a sentence   Fund of Knowledge adequate knowledge of current events  adequate fund of knowledge regarding past history  adequate fund of knowledge regarding vocabulary    Pain none   Pain Scale 0       Laboratory Results: I have personally reviewed all pertinent laboratory/tests results        Suicide/Homicide Risk Assessment:    Risk of Harm to Self:  The following ratings are based on assessment at the time of the interview  Current Specific Risk Factors include: diagnosis of mood disorder  Protective Factors: no current suicidal ideation, ability to adapt to change, able to make plans for the future, compliant with medications  Based on today's assessment, Georgina presents the following risk of harm to self: minimal    Risk of Harm to Others:  The following ratings are based on assessment at the time of the interview  Recent Specific Risk Factors include: none  Based on today's assessment, Georgina presents the following risk of harm to others: none    The following interventions are recommended: Continue medication management. No other intervention changes indicated at this time.    Psychotherapy Provided:     Individual psychotherapy provided: No    Treatment Plan:    Completed and signed during the session: Not applicable - Treatment Plan not due at this session    Goals: Progress towards Treatment Plan goals - Yes,  progressing, as evidenced by subjective findings in HPI/Subjective Section and in Assessment and Plan Section    Depression Follow-up Plan Completed: Yes    Note Share:    This note was not shared with the patient due to this is a psychotherapy note    Administrative Statements   Administrative Statements   Encounter provider GENESIS Lemons    The Patient is located at Home and in the following state in which I hold an active license [place].    The patient was identified by name and date of birth. Janes Locke was informed that this is a telemedicine visit and that the visit is being conducted through the Epic Embedded platform. She agrees to proceed..  My office door was closed. No one else was in the room.  She acknowledged consent and understanding of privacy and security of the video platform. The patient has agreed to participate and understands they can discontinue the visit at any time.    I have spent a total time of 30 minutes in caring for this patient on the day of the visit/encounter including Counseling / Coordination of care, not including the time spent for establishing the audio/video connection.    GENESIS Lemons 03/20/25

## 2025-03-21 ENCOUNTER — TELEMEDICINE (OUTPATIENT)
Dept: PSYCHIATRY | Facility: CLINIC | Age: 35
End: 2025-03-21
Payer: COMMERCIAL

## 2025-03-21 DIAGNOSIS — F43.10 POST TRAUMATIC STRESS DISORDER (PTSD): ICD-10-CM

## 2025-03-21 DIAGNOSIS — F90.0 ATTENTION DEFICIT HYPERACTIVITY DISORDER, INATTENTIVE TYPE: Primary | ICD-10-CM

## 2025-03-21 DIAGNOSIS — F41.1 GAD (GENERALIZED ANXIETY DISORDER): ICD-10-CM

## 2025-03-21 PROCEDURE — 90837 PSYTX W PT 60 MINUTES: CPT | Performed by: SOCIAL WORKER

## 2025-03-21 NOTE — PSYCH
"                        Behavioral Health Psychotherapy Progress Note    Psychotherapy Provided: Individual Psychotherapy     1. Attention deficit hyperactivity disorder, inattentive type        2. GOOD (generalized anxiety disorder)        3. Post traumatic stress disorder (PTSD)                              Goals addressed in session: Goal 1,2    DATA: JUDY met with Altheass  yevgeniy.  She continues to set boundaries with her family.  They each did reach out to her but she held the boundary.  She has been spending time with her grown nephew and niece and is enjoying her time with them.  She has been doing good at work and is receiving compliments.  She has a difficult time accepting them.  Developed tx plan and safety plan.      During this session, this clinician used the following therapeutic modalities: Client-centered Therapy, Cognitive Behavioral Therapy, Solution-Focused Therapy and Supportive Psychotherapy is diagnosed with a co-occurring substance use disorder, please indicate any changes in the frequency or amount of use: none  Stage of change for addressing substance use diagnoses: Action    ASSESSMENT:  Georgina Locke presents with a Euthymic/ normal mood.     her affect is Normal range and intensity, which is congruent, with her mood and the content of the session. The client has made progress on their goals.     Georgina Locke presents with a none risk of suicide, none risk of self-harm, and none risk of harm to others.    For any risk assessment that surpasses a \"low\" rating, a safety plan must be developed.    A safety plan was indicated: no  If yes, describe in detail     PLAN: Between sessions, Georgina Locke will address tx plan goals.. At the next session, the therapist will use Client-centered Therapy, Cognitive Behavioral Therapy, Solution-Focused Therapy and Supportive Psychotherapy to address tx plan goals and issues that arise in between sessions..    Behavioral Health Treatment Plan and " Discharge Planning: Georgina Locke is aware of and agrees to continue to work on their treatment plan. They have identified and are working toward their discharge goals. yes    Visit start and stop times:    03/21/25  Start Time: 1712  Stop Time: 1805  Total Visit Time: 53 minutes    Virtual Regular Visit    Verification of patient location:    Patient is located at Home in the following state in which I hold an active license [place]      Assessment/Plan:    Problem List Items Addressed This Visit       Post traumatic stress disorder (PTSD)    GOOD (generalized anxiety disorder)    Attention deficit hyperactivity disorder, inattentive type - Primary                                             Goals addressed in session: Goal 1          Reason for visit is   No chief complaint on file.       Encounter provider Billy Pablo    Provider [facility] [place]  [address]  [place] [address] [phone]      Recent Visits  No visits were found meeting these conditions.  Showing recent visits within past 7 days and meeting all other requirements  Today's Visits  Date Type Provider Dept   03/21/25 Telemedicine [facility]   Showing today's visits and meeting all other requirements  Future Appointments  No visits were found meeting these conditions.  Showing future appointments within next 150 days and meeting all other requirements       The patient was identified by name and date of birth. Janes Locke was informed that this is a telemedicine visit and that the visit is being conducted throughthe [employer] platform. She agrees to proceed..  My office door was closed. No one else was in the room.  She acknowledged consent and understanding of privacy and security of the video platform. The patient has agreed to participate and understands they can discontinue the visit at any time.    Patient is  aware this is a billable service.     Subjective  Janes Locke is a 34 y.o. female  .      HPI     Past Medical History:   Diagnosis Date    Bipolar 1 disorder (HCC)     Cancer (HCC)     thyroid    Depression     Skin picking habit 08/18/2021    Suicidal ideations     Thyroid disease        Past Surgical History:   Procedure Laterality Date    IR LUMBAR PUNCTURE  3/9/2023    NECK SURGERY      US GUIDED THYROID BIOPSY      normal. Onset: 2012       Current Outpatient Medications   Medication Sig Dispense Refill    acetaZOLAMIDE (DIAMOX) 250 mg tablet TAKE 2 TABLETS BY MOUTH TWICE A  tablet 8    albuterol (PROVENTIL HFA,VENTOLIN HFA) 90 mcg/act inhaler Inhale 2 puffs every 4 - 6 hours as needed      amphetamine-dextroamphetamine (ADDERALL XR, 20MG,) 20 MG 24 hr capsule Take 1 capsule (20 mg total) by mouth every morning Max Daily Amount: 20 mg      [START ON 4/17/2025] amphetamine-dextroamphetamine (ADDERALL XR, 30MG,) 30 MG 24 hr capsule Take 1 capsule (30 mg total) by mouth every morning Note dose increase on 4/17/2025 Max Daily Amount: 30 mg Do not start before April 17, 2025. 30 capsule 0    Calcium Carbonate 1500 (600 Ca) MG TABS Take by mouth 2 (two) times a day      clonazePAM (KlonoPIN) 0.5 mg tablet Take 1 tablet (0.5 mg total) by mouth 3 (three) times a day 270 tablet 1    doxycycline hyclate (VIBRA-TABS) 100 mg tablet Take 100 mg by mouth 2 (two) times a day      ergocalciferol (VITAMIN D2) 50,000 units Take 50,000 Units by mouth once a week      FLUoxetine (PROzac) 40 MG capsule Take 1 capsule (40 mg total) by mouth daily 90 capsule 1    gabapentin (Neurontin) 600 MG tablet Take 1.5 tablets (900 mg total) by mouth 3 (three) times a day 405 tablet 1    levothyroxine 25 mcg tablet Take 25 mcg by mouth daily      levothyroxine 300 MCG tablet Take 300 mcg by mouth daily      Tess 0.25-35 MG-MCG per tablet Take 1 tablet by mouth daily      OLANZapine (ZyPREXA) 10 mg tablet Take 1 tablet (10 mg  total) by mouth daily at bedtime 90 tablet 1    phentermine 15 MG capsule Take 1 capsule by mouth Daily      prazosin (MINIPRESS) 2 mg capsule Take 1 capsule (2 mg total) by mouth daily at bedtime 30 capsule 3    predniSONE 10 mg tablet PLEASE SEE ATTACHED FOR DETAILED DIRECTIONS      traZODone (DESYREL) 150 mg tablet Take 0.5 tablets (75 mg total) by mouth daily at bedtime 45 tablet 1     No current facility-administered medications for this visit.        No Known Allergies    Review of Systems    Video Exam    There were no vitals filed for this visit.    Physical Exam

## 2025-03-21 NOTE — BH CRISIS PLAN
"Client Name: Georgina Locke       Client YOB: 1990    [place] Safety Plan      Creation Date: 3/12/24 Update Date: 3/21/25   Created By: Billy Pablo Last Updated By: Billy Pablo      Step 1: Warning Signs:   Warning Signs   \"searching\"   not sleeping   talking too much   other people telling her \"she is not ok.\"            Step 2: Internal Coping Strategies:   Internal Coping Strategies   scrolling the internet            Step 3: People and social settings that provide distraction:   Name Contact Information   Saroj lives with    Places   home           Step 4: People whom I can ask for help during a crisis:      Name Contact Information    Saroj lives with      Step 5: Professionals or agencies I can contact during a crisis:      Clinican/Agency Name Phone Emergency Contact    Aixa Fischer has #       Local Emergency Department Emergency Department Phone Emergency Department Address    [facility] [address]         Crisis Phone Numbers:   Suicide Prevention Lifeline: Call or Text  615 Crisis Text Line: Text HOME to 175-213   Please note: Some [facility] do not have a separate number for Child/Adolescent specific crisis. If your county is not listed under Child/Adolescent, please call the adult number for your county      Adult Crisis Numbers: Child/Adolescent Crisis Numbers   [facility]: [phone] [facility]: [phone]   [facility]: [phone] [facility]: [phone]   [facility]: [phone] [place]: [phone]   [facility]: [phone] [facility]: [phone]   [facility]: [phone]   [facility]: [phone]   [facility]: [phone]   [facility]: [phone] (daytime) [phone] (after hours, weekends, holidays)      Step 6: Making the environment safer (plan for lethal means safety):   Patient did not identify any lethal methods: Yes     Optional: What is most important to me " and worth living for?      [place] Safety Plan. Annette Andujar and Deng Ahmadi. Used with permission of the authors.

## 2025-03-21 NOTE — BH TREATMENT PLAN
Outpatient Behavioral Health Psychotherapy Treatment Plan    Georgina Locke  1990     Date of Initial Psychotherapy Assessment: 10/14/20  Date of Current Treatment Plan: 03/21/25  Treatment Plan Target Date: 3/25  Treatment Plan Expiration Date: 3/25    Diagnosis:   No diagnosis found.      Area(s) of Need:  I recently set boundaries with my family after I had broken them.  I cant decipher my emotions.    Long Term Goal 1 (in the client's own words): I want keep the new boundaries I had sey\t with my family.    Stage of Change: Preparation    Target Date for completion: 9/25     Anticipated therapeutic modalities: CBT     People identified to complete this goal: Georgina      Objective 1:    I will not engage with my family.     I will not allow them to lure me in.     I will take my meds as prescribed.            Long Term Goal 2 (in the client's own words): I want to learn how to decipher my emotions   Stage of Change: Preparation    Target Date for completion: 9/25     Anticipated therapeutic modalities: Preparation     People identified to complete this goal: Georgina      Objective 1:   I will identify each emotion as I feel it.      I will look up how each feeling feels and looks like.      I will check in to get validation regarding what I should feel.               Long Term Goal 3 (in the client's own words):     Stage of Change: Preparation    Target Date for completion:      Anticipated therapeutic modalities:      People identified to complete this goal:       Objective 1: (identify the means of measuring success in meeting the objective):       Objective 2: (identify the means of measuring success in meeting the objective):      I am currently under the care of a [facility] psychiatric provider: yes    My [facility] psychiatric provider is: Aixa Sue  I am currently taking psychiatric medications: Yes, as prescribed    I feel that I will be ready for discharge from mental health care when I reach the  following (measurable goal/objective): when I reach my goals and nothing left to address on a tx plan.    For children and adults who have a legal guardian:   Has there been any change to custody orders and/or guardianship status? No. If yes, attach updated documentation.    I have updated my Crisis Plan and have been offered a copy of this plan    Behavioral Health Treatment Plan [facility]: Diagnosis and Treatment Plan explained to Georgina Locke acknowledges an understanding of their diagnosis. Georgina Locke agrees to this treatment plan.    I have been offered a copy of this Treatment Plan. yes

## 2025-04-02 ENCOUNTER — TELEMEDICINE (OUTPATIENT)
Dept: PSYCHIATRY | Facility: CLINIC | Age: 35
End: 2025-04-02
Payer: COMMERCIAL

## 2025-04-02 DIAGNOSIS — F90.0 ATTENTION DEFICIT HYPERACTIVITY DISORDER, INATTENTIVE TYPE: ICD-10-CM

## 2025-04-02 DIAGNOSIS — F43.10 POST TRAUMATIC STRESS DISORDER (PTSD): Primary | ICD-10-CM

## 2025-04-02 PROCEDURE — 90834 PSYTX W PT 45 MINUTES: CPT | Performed by: SOCIAL WORKER

## 2025-04-02 NOTE — PSYCH
"                        Behavioral Health Psychotherapy Progress Note    Psychotherapy Provided: Individual Psychotherapy     1. Post traumatic stress disorder (PTSD)        2. Attention deficit hyperactivity disorder, inattentive type                              Goals addressed in session: Goal 1,2    DATA: JUDY met with Janes  session.  Her brother \"deported\" their mother.  Her mother was staying with him for 2 months until his wife and him \"couldn't take it anymore.  They sent her back to .  Her mom was being really mean to her sister in law.\"  Her sister in law and then her brother called her about her mom.  \"Her brother told her mother she can only stay for a week at a time.\"    Her brother is also now talking again to Janes.  She is very happy that her brother sees what she has been trying to tell him all along about their mother.  Work is good.  Discussed dropping her sessions to once per month.  She only wants to decrease to every 3 weeks.  JUDY has no assess to her schedule due to tech issues.  Will review Janes's appts with her next session.  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  She then informed them of of med and has begun taking it again.  She was having mood swings without it.  We had tech issues at the beginning of session which is reflected in the length of session and she had less to discuss.          During this session, this clinician used the following therapeutic modalities: Client-centered Therapy, Cognitive Behavioral Therapy, Solution-Focused Therapy and Supportive Psychotherapy is diagnosed with a co-occurring substance use disorder, please indicate any changes in the frequency or amount of use: none  Stage of change for addressing substance use diagnoses: Action    ASSESSMENT:  Georgina Locke presents with a Euthymic/ normal mood.     her affect is Normal range and intensity, " "which is congruent, with her mood and the content of the session. The client has made progress on their goals.     Georgina Locke presents with a none risk of suicide, none risk of self-harm, and none risk of harm to others.    For any risk assessment that surpasses a \"low\" rating, a safety plan must be developed.    A safety plan was indicated: no  If yes, describe in detail     PLAN: Between sessions, Georgina Locke will address tx plan goals.. At the next session, the therapist will use Client-centered Therapy, Cognitive Behavioral Therapy, Solution-Focused Therapy and Supportive Psychotherapy to address tx plan goals and issues that arise in between sessions..    Behavioral Health Treatment Plan and Discharge Planning: Georgina Locke is aware of and agrees to continue to work on their treatment plan. They have identified and are working toward their discharge goals. yes    Visit start and stop times:    04/02/25  Start Time: 1722  Stop Time: 1802  Total Visit Time: 40 minutes    Virtual Regular Visit    Verification of patient location:    Patient is located at Home in the following state in which I hold an active license [place]      Assessment/Plan:    Problem List Items Addressed This Visit       Post traumatic stress disorder (PTSD) - Primary    Attention deficit hyperactivity disorder, inattentive type                                             Goals addressed in session: Goal 1          Reason for visit is   No chief complaint on file.       Encounter provider Billy Pablo    Provider located at [facility] [place]  [facility] [place]  [address]  [place] [address] [phone]      Recent Visits  No visits were found meeting these conditions.  Showing recent visits within past 7 days and meeting all other requirements  Today's Visits  Date Type Provider Dept   04/02/25 Telemedicine [facility]   Showing " today's visits and meeting all other requirements  Future Appointments  No visits were found meeting these conditions.  Showing future appointments within next 150 days and meeting all other requirements       The patient was identified by name and date of birth. Janes Locke was informed that this is a telemedicine visit and that the visit is being conducted throughthe [employer] platform. She agrees to proceed..  My office door was closed. No one else was in the room.  She acknowledged consent and understanding of privacy and security of the video platform. The patient has agreed to participate and understands they can discontinue the visit at any time.    Patient is aware this is a billable service.     Subjective  Janes Locke is a 34 y.o. female  .      HPI     Past Medical History:   Diagnosis Date    Addiction to drug (HCC)     No    ADHD (attention deficit hyperactivity disorder)     Anxiety     Bipolar 1 disorder (HCC)     Cancer (HCC)     thyroid    Depression     Disease of thyroid gland     Hallucination     Yes    Impulse control disorder     Obesity     300lb    Pre-diabetes     Psychiatric illness     Psychosis (HCC)     Skin picking habit 08/18/2021    Sleep difficulties     Not at the moment but yes    Suicidal ideations     Thyroid disease        Past Surgical History:   Procedure Laterality Date    IR LUMBAR PUNCTURE  3/9/2023    NECK SURGERY      US GUIDED THYROID BIOPSY      normal. Onset: 2012       Current Outpatient Medications   Medication Sig Dispense Refill    acetaZOLAMIDE (DIAMOX) 250 mg tablet TAKE 2 TABLETS BY MOUTH TWICE A  tablet 8    albuterol (PROVENTIL HFA,VENTOLIN HFA) 90 mcg/act inhaler Inhale 2 puffs every 4 - 6 hours as needed      amphetamine-dextroamphetamine (ADDERALL XR, 20MG,) 20 MG 24 hr capsule Take 1 capsule (20 mg total) by mouth every morning Max Daily Amount: 20 mg      [START ON 4/17/2025] amphetamine-dextroamphetamine (ADDERALL XR, 30MG,) 30  MG 24 hr capsule Take 1 capsule (30 mg total) by mouth every morning Note dose increase on 4/17/2025 Max Daily Amount: 30 mg Do not start before April 17, 2025. 30 capsule 0    Calcium Carbonate 1500 (600 Ca) MG TABS Take by mouth 2 (two) times a day      clonazePAM (KlonoPIN) 0.5 mg tablet Take 1 tablet (0.5 mg total) by mouth 3 (three) times a day 270 tablet 1    doxycycline hyclate (VIBRA-TABS) 100 mg tablet Take 100 mg by mouth 2 (two) times a day      ergocalciferol (VITAMIN D2) 50,000 units Take 50,000 Units by mouth once a week      FLUoxetine (PROzac) 40 MG capsule Take 1 capsule (40 mg total) by mouth daily 90 capsule 1    gabapentin (Neurontin) 600 MG tablet Take 1.5 tablets (900 mg total) by mouth 3 (three) times a day 405 tablet 1    levothyroxine 25 mcg tablet Take 25 mcg by mouth daily      levothyroxine 300 MCG tablet Take 300 mcg by mouth daily      Tess 0.25-35 MG-MCG per tablet Take 1 tablet by mouth daily      OLANZapine (ZyPREXA) 10 mg tablet Take 1 tablet (10 mg total) by mouth daily at bedtime 90 tablet 1    phentermine 15 MG capsule Take 1 capsule by mouth Daily      prazosin (MINIPRESS) 2 mg capsule Take 1 capsule (2 mg total) by mouth daily at bedtime 30 capsule 3    predniSONE 10 mg tablet PLEASE SEE ATTACHED FOR DETAILED DIRECTIONS      traZODone (DESYREL) 150 mg tablet Take 0.5 tablets (75 mg total) by mouth daily at bedtime 45 tablet 1     No current facility-administered medications for this visit.        No Known Allergies    Review of Systems    Video Exam    There were no vitals filed for this visit.    Physical Exam

## 2025-04-04 ENCOUNTER — OFFICE VISIT (OUTPATIENT)
Dept: FAMILY MEDICINE CLINIC | Facility: CLINIC | Age: 35
End: 2025-04-04
Payer: COMMERCIAL

## 2025-04-04 VITALS
HEIGHT: 66 IN | DIASTOLIC BLOOD PRESSURE: 76 MMHG | OXYGEN SATURATION: 98 % | TEMPERATURE: 98.2 F | HEART RATE: 89 BPM | BODY MASS INDEX: 47.09 KG/M2 | WEIGHT: 293 LBS | RESPIRATION RATE: 17 BRPM | SYSTOLIC BLOOD PRESSURE: 136 MMHG

## 2025-04-04 DIAGNOSIS — F90.0 ATTENTION DEFICIT HYPERACTIVITY DISORDER, INATTENTIVE TYPE: ICD-10-CM

## 2025-04-04 DIAGNOSIS — F99 INSOMNIA DUE TO OTHER MENTAL DISORDER: ICD-10-CM

## 2025-04-04 DIAGNOSIS — E89.0 POSTOPERATIVE HYPOTHYROIDISM: ICD-10-CM

## 2025-04-04 DIAGNOSIS — E78.2 MODERATE MIXED HYPERLIPIDEMIA NOT REQUIRING STATIN THERAPY: ICD-10-CM

## 2025-04-04 DIAGNOSIS — F51.05 INSOMNIA DUE TO OTHER MENTAL DISORDER: ICD-10-CM

## 2025-04-04 DIAGNOSIS — Z76.89 ENCOUNTER TO ESTABLISH CARE: Primary | ICD-10-CM

## 2025-04-04 DIAGNOSIS — N93.9 ABNORMAL UTERINE BLEEDING: ICD-10-CM

## 2025-04-04 DIAGNOSIS — C73 MALIGNANT TUMOR OF THYROID GLAND (HCC): ICD-10-CM

## 2025-04-04 DIAGNOSIS — Z13.1 SCREENING FOR DIABETES MELLITUS (DM): ICD-10-CM

## 2025-04-04 DIAGNOSIS — Z30.09 ENCOUNTER FOR COUNSELING REGARDING CONTRACEPTION: ICD-10-CM

## 2025-04-04 DIAGNOSIS — F41.1 GAD (GENERALIZED ANXIETY DISORDER): ICD-10-CM

## 2025-04-04 DIAGNOSIS — Z11.59 NEED FOR HEPATITIS C SCREENING TEST: ICD-10-CM

## 2025-04-04 DIAGNOSIS — F31.9 BIPOLAR I DISORDER WITH MOOD-INCONGRUENT PSYCHOTIC FEATURES (HCC): ICD-10-CM

## 2025-04-04 DIAGNOSIS — F43.10 POST TRAUMATIC STRESS DISORDER (PTSD): ICD-10-CM

## 2025-04-04 DIAGNOSIS — R60.0 BILATERAL LEG EDEMA: ICD-10-CM

## 2025-04-04 PROCEDURE — 99204 OFFICE O/P NEW MOD 45 MIN: CPT

## 2025-04-04 NOTE — ASSESSMENT & PLAN NOTE
Repeat TSH ordered. Continue with levothyroxine 325mcg daily.  Orders:    TSH, 3rd generation; Future

## 2025-04-04 NOTE — ASSESSMENT & PLAN NOTE
Continue Zyprexa 10 mg HS, fluoxetine 40mg daily, gabapentin 900mg TID    Following with psych. Continue per specialist recs. Pt does not wish to change any dosing of current medications.

## 2025-04-04 NOTE — ASSESSMENT & PLAN NOTE
Seen on 3/202/2025: increased adderall to 30mg daily.     Following with psych. Continue per specialist recs. Pt does not wish to change any dosing of current medications.

## 2025-04-04 NOTE — PROGRESS NOTES
Name: Janes Locke      : 1990      MRN: [id number]  Encounter Provider: Tal March MD  Encounter Date: 2025   Encounter department: [facility] CARE [facility]  :  Assessment & Plan  Encounter to establish care  Extensive EMR review and update. Referral placed to GYN for review of contraception options. Recommend returning in one month for annual physical  Orders:    Comprehensive metabolic panel; Future    Attention deficit hyperactivity disorder, inattentive type  Seen on 3/202/2025: increased adderall to 30mg daily.     Following with psych. Continue per specialist recs. Pt does not wish to change any dosing of current medications.       Bipolar I disorder with mood-incongruent psychotic features (HCC)  Continue Zyprexa 10 mg HS, fluoxetine 40mg daily, gabapentin 900mg TID    Following with psych. Continue per specialist recs. Pt does not wish to change any dosing of current medications.       GOOD (generalized anxiety disorder)  Continue fluoxetine 40mg daily, clonazepam 0.5mg TID, gabapentin 900mg TID    Following with psych. Continue per specialist recs. Pt does not wish to change any dosing of current medications.       Post traumatic stress disorder (PTSD)  Continue prazosin 2mg HS       Insomnia due to other mental disorder  Continue prazosin 2mg HS and trazodone 75mg HS       Encounter for counseling regarding contraception  Pt wishes to use a contraception method that will completely remove her menses. Have placed referral to GYN to review options.  Orders:    Ambulatory Referral to Obstetrics / Gynecology; Future    Postoperative hypothyroidism  Repeat TSH ordered. Continue with levothyroxine 325mcg daily.  Orders:    TSH, 3rd generation; Future    Bilateral leg edema  Unclear etiology though finding and endorsed orthopnea is concerning for possible CHF. Echo ordered. Consider starting diuretic once labs completed.   Orders:    Echo complete w/  contrast if indicated; Future    Moderate mixed hyperlipidemia not requiring statin therapy  Lipid panel ordered.   Orders:    Lipid Panel with Direct LDL reflex; Future    Abnormal uterine bleeding  Will be discussing possible order for pelvic US during next visit as unfortunately many issues were addressed during today's visit and there was no time to discuss order.        Screening for diabetes mellitus (DM)    Orders:    Hemoglobin A1C; Future    Need for hepatitis C screening test    Orders:    Hepatitis C antibody; Future    Malignant tumor of thyroid gland (HCC)  Endo note from 12/28/2020 and 12/30/2020 reviewed. Will speak to pt regarding further Endo f/u given possible need for SIN per Endo notes.                History of Present Illness   Janes Locke is a 34 y.o. female presenting to clinic to establish care.    PMH: per EMR  Allergy: cats (itchy and sneezing), metal (rashes)  Surgeryhx: per EMR  Familyhx: mom bipolar/hypochondriac, paternal uncle had throat cancer. Pt endorses significant MH issues in family but no specific dx as family refuses to be evaluated or believe in medication.  Social: currently working as  at [employer] (mostly sedentary work), lives with roommate (who accompanies her today), vape, no etoh, no other illicit drug use.    Periods irregular since having started OCPs but previously regular, at times can bleed for the entire month and heavy, no pain. Pt seeks contraception that will completely halt her menses.      Review of Systems   Constitutional:  Negative for chills and fever.   Respiratory:  Negative for shortness of breath.    Cardiovascular:  Negative for chest pain.   Gastrointestinal:  Negative for abdominal pain, blood in stool, diarrhea, nausea and vomiting.   Genitourinary:  Negative for dysuria and hematuria.       Objective   /76 (BP Location: Left arm, Patient Position: Sitting, Cuff Size: Standard)   Pulse 89   Temp 98.2 °F (36.8 °C)  "(Tympanic)   Resp 17   Ht 5' 6\" (1.676 m)   Wt (!) 138 kg (303 lb 9.6 oz)   SpO2 98%   BMI 49.00 kg/m²      Physical Exam  Vitals and nursing note reviewed.   Constitutional:       General: She is not in acute distress.     Appearance: She is not ill-appearing, toxic-appearing or diaphoretic.   Eyes:      Conjunctiva/sclera: Conjunctivae normal.   Cardiovascular:      Rate and Rhythm: Normal rate and regular rhythm.      Pulses: Normal pulses.      Heart sounds: Normal heart sounds.   Pulmonary:      Effort: Pulmonary effort is normal.      Breath sounds: Normal breath sounds.   Musculoskeletal:      Right lower leg: No tenderness or bony tenderness. 1+ Pitting Edema present.      Left lower leg: No tenderness or bony tenderness. 1+ Pitting Edema present.   Skin:     General: Skin is warm and dry.   Neurological:      General: No focal deficit present.      Mental Status: She is alert.   Psychiatric:         Mood and Affect: Mood normal.         Behavior: Behavior is cooperative.       "

## 2025-04-04 NOTE — ASSESSMENT & PLAN NOTE
Continue fluoxetine 40mg daily, clonazepam 0.5mg TID, gabapentin 900mg TID    Following with psych. Continue per specialist recs. Pt does not wish to change any dosing of current medications.

## 2025-04-05 PROBLEM — N93.9 ABNORMAL UTERINE BLEEDING: Status: ACTIVE | Noted: 2025-04-05

## 2025-04-05 PROBLEM — E78.2 MODERATE MIXED HYPERLIPIDEMIA NOT REQUIRING STATIN THERAPY: Status: ACTIVE | Noted: 2025-04-05

## 2025-04-05 PROBLEM — R60.0 BILATERAL LEG EDEMA: Status: ACTIVE | Noted: 2025-04-05

## 2025-04-05 NOTE — ASSESSMENT & PLAN NOTE
Unclear etiology though finding and endorsed orthopnea is concerning for possible CHF. Echo ordered. Consider starting diuretic once labs completed.   Orders:    Echo complete w/ contrast if indicated; Future

## 2025-04-05 NOTE — ASSESSMENT & PLAN NOTE
Will be discussing possible order for pelvic US during next visit as unfortunately many issues were addressed during today's visit and there was no time to discuss order.

## 2025-04-05 NOTE — ASSESSMENT & PLAN NOTE
Endo note from 12/28/2020 and 12/30/2020 reviewed. Will speak to pt regarding further Endo f/u given possible need for SIN per Endo notes.

## 2025-04-15 ENCOUNTER — TELEPHONE (OUTPATIENT)
Dept: PSYCHIATRY | Facility: CLINIC | Age: 35
End: 2025-04-15

## 2025-04-16 ENCOUNTER — TELEPHONE (OUTPATIENT)
Dept: PSYCHIATRY | Facility: CLINIC | Age: 35
End: 2025-04-16

## 2025-04-16 DIAGNOSIS — F41.1 GAD (GENERALIZED ANXIETY DISORDER): ICD-10-CM

## 2025-04-16 NOTE — TELEPHONE ENCOUNTER
NO-SHOW LETTER MAILED TO Janes Locke.  ADDRESS: [facility] [address] [facility] [id number]

## 2025-04-16 NOTE — TELEPHONE ENCOUNTER
Reason for call:   [x] Refill   [] Prior Auth  [] Other:     Office:   [] PCP/Provider -   [x] Specialty/Provider - Psychiatry    Medication: clonazePAM (KlonoPIN) 0.5 mg tablet    Dose/Frequency: Take 1 tablet (0.5 mg total) by mouth 3 (three) times a day,     Quantity: 270 tablet     Pharm[place] [place] -     Local Pharmacy   Does the patient have enough for 3 days?   [x] Yes   [] No - Send as HP to POD    Mail Away Pharmacy   Does the patient have enough for 10 days?   [] Yes   [] No - Send as HP to POD

## 2025-04-17 RX ORDER — CLONAZEPAM 0.5 MG/1
0.5 TABLET ORAL 3 TIMES DAILY
Qty: 270 TABLET | Refills: 0 | Status: SHIPPED | OUTPATIENT
Start: 2025-04-17

## 2025-04-17 NOTE — TELEPHONE ENCOUNTER
Refill must be reviewed and completed by the office or provider. The refill is unable to be approved or denied by the medication management team.     03/17/2025 03/06/2025 Mixed Amphetamine Salts (Capsule, Extended Release) 30.0 30 20 MG NA [facility] [place] 0 / 0 [place]   1 [id number] 03/17/2025 02/11/2025 clonazePAM (Tablet) 90.0 30 0.5 MG NA [facility] [place] 1 / 1 [place]   1 [id number] 02/18/2025 02/11/2025 Mixed Amphetamine Salts (Capsule, Extended Release) 30.0 30 20 MG NA [facility] [place] 0 / 0 [place]

## 2025-04-18 ENCOUNTER — APPOINTMENT (OUTPATIENT)
Dept: LAB | Facility: HOSPITAL | Age: 35
End: 2025-04-18
Payer: COMMERCIAL

## 2025-04-18 ENCOUNTER — OFFICE VISIT (OUTPATIENT)
Dept: GYNECOLOGY | Facility: CLINIC | Age: 35
End: 2025-04-18
Payer: COMMERCIAL

## 2025-04-18 VITALS
BODY MASS INDEX: 47.09 KG/M2 | WEIGHT: 293 LBS | SYSTOLIC BLOOD PRESSURE: 130 MMHG | DIASTOLIC BLOOD PRESSURE: 84 MMHG | HEIGHT: 66 IN

## 2025-04-18 DIAGNOSIS — Z13.1 SCREENING FOR DIABETES MELLITUS (DM): ICD-10-CM

## 2025-04-18 DIAGNOSIS — Z11.59 NEED FOR HEPATITIS C SCREENING TEST: ICD-10-CM

## 2025-04-18 DIAGNOSIS — Z30.09 ENCOUNTER FOR COUNSELING REGARDING CONTRACEPTION: ICD-10-CM

## 2025-04-18 DIAGNOSIS — E78.2 MODERATE MIXED HYPERLIPIDEMIA NOT REQUIRING STATIN THERAPY: ICD-10-CM

## 2025-04-18 DIAGNOSIS — E89.0 POSTOPERATIVE HYPOTHYROIDISM: ICD-10-CM

## 2025-04-18 DIAGNOSIS — Z01.419 WOMEN'S ANNUAL ROUTINE GYNECOLOGICAL EXAMINATION: Primary | ICD-10-CM

## 2025-04-18 DIAGNOSIS — Z76.89 ENCOUNTER TO ESTABLISH CARE: ICD-10-CM

## 2025-04-18 LAB
ALBUMIN SERPL BCG-MCNC: 3.7 G/DL (ref 3.5–5)
ALP SERPL-CCNC: 49 U/L (ref 34–104)
ALT SERPL W P-5'-P-CCNC: 20 U/L (ref 7–52)
ANION GAP SERPL CALCULATED.3IONS-SCNC: 8 MMOL/L (ref 4–13)
AST SERPL W P-5'-P-CCNC: 24 U/L (ref 13–39)
BILIRUB SERPL-MCNC: 0.57 MG/DL (ref 0.2–1)
BUN SERPL-MCNC: 16 MG/DL (ref 5–25)
CALCIUM SERPL-MCNC: 8.4 MG/DL (ref 8.4–10.2)
CHLORIDE SERPL-SCNC: 103 MMOL/L (ref 96–108)
CHOLEST SERPL-MCNC: 237 MG/DL (ref ?–200)
CO2 SERPL-SCNC: 24 MMOL/L (ref 21–32)
CREAT SERPL-MCNC: 0.87 MG/DL (ref 0.6–1.3)
EST. AVERAGE GLUCOSE BLD GHB EST-MCNC: 128 MG/DL
GFR SERPL CREATININE-BSD FRML MDRD: 86 ML/MIN/1.73SQ M
GLUCOSE P FAST SERPL-MCNC: 96 MG/DL (ref 65–99)
HBA1C MFR BLD: 6.1 %
HCV AB SER QL: NORMAL
HDLC SERPL-MCNC: 72 MG/DL
LDLC SERPL CALC-MCNC: 156 MG/DL (ref 0–100)
POTASSIUM SERPL-SCNC: 3.8 MMOL/L (ref 3.5–5.3)
PROT SERPL-MCNC: 7.5 G/DL (ref 6.4–8.4)
SODIUM SERPL-SCNC: 135 MMOL/L (ref 135–147)
TRIGL SERPL-MCNC: 45 MG/DL (ref ?–150)
TSH SERPL DL<=0.05 MIU/L-ACNC: 1.38 UIU/ML (ref 0.45–4.5)

## 2025-04-18 PROCEDURE — 84443 ASSAY THYROID STIM HORMONE: CPT

## 2025-04-18 PROCEDURE — G0476 HPV COMBO ASSAY CA SCREEN: HCPCS | Performed by: OBSTETRICS & GYNECOLOGY

## 2025-04-18 PROCEDURE — 80053 COMPREHEN METABOLIC PANEL: CPT

## 2025-04-18 PROCEDURE — 83036 HEMOGLOBIN GLYCOSYLATED A1C: CPT

## 2025-04-18 PROCEDURE — 36415 COLL VENOUS BLD VENIPUNCTURE: CPT

## 2025-04-18 PROCEDURE — S0610 ANNUAL GYNECOLOGICAL EXAMINA: HCPCS | Performed by: OBSTETRICS & GYNECOLOGY

## 2025-04-18 PROCEDURE — 80061 LIPID PANEL: CPT

## 2025-04-18 PROCEDURE — G0145 SCR C/V CYTO,THINLAYER,RESCR: HCPCS | Performed by: OBSTETRICS & GYNECOLOGY

## 2025-04-18 PROCEDURE — 86803 HEPATITIS C AB TEST: CPT

## 2025-04-18 NOTE — PROGRESS NOTES
Assessment & Plan   Diagnoses and all orders for this visit:    Women's annual routine gynecological examination    Encounter for counseling regarding contraception  -     Ambulatory Referral to Obstetrics / Gynecology    1. new patient yearly exam-Pap smear done with HPV testing, self breast awareness reviewed  2. contraception-currently on Tess, has been on that for about 7 years time.  Prior to starting OCP, had normal cycles.  When she takes it regularly, she will have menstrual cycles at 28-day intervals lasting 7 days.  When she tries to proceed with continuous use, she will make it about 2.5 months and then have episode of prolonged bleeding Lasting up to a month to 1.5 months or so.  She is interested in considering other contraceptive options.  She was given the contraceptive options sheet.  She is interested in Mirena IUD.  She was counseled in detail about this device.  She was counseled in detail about the insertion procedure for IUD.  She was given Mirena IUD pamphlet.  She will return in the near future for this insertion.  She was counseled take ibuprofen and naproxen prior to the procedure.  3. history of thyroid cancer-status post thyroidectomy.  She is on 300 mcg of levothyroxine  4.  History of PTSD/bipolar/ADD/General Exam disorder-follow-up as per treating doctor  5.  Migraine headache-has not had severe headaches since .  She denies any neurologic or current visual changes at this time.  6.  Hyperlipidemia-recent cholesterol 235 with LDL of 152.  Follow-up as per primary doctor.  7.  Other- 0, does not plan to have children  Follow-up near future for Mirena IUD insertion or as needed.    Subjective   Patient ID: Janes Locke is a 34 y.o. female.    Vitals:    25 1316   BP: 130/84     Patient was seen today for new patient visit.  Please see assessment plan for details.      The following portions of the patient's history were reviewed and updated as appropriate:  allergies, current medications, past family history, past medical history, past social history, past surgical history, and problem list.  Past Medical History:   Diagnosis Date    Addiction to drug (HCC)     No    ADHD (attention deficit hyperactivity disorder)     Anxiety     Bipolar 1 disorder (HCC)     Cancer (HCC)     thyroid    Depression     Disease of thyroid gland     Hallucination     Yes    Impulse control disorder     Obesity     300lb    Pre-diabetes     Psychiatric illness     Psychosis (HCC)     Skin picking habit 08/18/2021    Sleep difficulties     Not at the moment but yes    Suicidal ideations     Thyroid disease      Past Surgical History:   Procedure Laterality Date    IR LUMBAR PUNCTURE  03/09/2023    NECK SURGERY Left     hygroma removal    US GUIDED THYROID BIOPSY      normal. Onset: 2012     OB History   No obstetric history on file.       Current Outpatient Medications:     albuterol (PROVENTIL HFA,VENTOLIN HFA) 90 mcg/act inhaler, Inhale 2 puffs every 4 - 6 hours as needed, Disp: , Rfl:     amphetamine-dextroamphetamine (ADDERALL XR, 30MG,) 30 MG 24 hr capsule, Take 1 capsule (30 mg total) by mouth every morning Note dose increase on 4/17/2025 Max Daily Amount: 30 mg Do not start before April 17, 2025., Disp: 30 capsule, Rfl: 0    clonazePAM (KlonoPIN) 0.5 mg tablet, Take 1 tablet (0.5 mg total) by mouth 3 (three) times a day, Disp: 270 tablet, Rfl: 0    ergocalciferol (VITAMIN D2) 50,000 units, Take 50,000 Units by mouth once a week, Disp: , Rfl:     FLUoxetine (PROzac) 40 MG capsule, Take 1 capsule (40 mg total) by mouth daily, Disp: 90 capsule, Rfl: 1    gabapentin (Neurontin) 600 MG tablet, Take 1.5 tablets (900 mg total) by mouth 3 (three) times a day, Disp: 405 tablet, Rfl: 1    levothyroxine 25 mcg tablet, Take 25 mcg by mouth daily, Disp: , Rfl:     levothyroxine 300 MCG tablet, Take 300 mcg by mouth daily, Disp: , Rfl:     Tess 0.25-35 MG-MCG per tablet, Take 1 tablet by mouth  daily, Disp: , Rfl:     OLANZapine (ZyPREXA) 10 mg tablet, Take 1 tablet (10 mg total) by mouth daily at bedtime, Disp: 90 tablet, Rfl: 1    prazosin (MINIPRESS) 2 mg capsule, Take 1 capsule (2 mg total) by mouth daily at bedtime, Disp: 30 capsule, Rfl: 3    traZODone (DESYREL) 150 mg tablet, Take 0.5 tablets (75 mg total) by mouth daily at bedtime, Disp: 45 tablet, Rfl: 1  No Known Allergies  Social History     Socioeconomic History    Marital status: Single     Spouse name: None    Number of children: None    Years of education: None    Highest education level: None   Occupational History    None   Tobacco Use    Smoking status: Former     Current packs/day: 1.00     Types: Cigarettes    Smokeless tobacco: Never   Vaping Use    Vaping status: Some Days    Substances: Nicotine, Flavoring   Substance and Sexual Activity    Alcohol use: Not Currently     Comment: social drinker    Drug use: No    Sexual activity: Not Currently     Partners: Male     Birth control/protection: Abstinence   Other Topics Concern    None   Social History Narrative    None     Social Drivers of Health     Financial Resource Strain: Not on file   Food Insecurity: Not on file   Transportation Needs: Not on file   Physical Activity: Not on file   Stress: Not on file   Social Connections: Unknown (2024)    Received from [facility]     How often do you feel lonely or isolated from those around you? (Adult - for ages 18 years and over): Not on file   Intimate Partner Violence: Not on file   Housing Stability: Not on file     Family History   Problem Relation Age of Onset    Bipolar disorder Mother         bpd    Alcohol abuse Paternal Uncle          from throat cancer    Depression Paternal Aunt        Review of Systems   Constitutional:  Negative for chills, diaphoresis, fatigue and fever.   Respiratory:  Negative for apnea, cough, chest tightness, shortness of breath and wheezing.    Cardiovascular:  Negative for  "chest pain, palpitations and leg swelling.   Gastrointestinal:  Negative for abdominal distention, abdominal pain, anal bleeding, constipation, diarrhea, nausea, rectal pain and vomiting.   Genitourinary:  Negative for difficulty urinating, dyspareunia, dysuria, frequency, hematuria, menstrual problem, pelvic pain, urgency, vaginal bleeding, vaginal discharge and vaginal pain.   Musculoskeletal:  Negative for arthralgias, back pain and myalgias.   Skin:  Negative for color change and rash.   Neurological:  Negative for dizziness, syncope, light-headedness, numbness and headaches.   Hematological:  Negative for adenopathy. Does not bruise/bleed easily.   Psychiatric/Behavioral:  Negative for dysphoric mood and sleep disturbance. The patient is not nervous/anxious.        Objective   Physical Exam  OBGyn Exam     Objective      /84 (BP Location: Left arm, Patient Position: Standing)   Ht 5' 6\" (1.676 m)   Wt (!) 138 kg (303 lb 6.4 oz)   LMP 04/11/2025   BMI 48.97 kg/m²     General:   alert and oriented, in no acute distress   Neck: normal to inspection and palpation   Breast: normal appearance, no masses or tenderness   Heart:    Lungs:    Abdomen: soft, non-tender, without masses or organomegaly   Vulva: normal   Vagina: Without erythema or lesions or discharge.  Normal   Cervix: Without lesions or discharge or cervicitis.  No Cervical motion tenderness. nabothian cyst noted at 12:00.   Uterus: top normal size, anteverted, non-tender   Adnexa: no mass, fullness, tenderness   Rectum: Deferred, patient declined    Psych:  Normal mood and affect   Skin:  Without obvious lesions   Eyes: symmetric, with normal movements and reactivity   Musculoskeletal:  Normal muscle tone and movements appreciated       "

## 2025-04-21 LAB
HPV HR 12 DNA CVX QL NAA+PROBE: NEGATIVE
HPV16 DNA CVX QL NAA+PROBE: NEGATIVE
HPV18 DNA CVX QL NAA+PROBE: NEGATIVE

## 2025-04-23 ENCOUNTER — RESULTS FOLLOW-UP (OUTPATIENT)
Dept: FAMILY MEDICINE CLINIC | Facility: CLINIC | Age: 35
End: 2025-04-23

## 2025-04-23 ENCOUNTER — RESULTS FOLLOW-UP (OUTPATIENT)
Dept: GYNECOLOGY | Facility: CLINIC | Age: 35
End: 2025-04-23

## 2025-04-23 LAB
LAB AP GYN PRIMARY INTERPRETATION: NORMAL
LAB AP LMP: NORMAL
Lab: NORMAL

## 2025-04-29 ENCOUNTER — HOSPITAL ENCOUNTER (OUTPATIENT)
Dept: NON INVASIVE DIAGNOSTICS | Facility: HOSPITAL | Age: 35
Discharge: HOME | End: 2025-04-29
Payer: COMMERCIAL

## 2025-04-29 VITALS
HEIGHT: 66 IN | SYSTOLIC BLOOD PRESSURE: 130 MMHG | BODY MASS INDEX: 47.09 KG/M2 | HEART RATE: 98 BPM | WEIGHT: 293 LBS | DIASTOLIC BLOOD PRESSURE: 84 MMHG

## 2025-04-29 DIAGNOSIS — R60.0 BILATERAL LEG EDEMA: ICD-10-CM

## 2025-04-29 PROCEDURE — 93306 TTE W/DOPPLER COMPLETE: CPT

## 2025-04-30 LAB
AORTIC ROOT: 3 CM
BSA FOR ECHO PROCEDURE: 2.39 M2
E WAVE DECELERATION TIME: 266 MS
E/A RATIO: 1.05
FRACTIONAL SHORTENING: 37 (ref 28–44)
INTERVENTRICULAR SEPTUM IN DIASTOLE (PARASTERNAL SHORT AXIS VIEW): 0.8 CM
INTERVENTRICULAR SEPTUM: 0.8 CM (ref 0.6–1.1)
LAAS-AP2: 23.9 CM2
LAAS-AP4: 25.8 CM2
LEFT ATRIUM SIZE: 4.6 CM
LEFT ATRIUM VOLUME (MOD BIPLANE): 86 ML
LEFT ATRIUM VOLUME INDEX (MOD BIPLANE): 36 ML/M2
LEFT INTERNAL DIMENSION IN SYSTOLE: 3.4 CM (ref 2.1–4)
LEFT VENTRICULAR INTERNAL DIMENSION IN DIASTOLE: 5.4 CM (ref 3.5–6)
LEFT VENTRICULAR POSTERIOR WALL IN END DIASTOLE: 0.9 CM
LEFT VENTRICULAR STROKE VOLUME: 94 ML
LV EF US.2D.A4C+ESTIMATED: 64 %
LVSV (TEICH): 94 ML
MV E'TISSUE VEL-SEP: 16 CM/S
MV PEAK A VEL: 0.86 M/S
MV PEAK E VEL: 90 CM/S
MV STENOSIS PRESSURE HALF TIME: 77 MS
MV VALVE AREA P 1/2 METHOD: 2.86
RA PRESSURE ESTIMATED: 3 MMHG
RIGHT ATRIUM AREA SYSTOLE A4C: 15.2 CM2
RIGHT VENTRICLE ID DIMENSION: 4.1 CM
SL CV LEFT ATRIUM LENGTH A2C: 5.7 CM
SL CV LV EF: 65
SL CV PED ECHO LEFT VENTRICLE DIASTOLIC VOLUME (MOD BIPLANE) 2D: 143 ML
SL CV PED ECHO LEFT VENTRICLE SYSTOLIC VOLUME (MOD BIPLANE) 2D: 48 ML
TRICUSPID ANNULAR PLANE SYSTOLIC EXCURSION: 2.7 CM

## 2025-05-01 ENCOUNTER — TELEMEDICINE (OUTPATIENT)
Dept: PSYCHIATRY | Facility: CLINIC | Age: 35
End: 2025-05-01
Payer: COMMERCIAL

## 2025-05-01 DIAGNOSIS — F31.9 BIPOLAR I DISORDER WITH MOOD-INCONGRUENT PSYCHOTIC FEATURES (HCC): Primary | ICD-10-CM

## 2025-05-01 DIAGNOSIS — F43.10 POST TRAUMATIC STRESS DISORDER (PTSD): ICD-10-CM

## 2025-05-01 DIAGNOSIS — F51.05 INSOMNIA DUE TO OTHER MENTAL DISORDER: ICD-10-CM

## 2025-05-01 DIAGNOSIS — F99 INSOMNIA DUE TO OTHER MENTAL DISORDER: ICD-10-CM

## 2025-05-01 DIAGNOSIS — F90.0 ATTENTION DEFICIT HYPERACTIVITY DISORDER, INATTENTIVE TYPE: ICD-10-CM

## 2025-05-01 DIAGNOSIS — F41.1 GAD (GENERALIZED ANXIETY DISORDER): ICD-10-CM

## 2025-05-01 PROCEDURE — 99214 OFFICE O/P EST MOD 30 MIN: CPT | Performed by: NURSE PRACTITIONER

## 2025-05-01 RX ORDER — TRAZODONE HYDROCHLORIDE 150 MG/1
75 TABLET ORAL
Qty: 45 TABLET | Refills: 3 | Status: SHIPPED | OUTPATIENT
Start: 2025-05-16

## 2025-05-01 RX ORDER — FLUOXETINE HYDROCHLORIDE 40 MG/1
40 CAPSULE ORAL DAILY
Qty: 30 CAPSULE | Refills: 3 | Status: SHIPPED | OUTPATIENT
Start: 2025-05-16

## 2025-05-01 RX ORDER — DEXTROAMPHETAMINE SACCHARATE, AMPHETAMINE ASPARTATE MONOHYDRATE, DEXTROAMPHETAMINE SULFATE AND AMPHETAMINE SULFATE 7.5; 7.5; 7.5; 7.5 MG/1; MG/1; MG/1; MG/1
30 CAPSULE, EXTENDED RELEASE ORAL EVERY MORNING
Qty: 30 CAPSULE | Refills: 0 | Status: SHIPPED | OUTPATIENT
Start: 2025-05-16

## 2025-05-01 RX ORDER — CLONAZEPAM 0.5 MG/1
0.5 TABLET ORAL 3 TIMES DAILY
Qty: 90 TABLET | Refills: 1 | Status: SHIPPED | OUTPATIENT
Start: 2025-05-16

## 2025-05-01 RX ORDER — GABAPENTIN 600 MG/1
900 TABLET ORAL 3 TIMES DAILY
Qty: 135 TABLET | Refills: 3 | Status: SHIPPED | OUTPATIENT
Start: 2025-05-16

## 2025-05-01 RX ORDER — PRAZOSIN HYDROCHLORIDE 1 MG/1
3 CAPSULE ORAL
Qty: 90 CAPSULE | Refills: 3 | Status: SHIPPED | OUTPATIENT
Start: 2025-05-01

## 2025-05-01 RX ORDER — OLANZAPINE 10 MG/1
10 TABLET, FILM COATED ORAL
Qty: 30 TABLET | Refills: 3 | Status: SHIPPED | OUTPATIENT
Start: 2025-05-01

## 2025-05-01 NOTE — ASSESSMENT & PLAN NOTE
Orders:    prazosin (MINIPRESS) 1 mg capsule; Take 3 capsules (3 mg total) by mouth daily at bedtime

## 2025-05-01 NOTE — ASSESSMENT & PLAN NOTE
Orders:    OLANZapine (ZyPREXA) 10 mg tablet; Take 1 tablet (10 mg total) by mouth daily at bedtime    FLUoxetine (PROzac) 40 MG capsule; Take 1 capsule (40 mg total) by mouth daily Do not start before May 16, 2025.    gabapentin (Neurontin) 600 MG tablet; Take 1.5 tablets (900 mg total) by mouth 3 (three) times a day Do not start before May 16, 2025.

## 2025-05-01 NOTE — ASSESSMENT & PLAN NOTE
Orders:    clonazePAM (KlonoPIN) 0.5 mg tablet; Take 1 tablet (0.5 mg total) by mouth 3 (three) times a day Do not start before May 16, 2025.    FLUoxetine (PROzac) 40 MG capsule; Take 1 capsule (40 mg total) by mouth daily Do not start before May 16, 2025.    gabapentin (Neurontin) 600 MG tablet; Take 1.5 tablets (900 mg total) by mouth 3 (three) times a day Do not start before May 16, 2025.

## 2025-05-01 NOTE — ASSESSMENT & PLAN NOTE
Orders:    amphetamine-dextroamphetamine (ADDERALL XR, 30MG,) 30 MG 24 hr capsule; Take 1 capsule (30 mg total) by mouth every morning Note dose increase on 4/17/2025 Max Daily Amount: 30 mg Do not start before May 16, 2025.

## 2025-05-01 NOTE — PSYCH
MEDICATION MANAGEMENT NOTE    Name: Janes Locke      : 1990      MRN: [id number]  Encounter Provider: GENESIS Lemons  Encounter Date: 2025   Encounter department: [facility]    Insurance: Payor: BLUE CROSS / Plan: MISC BLUE CROSS / Product Type: Blue Fee for Service /      Reason for Visit: No chief complaint on file.  :  Assessment & Plan  GOOD (generalized anxiety disorder)    Orders:    clonazePAM (KlonoPIN) 0.5 mg tablet; Take 1 tablet (0.5 mg total) by mouth 3 (three) times a day Do not start before May 16, 2025.    FLUoxetine (PROzac) 40 MG capsule; Take 1 capsule (40 mg total) by mouth daily Do not start before May 16, 2025.    gabapentin (Neurontin) 600 MG tablet; Take 1.5 tablets (900 mg total) by mouth 3 (three) times a day Do not start before May 16, 2025.    Post traumatic stress disorder (PTSD)    Orders:    prazosin (MINIPRESS) 1 mg capsule; Take 3 capsules (3 mg total) by mouth daily at bedtime    Attention deficit hyperactivity disorder, inattentive type    Orders:    amphetamine-dextroamphetamine (ADDERALL XR, 30MG,) 30 MG 24 hr capsule; Take 1 capsule (30 mg total) by mouth every morning Note dose increase on 2025 Max Daily Amount: 30 mg Do not start before May 16, 2025.    Bipolar I disorder with mood-incongruent psychotic features (HCC)    Orders:    OLANZapine (ZyPREXA) 10 mg tablet; Take 1 tablet (10 mg total) by mouth daily at bedtime    FLUoxetine (PROzac) 40 MG capsule; Take 1 capsule (40 mg total) by mouth daily Do not start before May 16, 2025.    gabapentin (Neurontin) 600 MG tablet; Take 1.5 tablets (900 mg total) by mouth 3 (three) times a day Do not start before May 16, 2025.    Insomnia due to other mental disorder    Orders:    traZODone (DESYREL) 150 mg tablet; Take 0.5 tablets (75 mg total) by mouth daily at bedtime Do not start before May 16, 2025.        Treatment Recommendations:    Educated about diagnosis and  treatment modalities. Verbalizes understanding and agreement with the treatment plan.  Discussed self monitoring of symptoms, and symptom monitoring tools.  Discussed medications and if treatment adjustment was needed or desired.  Medication management every 2 months  Aware of 24 hour and weekend coverage for urgent situations accessed by calling [facility] main practice number  Will start individual therapy with own therapist  I am scheduling this patient out for greater than 3 months: No    Medications Risks/Benefits:      Risks, Benefits And Possible Side Effects Of Medications:    Risks, benefits, and possible side effects of medications explained to Georgina and she (or legal representative) verbalizes understanding and agreement for treatment.    Controlled Medication Discussion:     Georgina has been filling controlled prescriptions on time as prescribed according to [place] Prescription Drug Monitoring Program.      History of Present Illness     CC: Georgina presents today for follow up on mood lability and symptoms of ADHD     Georgina she continues to see improvement in her concentration and focus and performance at work since increase of Adderall XR.  Mood has been relatively stable, she states, as long as she remembers to take her olanzapine.  Still struggling with guilt regarding setting boundaries with her family, and in particular with her mother.  She continues to work through this with her therapist.  Also worried about recent echocardiogram which showed possible aneurysm, she has follow-up scheduled with cardiology.  Overall, taking this recent health concerns pretty well.  Denying any suicidal thoughts or passive death wish.  Ongoing mood lability but somewhat improved.  No overt psychosis.  No symptoms of grey.    Med Compliance: yes    Since our last visit, overall symptoms have been fluctuating with recent improvement.       HPI ROS:     Medication Side Effects: None  Depression:  5 or 6/10 (10 worst)  Anxiety: 5 or 6/10 (10 worst)  Safety concerns (SI, HI, others): Denies suicidal thoughts or passive death wish.  Denies thoughts of harming others.  Sleep: Fluctuating  Energy: Fluctuating  Appetite: Fluctuating or increased  Weight Change: No significant change    Georgina denies any side effects from medications unless noted above.    Review Of Systems: A review of systems is obtained and is negative except for the pertinent positives listed in HPI/Subjective above.      Current Rating Scores:     Current PHQ-9   PHQ-2/9 Depression Screening             Areas of Improvement: reviewed in HPI/Subjective Section and reviewed in Assessment and Plan Section      Past Medical History:   Diagnosis Date    Addiction to drug (HCC)     No    ADHD (attention deficit hyperactivity disorder)     Anxiety     Bipolar 1 disorder (HCC)     Cancer (HCC)     thyroid    Depression     Disease of thyroid gland     Hallucination     Yes    Impulse control disorder     Obesity     300lb    Pre-diabetes     Psychiatric illness     Psychosis (HCC)     Skin picking habit 08/18/2021    Sleep difficulties     Not at the moment but yes    Suicidal ideations     Thyroid disease      Past Surgical History:   Procedure Laterality Date    IR LUMBAR PUNCTURE  03/09/2023    NECK SURGERY Left     hygroma removal    US GUIDED THYROID BIOPSY      normal. Onset: 2012     Allergies: No Known Allergies    Current Outpatient Medications   Medication Instructions    albuterol (PROVENTIL HFA,VENTOLIN HFA) 90 mcg/act inhaler 2 puffs    [START ON 5/16/2025] amphetamine-dextroamphetamine (ADDERALL XR, 30MG,) 30 MG 24 hr capsule 30 mg, Oral, Every morning, Note dose increase on 4/17/2025    [START ON 5/16/2025] clonazePAM (KLONOPIN) 0.5 mg, Oral, 3 times daily    ergocalciferol (VITAMIN D2) 50,000 Units, Weekly    [START ON 5/16/2025] FLUoxetine (PROZAC) 40 mg, Oral, Daily    [START ON 5/16/2025] gabapentin (NEURONTIN) 900 mg, Oral, 3 times  daily    levothyroxine 25 mcg, Daily    levothyroxine 300 mcg, Daily    Tess 0.25-35 MG-MCG per tablet 1 tablet, Daily    OLANZapine (ZYPREXA) 10 mg, Oral, Daily at bedtime    prazosin (MINIPRESS) 3 mg, Oral, Daily at bedtime    [START ON 2025] traZODone (DESYREL) 75 mg, Oral, Daily at bedtime        Substance Abuse History:    Tobacco, Alcohol and Drug Use History     Tobacco Use    Smoking status: Former     Current packs/day: 1.00     Types: Cigarettes    Smokeless tobacco: Never   Vaping Use    Vaping status: Some Days    Substances: Nicotine, Flavoring   Substance Use Topics    Alcohol use: Not Currently     Comment: social drinker    Drug use: No          Social History:    Social History     Socioeconomic History    Marital status: Single     Spouse name: Not on file    Number of children: Not on file    Years of education: Not on file    Highest education level: Not on file   Occupational History    Not on file   Other Topics Concern    Not on file   Social History Narrative    Not on file        Family Psychiatric History:     Family History   Problem Relation Age of Onset    Bipolar disorder Mother         bpd    Alcohol abuse Paternal Uncle          from throat cancer    Depression Paternal Aunt        Medical History Reviewed by provider this encounter:  Tobacco  Allergies  Meds  Problems  Med Hx  Surg Hx  Fam Hx          Objective   LMP 2025      Mental Status Evaluation:    Appearance age appropriate, casually dressed   Behavior cooperative, calm   Speech normal rate, normal volume, normal pitch, spontaneous   Mood anxious   Affect labile, increased in intensity at times   Thought Processes organized, goal directed   Thought Content no overt delusions   Perceptual Disturbances: no auditory hallucinations, no visual hallucinations   Abnormal Thoughts  Risk Potential Suicidal ideation - None  Homicidal ideation - None  Potential for aggression - No   Orientation oriented to person,  place, time/date, and situation   Memory recent and remote memory grossly intact   Consciousness alert and awake   Attention Span Concentration Span decreased attention span  decreased concentration   Intellect appears to be of average intelligence   Insight intact and improving   Judgement intact and improving   Muscle Strength and  Gait normal muscle strength and normal muscle tone, normal gait and normal balance   Motor activity no abnormal movements   Language no difficulty naming common objects, no difficulty repeating a phrase, no difficulty writing a sentence   Fund of Knowledge adequate knowledge of current events  adequate fund of knowledge regarding past history  adequate fund of knowledge regarding vocabulary        Laboratory Results: I have personally reviewed all pertinent laboratory/tests results        Suicide/Homicide Risk Assessment:    Risk of Harm to Self:  The following ratings are based on assessment at the time of the interview  Recent Specific Risk Factors include: diagnosis of mood disorder  Protective Factors: no current suicidal ideation, able to make plans for the future, access to mental health treatment, compliant with medications, compliant with mental health treatment  Based on today's assessment, Georgina presents the following risk of harm to self: minimal    Risk of Harm to Others:  The following ratings are based on assessment at the time of the interview  Recent Specific Risk Factors include: none  Based on today's assessment, Georgina presents the following risk of harm to others: none    The following interventions are recommended: Continue medication management. No other intervention changes indicated at this time.    Psychotherapy Provided:     Individual psychotherapy provided: Yes    Reassurance and supportive therapy provided.     Treatment Plan:    Completed and signed during the session: Not applicable - Treatment Plan not due at this session.    Goals: Progress towards  "Treatment Plan goals - Yes, progressing, as evidenced by subjective findings in HPI/Subjective Section and in Assessment and Plan Section    Depression Follow-up Plan Completed: Yes    Note Share:    This note was not shared with the patient due to this is a psychotherapy note    Administrative Statements   Administrative Statements   Encounter provider GENESIS Lemons    The Patient is located at Home and in the following state in which I hold an active license [place].    The patient was identified by name and date of birth. Janes Locke was informed that this is a telemedicine visit and that the visit is being conducted through the Epic Embedded platform. She agrees to proceed..  My office door was closed. No one else was in the room.  She acknowledged consent and understanding of privacy and security of the video platform. The patient has agreed to participate and understands they can discontinue the visit at any time.    I have spent a total time of 30 minutes in caring for this patient on the day of the visit/encounter including Counseling / Coordination of care, not including the time spent for establishing the audio/video connection.      Portions of the record may have been created with voice recognition software. Occasional wrong word or \"sound a like\" substitutions may have occurred due to the inherent limitations of voice recognition software. Read the chart carefully and recognize, using context, where substitutions have occurred.    GENESIS Lemons 05/01/25  "

## 2025-05-01 NOTE — ASSESSMENT & PLAN NOTE
Orders:    traZODone (DESYREL) 150 mg tablet; Take 0.5 tablets (75 mg total) by mouth daily at bedtime Do not start before May 16, 2025.

## 2025-05-02 ENCOUNTER — TELEMEDICINE (OUTPATIENT)
Dept: PSYCHIATRY | Facility: CLINIC | Age: 35
End: 2025-05-02
Payer: COMMERCIAL

## 2025-05-02 DIAGNOSIS — F90.0 ATTENTION DEFICIT HYPERACTIVITY DISORDER, INATTENTIVE TYPE: Primary | ICD-10-CM

## 2025-05-02 DIAGNOSIS — F43.10 POST TRAUMATIC STRESS DISORDER (PTSD): ICD-10-CM

## 2025-05-02 DIAGNOSIS — I25.3 ATRIAL SEPTAL ANEURYSM: Primary | ICD-10-CM

## 2025-05-02 DIAGNOSIS — F41.1 GAD (GENERALIZED ANXIETY DISORDER): ICD-10-CM

## 2025-05-02 PROCEDURE — 90837 PSYTX W PT 60 MINUTES: CPT | Performed by: SOCIAL WORKER

## 2025-05-02 NOTE — PROGRESS NOTES
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  Will place referral to cardiology for further evaluation and recommendation.

## 2025-05-02 NOTE — PSYCH
"    Behavioral Health Psychotherapy Progress Note    Psychotherapy Provided: Individual Psychotherapy     1. Attention deficit hyperactivity disorder, inattentive type        2. Post traumatic stress disorder (PTSD)        3. GOOD (generalized anxiety disorder)                                Goals addressed in session: Goal 1,2    DATA: JUDY met with Janes vuong.  Discussed issues she had at work.  \"It has been a long week.\"   It appears she has a difficult boss but she handles him well and does good at her job.  She was punch drunk from being so tired.  She let her FLMA run at and has not renewed it.  \"She doesn't want to.\"  She has not been taking off of work to need it.  She has been ruminating on things and having nightmares regarding her mother coming back to the states.  Discussed coping skills for this.        During this session, this clinician used the following therapeutic modalities: Client-centered Therapy, Cognitive Behavioral Therapy, Solution-Focused Therapy and Supportive Psychotherapy is diagnosed with a co-occurring substance use disorder, please indicate any changes in the frequency or amount of use: none  Stage of change for addressing substance use diagnoses: Action    ASSESSMENT:  Georgina Locke presents with a Euthymic/ normal mood.     her affect is Normal range and intensity, which is congruent, with her mood and the content of the session. The client has made progress on their goals.     Georgina Locke presents with a none risk of suicide, none risk of self-harm, and none risk of harm to others.    For any risk assessment that surpasses a \"low\" rating, a safety plan must be developed.    A safety plan was indicated: no  If yes, describe in detail     PLAN: Between sessions, Georgina Locke will address tx plan goals.. At the next session, the therapist will use Client-centered Therapy, Cognitive Behavioral Therapy, Solution-Focused Therapy and Supportive Psychotherapy to address tx " plan goals and issues that arise in between sessions..    Behavioral Health Treatment Plan and Discharge Planning: Georgina Locke is aware of and agrees to continue to work on their treatment plan. They have identified and are working toward their discharge goals. yes    Visit start and stop times:    05/02/25  Start Time: 1711    Virtual Regular Visit    Verification of patient location:    Patient is located at Home in the following state in which I hold an active license [place]      Assessment/Plan:    Problem List Items Addressed This Visit    None                                              Goals addressed in session: Goal 1          Reason for visit is   No chief complaint on file.       Encounter provider Billy Pablo    Provider located at [facility] [place]  [facility] [place]  [address]  [place] [address] [phone]      Recent Visits  No visits were found meeting these conditions.  Showing recent visits within past 7 days and meeting all other requirements  Today's Visits  Date Type Provider Dept   05/02/25 Telemedicine [facility]   Showing today's visits and meeting all other requirements  Future Appointments  No visits were found meeting these conditions.  Showing future appointments within next 150 days and meeting all other requirements       The patient was identified by name and date of birth. Janes Locke was informed that this is a telemedicine visit and that the visit is being conducted throughthe [employer] platform. She agrees to proceed..  My office door was closed. No one else was in the room.  She acknowledged consent and understanding of privacy and security of the video platform. The patient has agreed to participate and understands they can discontinue the visit at any time.    Patient is aware this is a billable service.     Subjective  Janes Locke is a 34 y.o. female   .      HPI     Past Medical History:   Diagnosis Date    Addiction to drug (HCC)     No    ADHD (attention deficit hyperactivity disorder)     Anxiety     Bipolar 1 disorder (HCC)     Cancer (HCC)     thyroid    Depression     Disease of thyroid gland     Hallucination     Yes    Impulse control disorder     Obesity     300lb    Pre-diabetes     Psychiatric illness     Psychosis (HCC)     Skin picking habit 08/18/2021    Sleep difficulties     Not at the moment but yes    Suicidal ideations     Thyroid disease        Past Surgical History:   Procedure Laterality Date    IR LUMBAR PUNCTURE  03/09/2023    NECK SURGERY Left     hygroma removal    US GUIDED THYROID BIOPSY      normal. Onset: 2012       Current Outpatient Medications   Medication Sig Dispense Refill    albuterol (PROVENTIL HFA,VENTOLIN HFA) 90 mcg/act inhaler Inhale 2 puffs every 4 - 6 hours as needed      [START ON 5/16/2025] amphetamine-dextroamphetamine (ADDERALL XR, 30MG,) 30 MG 24 hr capsule Take 1 capsule (30 mg total) by mouth every morning Note dose increase on 4/17/2025 Max Daily Amount: 30 mg Do not start before May 16, 2025. 30 capsule 0    [START ON 5/16/2025] clonazePAM (KlonoPIN) 0.5 mg tablet Take 1 tablet (0.5 mg total) by mouth 3 (three) times a day Do not start before May 16, 2025. 90 tablet 1    ergocalciferol (VITAMIN D2) 50,000 units Take 50,000 Units by mouth once a week      [START ON 5/16/2025] FLUoxetine (PROzac) 40 MG capsule Take 1 capsule (40 mg total) by mouth daily Do not start before May 16, 2025. 30 capsule 3    [START ON 5/16/2025] gabapentin (Neurontin) 600 MG tablet Take 1.5 tablets (900 mg total) by mouth 3 (three) times a day Do not start before May 16, 2025. 135 tablet 3    levothyroxine 25 mcg tablet Take 25 mcg by mouth daily      levothyroxine 300 MCG tablet Take 300 mcg by mouth daily      Tess 0.25-35 MG-MCG per tablet Take 1 tablet by mouth daily      OLANZapine (ZyPREXA) 10 mg tablet Take 1 tablet (10 mg  total) by mouth daily at bedtime 30 tablet 3    prazosin (MINIPRESS) 1 mg capsule Take 3 capsules (3 mg total) by mouth daily at bedtime 90 capsule 3    [START ON 5/16/2025] traZODone (DESYREL) 150 mg tablet Take 0.5 tablets (75 mg total) by mouth daily at bedtime Do not start before May 16, 2025. 45 tablet 3     No current facility-administered medications for this visit.        No Known Allergies    Review of Systems    Video Exam    There were no vitals filed for this visit.    Physical Exam

## 2025-05-13 ENCOUNTER — TELEPHONE (OUTPATIENT)
Dept: CARDIOLOGY CLINIC | Facility: CLINIC | Age: 35
End: 2025-05-13

## 2025-05-13 NOTE — TELEPHONE ENCOUNTER
Called pt nad spoke with her. Informed her that due to a change in the providers schedule we had to reschedule her appointment. Pt chose to go to [place] on 08/05/25 with Dr. Davidson.

## 2025-05-14 ENCOUNTER — TELEMEDICINE (OUTPATIENT)
Dept: PSYCHIATRY | Facility: CLINIC | Age: 35
End: 2025-05-14
Payer: COMMERCIAL

## 2025-05-14 DIAGNOSIS — F43.10 POST TRAUMATIC STRESS DISORDER (PTSD): ICD-10-CM

## 2025-05-14 DIAGNOSIS — F90.0 ATTENTION DEFICIT HYPERACTIVITY DISORDER, INATTENTIVE TYPE: Primary | ICD-10-CM

## 2025-05-14 DIAGNOSIS — F41.1 GAD (GENERALIZED ANXIETY DISORDER): ICD-10-CM

## 2025-05-14 DIAGNOSIS — F31.9 BIPOLAR I DISORDER WITH MOOD-INCONGRUENT PSYCHOTIC FEATURES (HCC): ICD-10-CM

## 2025-05-14 PROCEDURE — 90834 PSYTX W PT 45 MINUTES: CPT | Performed by: SOCIAL WORKER

## 2025-05-14 NOTE — PSYCH
"    Behavioral Health Psychotherapy Progress Note    Psychotherapy Provided: Individual Psychotherapy     1. Attention deficit hyperactivity disorder, inattentive type        2. Bipolar I disorder with mood-incongruent psychotic features (HCC)        3. GOOD (generalized anxiety disorder)        4. Post traumatic stress disorder (PTSD)                                  Goals addressed in session: Goal 1,2    DATA: JUDY met with Iveliss  session.  She is very tired and took her night time pills already.  She has to get up at 2:30 am yest, today and tomorrow for OT for work.  Other than being tired she is doing ok.  Others at her job have an issue and do not like her randy.  She likes him.   We ended session early due to her falling asleep.      During this session, this clinician used the following therapeutic modalities: Client-centered Therapy, Cognitive Behavioral Therapy, Solution-Focused Therapy and Supportive Psychotherapy is diagnosed with a co-occurring substance use disorder, please indicate any changes in the frequency or amount of use: none  Stage of change for addressing substance use diagnoses: Action    ASSESSMENT:  Georgina Locke presents with a Euthymic/ normal mood.     her affect is Normal range and intensity, which is congruent, with her mood and the content of the session. The client has made progress on their goals.     Georgina Locke presents with a none risk of suicide, none risk of self-harm, and none risk of harm to others.    For any risk assessment that surpasses a \"low\" rating, a safety plan must be developed.    A safety plan was indicated: no  If yes, describe in detail     PLAN: Between sessions, Georgina Locke will address tx plan goals.. At the next session, the therapist will use Client-centered Therapy, Cognitive Behavioral Therapy, Solution-Focused Therapy and Supportive Psychotherapy to address tx plan goals and issues that arise in between sessions..    Behavioral Health " Treatment Plan and Discharge Planning: Georgina Locke is aware of and agrees to continue to work on their treatment plan. They have identified and are working toward their discharge goals. yes    Visit start and stop times:    05/14/25  Start Time: 1712  Stop Time: 1752  Total Visit Time: 40 minutes    Virtual Regular Visit    Verification of patient location:    Patient is located at Home in the following state in which I hold an active license [place]      Assessment/Plan:    Problem List Items Addressed This Visit       Post traumatic stress disorder (PTSD)    Bipolar I disorder with mood-incongruent psychotic features (HCC)    OGOD (generalized anxiety disorder)    Attention deficit hyperactivity disorder, inattentive type - Primary                                               Goals addressed in session: Goal 1          Reason for visit is   No chief complaint on file.       Encounter provider Billy Pablo    Provider located at [facility] [place]  [facility] [place]  [address]  [place] [id number] [phone]      Recent Visits  No visits were found meeting these conditions.  Showing recent visits within past 7 days and meeting all other requirements  Today's Visits  Date Type Provider Dept   05/14/25 Telemedicine Billy [facility]   Showing today's visits and meeting all other requirements  Future Appointments  No visits were found meeting these conditions.  Showing future appointments within next 150 days and meeting all other requirements       The patient was identified by name and date of birth. Janes Locke was informed that this is a telemedicine visit and that the visit is being conducted throughthe [employer] platform. She agrees to proceed..  My office door was closed. No one else was in the room.  She acknowledged consent and understanding of privacy and security of the video platform. The patient has  agreed to participate and understands they can discontinue the visit at any time.    Patient is aware this is a billable service.     Subjective  Janes Locke is a 34 y.o. female  .      HPI     Past Medical History:   Diagnosis Date    Addiction to drug (HCC)     No    ADHD (attention deficit hyperactivity disorder)     Anxiety     Bipolar 1 disorder (HCC)     Cancer (HCC)     thyroid    Depression     Disease of thyroid gland     Hallucination     Yes    Impulse control disorder     Obesity     300lb    Pre-diabetes     Psychiatric illness     Psychosis (HCC)     Skin picking habit 08/18/2021    Sleep difficulties     Not at the moment but yes    Suicidal ideations     Thyroid disease        Past Surgical History:   Procedure Laterality Date    IR LUMBAR PUNCTURE  03/09/2023    NECK SURGERY Left     hygroma removal    US GUIDED THYROID BIOPSY      normal. Onset: 2012       Current Outpatient Medications   Medication Sig Dispense Refill    albuterol (PROVENTIL HFA,VENTOLIN HFA) 90 mcg/act inhaler Inhale 2 puffs every 4 - 6 hours as needed      [START ON 5/16/2025] amphetamine-dextroamphetamine (ADDERALL XR, 30MG,) 30 MG 24 hr capsule Take 1 capsule (30 mg total) by mouth every morning Note dose increase on 4/17/2025 Max Daily Amount: 30 mg Do not start before May 16, 2025. 30 capsule 0    [START ON 5/16/2025] clonazePAM (KlonoPIN) 0.5 mg tablet Take 1 tablet (0.5 mg total) by mouth 3 (three) times a day Do not start before May 16, 2025. 90 tablet 1    ergocalciferol (VITAMIN D2) 50,000 units Take 50,000 Units by mouth once a week      [START ON 5/16/2025] FLUoxetine (PROzac) 40 MG capsule Take 1 capsule (40 mg total) by mouth daily Do not start before May 16, 2025. 30 capsule 3    [START ON 5/16/2025] gabapentin (Neurontin) 600 MG tablet Take 1.5 tablets (900 mg total) by mouth 3 (three) times a day Do not start before May 16, 2025. 135 tablet 3    levothyroxine 25 mcg tablet Take 25 mcg by mouth daily       levothyroxine 300 MCG tablet Take 300 mcg by mouth daily      Tess 0.25-35 MG-MCG per tablet Take 1 tablet by mouth daily      OLANZapine (ZyPREXA) 10 mg tablet Take 1 tablet (10 mg total) by mouth daily at bedtime 30 tablet 3    prazosin (MINIPRESS) 1 mg capsule Take 3 capsules (3 mg total) by mouth daily at bedtime 90 capsule 3    [START ON 5/16/2025] traZODone (DESYREL) 150 mg tablet Take 0.5 tablets (75 mg total) by mouth daily at bedtime Do not start before May 16, 2025. 45 tablet 3     No current facility-administered medications for this visit.        No Known Allergies    Review of Systems    Video Exam    There were no vitals filed for this visit.    Physical Exam

## 2025-05-27 ENCOUNTER — TELEMEDICINE (OUTPATIENT)
Dept: PSYCHIATRY | Facility: CLINIC | Age: 35
End: 2025-05-27
Payer: COMMERCIAL

## 2025-05-27 DIAGNOSIS — F90.0 ATTENTION DEFICIT HYPERACTIVITY DISORDER, INATTENTIVE TYPE: Primary | ICD-10-CM

## 2025-05-27 DIAGNOSIS — F43.10 POST TRAUMATIC STRESS DISORDER (PTSD): ICD-10-CM

## 2025-05-27 DIAGNOSIS — F41.1 GAD (GENERALIZED ANXIETY DISORDER): ICD-10-CM

## 2025-05-27 PROCEDURE — 90837 PSYTX W PT 60 MINUTES: CPT | Performed by: SOCIAL WORKER

## 2025-05-27 NOTE — PSYCH
"    Behavioral Health Psychotherapy Progress Note    Psychotherapy Provided: Individual Psychotherapy     1. Attention deficit hyperactivity disorder, inattentive type        2. GOOD (generalized anxiety disorder)        3. Post traumatic stress disorder (PTSD)                        Goals addressed in session: Goal 1,2    DATA: JUDY met with Janes vuong.  She is not going to go back to college due to the debt she would incur.  She began drawing again but \"doesn't have the time to do it.\"  She takes her night time meds very early out of fear of not getting up in the morning.   She has to be up at 3:45 am.  She takes her meds between 3-5 pm.  Discussed taking them at 7:00 to be in bed between 7-8p.  Her PCP told her, \"she was on a lot of sedating meds.\"  She takes Zyprexa, Klonopin, and Trazodone.  Discussed does she need the Trazodone to sleep?  \"She doesn't think so.\"   She doesn't see her Dr until 7/1.  Per pts, request MSW messaged her Dr regarding the Trazodone, tiredness, and lack of time to do things due to the meds.  She took her meds \"late today at 5:00 pm.\"  She was falling asleep at the end of session.      During this session, this clinician used the following therapeutic modalities: Client-centered Therapy, Cognitive Behavioral Therapy, Solution-Focused Therapy and Supportive Psychotherapy is diagnosed with a co-occurring substance use disorder, please indicate any changes in the frequency or amount of use: none  Stage of change for addressing substance use diagnoses: Action    ASSESSMENT:  Georgina Locke presents with a Euthymic/ normal mood.     her affect is Normal range and intensity, which is congruent, with her mood and the content of the session. The client has made progress on their goals.     Georgina Locke presents with a none risk of suicide, none risk of self-harm, and none risk of harm to others.    For any risk assessment that surpasses a \"low\" rating, a safety plan must be " developed.    A safety plan was indicated: no  If yes, describe in detail     PLAN: Between sessions, Georgina Locke will address tx plan goals.. At the next session, the therapist will use Client-centered Therapy, Cognitive Behavioral Therapy, Solution-Focused Therapy and Supportive Psychotherapy to address tx plan goals and issues that arise in between sessions..    Behavioral Health Treatment Plan and Discharge Planning: Georgina Locke is aware of and agrees to continue to work on their treatment plan. They have identified and are working toward their discharge goals. yes    Visit start and stop times:    05/27/25  Start Time: 1711  Stop Time: 1805  Total Visit Time: 54 minutes    Virtual Regular Visit    Verification of patient location:    Patient is located at Home in the following state in which I hold an active license [place]      Assessment/Plan:    Problem List Items Addressed This Visit       Post traumatic stress disorder (PTSD)    GOOD (generalized anxiety disorder)    Attention deficit hyperactivity disorder, inattentive type - Primary                                                 Goals addressed in session: Goal 1          Reason for visit is   No chief complaint on file.       Encounter provider Billy Pablo    Provider located at [facility] [place]  [facility] [place]  [address]  [place] [address] [phone]      Recent Visits  No visits were found meeting these conditions.  Showing recent visits within past 7 days and meeting all other requirements  Today's Visits  Date Type Provider Dept   05/27/25 Telemedicine Billy Pablo [facility]   Showing today's visits and meeting all other requirements  Future Appointments  No visits were found meeting these conditions.  Showing future appointments within next 150 days and meeting all other requirements       The patient was identified by name and date of birth.  Janes Locke was informed that this is a telemedicine visit and that the visit is being conducted throughthe [employer] platform. She agrees to proceed..  My office door was closed. No one else was in the room.  She acknowledged consent and understanding of privacy and security of the video platform. The patient has agreed to participate and understands they can discontinue the visit at any time.    Patient is aware this is a billable service.     Subjective  Janes Locke is a 34 y.o. female  .      HPI     Past Medical History:   Diagnosis Date    Addiction to drug (HCC)     No    ADHD (attention deficit hyperactivity disorder)     Anxiety     Bipolar 1 disorder (HCC)     Cancer (HCC)     thyroid    Depression     Disease of thyroid gland     Hallucination     Yes    Impulse control disorder     Obesity     300lb    Pre-diabetes     Psychiatric illness     Psychosis (HCC)     Skin picking habit 08/18/2021    Sleep difficulties     Not at the moment but yes    Suicidal ideations     Thyroid disease        Past Surgical History:   Procedure Laterality Date    IR LUMBAR PUNCTURE  03/09/2023    NECK SURGERY Left     hygroma removal    US GUIDED THYROID BIOPSY      normal. Onset: 2012       Current Outpatient Medications   Medication Sig Dispense Refill    albuterol (PROVENTIL HFA,VENTOLIN HFA) 90 mcg/act inhaler Inhale 2 puffs every 4 - 6 hours as needed      amphetamine-dextroamphetamine (ADDERALL XR, 30MG,) 30 MG 24 hr capsule Take 1 capsule (30 mg total) by mouth every morning Note dose increase on 4/17/2025 Max Daily Amount: 30 mg Do not start before May 16, 2025. 30 capsule 0    clonazePAM (KlonoPIN) 0.5 mg tablet Take 1 tablet (0.5 mg total) by mouth 3 (three) times a day Do not start before May 16, 2025. 90 tablet 1    ergocalciferol (VITAMIN D2) 50,000 units Take 50,000 Units by mouth once a week      FLUoxetine (PROzac) 40 MG capsule Take 1 capsule (40 mg total) by mouth daily Do not start before  May 16, 2025. 30 capsule 3    gabapentin (Neurontin) 600 MG tablet Take 1.5 tablets (900 mg total) by mouth 3 (three) times a day Do not start before May 16, 2025. 135 tablet 3    levothyroxine 25 mcg tablet Take 25 mcg by mouth daily      levothyroxine 300 MCG tablet Take 300 mcg by mouth daily      Tess 0.25-35 MG-MCG per tablet Take 1 tablet by mouth daily      OLANZapine (ZyPREXA) 10 mg tablet Take 1 tablet (10 mg total) by mouth daily at bedtime 30 tablet 3    prazosin (MINIPRESS) 1 mg capsule Take 3 capsules (3 mg total) by mouth daily at bedtime 90 capsule 3    traZODone (DESYREL) 150 mg tablet Take 0.5 tablets (75 mg total) by mouth daily at bedtime Do not start before May 16, 2025. 45 tablet 3     No current facility-administered medications for this visit.        No Known Allergies    Review of Systems    Video Exam    There were no vitals filed for this visit.    Physical Exam

## 2025-05-28 DIAGNOSIS — F90.0 ATTENTION DEFICIT HYPERACTIVITY DISORDER, INATTENTIVE TYPE: ICD-10-CM

## 2025-05-28 NOTE — TELEPHONE ENCOUNTER
Reason for call:   [x] Refill   [] Prior Auth  [] Other:     Office:   [] PCP/Provider -   [x] Specialty/Provider -     Medication: amphetamine-dextroamphetamine (ADDERALL XR, 30MG,) 30 MG 24 hr capsule     Dose/Frequency: Take 1 capsule (30 mg total) by mouth every morning     Quantity: 30    Pharmacy: [facility]   Does the patient have enough for 3 days?   [x] Yes   [] No - Send as HP to POD

## 2025-05-29 NOTE — TELEPHONE ENCOUNTER
05/16/2025 05/01/2025 Mixed Amphetamine Salts (Capsule, Extended Release) 30.0 30 30 MG NA AFSANEH LUONG [facility] [place] / 0 [place]   1 [id number] ** 05/12/2025 05/01/2025 clonazePAM (Tablet) 90.0 30 0.5 MG NA AFSANEH PERKINSE [facility] Commercial [place] 0 / 1 [place]   1 [id number] ** 04/18/2025 04/17/2025 clonazePAM (Tablet) 90.0 30 0.5 MG NA DANIEL GALICIA [facility] [place] / 0 [place]   1 [id number] ** 04/17/2025 03/20/2025 Amphetamines (Capsule, Extended Release) 30.0 30 30 MG NA AFSANEH LUONG [facility] [place] / 0 [place]   1 [id number] ** 03/17/2025 03/06/2025 Mixed Amphetamine Salts (Capsule, Extended Release) 30.0 30 20 MG NA AFSANEH LUONG [facility] [place] / 0 [place]   1 [id number] ** 03/17/2025 02/11/2025 clonazePAM (Tablet) 90.0 30 0.5 MG NA AFSANEH LUONG [facility] Commercial [place] 1 / 1 [place]   1 [id number] ** 02/18/2025 02/11/2025 Mixed Amphetamine Salts (Capsule, Extended Release) 30.0 30 20 MG NA AFSANEH LUONG [facility] Commercial [place] / 0 [place]   1 [id number] ** 02/18/2025 02/11/2025 Mixed Amphetamine Salts (Capsule, E

## 2025-05-30 NOTE — TELEPHONE ENCOUNTER
Patient called to request a refill for their Adderall 30 mg advised a refill was requested on 5/28/25 and is pending approval. Patient verbalized understanding and is in agreement.     Does the patient have enough for 3 days?   [x] Yes   [] No - Send as HP to POD

## 2025-06-02 RX ORDER — DEXTROAMPHETAMINE SACCHARATE, AMPHETAMINE ASPARTATE MONOHYDRATE, DEXTROAMPHETAMINE SULFATE AND AMPHETAMINE SULFATE 7.5; 7.5; 7.5; 7.5 MG/1; MG/1; MG/1; MG/1
30 CAPSULE, EXTENDED RELEASE ORAL EVERY MORNING
Qty: 30 CAPSULE | Refills: 0 | Status: SHIPPED | OUTPATIENT
Start: 2025-06-13

## 2025-06-13 ENCOUNTER — TELEPHONE (OUTPATIENT)
Dept: PSYCHIATRY | Facility: CLINIC | Age: 35
End: 2025-06-13

## 2025-06-16 ENCOUNTER — TELEPHONE (OUTPATIENT)
Dept: PSYCHIATRY | Facility: CLINIC | Age: 35
End: 2025-06-16

## 2025-06-16 ENCOUNTER — TELEPHONE (OUTPATIENT)
Age: 35
End: 2025-06-16

## 2025-06-16 NOTE — TELEPHONE ENCOUNTER
Pt called in seeking to schedule an appt with Billy freeman this week if possible. Writer was able to warm transfer to therapy anywhere line for assistance.

## 2025-06-17 NOTE — TELEPHONE ENCOUNTER
Patient looking to schedule f/u with therapist.    Please contact patient @     [phone] ([email or web address])

## 2025-06-18 NOTE — TELEPHONE ENCOUNTER
When the call was transferred on 6/16, I notified the patient that there was nothing available until her next F/U due to the time she needed.

## 2025-06-20 ENCOUNTER — PROCEDURE VISIT (OUTPATIENT)
Dept: GYNECOLOGY | Facility: CLINIC | Age: 35
End: 2025-06-20
Payer: COMMERCIAL

## 2025-06-20 ENCOUNTER — TELEMEDICINE (OUTPATIENT)
Dept: PSYCHIATRY | Facility: CLINIC | Age: 35
End: 2025-06-20
Payer: COMMERCIAL

## 2025-06-20 VITALS — BODY MASS INDEX: 48.81 KG/M2 | SYSTOLIC BLOOD PRESSURE: 118 MMHG | WEIGHT: 293 LBS | DIASTOLIC BLOOD PRESSURE: 70 MMHG

## 2025-06-20 DIAGNOSIS — Z30.430 ENCOUNTER FOR INSERTION OF MIRENA IUD: ICD-10-CM

## 2025-06-20 DIAGNOSIS — Z01.818 PREOPERATIVE TESTING: Primary | ICD-10-CM

## 2025-06-20 DIAGNOSIS — F90.0 ATTENTION DEFICIT HYPERACTIVITY DISORDER, INATTENTIVE TYPE: Primary | ICD-10-CM

## 2025-06-20 DIAGNOSIS — F43.10 POST TRAUMATIC STRESS DISORDER (PTSD): ICD-10-CM

## 2025-06-20 PROBLEM — Z97.5 IUD (INTRAUTERINE DEVICE) IN PLACE: Status: ACTIVE | Noted: 2025-06-20

## 2025-06-20 LAB — SL AMB POCT URINE HCG: NEGATIVE

## 2025-06-20 PROCEDURE — 90832 PSYTX W PT 30 MINUTES: CPT | Performed by: SOCIAL WORKER

## 2025-06-20 PROCEDURE — 81025 URINE PREGNANCY TEST: CPT | Performed by: OBSTETRICS & GYNECOLOGY

## 2025-06-20 PROCEDURE — 58300 INSERT INTRAUTERINE DEVICE: CPT | Performed by: OBSTETRICS & GYNECOLOGY

## 2025-06-20 NOTE — PSYCH
"    Behavioral Health Psychotherapy Progress Note    Psychotherapy Provided: Individual Psychotherapy     1. Attention deficit hyperactivity disorder, inattentive type        2. Post traumatic stress disorder (PTSD)                        Goals addressed in session: Goal 1,2    DATA: JUDY met with Janes vuong.  Very stressed about work.  She got more added to her position.  Broke up with her girlfriend who lives with her.    Discussed the reasons why, does she want to be with her, the nature of their relationship.  It appears the nature of their relationship is they are best friends.  Discussed being honest with her friend.  She took today off of work because it is her birthday and she needed a day off.  She is taking time when she needs it.  She had to end session early because she had another scheduled appt to attend.  She wanted a session and this time was the only time JUDY had.    During this session, this clinician used the following therapeutic modalities: Client-centered Therapy, Cognitive Behavioral Therapy, Solution-Focused Therapy and Supportive Psychotherapy is diagnosed with a co-occurring substance use disorder, please indicate any changes in the frequency or amount of use: none  Stage of change for addressing substance use diagnoses: Action    ASSESSMENT:  Georgina Locke presents with a Euthymic/ normal mood.     her affect is Normal range and intensity, which is congruent, with her mood and the content of the session. The client has made progress on their goals.     Georgina Locke presents with a none risk of suicide, none risk of self-harm, and none risk of harm to others.    For any risk assessment that surpasses a \"low\" rating, a safety plan must be developed.    A safety plan was indicated: no  If yes, describe in detail     PLAN: Between sessions, Georgina Locke will address tx plan goals.. At the next session, the therapist will use Client-centered Therapy, Cognitive Behavioral " Therapy, Solution-Focused Therapy and Supportive Psychotherapy to address tx plan goals and issues that arise in between sessions..    Behavioral Health Treatment Plan and Discharge Planning: Georgina Locke is aware of and agrees to continue to work on their treatment plan. They have identified and are working toward their discharge goals. yes    Visit start and stop times:    06/20/25  Start Time: 1211  Stop Time: 1243  Total Visit Time: 32 minutes    Virtual Regular Visit    Verification of patient location:    Patient is located at Home in the following state in which I hold an active license [place]      Assessment/Plan:    Problem List Items Addressed This Visit       Post traumatic stress disorder (PTSD)    Attention deficit hyperactivity disorder, inattentive type - Primary                                                 Goals addressed in session: Goal 1          Reason for visit is   No chief complaint on file.       Encounter provider Billy Pablo    Provider located at [facility] [place]  [facility] [place]  [address]  [place] [id number] [phone]      Recent Visits  Date Type Provider Dept   06/16/25 Telephone Billy Pablo [facility]   06/13/25 Telephone Billy Pablo [facility]   Showing recent visits within past 7 days and meeting all other requirements  Today's Visits  Date Type Provider Dept   06/20/25 Telemedicine Billy Pablo [facility]   Showing today's visits and meeting all other requirements  Future Appointments  No visits were found meeting these conditions.  Showing future appointments within next 150 days and meeting all other requirements       The patient was identified by name and date of birth. Janes Locke was informed that this is a telemedicine visit and that the visit is being conducted throughthe [employer] platform. She agrees to proceed..  My  office door was closed. No one else was in the room.  She acknowledged consent and understanding of privacy and security of the video platform. The patient has agreed to participate and understands they can discontinue the visit at any time.    Patient is aware this is a billable service.     Subjective  Janes Locke is a 35 y.o. female  .      HPI     Past Medical History:   Diagnosis Date    Addiction to drug (HCC)     No    ADHD (attention deficit hyperactivity disorder)     Anxiety     Bipolar 1 disorder (HCC)     Cancer (HCC)     thyroid    Depression     Disease of thyroid gland     Hallucination     Yes    Impulse control disorder     Obesity     300lb    Pre-diabetes     Psychiatric illness     Psychosis (HCC)     Skin picking habit 08/18/2021    Sleep difficulties     Not at the moment but yes    Suicidal ideations     Thyroid disease        Past Surgical History:   Procedure Laterality Date    IR LUMBAR PUNCTURE  03/09/2023    NECK SURGERY Left     hygroma removal    US GUIDED THYROID BIOPSY      normal. Onset: 2012       Current Outpatient Medications   Medication Sig Dispense Refill    albuterol (PROVENTIL HFA,VENTOLIN HFA) 90 mcg/act inhaler Inhale 2 puffs every 4 - 6 hours as needed      amphetamine-dextroamphetamine (ADDERALL XR, 30MG,) 30 MG 24 hr capsule Take 1 capsule (30 mg total) by mouth every morning Max Daily Amount: 30 mg Do not start before June 13, 2025. 30 capsule 0    clonazePAM (KlonoPIN) 0.5 mg tablet Take 1 tablet (0.5 mg total) by mouth 3 (three) times a day Do not start before May 16, 2025. 90 tablet 1    ergocalciferol (VITAMIN D2) 50,000 units Take 50,000 Units by mouth once a week      FLUoxetine (PROzac) 40 MG capsule Take 1 capsule (40 mg total) by mouth daily Do not start before May 16, 2025. 30 capsule 3    gabapentin (Neurontin) 600 MG tablet Take 1.5 tablets (900 mg total) by mouth 3 (three) times a day Do not start before May 16, 2025. 135 tablet 3    levothyroxine  25 mcg tablet Take 25 mcg by mouth daily      levothyroxine 300 MCG tablet Take 300 mcg by mouth daily      Tess 0.25-35 MG-MCG per tablet Take 1 tablet by mouth daily      OLANZapine (ZyPREXA) 10 mg tablet Take 1 tablet (10 mg total) by mouth daily at bedtime 30 tablet 3    prazosin (MINIPRESS) 1 mg capsule Take 3 capsules (3 mg total) by mouth daily at bedtime 90 capsule 3    traZODone (DESYREL) 150 mg tablet Take 0.5 tablets (75 mg total) by mouth daily at bedtime Do not start before May 16, 2025. 45 tablet 3     No current facility-administered medications for this visit.        No Known Allergies    Review of Systems    Video Exam    There were no vitals filed for this visit.    Physical Exam

## 2025-06-20 NOTE — PROGRESS NOTES
IUD Procedure    Date/Time: 6/20/2025 1:16 PM    Performed by: Gutierrez Covarrubias MD  Authorized by: Gutierrez Covarrubias MD    Other Assisting Provider: No    Verbal consent obtained?: Yes    Written consent obtained?: Yes    Risks and benefits: Risks, benefits and alternatives were discussed    Consent given by:  Patient  Time Out:     Time out: Immediately prior to the procedure a time out was called      Time out performed at:  6/20/2025 1:16 PM  Patient states understanding of procedure being performed: Yes    Patient's understanding of procedure matches consent: Yes    Procedure consent matches procedure scheduled: Yes    Relevant documents present and verified: Yes    Test results available and properly labeled: Yes    Patient identity confirmed:  Verbally with patient  Select procedure: IUD insertion    IUD Insertion:     Pelvic exam performed: yes      Negative urine pregnancy test: yes      Cervix cleaned and prepped: yes      Speculum placed in vagina: yes      Tenaculum applied to cervix: yes      IUD inserted with no complications: yes      Strings trimmed: yes      Uterus sounded: yes      Uterus sound depth (cm):  6.5    IUD type:  1 each Levonorgestrel 20 MCG/DAY  Post-procedure:     Patient tolerated procedure well: yes      Patient will follow up after next period: yes    Insertion Comments:      Speculum placed.  Cervix cleansed with Hibiclens followed by HurriCaine spray.  Tenaculum placed to the anterior cervix.  Uterus sounded to 6.5 cm.  Mirena IUD was inserted without difficulty.  IUD string cut to a length of 2.5 to 3 cm.  Patient tolerated procedure well.  Light bleeding noted at tenaculum sites, Monsel solution applied with excellent hemostasis.

## 2025-06-20 NOTE — PROGRESS NOTES
Assessment & Plan   Diagnoses and all orders for this visit:    Encounter for insertion of Mirena IUD  -     IUD Procedure    1.  Mirena IUD insertion-done without difficulty.  Patient tolerated the procedure well.  IUD string cut to a length of 2.5 cm.  Pelvic rest recommended for the next 5 to 7 days.  Counseled to call if any severely heavy bleeding, fevers, severe pain, orthostatic symptoms.  Counseled about small chance of IUD expulsion, rare risk of IUD perforation.  The follow-up 1 month time for IUD check.  2. previous contraception-Tess which she has been taking continuously and can get up to 2.5 months then has prolonged bleeding for a month or more.  3.  History of thyroid cancer-status post thyroidectomy, on 300 mcg of levothyroxine  4.  History of PTSD/bipolar/ADHD/general anxiety disorder  5.  History of migraine headaches  6.  History of hyperlipidemia  7.  Other- 0, does not plan to have children ever.    8.  Elevated BMI  Follow-up 1 month for IUD check or as needed.      Subjective   Patient ID: Janes Locke is a 35 y.o. female.    Vitals:    25 1305   BP: 118/70     Patient was seen today for Mirena IUD insertion.        The following portions of the patient's history were reviewed and updated as appropriate: allergies, current medications, past family history, past medical history, past social history, past surgical history, and problem list.  Past Medical History[1]  Past Surgical History[2]  OB History    Para Term  AB Living   0 0 0 0 0 0   SAB IAB Ectopic Multiple Live Births   0 0 0 0 0     Current Medications[3]  Allergies[4]  Social History[5]  Family History[6]    Review of Systems   Constitutional:  Negative for chills, diaphoresis, fatigue and fever.   Respiratory:  Negative for apnea, cough, chest tightness, shortness of breath and wheezing.    Cardiovascular:  Negative for chest pain, palpitations and leg swelling.   Gastrointestinal:  Negative for  abdominal distention, abdominal pain, anal bleeding, constipation, diarrhea, nausea, rectal pain and vomiting.   Genitourinary:  Negative for difficulty urinating, dyspareunia, dysuria, frequency, hematuria, menstrual problem, pelvic pain, urgency, vaginal bleeding, vaginal discharge and vaginal pain.   Musculoskeletal:  Negative for arthralgias, back pain and myalgias.   Skin:  Negative for color change and rash.   Neurological:  Negative for dizziness, syncope, light-headedness, numbness and headaches.   Hematological:  Negative for adenopathy. Does not bruise/bleed easily.   Psychiatric/Behavioral:  Negative for dysphoric mood and sleep disturbance. The patient is not nervous/anxious.        Objective   Physical Exam  OBGyn Exam     Objective      /70 (BP Location: Left arm, Patient Position: Sitting)   Wt (!) 137 kg (302 lb 6.4 oz)   LMP 06/08/2025   BMI 48.81 kg/m²     General:   alert and oriented, in no acute distress   Neck:    Breast:    Heart:    Lungs:    Abdomen: soft, non-tender, without masses or organomegaly   Vulva: normal   Vagina: Without erythema or lesions or discharge.  Normal   Cervix: Without lesions or discharge or cervicitis.  No Cervical motion tenderness   Uterus: normal size, anteverted, non-tender   Adnexa: no mass, fullness, tenderness   Rectum: deferred    Psych:  Normal mood and affect   Skin:  Without obvious lesions   Eyes: symmetric, with normal movements and reactivity   Musculoskeletal:  Normal muscle tone and movements appreciated              [1]   Past Medical History:  Diagnosis Date    Addiction to drug (HCC)     No    ADHD (attention deficit hyperactivity disorder)     Anxiety     Bipolar 1 disorder (HCC)     Cancer (HCC)     thyroid    Depression     Disease of thyroid gland     Hallucination     Yes    Impulse control disorder     Obesity     300lb    Pre-diabetes     Psychiatric illness     Psychosis (HCC)     Skin picking habit 08/18/2021    Sleep difficulties      Not at the moment but yes    Suicidal ideations     Thyroid disease    [2]   Past Surgical History:  Procedure Laterality Date    IR LUMBAR PUNCTURE  03/09/2023    NECK SURGERY Left     hygroma removal    US GUIDED THYROID BIOPSY      normal. Onset: 2012   [3]   Current Outpatient Medications:     albuterol (PROVENTIL HFA,VENTOLIN HFA) 90 mcg/act inhaler, Inhale 2 puffs every 4 - 6 hours as needed, Disp: , Rfl:     amphetamine-dextroamphetamine (ADDERALL XR, 30MG,) 30 MG 24 hr capsule, Take 1 capsule (30 mg total) by mouth every morning Max Daily Amount: 30 mg Do not start before June 13, 2025., Disp: 30 capsule, Rfl: 0    clonazePAM (KlonoPIN) 0.5 mg tablet, Take 1 tablet (0.5 mg total) by mouth 3 (three) times a day Do not start before May 16, 2025., Disp: 90 tablet, Rfl: 1    ergocalciferol (VITAMIN D2) 50,000 units, Take 50,000 Units by mouth once a week, Disp: , Rfl:     FLUoxetine (PROzac) 40 MG capsule, Take 1 capsule (40 mg total) by mouth daily Do not start before May 16, 2025., Disp: 30 capsule, Rfl: 3    gabapentin (Neurontin) 600 MG tablet, Take 1.5 tablets (900 mg total) by mouth 3 (three) times a day Do not start before May 16, 2025., Disp: 135 tablet, Rfl: 3    levothyroxine 25 mcg tablet, Take 25 mcg by mouth in the morning., Disp: , Rfl:     levothyroxine 300 MCG tablet, Take 300 mcg by mouth in the morning., Disp: , Rfl:     Tess 0.25-35 MG-MCG per tablet, Take 1 tablet by mouth in the morning., Disp: , Rfl:     OLANZapine (ZyPREXA) 10 mg tablet, Take 1 tablet (10 mg total) by mouth daily at bedtime, Disp: 30 tablet, Rfl: 3    prazosin (MINIPRESS) 1 mg capsule, Take 3 capsules (3 mg total) by mouth daily at bedtime, Disp: 90 capsule, Rfl: 3    traZODone (DESYREL) 150 mg tablet, Take 0.5 tablets (75 mg total) by mouth daily at bedtime Do not start before May 16, 2025., Disp: 45 tablet, Rfl: 3  [4] No Known Allergies  [5]   Social History  Socioeconomic History    Marital status: Single   Tobacco  Use    Smoking status: Former     Current packs/day: 1.00     Types: Cigarettes    Smokeless tobacco: Never   Vaping Use    Vaping status: Some Days    Substances: Nicotine, Flavoring   Substance and Sexual Activity    Alcohol use: Not Currently     Comment: social drinker    Drug use: No    Sexual activity: Not Currently     Partners: Male     Birth control/protection: Abstinence     Social Drivers of Health      Received from [facility]    [email or web address]   [6]   Family History  Problem Relation Name Age of Onset    Bipolar disorder Mother Hilda Tidwell         bpd    Alcohol abuse Paternal Uncle Wade Locke          from throat cancer    Depression Paternal Aunt Kayla Locke

## 2025-07-01 ENCOUNTER — TELEPHONE (OUTPATIENT)
Dept: PSYCHIATRY | Facility: CLINIC | Age: 35
End: 2025-07-01

## 2025-07-01 ENCOUNTER — OFFICE VISIT (OUTPATIENT)
Dept: PSYCHIATRY | Facility: CLINIC | Age: 35
End: 2025-07-01
Payer: COMMERCIAL

## 2025-07-01 ENCOUNTER — TELEPHONE (OUTPATIENT)
Age: 35
End: 2025-07-01

## 2025-07-01 DIAGNOSIS — F43.10 POST TRAUMATIC STRESS DISORDER (PTSD): ICD-10-CM

## 2025-07-01 DIAGNOSIS — F41.1 GAD (GENERALIZED ANXIETY DISORDER): ICD-10-CM

## 2025-07-01 DIAGNOSIS — F51.05 INSOMNIA DUE TO OTHER MENTAL DISORDER: ICD-10-CM

## 2025-07-01 DIAGNOSIS — F90.0 ATTENTION DEFICIT HYPERACTIVITY DISORDER, INATTENTIVE TYPE: ICD-10-CM

## 2025-07-01 DIAGNOSIS — F99 INSOMNIA DUE TO OTHER MENTAL DISORDER: ICD-10-CM

## 2025-07-01 DIAGNOSIS — F31.9 BIPOLAR I DISORDER WITH MOOD-INCONGRUENT PSYCHOTIC FEATURES (HCC): Primary | ICD-10-CM

## 2025-07-01 PROCEDURE — 99214 OFFICE O/P EST MOD 30 MIN: CPT | Performed by: NURSE PRACTITIONER

## 2025-07-01 RX ORDER — CLONAZEPAM 0.5 MG/1
0.5 TABLET ORAL 3 TIMES DAILY
Qty: 90 TABLET | Refills: 1 | Status: SHIPPED | OUTPATIENT
Start: 2025-07-01

## 2025-07-01 RX ORDER — DEXTROAMPHETAMINE SACCHARATE, AMPHETAMINE ASPARTATE MONOHYDRATE, DEXTROAMPHETAMINE SULFATE AND AMPHETAMINE SULFATE 7.5; 7.5; 7.5; 7.5 MG/1; MG/1; MG/1; MG/1
30 CAPSULE, EXTENDED RELEASE ORAL EVERY MORNING
Qty: 30 CAPSULE | Refills: 0 | Status: SHIPPED | OUTPATIENT
Start: 2025-07-11

## 2025-07-01 RX ORDER — TRAZODONE HYDROCHLORIDE 150 MG/1
75 TABLET ORAL
Qty: 45 TABLET | Refills: 3 | Status: SHIPPED | OUTPATIENT
Start: 2025-07-01

## 2025-07-01 NOTE — PSYCH
MEDICATION MANAGEMENT NOTE    Name: Janes Locke      : 1990      MRN: [id number]  Encounter Provider: GENESIS Lemons  Encounter Date: 2025   Encounter department: [facility]    Insurance: Payor: BLUE CROSS / Plan: MISC BLUE CROSS / Product Type: Blue Fee for Service /      Reason for Visit: Medication management follow-up visit  Assessment & Plan  Attention deficit hyperactivity disorder, inattentive type    Orders:    amphetamine-dextroamphetamine (ADDERALL XR, 30MG,) 30 MG 24 hr capsule; Take 1 capsule (30 mg total) by mouth every morning Max Daily Amount: 30 mg Do not start before 2025.    GOOD (generalized anxiety disorder)    Orders:    clonazePAM (KlonoPIN) 0.5 mg tablet; Take 1 tablet (0.5 mg total) by mouth 3 (three) times a day    Insomnia due to other mental disorder    Orders:    traZODone (DESYREL) 150 mg tablet; Take 0.5 tablets (75 mg total) by mouth daily at bedtime as needed for sleep    Post traumatic stress disorder (PTSD)         Bipolar I disorder with mood-incongruent psychotic features (HCC)             Treatment Recommendations:    Educated about diagnosis and treatment modalities. Verbalizes understanding and agreement with the treatment plan.  Discussed self monitoring of symptoms, and symptom monitoring tools.  Discussed medications and if treatment adjustment was needed or desired.  Aware of 24 hour and weekend coverage for urgent situations accessed by calling [facility] main practice number  I am scheduling this patient out for greater than 3 months: No    Medications Risks/Benefits:      Risks, Benefits And Possible Side Effects Of Medications:    Risks, benefits, and possible side effects of medications explained to Georgina and she (or legal representative) verbalizes understanding and agreement for treatment.    Controlled Medication Discussion:     Georgina has been filling controlled prescriptions on  "time as prescribed according to [place] Prescription Drug Monitoring Program.      History of Present Illness     CC: Georgina presents today for follow up on Bipolar disorder, anxiety disorder, ADHD       Georgina presents in person today.  She has paperwork for intermittently for work.  Her mood is bright.  Her significant other is with her, they are both pleasant and laughing easily.  She does report over the past several weeks she decided \"to go without Zyprexa again \".  There was some significant mood lability and irritability and she is now restarted her Zyprexa.  She does acknowledge that at least once a year she convinces herself she does not need Zyprexa, then always regrets later.  She otherwise reports mood is good, some work frustrations, made worse by her not taking her Zyprexa.  But she seems optimistic she can make necessary adjustments.  Continues to report concentration focus improved with the addition of Adderall.  We discussed nighttime, specifically her trazodone.  I did tell her trazodone can be as needed, she does not have to take it at night especially before a therapy session.  She takes her bedtime medications early because she wakes up at 3:45 in the morning.      Med Compliance: Can be variable and selective as noted above    Since our last visit, overall symptoms have been fluctuating with recent worsening.  Due to noncompliance    HPI ROS:     Medication Side Effects: Denies  Depression: 5/10 (10 worst)  Anxiety: 5/10 (10 worst)  Safety concerns (SI, HI, others): Denies any thoughts of harming self or others  Sleep: Adequate  Energy: Variable  Appetite: Variable  Weight Change: No change    Georgina denies any side effects from medications unless noted above.    Review Of Systems: A review of systems is obtained and is negative except for the pertinent positives listed in HPI/Subjective above.      Current Rating Scores:     Current PHQ-9   PHQ-2/9 Depression Screening           Current GOOD-7 "     Areas of Improvement: reviewed in HPI/Subjective Section and reviewed in Assessment and Plan Section      Past Medical History[1]  Past Surgical History[2]  Allergies: Allergies[3]    Current Outpatient Medications   Medication Instructions    albuterol (PROVENTIL HFA,VENTOLIN HFA) 90 mcg/act inhaler 2 puffs    [START ON 7/11/2025] amphetamine-dextroamphetamine (ADDERALL XR, 30MG,) 30 MG 24 hr capsule 30 mg, Oral, Every morning    clonazePAM (KLONOPIN) 0.5 mg, Oral, 3 times daily    ergocalciferol (VITAMIN D2) 50,000 Units, Weekly    FLUoxetine (PROZAC) 40 mg, Oral, Daily    gabapentin (NEURONTIN) 900 mg, Oral, 3 times daily    levothyroxine 25 mcg, Daily    levothyroxine 300 mcg, Daily    Tess 0.25-35 MG-MCG per tablet 1 tablet, Daily    OLANZapine (ZYPREXA) 10 mg, Oral, Daily at bedtime    prazosin (MINIPRESS) 3 mg, Oral, Daily at bedtime    traZODone (DESYREL) 75 mg, Oral, Daily at bedtime PRN        Substance Abuse History:    Tobacco, Alcohol and Drug Use History     Tobacco Use    Smoking status: Former     Current packs/day: 1.00     Types: Cigarettes    Smokeless tobacco: Never   Vaping Use    Vaping status: Some Days    Substances: Nicotine, Flavoring   Substance Use Topics    Alcohol use: Not Currently     Comment: social drinker    Drug use: No          Social History:    Social History     Socioeconomic History    Marital status: Single     Spouse name: Not on file    Number of children: Not on file    Years of education: Not on file    Highest education level: Not on file   Occupational History    Not on file   Other Topics Concern    Not on file   Social History Narrative    Not on file        Family Psychiatric History:     Family History[4]    Medical History Reviewed by provider this encounter:  Tobacco  Allergies  Meds  Problems  Med Hx  Surg Hx  Fam Hx          Objective   LMP 06/08/2025      Mental Status Evaluation:    Appearance age appropriate, casually dressed   Behavior  cooperative, calm   Speech normal rate, normal volume, normal pitch, spontaneous   Mood improved   Affect brighter   Thought Processes organized, goal directed   Thought Content no overt delusions   Perceptual Disturbances: no auditory hallucinations, no visual hallucinations   Abnormal Thoughts  Risk Potential Suicidal ideation - None  Homicidal ideation - None  Potential for aggression - No   Orientation oriented to person, place, time/date, and situation   Memory recent and remote memory grossly intact   Consciousness alert and awake   Attention Span Concentration Span attention span and concentration are age appropriate   Intellect appears to be of average intelligence   Insight improving   Judgement intact and improving   Muscle Strength and  Gait normal muscle strength and normal muscle tone, normal gait and normal balance   Motor activity no abnormal movements   Language no difficulty naming common objects, no difficulty repeating a phrase, no difficulty writing a sentence   Fund of Knowledge adequate knowledge of current events  adequate fund of knowledge regarding past history  adequate fund of knowledge regarding vocabulary        Laboratory Results: I have personally reviewed all pertinent laboratory/tests results    Last Visit Labs:   Procedure visit on 06/20/2025   Component Date Value    URINE HCG 06/20/2025 negative        Suicide/Homicide Risk Assessment:    Risk of Harm to Self:  The following ratings are based on assessment at the time of the interview  Recent Specific Risk Factors include: diagnosis of mood disorder  Protective Factors: no current suicidal ideation, access to mental health treatment, compliant with mental health treatment, connection to community  Based on today's assessment, Georgina presents the following risk of harm to self: minimal    Risk of Harm to Others:  The following ratings are based on assessment at the time of the interview  Recent Specific Risk Factors include:  "none  Based on today's assessment, Georgian presents the following risk of harm to others: none    The following interventions are recommended: Continue medication management. No other intervention changes indicated at this time.    Psychotherapy Provided:     Individual psychotherapy provided: Yes    Reassurance and supportive therapy provided.     Treatment Plan:    Completed and signed during the session: Not applicable - Treatment Plan not due at this session.    Goals: Progress towards Treatment Plan goals - Yes, progressing, as evidenced by subjective findings in HPI/Subjective Section and in Assessment and Plan Section    Depression Follow-up Plan Completed: Yes    Note Share:    This note was not shared with the patient due to this is a psychotherapy note    Administrative Statements   Administrative Statements   I have spent a total time of 30 minutes in caring for this patient on the day of the visit/encounter including Counseling / Coordination of care and Communicating with other healthcare professionals completing paperwork for FMLA    Portions of the record may have been created with voice recognition software. Occasional wrong word or \"sound a like\" substitutions may have occurred due to the inherent limitations of voice recognition software. Read the chart carefully and recognize, using context, where substitutions have occurred.    GENESIS Lemons 07/01/25         [1]   Past Medical History:  Diagnosis Date    Addiction to drug (HCC)     No    ADHD (attention deficit hyperactivity disorder)     Anxiety     Bipolar 1 disorder (HCC)     Cancer (HCC)     thyroid    Depression     Disease of thyroid gland     Hallucination     Yes    Impulse control disorder     Obesity     300lb    Pre-diabetes     Psychiatric illness     Psychosis (HCC)     Skin picking habit 08/18/2021    Sleep difficulties     Not at the moment but yes    Suicidal ideations     Thyroid disease    [2]   Past Surgical " History:  Procedure Laterality Date    IR LUMBAR PUNCTURE  2023    NECK SURGERY Left     hygroma removal    US GUIDED THYROID BIOPSY      normal. Onset:    [3] No Known Allergies  [4]   Family History  Problem Relation Name Age of Onset    Bipolar disorder Mother Hilda Tidwell         bpd    Alcohol abuse Paternal Uncle Wade Locke          from throat cancer    Depression Paternal Aunt Kayla Locke

## 2025-07-01 NOTE — TELEPHONE ENCOUNTER
Avoid spicy, greasy foods  Avoid caffeine, citric acid, chocolate, peppermint, and carbonated drinks  Do not lay down within 3 hours of eating  Increase fluid to 64 ounces daily  Avoid antiinflammatory medications such as motrin, advil, aleve, ibuprofen, and BC powder     FMLA paperwork work were completed by provider during today's apppt.     The patient signed the [facility] and forms were faxed.

## 2025-07-01 NOTE — ASSESSMENT & PLAN NOTE
Orders:    amphetamine-dextroamphetamine (ADDERALL XR, 30MG,) 30 MG 24 hr capsule; Take 1 capsule (30 mg total) by mouth every morning Max Daily Amount: 30 mg Do not start before July 11, 2025.    
  Orders:    clonazePAM (KlonoPIN) 0.5 mg tablet; Take 1 tablet (0.5 mg total) by mouth 3 (three) times a day    
  Orders:    traZODone (DESYREL) 150 mg tablet; Take 0.5 tablets (75 mg total) by mouth daily at bedtime as needed for sleep    
2

## 2025-07-01 NOTE — TELEPHONE ENCOUNTER
Janes Locke  is requesting a letter for  work to be completed.     Date due: 7/1/2025    Special notes: Pt called in and asked for a note for work, she stated she took off 6/30/2025 and 7/1/2025 for her mental health. She will pick them up at her appt later today.

## 2025-07-16 ENCOUNTER — TELEMEDICINE (OUTPATIENT)
Dept: PSYCHIATRY | Facility: CLINIC | Age: 35
End: 2025-07-16
Payer: COMMERCIAL

## 2025-07-16 DIAGNOSIS — F41.1 GAD (GENERALIZED ANXIETY DISORDER): ICD-10-CM

## 2025-07-16 DIAGNOSIS — F43.10 POST TRAUMATIC STRESS DISORDER (PTSD): ICD-10-CM

## 2025-07-16 DIAGNOSIS — F90.0 ATTENTION DEFICIT HYPERACTIVITY DISORDER, INATTENTIVE TYPE: ICD-10-CM

## 2025-07-16 DIAGNOSIS — F31.9 BIPOLAR I DISORDER WITH MOOD-INCONGRUENT PSYCHOTIC FEATURES (HCC): Primary | ICD-10-CM

## 2025-07-16 PROCEDURE — 90837 PSYTX W PT 60 MINUTES: CPT | Performed by: SOCIAL WORKER

## 2025-07-16 NOTE — PSYCH
"Virtual Regular VisitName: Janes Locke      : 1990      MRN: [id number]  Encounter Provider: Billy Pablo  Encounter Date: 2025   Encounter department: [facility]  :  Assessment & Plan  Bipolar I disorder with mood-incongruent psychotic features (HCC)         Post traumatic stress disorder (PTSD)         GOOD (generalized anxiety disorder)         Attention deficit hyperactivity disorder, inattentive type             Goals addressed in session: Goal 1 and Goal 2     DATA: MSW met with Janes for session.  She stopped taking her Zyprexa again.  She has a habit of doing this.   \"She feels better, stops her meds, spirals, then, starts her meds again.\"  She was only off of her meds for a week this time before she realized the spiral.  This is progress. She remembered the coping skills to tell herself when she strarts to spiral. She is back together with her girlfriend.  Her girlfriend has returned to doing her meds for her.  She has a history of doing her meds  She has been having issues at work with a supervisor and his \"cronies.\"  She had to report them to HR.        During this session, this clinician used the following therapeutic modalities: Client-centered Therapy, Cognitive Behavioral Therapy, Solution-Focused Therapy, and Supportive Psychotherapy    Substance Abuse was not addressed during this session. If the client is diagnosed with a co-occurring substance use disorder, please indicate any changes in the frequency or amount of use: na. Stage of change for addressing substance use diagnoses: No substance use/Not applicable    ASSESSMENT:  Janes presents with a Euthymic/ normal mood. Janes's affect is Normal range and intensity, which is congruent, with their mood and the content of the session. The client has made progress on their goals as evidenced by see above.    Janes presents with a none risk of suicide, none risk of self-harm, and none risk " "of harm to others.    For any risk assessment that surpasses a \"low\" rating, a safety plan must be developed.    A safety plan was indicated: no  If yes, describe in detail na    PLAN: Between sessions, Janes will tx plan goals.. At the next session, the therapist will use Client-centered Therapy, Cognitive Behavioral Therapy, Solution-Focused Therapy, and Supportive Psychotherapy to address tx plan goals and issues that arise in between session.    Behavioral Health Treatment Plan [facility]: Diagnosis and Treatment Plan explained to Janes, Janes relates understanding diagnosis and is agreeable to Treatment Plan. Yes     Depression Follow-up Plan Completed: No     Reason for visit is   Chief Complaint   Patient presents with    Virtual Regular Visit      Recent Visits  No visits were found meeting these conditions.  Showing recent visits within past 7 days and meeting all other requirements  Today's Visits  Date Type Provider Dept   07/16/25 Telemedicine Billy Pablo [facility]   Showing today's visits and meeting all other requirements  Future Appointments  No visits were found meeting these conditions.  Showing future appointments within next 150 days and meeting all other requirements     History of Present Illness     HPI    Past Medical History   Past Medical History:   Diagnosis Date    Addiction to drug (HCC)     No    ADHD (attention deficit hyperactivity disorder)     Anxiety     Bipolar 1 disorder (HCC)     Cancer (HCC)     thyroid    Depression     Disease of thyroid gland     Hallucination     Yes    Impulse control disorder     Obesity     300lb    Pre-diabetes     Psychiatric illness     Psychosis (HCC)     Skin picking habit 08/18/2021    Sleep difficulties     Not at the moment but yes    Suicidal ideations     Thyroid disease      Past Surgical History:   Procedure Laterality Date    IR LUMBAR PUNCTURE  03/09/2023    NECK SURGERY Left     hygroma removal    US GUIDED " THYROID BIOPSY      normal. Onset: 2012     Current Outpatient Medications   Medication Instructions    albuterol (PROVENTIL HFA,VENTOLIN HFA) 90 mcg/act inhaler 2 puffs    amphetamine-dextroamphetamine (ADDERALL XR, 30MG,) 30 MG 24 hr capsule 30 mg, Oral, Every morning    clonazePAM (KLONOPIN) 0.5 mg, Oral, 3 times daily    ergocalciferol (VITAMIN D2) 50,000 Units, Weekly    FLUoxetine (PROZAC) 40 mg, Oral, Daily    gabapentin (NEURONTIN) 900 mg, Oral, 3 times daily    levothyroxine 25 mcg, Daily    levothyroxine 300 mcg, Daily    Tess 0.25-35 MG-MCG per tablet 1 tablet, Daily    OLANZapine (ZYPREXA) 10 mg, Oral, Daily at bedtime    prazosin (MINIPRESS) 3 mg, Oral, Daily at bedtime    traZODone (DESYREL) 75 mg, Oral, Daily at bedtime PRN     No Known Allergies    Objective   LMP 06/08/2025     Video Exam  Physical Exam     Administrative Statements   Encounter provider Billy Pablo    The Patient is located at Home and in the following state in which I hold an active license [place].    The patient was identified by name and date of birth. Janes Locke was informed that this is a telemedicine visit and that the visit is being conducted through the Epic Embedded platform. She agrees to proceed..  My office door was closed. No one else was in the room.  She acknowledged consent and understanding of privacy and security of the video platform. The patient has agreed to participate and understands they can discontinue the visit at any time.    I have spent a total time of see below minutes in caring for this patient on the day of the visit/encounter including Prognosis, Patient and family education, Impressions, Counseling / Coordination of care, and Obtaining or reviewing history  , not including the time spent for establishing the audio/video connection.    Visit Time  Start Time: 1711  Stop Time: 1805  Total Visit Time: 54 minutes

## 2025-07-29 ENCOUNTER — TELEMEDICINE (OUTPATIENT)
Dept: PSYCHIATRY | Facility: CLINIC | Age: 35
End: 2025-07-29
Payer: COMMERCIAL

## 2025-07-29 ENCOUNTER — OFFICE VISIT (OUTPATIENT)
Dept: GYNECOLOGY | Facility: CLINIC | Age: 35
End: 2025-07-29
Payer: COMMERCIAL

## 2025-07-29 ENCOUNTER — ULTRASOUND (OUTPATIENT)
Dept: GYNECOLOGY | Facility: CLINIC | Age: 35
End: 2025-07-29
Payer: COMMERCIAL

## 2025-07-29 VITALS — BODY MASS INDEX: 50.04 KG/M2 | SYSTOLIC BLOOD PRESSURE: 116 MMHG | WEIGHT: 293 LBS | DIASTOLIC BLOOD PRESSURE: 70 MMHG

## 2025-07-29 DIAGNOSIS — F90.0 ATTENTION DEFICIT HYPERACTIVITY DISORDER, INATTENTIVE TYPE: ICD-10-CM

## 2025-07-29 DIAGNOSIS — N80.03 ADENOMYOSIS: ICD-10-CM

## 2025-07-29 DIAGNOSIS — F41.1 GAD (GENERALIZED ANXIETY DISORDER): ICD-10-CM

## 2025-07-29 DIAGNOSIS — N83.202 BILATERAL OVARIAN CYSTS: ICD-10-CM

## 2025-07-29 DIAGNOSIS — N93.9 ABNORMAL UTERINE BLEEDING: ICD-10-CM

## 2025-07-29 DIAGNOSIS — F43.10 POST TRAUMATIC STRESS DISORDER (PTSD): Primary | ICD-10-CM

## 2025-07-29 DIAGNOSIS — D25.9 UTERINE LEIOMYOMA, UNSPECIFIED LOCATION: ICD-10-CM

## 2025-07-29 DIAGNOSIS — T83.32XA INTRAUTERINE CONTRACEPTIVE DEVICE THREADS LOST, INITIAL ENCOUNTER: Primary | ICD-10-CM

## 2025-07-29 DIAGNOSIS — N83.201 BILATERAL OVARIAN CYSTS: ICD-10-CM

## 2025-07-29 DIAGNOSIS — Z97.5 IUD (INTRAUTERINE DEVICE) IN PLACE: Primary | ICD-10-CM

## 2025-07-29 PROCEDURE — 76856 US EXAM PELVIC COMPLETE: CPT | Performed by: OBSTETRICS & GYNECOLOGY

## 2025-07-29 PROCEDURE — 90837 PSYTX W PT 60 MINUTES: CPT | Performed by: SOCIAL WORKER

## 2025-07-29 PROCEDURE — 99214 OFFICE O/P EST MOD 30 MIN: CPT | Performed by: OBSTETRICS & GYNECOLOGY

## 2025-08-01 DIAGNOSIS — F90.0 ATTENTION DEFICIT HYPERACTIVITY DISORDER, INATTENTIVE TYPE: ICD-10-CM

## 2025-08-01 RX ORDER — DEXTROAMPHETAMINE SACCHARATE, AMPHETAMINE ASPARTATE MONOHYDRATE, DEXTROAMPHETAMINE SULFATE AND AMPHETAMINE SULFATE 7.5; 7.5; 7.5; 7.5 MG/1; MG/1; MG/1; MG/1
30 CAPSULE, EXTENDED RELEASE ORAL EVERY MORNING
Qty: 30 CAPSULE | Refills: 0 | Status: SHIPPED | OUTPATIENT
Start: 2025-08-01

## 2025-08-08 ENCOUNTER — OFFICE VISIT (OUTPATIENT)
Dept: FAMILY MEDICINE CLINIC | Facility: CLINIC | Age: 35
End: 2025-08-08
Payer: COMMERCIAL

## 2025-08-08 VITALS
BODY MASS INDEX: 47.09 KG/M2 | SYSTOLIC BLOOD PRESSURE: 120 MMHG | WEIGHT: 293 LBS | HEART RATE: 104 BPM | OXYGEN SATURATION: 96 % | HEIGHT: 66 IN | RESPIRATION RATE: 18 BRPM | TEMPERATURE: 99 F | DIASTOLIC BLOOD PRESSURE: 69 MMHG

## 2025-08-08 DIAGNOSIS — C73 MALIGNANT TUMOR OF THYROID GLAND (HCC): ICD-10-CM

## 2025-08-08 DIAGNOSIS — Z23 ENCOUNTER FOR IMMUNIZATION: ICD-10-CM

## 2025-08-08 DIAGNOSIS — H47.10 PAPILLEDEMA: ICD-10-CM

## 2025-08-08 DIAGNOSIS — E89.0 POSTOPERATIVE HYPOTHYROIDISM: ICD-10-CM

## 2025-08-08 DIAGNOSIS — Z91.89 AT RISK FOR OBSTRUCTIVE SLEEP APNEA: ICD-10-CM

## 2025-08-08 DIAGNOSIS — Z00.00 ANNUAL PHYSICAL EXAM: Primary | ICD-10-CM

## 2025-08-08 PROCEDURE — 99395 PREV VISIT EST AGE 18-39: CPT

## 2025-08-08 PROCEDURE — 90471 IMMUNIZATION ADMIN: CPT

## 2025-08-08 PROCEDURE — 90651 9VHPV VACCINE 2/3 DOSE IM: CPT

## 2025-08-08 PROCEDURE — 99214 OFFICE O/P EST MOD 30 MIN: CPT

## 2025-08-08 RX ORDER — LEVOTHYROXINE SODIUM 25 UG/1
25 TABLET ORAL DAILY
Qty: 30 TABLET | Refills: 5 | Status: SHIPPED | OUTPATIENT
Start: 2025-08-08 | End: 2026-02-04

## 2025-08-08 RX ORDER — LEVOTHYROXINE SODIUM 300 UG/1
300 TABLET ORAL DAILY
Qty: 30 TABLET | Refills: 5 | Status: SHIPPED | OUTPATIENT
Start: 2025-08-08 | End: 2026-02-04

## 2025-08-13 ENCOUNTER — OFFICE VISIT (OUTPATIENT)
Dept: SLEEP CENTER | Facility: CLINIC | Age: 35
End: 2025-08-13
Payer: COMMERCIAL

## 2025-08-13 PROBLEM — G47.39 SLEEP APNEA-LIKE BEHAVIOR: Status: ACTIVE | Noted: 2025-08-13

## 2025-08-14 ENCOUNTER — TELEPHONE (OUTPATIENT)
Age: 35
End: 2025-08-14

## 2025-08-22 ENCOUNTER — TELEPHONE (OUTPATIENT)
Dept: NEUROLOGY | Facility: CLINIC | Age: 35
End: 2025-08-22

## 2025-08-22 ENCOUNTER — TELEMEDICINE (OUTPATIENT)
Dept: NEUROLOGY | Facility: CLINIC | Age: 35
End: 2025-08-22
Payer: COMMERCIAL

## 2025-08-22 DIAGNOSIS — G93.2 IIH (IDIOPATHIC INTRACRANIAL HYPERTENSION): ICD-10-CM

## 2025-08-22 DIAGNOSIS — Z86.69 HISTORY OF MIGRAINE: ICD-10-CM

## 2025-08-22 DIAGNOSIS — H47.10 PAPILLEDEMA: Primary | ICD-10-CM

## 2025-08-22 PROCEDURE — 99213 OFFICE O/P EST LOW 20 MIN: CPT

## 2025-08-22 RX ORDER — ACETAZOLAMIDE 250 MG/1
TABLET ORAL
Qty: 42 TABLET | Refills: 0 | Status: SHIPPED | OUTPATIENT
Start: 2025-08-22 | End: 2025-09-05

## 2025-08-22 RX ORDER — ACETAZOLAMIDE 250 MG/1
500 TABLET ORAL 2 TIMES DAILY
Qty: 60 TABLET | Refills: 3 | Status: SHIPPED | OUTPATIENT
Start: 2025-08-22